# Patient Record
Sex: FEMALE | Race: WHITE | Employment: OTHER | URBAN - METROPOLITAN AREA
[De-identification: names, ages, dates, MRNs, and addresses within clinical notes are randomized per-mention and may not be internally consistent; named-entity substitution may affect disease eponyms.]

---

## 2017-03-21 DIAGNOSIS — K58.9 IRRITABLE BOWEL SYNDROME WITHOUT DIARRHEA: ICD-10-CM

## 2017-03-21 NOTE — TELEPHONE ENCOUNTER
Senna Lax 8.6 mg tab       Last Written Prescription Date: 03/10/16  Last Fill Quantity: 120,  # refills: 6   [place] Office Visit with [facility] [place] or [facility] prescribing provider: 08/03/16 Dr. Craig

## 2017-03-22 RX ORDER — SENNOSIDES A AND B 8.6 MG/1
2 TABLET, FILM COATED ORAL 2 TIMES DAILY
Qty: 120 TABLET | Refills: 6 | Status: SHIPPED | OUTPATIENT
Start: 2017-03-22 | End: 2017-06-26

## 2017-03-22 NOTE — TELEPHONE ENCOUNTER
Prescription approved per [facility] Refill Protocol  Hailey Ruiz RN BS

## 2017-03-28 ENCOUNTER — TELEPHONE (OUTPATIENT)
Dept: FAMILY MEDICINE | Facility: CLINIC | Age: 72
End: 2017-03-28

## 2017-03-28 DIAGNOSIS — I10 BENIGN ESSENTIAL HYPERTENSION: Primary | ICD-10-CM

## 2017-03-28 RX ORDER — LISINOPRIL 40 MG/1
40 TABLET ORAL DAILY
Qty: 90 TABLET | Refills: 0 | Status: SHIPPED | OUTPATIENT
Start: 2017-03-28 | End: 2017-06-26

## 2017-03-28 NOTE — TELEPHONE ENCOUNTER
captopril (CAPOTEN) 50 MG tablet 180 tablet 3 8/3/2016  No   Sig: Take 1 tablet (50 mg) by mouth 2 times daily     Pt calling in, states her Capoten is not covered any longer (was >$200)  Wondering if she could get rx for Lisinopril instead    Route to     CB# [phone] OK to JERRI Ruiz RN, BS  Clinical Nurse Triage.

## 2017-04-07 DIAGNOSIS — G89.29 CHRONIC LOW BACK PAIN WITH SCIATICA, SCIATICA LATERALITY UNSPECIFIED, UNSPECIFIED BACK PAIN LATERALITY: ICD-10-CM

## 2017-04-07 DIAGNOSIS — M54.40 CHRONIC LOW BACK PAIN WITH SCIATICA, SCIATICA LATERALITY UNSPECIFIED, UNSPECIFIED BACK PAIN LATERALITY: ICD-10-CM

## 2017-04-07 RX ORDER — GABAPENTIN 300 MG/1
CAPSULE ORAL
Qty: 90 CAPSULE | Refills: 0 | Status: SHIPPED | OUTPATIENT
Start: 2017-04-07 | End: 2017-05-31

## 2017-04-07 NOTE — TELEPHONE ENCOUNTER
gabapentin (NEURONTIN) 300 MG capsule      Sig: Take 1 capsule (300 mg) by mouth 3 times daily Due for fasting       Last Written Prescription Date: 12/7/16  Last Quantity: 90, # refills: 3  Last Office Visit with [facility], [facility] or [facility] prescribing provider: 8/3/2016       Creatinine   Date Value Ref Range Status   08/03/2016 0.72 0.52 - 1.04 mg/dL Final     Lab Results   Component Value Date    AST 15 08/03/2016     Lab Results   Component Value Date    ALT 23 08/03/2016     BP Readings from Last 3 Encounters:   08/03/16 132/74   06/23/15 110/64   06/12/15 118/64         Routing refill request to provider for review/approval because:  Drug not on the [facility], [facility] or  [facility] refill protocol or controlled substance    RAJWINDER Moyer  April 7, 2017  8:22 AM

## 2017-04-07 NOTE — LETTER
[facility]  [id number] [place] [id number] [phone]          April 7, 2017    Claudia Simon                                                                                                                     [address] [facility] [address] [place] [id number]            Dear Claudia,    After reviewing your chart, it has come to our attention that you are due for an office visit for a Diabetes check and medication review.    Please call our clinic at: [phone] to schedule an appointment.              Sincerely,         Sekou Craig MD/jesusita

## 2017-04-07 NOTE — TELEPHONE ENCOUNTER
Routing refill request to provider for review/approval because:  Drug not on the [facility] refill protocol     Letter sent, patient due for office visit.    Meghan Gunter RN

## 2017-05-16 ENCOUNTER — TELEPHONE (OUTPATIENT)
Dept: FAMILY MEDICINE | Facility: CLINIC | Age: 72
End: 2017-05-16

## 2017-05-16 NOTE — TELEPHONE ENCOUNTER
Panel Management Review      Patient has the following on her problem list:     Diabetes    ASA: Passed    Last A1C  Lab Results   Component Value Date    A1C 7.4 06/23/2015     A1C tested: Passed    Last LDL:    Lab Results   Component Value Date    CHOL 247 08/03/2016     Lab Results   Component Value Date    HDL 31 08/03/2016     Lab Results   Component Value Date     08/03/2016     Lab Results   Component Value Date    TRIG 360 08/03/2016     Lab Results   Component Value Date    CHOLHDLRATIO 6.7 06/12/2015     Lab Results   Component Value Date    NHDL 216 08/03/2016       Is the patient on a Statin? YES             Is the patient on Aspirin? YES    Medications     HMG CoA Reductase Inhibitors    atorvastatin (LIPITOR) 10 MG tablet    Salicylates    ASPIRIN 81 MG OR TABS          Last three blood pressure readings:  BP Readings from Last 3 Encounters:   08/03/16 132/74   06/23/15 110/64   06/12/15 118/64       Date of last diabetes office visit:      Tobacco History:     History   Smoking Status     Former Smoker     Years: 34.00     Quit date: 2/12/2009   Smokeless Tobacco     Never Used           Composite cancer screening  Chart review shows that this patient is due/due soon for the following Mammogram  Summary:    Patient is due/failing the following:   MAMMOGRAM    Action needed:   Patient needs office visit for Mammo.    Type of outreach:    Phone, spoke to patient.  sent to scheduling to make a Mammo Appt    Questions for provider review:    Jarrod Watson      Chart routed to none  .

## 2017-05-31 DIAGNOSIS — M54.40 CHRONIC LOW BACK PAIN WITH SCIATICA, SCIATICA LATERALITY UNSPECIFIED, UNSPECIFIED BACK PAIN LATERALITY: ICD-10-CM

## 2017-05-31 DIAGNOSIS — G89.29 CHRONIC LOW BACK PAIN WITH SCIATICA, SCIATICA LATERALITY UNSPECIFIED, UNSPECIFIED BACK PAIN LATERALITY: ICD-10-CM

## 2017-06-01 RX ORDER — GABAPENTIN 300 MG/1
CAPSULE ORAL
Qty: 90 CAPSULE | Refills: 0 | Status: SHIPPED | OUTPATIENT
Start: 2017-06-01 | End: 2017-06-26

## 2017-06-01 NOTE — TELEPHONE ENCOUNTER
Gabapentin 300mg       Last Written Prescription Date: 04/07/17  Last Fill Quantity: 90,  # refills: 0   Last Office Visit with [facility], P or [facility] prescribing provider: 08/03/16 dr. Craig                                         Next 5 appointments (look out 90 days)     Jun 26, 2017  9:30 AM CDT   SHORT with Sekou Craig MD   [facility]    [address] [place] [id number] [phone]

## 2017-06-01 NOTE — TELEPHONE ENCOUNTER
Routing refill request to provider for review/approval because:  Drug not on the [facility] refill protocol     Hailey Ruiz RN, BS  Clinical Nurse Triage.

## 2017-06-26 ENCOUNTER — OFFICE VISIT (OUTPATIENT)
Dept: FAMILY MEDICINE | Facility: CLINIC | Age: 72
End: 2017-06-26
Payer: MEDICARE

## 2017-06-26 ENCOUNTER — RADIANT APPOINTMENT (OUTPATIENT)
Dept: MAMMOGRAPHY | Facility: CLINIC | Age: 72
End: 2017-06-26
Payer: MEDICARE

## 2017-06-26 VITALS
DIASTOLIC BLOOD PRESSURE: 78 MMHG | OXYGEN SATURATION: 96 % | HEART RATE: 77 BPM | TEMPERATURE: 97.7 F | WEIGHT: 165.1 LBS | BODY MASS INDEX: 28.19 KG/M2 | HEIGHT: 64 IN | SYSTOLIC BLOOD PRESSURE: 129 MMHG | RESPIRATION RATE: 14 BRPM

## 2017-06-26 DIAGNOSIS — G89.29 CHRONIC LOW BACK PAIN WITH SCIATICA, SCIATICA LATERALITY UNSPECIFIED, UNSPECIFIED BACK PAIN LATERALITY: ICD-10-CM

## 2017-06-26 DIAGNOSIS — K58.1 IRRITABLE BOWEL SYNDROME WITH CONSTIPATION: ICD-10-CM

## 2017-06-26 DIAGNOSIS — I10 ESSENTIAL HYPERTENSION, BENIGN: Primary | ICD-10-CM

## 2017-06-26 DIAGNOSIS — E11.40 TYPE 2 DIABETES MELLITUS WITH DIABETIC NEUROPATHY, WITHOUT LONG-TERM CURRENT USE OF INSULIN (H): ICD-10-CM

## 2017-06-26 DIAGNOSIS — E78.5 HYPERLIPIDEMIA LDL GOAL <130: ICD-10-CM

## 2017-06-26 DIAGNOSIS — Z12.31 VISIT FOR SCREENING MAMMOGRAM: ICD-10-CM

## 2017-06-26 DIAGNOSIS — I10 BENIGN ESSENTIAL HYPERTENSION: ICD-10-CM

## 2017-06-26 DIAGNOSIS — Z23 NEED FOR TDAP VACCINATION: ICD-10-CM

## 2017-06-26 DIAGNOSIS — E66.01 MORBID OBESITY DUE TO EXCESS CALORIES (H): ICD-10-CM

## 2017-06-26 DIAGNOSIS — M54.40 CHRONIC LOW BACK PAIN WITH SCIATICA, SCIATICA LATERALITY UNSPECIFIED, UNSPECIFIED BACK PAIN LATERALITY: ICD-10-CM

## 2017-06-26 DIAGNOSIS — K58.9 IRRITABLE BOWEL SYNDROME WITHOUT DIARRHEA: ICD-10-CM

## 2017-06-26 LAB
ALBUMIN SERPL-MCNC: 3.8 G/DL (ref 3.4–5)
ALP SERPL-CCNC: 60 U/L (ref 40–150)
ALT SERPL W P-5'-P-CCNC: 26 U/L (ref 0–50)
ANION GAP SERPL CALCULATED.3IONS-SCNC: 7 MMOL/L (ref 3–14)
AST SERPL W P-5'-P-CCNC: 14 U/L (ref 0–45)
BILIRUB SERPL-MCNC: 1 MG/DL (ref 0.2–1.3)
BUN SERPL-MCNC: 14 MG/DL (ref 7–30)
CALCIUM SERPL-MCNC: 9.7 MG/DL (ref 8.5–10.1)
CHLORIDE SERPL-SCNC: 107 MMOL/L (ref 94–109)
CHOLEST SERPL-MCNC: 171 MG/DL
CO2 SERPL-SCNC: 25 MMOL/L (ref 20–32)
CREAT SERPL-MCNC: 0.78 MG/DL (ref 0.52–1.04)
CREAT UR-MCNC: 139 MG/DL
GFR SERPL CREATININE-BSD FRML MDRD: 72 ML/MIN/1.7M2
GLUCOSE SERPL-MCNC: 132 MG/DL (ref 70–99)
HBA1C MFR BLD: 6.5 % (ref 4.3–6)
HDLC SERPL-MCNC: 40 MG/DL
LDLC SERPL CALC-MCNC: 84 MG/DL
MICROALBUMIN UR-MCNC: 7 MG/L
MICROALBUMIN/CREAT UR: 4.98 MG/G CR (ref 0–25)
NONHDLC SERPL-MCNC: 131 MG/DL
POTASSIUM SERPL-SCNC: 4.6 MMOL/L (ref 3.4–5.3)
PROT SERPL-MCNC: 7.3 G/DL (ref 6.8–8.8)
SODIUM SERPL-SCNC: 139 MMOL/L (ref 133–144)
TRIGL SERPL-MCNC: 234 MG/DL
TSH SERPL DL<=0.05 MIU/L-ACNC: 4.64 MU/L (ref 0.4–4)

## 2017-06-26 PROCEDURE — 99214 OFFICE O/P EST MOD 30 MIN: CPT | Mod: 25 | Performed by: FAMILY MEDICINE

## 2017-06-26 PROCEDURE — 84443 ASSAY THYROID STIM HORMONE: CPT | Performed by: FAMILY MEDICINE

## 2017-06-26 PROCEDURE — 90715 TDAP VACCINE 7 YRS/> IM: CPT | Performed by: FAMILY MEDICINE

## 2017-06-26 PROCEDURE — G0202 SCR MAMMO BI INCL CAD: HCPCS | Mod: TC

## 2017-06-26 PROCEDURE — 80053 COMPREHEN METABOLIC PANEL: CPT | Performed by: FAMILY MEDICINE

## 2017-06-26 PROCEDURE — 90471 IMMUNIZATION ADMIN: CPT | Performed by: FAMILY MEDICINE

## 2017-06-26 PROCEDURE — 36415 COLL VENOUS BLD VENIPUNCTURE: CPT | Performed by: FAMILY MEDICINE

## 2017-06-26 PROCEDURE — 80061 LIPID PANEL: CPT | Performed by: FAMILY MEDICINE

## 2017-06-26 PROCEDURE — 82043 UR ALBUMIN QUANTITATIVE: CPT | Performed by: FAMILY MEDICINE

## 2017-06-26 PROCEDURE — 83036 HEMOGLOBIN GLYCOSYLATED A1C: CPT | Performed by: FAMILY MEDICINE

## 2017-06-26 RX ORDER — GABAPENTIN 300 MG/1
CAPSULE ORAL
Qty: 90 CAPSULE | Refills: 0 | Status: SHIPPED | OUTPATIENT
Start: 2017-06-26 | End: 2017-07-31

## 2017-06-26 RX ORDER — LISINOPRIL 40 MG/1
40 TABLET ORAL DAILY
Qty: 90 TABLET | Refills: 3 | Status: SHIPPED | OUTPATIENT
Start: 2017-06-26 | End: 2018-07-27

## 2017-06-26 RX ORDER — SENNOSIDES A AND B 8.6 MG/1
2 TABLET, FILM COATED ORAL 2 TIMES DAILY
Qty: 120 TABLET | Refills: 6 | Status: SHIPPED | OUTPATIENT
Start: 2017-06-26 | End: 2018-07-19

## 2017-06-26 RX ORDER — METOPROLOL SUCCINATE 25 MG/1
25 TABLET, EXTENDED RELEASE ORAL DAILY
Qty: 90 TABLET | Refills: 1 | Status: SHIPPED | OUTPATIENT
Start: 2017-06-26 | End: 2017-12-31

## 2017-06-26 RX ORDER — ATORVASTATIN CALCIUM 10 MG/1
10 TABLET, FILM COATED ORAL DAILY
Qty: 90 TABLET | Refills: 3 | Status: SHIPPED | OUTPATIENT
Start: 2017-06-26 | End: 2018-07-27

## 2017-06-26 NOTE — NURSING NOTE
"No chief complaint on file.      Initial /78 (BP Location: Left arm, Patient Position: Chair, Cuff Size: Adult Regular)  Pulse 77  Temp 97.7  F (36.5  C) (Oral)  Resp 14  Ht 5' 4\" (1.626 m)  Wt 165 lb 1.6 oz (74.9 kg)  SpO2 96%  BMI 28.34 kg/m2 Estimated body mass index is 28.34 kg/(m^2) as calculated from the following:    Height as of this encounter: 5' 4\" (1.626 m).    Weight as of this encounter: 165 lb 1.6 oz (74.9 kg).  Medication Reconciliation: complete   Krystian Burnett CMA       "

## 2017-06-26 NOTE — MR AVS SNAPSHOT
"              After Visit Summary   6/26/2017    Claudia Simon    MRN: [id number]           Patient Information     Date Of Birth          1945        Visit Information        Provider Department      6/26/2017 9:30 AM Sekou Craig MD [facility]        Today's Diagnoses     Essential hypertension, benign    -  1    Type 2 diabetes mellitus with diabetic neuropathy, without long-term current use of insulin (H)        Irritable bowel syndrome with constipation        Morbid obesity due to excess calories (H)        Hyperlipidemia LDL goal <130        Chronic low back pain with sciatica, sciatica laterality unspecified, unspecified back pain laterality        Benign essential hypertension        Irritable bowel syndrome without diarrhea           Follow-ups after your visit        Who to contact     If you have questions or need follow up information about today's clinic visit or your schedule please contact [facility] directly at [phone].  Normal or non-critical lab and imaging results will be communicated to you by MyChart, letter or phone within 4 business days after the clinic has received the results. If you do not hear from us within 7 days, please contact the clinic through MyChart or phone. If you have a critical or abnormal lab result, we will notify you by phone as soon as possible.  Submit refill requests through [facility] or call your pharmacy and they will forward the refill request to us. Please allow 3 business days for your refill to be completed.          Additional Information About Your Visit        MyChart Information     [facility] lets you send messages to your doctor, view your test results, renew your prescriptions, schedule appointments and more. To sign up, go to [email or web address] . Click on \"Log in\" on the left side of the screen, which will take you to the Welcome page. Then click on \"Sign up Now\" on the right side of the page. " "    You will be asked to enter the access code listed below, as well as some personal information. Please follow the directions to create your username and password.     Your access code is: [email or web address]  Expires: 2017 10:17 AM     Your access code will  in 90 days. If you need help or a new code, please call your [facility] clinic or [phone].        Care EveryWhere ID     This is your Care EveryWhere ID. This could be used by other organizations to access your [facility] medical records  [id number]        Your Vitals Were     Pulse Temperature Respirations Height Pulse Oximetry BMI (Body Mass Index)    77 97.7  F (36.5  C) (Oral) 14 5' 4\" (1.626 m) 96% 28.34 kg/m2       Blood Pressure from Last 3 Encounters:   17 129/78   16 132/74   06/23/15 110/64    Weight from Last 3 Encounters:   17 165 lb 1.6 oz (74.9 kg)   16 168 lb (76.2 kg)   06/23/15 173 lb (78.5 kg)              We Performed the Following     Albumin Random Urine Quantitative     Comprehensive metabolic panel     Hemoglobin A1c     Lipid panel reflex to direct LDL     TSH          Today's Medication Changes          These changes are accurate as of: 17 10:22 AM.  If you have any questions, ask your nurse or doctor.               These medicines have changed or have updated prescriptions.        Dose/Directions    gabapentin 300 MG capsule   Commonly known as:  NEURONTIN   This may have changed:  See the new instructions.   Used for:  Chronic low back pain with sciatica, sciatica laterality unspecified, unspecified back pain laterality   Changed by:  Sekou Craig MD        TAKE ONE CAPSULE BY MOUTH THREE TIMES DAILY   Quantity:  90 capsule   Refills:  0            Where to get your medicines      These medications were sent to [id number] - [place], [place] - [address]  [address] [facility] [address]    Hours:  9Am-9Pm (M-F) 9Am-6Pm (S&S) Phone:  [phone]     atorvastatin 10 MG " tablet    gabapentin 300 MG capsule    lisinopril 40 MG tablet    metoprolol 25 MG 24 hr tablet    senna 8.6 MG tablet                Primary Care Provider Office Phone # Fax #    Sekou Craig [phone]       [facility] [address] [facility] [address]        Equal Access to Services     ELODIA LANDEROS : Hadii aad ku hadasho Soomaali, waaxda luqadaha, qaybta kaalmada adeegyada, waxay idiin hayaan adeeg kharash la'aan ah. So [facility] [phone].    ATENCIÓN: Si habla español, tiene a catherine disposición servicios gratuitos de asistencia lingüística. Kristie al [phone].    We comply with applicable federal civil rights laws and [place] laws. We do not discriminate on the basis of race, color, national origin, age, disability sex, sexual orientation or gender identity.            Thank you!     Thank you for choosing [facility]  for your care. Our goal is always to provide you with excellent care. Hearing back from our patients is one way we can continue to improve our services. Please take a few minutes to complete the written survey that you may receive in the mail after your visit with us. Thank you!             Your Updated Medication List - Protect others around you: Learn how to safely use, store and throw away your medicines at www.disposemymeds.org.          This list is accurate as of: 6/26/17 10:22 AM.  Always use your most recent med list.                   Brand Name Dispense Instructions for use Diagnosis    aspirin 81 MG tablet     100    ONE DAILY    Disorder of bone and cartilage, unspecified       atorvastatin 10 MG tablet    LIPITOR    90 tablet    Take 1 tablet (10 mg) by mouth daily    Hyperlipidemia LDL goal <130       gabapentin 300 MG capsule    NEURONTIN    90 capsule    TAKE ONE CAPSULE BY MOUTH THREE TIMES DAILY    Chronic low back pain with sciatica, sciatica laterality unspecified, unspecified back pain laterality        lisinopril 40 MG tablet    PRINIVIL/ZESTRIL    90 tablet    Take 1 tablet (40 mg) by mouth daily    Benign essential hypertension       metoprolol 25 MG 24 hr tablet    TOPROL XL    90 tablet    Take 1 tablet (25 mg) by mouth daily Due for office visit    Essential hypertension, benign       senna 8.6 MG tablet    SENNA LAX    120 tablet    Take 2 tablets by mouth 2 times daily    Irritable bowel syndrome without diarrhea

## 2017-06-26 NOTE — PROGRESS NOTES
"  SUBJECTIVE:                                                    Claudia Simon is a 72 year old female who presents to clinic today for the following health issues:      Medication Followup of all medications    Taking Medication as prescribed: yes    Side Effects:  Not sure    Medication Helping Symptoms:  Not sure   Has a prickly sensation acress her back like wearing a TENS unit     PROBLEMS TO ADD ON...    Problem list and histories reviewed & adjusted, as indicated.  Additional history: as documented    Current Outpatient Prescriptions   Medication Sig Dispense Refill     gabapentin (NEURONTIN) 300 MG capsule TAKE ONE CAPSULE BY MOUTH THREE TIMES DAILY  90 capsule 0     lisinopril (PRINIVIL/ZESTRIL) 40 MG tablet Take 1 tablet (40 mg) by mouth daily 90 tablet 0     senna (SENNA LAX) 8.6 MG tablet Take 2 tablets by mouth 2 times daily 120 tablet 6     atorvastatin (LIPITOR) 10 MG tablet Take 1 tablet (10 mg) by mouth daily 90 tablet 3     metoprolol (TOPROL XL) 25 MG 24 hr tablet Take 1 tablet (25 mg) by mouth daily Due for office visit 90 tablet 3     ASPIRIN 81 MG OR TABS ONE DAILY 100 3       Reviewed and updated as needed this visit by clinical staff       Reviewed and updated as needed this visit by Provider         ROS:  Constitutional, HEENT, cardiovascular, pulmonary, GI, , musculoskeletal, neuro, skin, endocrine and psych systems are negative, except as otherwise noted.    OBJECTIVE:     /78 (BP Location: Left arm, Patient Position: Chair, Cuff Size: Adult Regular)  Pulse 77  Temp 97.7  F (36.5  C) (Oral)  Resp 14  Ht 5' 4\" (1.626 m)  Wt 165 lb 1.6 oz (74.9 kg)  SpO2 96%  BMI 28.34 kg/m2  Body mass index is 28.34 kg/(m^2).   GENERAL: healthy, alert and no distress  EYES: Eyes grossly normal to inspection, PERRL and conjunctivae and sclerae normal  HENT: ear canals and TM's normal, nose and mouth without ulcers or lesions  NECK: no adenopathy, no asymmetry, masses, or scars and thyroid " normal to palpation  RESP: lungs clear to auscultation - no rales, rhonchi or wheezes  BREAST: normal without masses, tenderness or nipple discharge and no palpable axillary masses or adenopathy  CV: regular rate and rhythm, normal S1 S2, no S3 or S4, no murmur, click or rub, no peripheral edema and peripheral pulses strong  ABDOMEN: soft, nontender, no hepatosplenomegaly, no masses and bowel sounds normal  MS: no gross musculoskeletal defects noted, no edema  SKIN: no suspicious lesions or rashes  NEURO: Normal strength and tone, mentation intact and speech normal  PSYCH: mentation appears normal, affect normal/bright        ASSESSMENT:   DIABETES TYPE 2, non-insulin dependent, controlled. Associated with the following: Renal Complications: CKD  HYPERLIPIDEMIA, controlled  HYPERTENSION, controlled. Associated with the following complications: None  HYPOTHYROIDISM, borderlin  e euthyroid                                 VASCULAR DISEASE, Coronary Artery Disease (CAD); controlled. Associated with the following complications: None      PLAN:   (I10) Essential hypertension, benign  (primary encounter diagnosis)  Comment:   Plan: at goal, work on exercise     (E11.40) Type 2 diabetes mellitus with diabetic neuropathy, without long-term current use of insulin (H)  Comment:   Plan: good control    (K58.1) Irritable bowel syndrome with constipation  Comment:   Plan: occasional now    (E66.01) Morbid obesity due to excess calories (H)  Comment:   Plan: diabetes, osteoarthritis ,     (E78.5) Hyperlipidemia LDL goal <130  Comment:   Plan: Body mass index is 28.34 kg/(m^2).            Sekou Craig MD  [facility]

## 2017-06-26 NOTE — LETTER
[facility]  [address] [place], [place], [address]  [phone]      June 27, 2017    Claudia Simon  [address] [facility] [address]  [place] [address]          Dear Claudia,    The results of your recent tests are back. Labs look at your baseline, See me in November. Have a great summer.  Enclosed is a copy of the results.  It was a pleasure to see you at your last appointment.  Results for orders placed or performed in visit on 06/26/17   TSH   Result Value Ref Range    TSH 4.64 (H) 0.40 - 4.00 mU/L   Lipid panel reflex to direct LDL   Result Value Ref Range    Cholesterol 171 <200 mg/dL    Triglycerides 234 (H) <150 mg/dL    HDL Cholesterol 40 (L) >49 mg/dL    LDL Cholesterol Calculated 84 <100 mg/dL    Non HDL Cholesterol 131 (H) <130 mg/dL   Comprehensive metabolic panel   Result Value Ref Range    Sodium 139 133 - 144 mmol/L    Potassium 4.6 3.4 - 5.3 mmol/L    Chloride 107 94 - 109 mmol/L    Carbon Dioxide 25 20 - 32 mmol/L    Anion Gap 7 3 - 14 mmol/L    Glucose 132 (H) 70 - 99 mg/dL    Urea Nitrogen 14 7 - 30 mg/dL    Creatinine 0.78 0.52 - 1.04 mg/dL    GFR Estimate 72 >60 mL/min/1.7m2    GFR Estimate If Black 87 >60 mL/min/1.7m2    Calcium 9.7 8.5 - 10.1 mg/dL    Bilirubin Total 1.0 0.2 - 1.3 mg/dL    Albumin 3.8 3.4 - 5.0 g/dL    Protein Total 7.3 6.8 - 8.8 g/dL    Alkaline Phosphatase 60 40 - 150 U/L    ALT 26 0 - 50 U/L    AST 14 0 - 45 U/L   Hemoglobin A1c   Result Value Ref Range    Hemoglobin A1C 6.5 (H) 4.3 - 6.0 %   Albumin Random Urine Quantitative   Result Value Ref Range    Creatinine Urine 139 mg/dL    Albumin Urine mg/L 7 mg/L    Albumin Urine mg/g Cr 4.98 0 - 25 mg/g Cr     If you have any questions or concerns, please call myself or my nurse at [phone].          Sincerely,    Sekou Craig MD  [id number]

## 2017-07-31 DIAGNOSIS — M54.40 CHRONIC LOW BACK PAIN WITH SCIATICA, SCIATICA LATERALITY UNSPECIFIED, UNSPECIFIED BACK PAIN LATERALITY: ICD-10-CM

## 2017-07-31 DIAGNOSIS — G89.29 CHRONIC LOW BACK PAIN WITH SCIATICA, SCIATICA LATERALITY UNSPECIFIED, UNSPECIFIED BACK PAIN LATERALITY: ICD-10-CM

## 2017-07-31 NOTE — TELEPHONE ENCOUNTER
Gabapentin Oral Capsule 300 MG-pt would like 90 days-not pills please        Last Written Prescription Date: 06/26/17  Last Fill Quantity: 90,  # refills: 0   Last Office Visit with [facility], [facility] or [facility] prescribing provider: 06/26/17 Dr. Craig

## 2017-08-04 RX ORDER — GABAPENTIN 300 MG/1
CAPSULE ORAL
Qty: 270 CAPSULE | Refills: 1 | Status: SHIPPED | OUTPATIENT
Start: 2017-08-04 | End: 2018-01-23

## 2017-08-17 ENCOUNTER — TELEPHONE (OUTPATIENT)
Dept: FAMILY MEDICINE | Facility: CLINIC | Age: 72
End: 2017-08-17

## 2017-08-17 NOTE — TELEPHONE ENCOUNTER
Panel Management Review      Patient has the following on her problem list:     Diabetes    ASA: Passed    Last A1C  Lab Results   Component Value Date    A1C 6.5 06/26/2017    A1C 7.4 06/23/2015     A1C tested: Passed    Last LDL:    Lab Results   Component Value Date    CHOL 171 06/26/2017     Lab Results   Component Value Date    HDL 40 06/26/2017     Lab Results   Component Value Date    LDL 84 06/26/2017     Lab Results   Component Value Date    TRIG 234 06/26/2017     Lab Results   Component Value Date    CHOLHDLRATIO 6.7 06/12/2015     Lab Results   Component Value Date    NHDL 131 06/26/2017       Is the patient on a Statin? YES             Is the patient on Aspirin? YES    Medications     HMG CoA Reductase Inhibitors    atorvastatin (LIPITOR) 10 MG tablet    Salicylates    ASPIRIN 81 MG OR TABS          Last three blood pressure readings:  BP Readings from Last 3 Encounters:   06/26/17 129/78   08/03/16 132/74   06/23/15 110/64       Date of last diabetes office visit: 6/26/17     Tobacco History:     History   Smoking Status     Former Smoker     Years: 34.00     Quit date: 2/12/2009   Smokeless Tobacco     Never Used         Hypertension   Last three blood pressure readings:  BP Readings from Last 3 Encounters:   06/26/17 129/78   08/03/16 132/74   06/23/15 110/64     Blood pressure: Passed    HTN Guidelines:  Age 18-59 BP range:  Less than 140/90  Age 60-85 with Diabetes:  Less than 140/90  Age 60-85 without Diabetes:  less than 150/90          Composite cancer screening  Chart review shows that this patient is due/due soon for the following Colonoscopy  Summary:    Patient is due/failing the following:   COLONOSCOPY    Action needed:   Patient needs referral/order: colonoscopy    Type of outreach:    Phone, left message for patient to call back.     Questions for provider review:    None                                                                                                                                     Krystian Burnett [facility]        Chart routed to Care Team .

## 2017-09-16 ENCOUNTER — HEALTH MAINTENANCE LETTER (OUTPATIENT)
Age: 72
End: 2017-09-16

## 2017-12-31 DIAGNOSIS — I10 ESSENTIAL HYPERTENSION, BENIGN: ICD-10-CM

## 2018-01-03 RX ORDER — METOPROLOL SUCCINATE 25 MG/1
TABLET, EXTENDED RELEASE ORAL
Qty: 90 TABLET | Refills: 1 | Status: SHIPPED | OUTPATIENT
Start: 2018-01-03 | End: 2018-06-30

## 2018-01-03 NOTE — TELEPHONE ENCOUNTER
Requested Prescriptions   Pending Prescriptions Disp Refills     metoprolol (TOPROL-XL) 25 MG 24 hr tablet [Pharmacy Med Name: Metoprolol Succinate ER Oral Tablet Extended Release 24 Hour 25 MG] 90 tablet 0    Last Written Prescription Date:  6/26/17  Last Fill Quantity: 90,  # refills: 1   Last Office Visit with [facility], [facility] or [facility] prescribing provider:  6/26/2017   Future Office Visit:      Sig: TAKE ONE TABLET BY MOUTH DAILY.NEED TO SEE DOCTOR.    Beta-Blockers Protocol Passed    12/31/2017  7:00 AM       Passed - Blood pressure under 140/90    BP Readings from Last 3 Encounters:   06/26/17 129/78   08/03/16 132/74   06/23/15 110/64          Passed - Patient is age 6 or older       Passed - Recent or future visit with authorizing provider's specialty    Patient had office visit in the last year or has a visit in the next 30 days with authorizing provider.  See chart review.

## 2018-01-03 NOTE — TELEPHONE ENCOUNTER
Prescription approved per [facility] Refill Protocol.    Abi Pierre RN -- [facility]

## 2018-01-23 DIAGNOSIS — M54.40 CHRONIC LOW BACK PAIN WITH SCIATICA, SCIATICA LATERALITY UNSPECIFIED, UNSPECIFIED BACK PAIN LATERALITY: ICD-10-CM

## 2018-01-23 DIAGNOSIS — G89.29 CHRONIC LOW BACK PAIN WITH SCIATICA, SCIATICA LATERALITY UNSPECIFIED, UNSPECIFIED BACK PAIN LATERALITY: ICD-10-CM

## 2018-01-23 RX ORDER — GABAPENTIN 300 MG/1
CAPSULE ORAL
Qty: 270 CAPSULE | Refills: 0 | Status: SHIPPED | OUTPATIENT
Start: 2018-01-23 | End: 2018-04-23

## 2018-01-23 NOTE — TELEPHONE ENCOUNTER
Controlled Substance Refill Request for gabapentin (NEURONTIN) 300 MG capsule  Problem List Complete:  No     PROVIDER TO CONSIDER COMPLETION OF PROBLEM LIST AND OVERVIEW/CONTROLLED SUBSTANCE AGREEMENT    Last Written Prescription Date:  08/04/2017  Last Fill Quantity: 270 CAPSULE,   # refills: 1    Last Office Visit with G primary care provider: 06/26/2017    Future Office visit:     Controlled substance agreement on file: No.     Processing:  Fax Rx to [facility] pharmacy     checked in past 6 months?  No, route to GUERO PURDY

## 2018-01-25 ENCOUNTER — TELEPHONE (OUTPATIENT)
Dept: FAMILY MEDICINE | Facility: CLINIC | Age: 73
End: 2018-01-25

## 2018-01-25 NOTE — TELEPHONE ENCOUNTER
Panel Management Review      Patient has the following on her problem list:     Diabetes    ASA: Passed    Last A1C  Lab Results   Component Value Date    A1C 6.5 06/26/2017    A1C 7.4 06/23/2015     A1C tested: Passed    Last LDL:    Lab Results   Component Value Date    CHOL 171 06/26/2017     Lab Results   Component Value Date    HDL 40 06/26/2017     Lab Results   Component Value Date    LDL 84 06/26/2017     Lab Results   Component Value Date    TRIG 234 06/26/2017     Lab Results   Component Value Date    CHOLHDLRATIO 6.7 06/12/2015     Lab Results   Component Value Date    NHDL 131 06/26/2017       Is the patient on a Statin? YES             Is the patient on Aspirin? YES    Medications     HMG CoA Reductase Inhibitors    atorvastatin (LIPITOR) 10 MG tablet    Salicylates    ASPIRIN 81 MG OR TABS          Last three blood pressure readings:  BP Readings from Last 3 Encounters:   06/26/17 129/78   08/03/16 132/74   06/23/15 110/64       Date of last diabetes office visit: 6/23/15     Tobacco History:     History   Smoking Status     Former Smoker     Years: 34.00     Quit date: 2/12/2009   Smokeless Tobacco     Never Used         Hypertension   Last three blood pressure readings:  BP Readings from Last 3 Encounters:   06/26/17 129/78   08/03/16 132/74   06/23/15 110/64     Blood pressure: Passed    HTN Guidelines:  Age 18-59 BP range:  Less than 140/90  Age 60-85 with Diabetes:  Less than 140/90  Age 60-85 without Diabetes:  less than 150/90      Composite cancer screening  Chart review shows that this patient is due/due soon for the following Colonoscopy  Summary:    Patient is due/failing the following:   COLONOSCOPY    Action needed:   Patient needs referral/order: colonoscopy    Type of outreach:    Phone, spoke to patient.  patient declined    Questions for provider review:    None                                                                                                                                     Krystian Burnett CMA        Chart routed to none .

## 2018-01-29 ENCOUNTER — TRANSFERRED RECORDS (OUTPATIENT)
Dept: HEALTH INFORMATION MANAGEMENT | Facility: CLINIC | Age: 73
End: 2018-01-29

## 2018-04-23 DIAGNOSIS — M54.40 CHRONIC LOW BACK PAIN WITH SCIATICA, SCIATICA LATERALITY UNSPECIFIED, UNSPECIFIED BACK PAIN LATERALITY: ICD-10-CM

## 2018-04-23 DIAGNOSIS — G89.29 CHRONIC LOW BACK PAIN WITH SCIATICA, SCIATICA LATERALITY UNSPECIFIED, UNSPECIFIED BACK PAIN LATERALITY: ICD-10-CM

## 2018-04-24 NOTE — TELEPHONE ENCOUNTER
Requested Prescriptions   Pending Prescriptions Disp Refills     gabapentin (NEURONTIN) 300 MG capsule [Pharmacy Med Name: Gabapentin Oral Capsule 300 MG] 270 capsule 0     Sig: TAKE ONE CAPSULE BY MOUTH THREE TIMES DAILY    There is no refill protocol information for this order            Last Written Prescription Date:  1/23/18  Last Fill Quantity: 270 capsule,   # refills: 0  Last Office Visit: 6/26/17 (Kiara)  Future Office visit:       Routing refill request to provider for review/approval because:  Drug not on the [facility], P or [facility] refill protocol or controlled substance

## 2018-04-26 ENCOUNTER — TELEPHONE (OUTPATIENT)
Dept: FAMILY MEDICINE | Facility: CLINIC | Age: 73
End: 2018-04-26

## 2018-04-26 NOTE — TELEPHONE ENCOUNTER
Panel Management Review      Patient has the following on her problem list: None      Composite cancer screening  Chart review shows that this patient is due/due soon for the following Colonoscopy  Summary:    Patient is due/failing the following:   COLONOSCOPY    Action needed:   Patient needs referral/order: colonoscopy    Type of outreach:    Phone, left message for patient to call back.     Questions for provider review:    None                                                                                                                                    [facility]        Chart routed to Care Team .

## 2018-04-27 RX ORDER — GABAPENTIN 300 MG/1
CAPSULE ORAL
Qty: 270 CAPSULE | Refills: 0 | Status: SHIPPED | OUTPATIENT
Start: 2018-04-27 | End: 2018-07-27

## 2018-06-30 DIAGNOSIS — I10 ESSENTIAL HYPERTENSION, BENIGN: ICD-10-CM

## 2018-07-02 RX ORDER — METOPROLOL SUCCINATE 25 MG/1
TABLET, EXTENDED RELEASE ORAL
Qty: 30 TABLET | Refills: 0 | Status: SHIPPED | OUTPATIENT
Start: 2018-07-02 | End: 2018-07-27

## 2018-07-02 NOTE — TELEPHONE ENCOUNTER
Routing refill request to provider for review/approval because:  Bree given x1 and patient did not follow up, please advise  Patient needs to be seen because it has been more than 1 year since last office visit.    Hailey Ruiz RN, BS  Clinical Nurse Triage.

## 2018-07-19 DIAGNOSIS — K58.9 IRRITABLE BOWEL SYNDROME WITHOUT DIARRHEA: ICD-10-CM

## 2018-07-19 NOTE — TELEPHONE ENCOUNTER
Requested Prescriptions   Pending Prescriptions Disp Refills     EQL SENNA LAXATIVE 8.6 MG tablet [Pharmacy Med Name: EQL Senna Laxative Oral Tablet 8.6 MG] 120 tablet 5     Sig: TAKE TWO TABLETS BY MOUTH TWICE DAILY.    There is no refill protocol information for this order        Last Written Prescription Date:  6/26/17  Last Fill Quantity: 120 tablet,  # refills: 6   Last office visit: 6/26/2017 with prescribing provider:  Kiara Frazier Office Visit:

## 2018-07-20 RX ORDER — IBUPROFEN 50 MG/1.25
SUSPENSION, DROPS(FINAL DOSAGE FORM)(ML) ORAL
Qty: 120 TABLET | Refills: 5 | Status: SHIPPED | OUTPATIENT
Start: 2018-07-20 | End: 2019-01-01

## 2018-07-20 NOTE — TELEPHONE ENCOUNTER
Prescription approved per [facility] Refill Protocol.    Elda Chahal, RN  Patient Care Supervisor  [facility]  [phone]

## 2018-07-27 ENCOUNTER — OFFICE VISIT (OUTPATIENT)
Dept: FAMILY MEDICINE | Facility: CLINIC | Age: 73
End: 2018-07-27
Payer: MEDICARE

## 2018-07-27 ENCOUNTER — RADIANT APPOINTMENT (OUTPATIENT)
Dept: MAMMOGRAPHY | Facility: CLINIC | Age: 73
End: 2018-07-27
Payer: MEDICARE

## 2018-07-27 VITALS
SYSTOLIC BLOOD PRESSURE: 100 MMHG | HEART RATE: 66 BPM | TEMPERATURE: 97.7 F | OXYGEN SATURATION: 97 % | RESPIRATION RATE: 14 BRPM | BODY MASS INDEX: 27.49 KG/M2 | HEIGHT: 64 IN | WEIGHT: 161 LBS | DIASTOLIC BLOOD PRESSURE: 60 MMHG

## 2018-07-27 DIAGNOSIS — I10 BENIGN ESSENTIAL HYPERTENSION: ICD-10-CM

## 2018-07-27 DIAGNOSIS — E11.40 TYPE 2 DIABETES MELLITUS WITH DIABETIC NEUROPATHY, WITHOUT LONG-TERM CURRENT USE OF INSULIN (H): ICD-10-CM

## 2018-07-27 DIAGNOSIS — M54.40 CHRONIC LOW BACK PAIN WITH SCIATICA, SCIATICA LATERALITY UNSPECIFIED, UNSPECIFIED BACK PAIN LATERALITY: ICD-10-CM

## 2018-07-27 DIAGNOSIS — Z12.11 COLON CANCER SCREENING: Primary | ICD-10-CM

## 2018-07-27 DIAGNOSIS — E78.5 HYPERLIPIDEMIA LDL GOAL <130: ICD-10-CM

## 2018-07-27 DIAGNOSIS — I10 ESSENTIAL HYPERTENSION, BENIGN: ICD-10-CM

## 2018-07-27 DIAGNOSIS — G89.29 CHRONIC LOW BACK PAIN WITH SCIATICA, SCIATICA LATERALITY UNSPECIFIED, UNSPECIFIED BACK PAIN LATERALITY: ICD-10-CM

## 2018-07-27 DIAGNOSIS — Z12.31 VISIT FOR SCREENING MAMMOGRAM: ICD-10-CM

## 2018-07-27 LAB
ALBUMIN SERPL-MCNC: 4.1 G/DL (ref 3.4–5)
ALP SERPL-CCNC: 67 U/L (ref 40–150)
ALT SERPL W P-5'-P-CCNC: 19 U/L (ref 0–50)
ANION GAP SERPL CALCULATED.3IONS-SCNC: 4 MMOL/L (ref 3–14)
AST SERPL W P-5'-P-CCNC: 14 U/L (ref 0–45)
BILIRUB SERPL-MCNC: 0.9 MG/DL (ref 0.2–1.3)
BUN SERPL-MCNC: 11 MG/DL (ref 7–30)
CALCIUM SERPL-MCNC: 9.4 MG/DL (ref 8.5–10.1)
CHLORIDE SERPL-SCNC: 102 MMOL/L (ref 94–109)
CHOLEST SERPL-MCNC: 203 MG/DL
CO2 SERPL-SCNC: 29 MMOL/L (ref 20–32)
CREAT SERPL-MCNC: 0.77 MG/DL (ref 0.52–1.04)
GFR SERPL CREATININE-BSD FRML MDRD: 73 ML/MIN/1.7M2
GLUCOSE SERPL-MCNC: 111 MG/DL (ref 70–99)
HBA1C MFR BLD: 6.4 % (ref 0–5.6)
HDLC SERPL-MCNC: 40 MG/DL
LDLC SERPL CALC-MCNC: 131 MG/DL
NONHDLC SERPL-MCNC: 163 MG/DL
POTASSIUM SERPL-SCNC: 4.9 MMOL/L (ref 3.4–5.3)
PROT SERPL-MCNC: 7.7 G/DL (ref 6.8–8.8)
SODIUM SERPL-SCNC: 135 MMOL/L (ref 133–144)
TRIGL SERPL-MCNC: 162 MG/DL

## 2018-07-27 PROCEDURE — 80061 LIPID PANEL: CPT | Performed by: FAMILY MEDICINE

## 2018-07-27 PROCEDURE — 36415 COLL VENOUS BLD VENIPUNCTURE: CPT | Performed by: FAMILY MEDICINE

## 2018-07-27 PROCEDURE — 80053 COMPREHEN METABOLIC PANEL: CPT | Performed by: FAMILY MEDICINE

## 2018-07-27 PROCEDURE — 83036 HEMOGLOBIN GLYCOSYLATED A1C: CPT | Performed by: FAMILY MEDICINE

## 2018-07-27 PROCEDURE — 99214 OFFICE O/P EST MOD 30 MIN: CPT | Performed by: FAMILY MEDICINE

## 2018-07-27 PROCEDURE — 77067 SCR MAMMO BI INCL CAD: CPT | Mod: TC

## 2018-07-27 PROCEDURE — 82043 UR ALBUMIN QUANTITATIVE: CPT | Performed by: FAMILY MEDICINE

## 2018-07-27 RX ORDER — METOPROLOL SUCCINATE 25 MG/1
TABLET, EXTENDED RELEASE ORAL
Qty: 90 TABLET | Refills: 1 | Status: SHIPPED | OUTPATIENT
Start: 2018-07-27 | End: 2019-01-01

## 2018-07-27 RX ORDER — ATORVASTATIN CALCIUM 10 MG/1
10 TABLET, FILM COATED ORAL DAILY
Qty: 90 TABLET | Refills: 3 | COMMUNITY
Start: 2018-07-27 | End: 2019-01-01

## 2018-07-27 RX ORDER — GABAPENTIN 300 MG/1
CAPSULE ORAL
Qty: 270 CAPSULE | Refills: 1 | Status: SHIPPED | OUTPATIENT
Start: 2018-07-27 | End: 2019-01-01

## 2018-07-27 RX ORDER — LISINOPRIL 40 MG/1
40 TABLET ORAL DAILY
Qty: 90 TABLET | Refills: 3 | Status: SHIPPED | OUTPATIENT
Start: 2018-07-27 | End: 2019-01-01

## 2018-07-27 NOTE — PROGRESS NOTES
SUBJECTIVE:   Claudia Simon is a 73 year old female who presents to clinic today for the following health issues:    Diabetes Follow-up      Patient is checking blood sugars: not at all    Diabetic concerns: None     Symptoms of hypoglycemia (low blood sugar): none     Paresthesias (numbness or burning in feet) or sores: No     Date of last diabetic eye exam: none    Diabetes Management Resources    Hyperlipidemia Follow-Up      Rate your low fat/cholesterol diet?: fair    Taking statin?  Yes, no muscle aches from statin    Other lipid medications/supplements?:  none    Hypertension Follow-up      Outpatient blood pressures are not being checked.    Low Salt Diet: no added salt    BP Readings from Last 2 Encounters:   06/26/17 129/78   08/03/16 132/74     Hemoglobin A1C (%)   Date Value   06/26/2017 6.5 (H)   06/23/2015 7.4 (H)     LDL Cholesterol Calculated (mg/dL)   Date Value   06/26/2017 84   08/03/2016 144 (H)       Amount of exercise or physical activity: 2-3 days/week for an average of 45-60 minutes    Problems taking medications regularly: No    Medication side effects: none    Diet: low fat/cholesterol     REVIEW OF SYSTEMS    Generally has been otherwise  feeling well until this episode. No problems with vision, hearing, dental or neck pain.Has hph airborne or ingestion allergy  No chest pain, palpitations, dyspnea, change in bowel habits, blood  in stool or dyspepsia.  No rashes, changing moles, weakness, lassitude or back problems.  No chronic issues . No dysuria  Patient not  a smoker. No problems with significant headaches.  On exam the vital signs are stable  Weight is Body mass index is 27.64 kg/(m^2).   Eyes show beny   No neck masses or thyromegaly.Ear nose and throat shows normal   No bruits, murmers, rubs or extrasounds. No cardiomegaly or chest wall tenderness. Lungs clear, no abdominal masses or organomegaly. No CVA tenderness.  Skin eval no rash   No hernias, good range of motion neck,  "back and extremities. No abnormal skin lesions. Normal genitalia. Good peripheral pulses. No adenopathy.  Normal gait and stance. Neck is supple.  Back exam shows pain in right hip aggravated by torsional stress: she doesn't want to see Ortho about it at this time present without trauma for 2-4 weeks.     (Z12.11) Colon cancer screening  (primary encounter diagnosis)  Comment:   Plan: Fecal colorectal cancer screen (FIT)            (E11.40) Type 2 diabetes mellitus with diabetic neuropathy, without long-term current use of insulin (H)  Comment:   Plan: Hemoglobin A1c, Comprehensive metabolic panel,         Lipid panel reflex to direct LDL Fasting,         Albumin Random Urine Quantitative with Creat         Ratio        Body mass index is 27.64 kg/(m^2).  Is improved, diet and pool exercise     (E78.5) Hyperlipidemia LDL goal <130  Comment:   Plan: atorvastatin (LIPITOR) 10 MG tablet            (M54.40,  G89.29) Chronic low back pain with sciatica, sciatica laterality unspecified, unspecified back pain laterality  Comment:   Plan: gabapentin (NEURONTIN) 300 MG capsule        With right hip pain: she needs a anusha to get up on an xray table,with her back not ready to schedule    (I10) Benign essential hypertension  Comment:   Plan: lisinopril (PRINIVIL/ZESTRIL) 40 MG tablet        /60 (BP Location: Left arm, Patient Position: Chair, Cuff Size: Adult Regular)  Pulse 66  Temp 97.7  F (36.5  C) (Oral)  Resp 14  Ht 5' 4\" (1.626 m)  Wt 161 lb (73 kg)  SpO2 97%  BMI 27.64 kg/m2      (I10) Essential hypertension, benign  Comment:   Plan: metoprolol succinate (TOPROL-XL) 25 MG 24 hr         tablet        Med recheck       .    "

## 2018-07-27 NOTE — LETTER
July 30, 2018      Claudia Simon  [address] [place] [id number]        Dear ,    We are writing to inform you of your test results.    Your blood tests look pretty good, with good a1c, very decent cholesterol and better than average kidney function. Good work and nice to see you.     Resulted Orders   Hemoglobin A1c   Result Value Ref Range    Hemoglobin A1C 6.4 (H) 0 - 5.6 %      Comment:      Normal <5.7% Prediabetes 5.7-6.4%  Diabetes 6.5% or higher - adopted from ADA   consensus guidelines.     Comprehensive metabolic panel   Result Value Ref Range    Sodium 135 133 - 144 mmol/L    Potassium 4.9 3.4 - 5.3 mmol/L    Chloride 102 94 - 109 mmol/L    Carbon Dioxide 29 20 - 32 mmol/L    Anion Gap 4 3 - 14 mmol/L    Glucose 111 (H) 70 - 99 mg/dL      Comment:      Fasting specimen    Urea Nitrogen 11 7 - 30 mg/dL    Creatinine 0.77 0.52 - 1.04 mg/dL    GFR Estimate 73 >60 mL/min/1.7m2      Comment:      Non  GFR Calc    GFR Estimate If Black 89 >60 mL/min/1.7m2      Comment:       GFR Calc    Calcium 9.4 8.5 - 10.1 mg/dL    Bilirubin Total 0.9 0.2 - 1.3 mg/dL    Albumin 4.1 3.4 - 5.0 g/dL    Protein Total 7.7 6.8 - 8.8 g/dL    Alkaline Phosphatase 67 40 - 150 U/L    ALT 19 0 - 50 U/L    AST 14 0 - 45 U/L   Lipid panel reflex to direct LDL Fasting   Result Value Ref Range    Cholesterol 203 (H) <200 mg/dL      Comment:      Desirable:       <200 mg/dl    Triglycerides 162 (H) <150 mg/dL      Comment:      Borderline high:  150-199 mg/dl  High:             200-499 mg/dl  Very high:       >499 mg/dl  Fasting specimen      HDL Cholesterol 40 (L) >49 mg/dL    LDL Cholesterol Calculated 131 (H) <100 mg/dL      Comment:      Above desirable:  100-129 mg/dl  Borderline High:  130-159 mg/dL  High:             160-189 mg/dL  Very high:       >189 mg/dl      Non HDL Cholesterol 163 (H) <130 mg/dL      Comment:      Above Desirable:  130-159 mg/dl  Borderline  high:  160-189 mg/dl  High:             190-219 mg/dl  Very high:       >219 mg/dl     Albumin Random Urine Quantitative with Creat Ratio   Result Value Ref Range    Creatinine Urine 80 mg/dL    Albumin Urine mg/L <5 mg/L    Albumin Urine mg/g Cr Unable to calculate due to low value 0 - 25 mg/g Cr       If you have any questions or concerns, please call the clinic at the number listed above.       Sincerely,        Sekou Craig MD

## 2018-07-27 NOTE — MR AVS SNAPSHOT
"              After Visit Summary   7/27/2018    Claudia Simon    MRN: [id number]           Patient Information     Date Of Birth          1945        Visit Information        Provider Department      7/27/2018 10:45 AM Sekou Craig MD [facility]        Today's Diagnoses     Colon cancer screening    -  1    Type 2 diabetes mellitus with diabetic neuropathy, without long-term current use of insulin (H)        Hyperlipidemia LDL goal <130        Chronic low back pain with sciatica, sciatica laterality unspecified, unspecified back pain laterality        Benign essential hypertension        Essential hypertension, benign           Follow-ups after your visit        Follow-up notes from your care team     Return in about 3 months (around 10/27/2018) for Routine Visit.      Your next 10 appointments already scheduled     Jul 27, 2018  2:00 PM CDT   MA SCREENING DIGITAL BILATERAL with CRMA1   [facility] ([facility]    [address] [place] [id number] [phone]           Do not use any powder, lotion or deodorant under your arms or on your breast. If you do, we will ask you to remove it before your exam.  Wear comfortable, two-piece clothing.  If you have any allergies, tell your care team.  Bring any previous mammograms from other facilities or have them mailed to the breast center. Three-dimensional (3D) mammograms are available at [facility] locations in [facility], [facility], [place], [place], and [place]. [facility] locations include [place] and [facility] in [place]. Benefits of 3D mammograms include: - Improved rate of cancer detection - Decreases your chance of having to go back for more tests, which means fewer: - \"False-positive\" results (This means that there is an abnormal area but it isn't cancer.) - Invasive testing procedures, such as a biopsy or surgery - " "Can provide clearer images of the breast if you have dense breast tissue. 3D mammography is an optional exam that anyone can have with a 2D mammogram. It doesn't replace or take the place of a 2D mammogram. 2D mammograms remain an effective screening test for all women.  Not all insurance companies cover the cost of a 3D mammogram. Check with your insurance.              Future tests that were ordered for you today     Open Future Orders        Priority Expected Expires Ordered    Fecal colorectal cancer screen (FIT) Routine 8/17/2018 10/19/2018 7/27/2018    MA Screening Digital Bilateral Routine  7/27/2019 7/27/2018            Who to contact     If you have questions or need follow up information about today's clinic visit or your schedule please contact [facility] directly at [phone].  Normal or non-critical lab and imaging results will be communicated to you by MyChart, letter or phone within 4 business days after the clinic has received the results. If you do not hear from us within 7 days, please contact the clinic through MyChart or phone. If you have a critical or abnormal lab result, we will notify you by phone as soon as possible.  Submit refill requests through [employer] or call your pharmacy and they will forward the refill request to us. Please allow 3 business days for your refill to be completed.          Additional Information About Your Visit        Care EveryWhere ID     This is your Care EveryWhere ID. This could be used by other organizations to access your [facility] medical records  [id number]        Your Vitals Were     Pulse Temperature Respirations Height Pulse Oximetry BMI (Body Mass Index)    66 97.7  F (36.5  C) (Oral) 14 5' 4\" (1.626 m) 97% 27.64 kg/m2       Blood Pressure from Last 3 Encounters:   07/27/18 100/60   06/26/17 129/78   08/03/16 132/74    Weight from Last 3 Encounters:   07/27/18 161 lb (73 kg)   06/26/17 165 lb 1.6 oz (74.9 kg)   08/03/16 168 lb (76.2 " kg)              We Performed the Following     Albumin Random Urine Quantitative with Creat Ratio     Comprehensive metabolic panel     Hemoglobin A1c     Lipid panel reflex to direct LDL Fasting          Today's Medication Changes          These changes are accurate as of 7/27/18 11:18 AM.  If you have any questions, ask your nurse or doctor.               These medicines have changed or have updated prescriptions.        Dose/Directions    gabapentin 300 MG capsule   Commonly known as:  NEURONTIN   This may have changed:  See the new instructions.   Used for:  Chronic low back pain with sciatica, sciatica laterality unspecified, unspecified back pain laterality   Changed by:  Sekou Craig MD        TAKE ONE CAPSULE BY MOUTH THREE TIMES DAILY   Quantity:  270 capsule   Refills:  1       metoprolol succinate 25 MG 24 hr tablet   Commonly known as:  TOPROL-XL   This may have changed:  See the new instructions.   Used for:  Essential hypertension, benign   Changed by:  Sekou Craig MD        TAKE ONE TABLET BY MOUTH ONE TIME DAILY.   Quantity:  90 tablet   Refills:  1            Where to get your medicines      These medications were sent to [id number] - [place], [place] - [address]  [address] [facility] [address]    Hours:  9Am-9Pm (M-F) 9Am-6Pm (S&S) Phone:  [phone]     gabapentin 300 MG capsule    lisinopril 40 MG tablet    metoprolol succinate 25 MG 24 hr tablet                Primary Care Provider Office Phone # Fax #    Sekou Craig [phone] [address] [facility] [address]        Equal Access to Services     [facility] AH: Hadii tammy castroo Soemiliano, waaxda luqadaha, qaybta kaalmada adeegyada, shiloh jara. So [facility] [phone].    ATENCIÓN: Si habla español, tiene a catherine disposición servicios gratuitos de asistencia lingüística. Llame al [phone].    We comply with applicable federal  civil rights laws and [place] laws. We do not discriminate on the basis of race, color, national origin, age, disability, sex, sexual orientation, or gender identity.            Thank you!     Thank you for choosing [facility]  for your care. Our goal is always to provide you with excellent care. Hearing back from our patients is one way we can continue to improve our services. Please take a few minutes to complete the written survey that you may receive in the mail after your visit with us. Thank you!             Your Updated Medication List - Protect others around you: Learn how to safely use, store and throw away your medicines at www.disposemymeds.org.          This list is accurate as of 7/27/18 11:18 AM.  Always use your most recent med list.                   Brand Name Dispense Instructions for use Diagnosis    aspirin 81 MG tablet     100    ONE DAILY    Disorder of bone and cartilage, unspecified       atorvastatin 10 MG tablet    LIPITOR    90 tablet    Take 1 tablet (10 mg) by mouth daily    Hyperlipidemia LDL goal <130       EQL SENNA LAXATIVE 8.6 MG tablet   Generic drug:  senna     120 tablet    TAKE TWO TABLETS BY MOUTH TWICE DAILY.    Irritable bowel syndrome without diarrhea       gabapentin 300 MG capsule    NEURONTIN    270 capsule    TAKE ONE CAPSULE BY MOUTH THREE TIMES DAILY    Chronic low back pain with sciatica, sciatica laterality unspecified, unspecified back pain laterality       lisinopril 40 MG tablet    PRINIVIL/ZESTRIL    90 tablet    Take 1 tablet (40 mg) by mouth daily    Benign essential hypertension       metoprolol succinate 25 MG 24 hr tablet    TOPROL-XL    90 tablet    TAKE ONE TABLET BY MOUTH ONE TIME DAILY.    Essential hypertension, benign

## 2018-07-28 LAB
CREAT UR-MCNC: 80 MG/DL
MICROALBUMIN UR-MCNC: <5 MG/L
MICROALBUMIN/CREAT UR: NORMAL MG/G CR (ref 0–25)

## 2018-10-05 ENCOUNTER — ALLIED HEALTH/NURSE VISIT (OUTPATIENT)
Dept: NURSING | Facility: CLINIC | Age: 73
End: 2018-10-05
Payer: MEDICARE

## 2018-10-05 DIAGNOSIS — Z23 NEED FOR PROPHYLACTIC VACCINATION AND INOCULATION AGAINST INFLUENZA: Primary | ICD-10-CM

## 2018-10-05 PROCEDURE — 90662 IIV NO PRSV INCREASED AG IM: CPT

## 2018-10-05 PROCEDURE — G0008 ADMIN INFLUENZA VIRUS VAC: HCPCS

## 2018-10-05 NOTE — MR AVS SNAPSHOT
After Visit Summary   10/5/2018    Claudia Simon    MRN: [id number]           Patient Information     Date Of Birth          1945        Visit Information        Provider Department      10/5/2018 11:30 AM PHAN ORELLANA/LPN [facility]        Today's Diagnoses     Need for prophylactic vaccination and inoculation against influenza    -  1       Follow-ups after your visit        Who to contact     If you have questions or need follow up information about today's clinic visit or your schedule please contact [facility] directly at [phone].  Normal or non-critical lab and imaging results will be communicated to you by MyChart, letter or phone within 4 business days after the clinic has received the results. If you do not hear from us within 7 days, please contact the clinic through MyChart or phone. If you have a critical or abnormal lab result, we will notify you by phone as soon as possible.  Submit refill requests through [employer] or call your pharmacy and they will forward the refill request to us. Please allow 3 business days for your refill to be completed.          Additional Information About Your Visit        Care EveryWhere ID     This is your Care EveryWhere ID. This could be used by other organizations to access your [facility] medical records  [id number]         Blood Pressure from Last 3 Encounters:   07/27/18 100/60   06/26/17 129/78   08/03/16 132/74    Weight from Last 3 Encounters:   07/27/18 161 lb (73 kg)   06/26/17 165 lb 1.6 oz (74.9 kg)   08/03/16 168 lb (76.2 kg)              We Performed the Following     FLU VACCINE, INCREASED ANTIGEN, PRESV FREE, AGE 65+ [36279]     Vaccine Administration, Initial [[id number]]        Primary Care Provider Office Phone # Fax #    Sekou Craig [phone] [address] [facility] [address]        Equal Access to Services     ELODIA LANDEROS AH: Eric Forrest,  waaxda luqadaha, qaybta kaalmada isai, shiloh doylekashmir ah. So [facility] [phone].    ATENCIÓN: Si yue yoo, tiene a catherine disposición servicios gratuitos de asistencia lingüística. Kristie al [phone].    We comply with applicable federal civil rights laws and [place] laws. We do not discriminate on the basis of race, color, national origin, age, disability, sex, sexual orientation, or gender identity.            Thank you!     Thank you for choosing [facility]  for your care. Our goal is always to provide you with excellent care. Hearing back from our patients is one way we can continue to improve our services. Please take a few minutes to complete the written survey that you may receive in the mail after your visit with us. Thank you!             Your Updated Medication List - Protect others around you: Learn how to safely use, store and throw away your medicines at www.disposemymeds.org.          This list is accurate as of 10/5/18 12:19 PM.  Always use your most recent med list.                   Brand Name Dispense Instructions for use Diagnosis    aspirin 81 MG tablet     100    ONE DAILY    Disorder of bone and cartilage, unspecified       atorvastatin 10 MG tablet    LIPITOR    90 tablet    Take 1 tablet (10 mg) by mouth daily    Hyperlipidemia LDL goal <130       EQL SENNA LAXATIVE 8.6 MG tablet   Generic drug:  senna     120 tablet    TAKE TWO TABLETS BY MOUTH TWICE DAILY.    Irritable bowel syndrome without diarrhea       gabapentin 300 MG capsule    NEURONTIN    270 capsule    TAKE ONE CAPSULE BY MOUTH THREE TIMES DAILY    Chronic low back pain with sciatica, sciatica laterality unspecified, unspecified back pain laterality       lisinopril 40 MG tablet    PRINIVIL/ZESTRIL    90 tablet    Take 1 tablet (40 mg) by mouth daily    Benign essential hypertension       metoprolol succinate 25 MG 24 hr tablet    TOPROL-XL    90 tablet    TAKE ONE TABLET BY  MOUTH ONE TIME DAILY.    Essential hypertension, benign

## 2018-10-23 ENCOUNTER — TELEPHONE (OUTPATIENT)
Dept: FAMILY MEDICINE | Facility: CLINIC | Age: 73
End: 2018-10-23

## 2018-10-29 NOTE — TELEPHONE ENCOUNTER
Type of outreach:  Phone, left message for patient to call back.    Joann Mace MA on 10/29/2018 at 12:30 PM

## 2018-11-02 NOTE — TELEPHONE ENCOUNTER
Pt returned call  Pt last colonoscopy done 2007, had FIT testing completed July 2018    Hailey Ruiz RN, BS  Clinical Nurse Triage.

## 2019-01-01 ENCOUNTER — APPOINTMENT (OUTPATIENT)
Dept: RADIATION ONCOLOGY | Facility: CLINIC | Age: 74
End: 2019-01-01
Attending: RADIOLOGY
Payer: MEDICARE

## 2019-01-01 ENCOUNTER — APPOINTMENT (OUTPATIENT)
Dept: OCCUPATIONAL THERAPY | Facility: CLINIC | Age: 74
DRG: 180 | End: 2019-01-01
Attending: NEUROLOGICAL SURGERY
Payer: MEDICARE

## 2019-01-01 ENCOUNTER — ANCILLARY PROCEDURE (OUTPATIENT)
Dept: PET IMAGING | Facility: CLINIC | Age: 74
End: 2019-01-01
Attending: INTERNAL MEDICINE
Payer: MEDICARE

## 2019-01-01 ENCOUNTER — APPOINTMENT (OUTPATIENT)
Dept: MRI IMAGING | Facility: CLINIC | Age: 74
DRG: 180 | End: 2019-01-01
Attending: NURSE PRACTITIONER
Payer: MEDICARE

## 2019-01-01 ENCOUNTER — TELEPHONE (OUTPATIENT)
Dept: EMERGENCY MEDICINE | Facility: CLINIC | Age: 74
End: 2019-01-01

## 2019-01-01 ENCOUNTER — DOCUMENTATION ONLY (OUTPATIENT)
Dept: CARE COORDINATION | Facility: CLINIC | Age: 74
End: 2019-01-01

## 2019-01-01 ENCOUNTER — APPOINTMENT (OUTPATIENT)
Dept: LAB | Facility: CLINIC | Age: 74
End: 2019-01-01
Payer: MEDICARE

## 2019-01-01 ENCOUNTER — TELEPHONE (OUTPATIENT)
Dept: ONCOLOGY | Facility: CLINIC | Age: 74
End: 2019-01-01

## 2019-01-01 ENCOUNTER — PATIENT OUTREACH (OUTPATIENT)
Dept: CARE COORDINATION | Facility: CLINIC | Age: 74
End: 2019-01-01

## 2019-01-01 ENCOUNTER — ONCOLOGY VISIT (OUTPATIENT)
Dept: ONCOLOGY | Facility: CLINIC | Age: 74
End: 2019-01-01
Attending: INTERNAL MEDICINE
Payer: MEDICARE

## 2019-01-01 ENCOUNTER — APPOINTMENT (OUTPATIENT)
Dept: ULTRASOUND IMAGING | Facility: CLINIC | Age: 74
DRG: 809 | End: 2019-01-01
Attending: INTERNAL MEDICINE
Payer: MEDICARE

## 2019-01-01 ENCOUNTER — APPOINTMENT (OUTPATIENT)
Dept: CT IMAGING | Facility: CLINIC | Age: 74
DRG: 180 | End: 2019-01-01
Attending: NEUROLOGICAL SURGERY
Payer: MEDICARE

## 2019-01-01 ENCOUNTER — APPOINTMENT (OUTPATIENT)
Dept: GENERAL RADIOLOGY | Facility: CLINIC | Age: 74
DRG: 809 | End: 2019-01-01
Attending: INTERNAL MEDICINE
Payer: MEDICARE

## 2019-01-01 ENCOUNTER — APPOINTMENT (OUTPATIENT)
Dept: CT IMAGING | Facility: CLINIC | Age: 74
DRG: 809 | End: 2019-01-01
Attending: HOSPITALIST
Payer: MEDICARE

## 2019-01-01 ENCOUNTER — APPOINTMENT (OUTPATIENT)
Dept: MRI IMAGING | Facility: CLINIC | Age: 74
End: 2019-01-01
Attending: EMERGENCY MEDICINE
Payer: MEDICARE

## 2019-01-01 ENCOUNTER — TELEPHONE (OUTPATIENT)
Dept: PHARMACY | Facility: CLINIC | Age: 74
End: 2019-01-01

## 2019-01-01 ENCOUNTER — APPOINTMENT (OUTPATIENT)
Dept: INTERVENTIONAL RADIOLOGY/VASCULAR | Facility: CLINIC | Age: 74
DRG: 180 | End: 2019-01-01
Attending: RADIOLOGY
Payer: MEDICARE

## 2019-01-01 ENCOUNTER — ONCOLOGY VISIT (OUTPATIENT)
Dept: ONCOLOGY | Facility: CLINIC | Age: 74
End: 2019-01-01
Attending: PHYSICIAN ASSISTANT
Payer: MEDICARE

## 2019-01-01 ENCOUNTER — HOSPITAL ENCOUNTER (INPATIENT)
Facility: CLINIC | Age: 74
LOS: 11 days | Discharge: HOSPICE | DRG: 809 | End: 2019-11-07
Attending: EMERGENCY MEDICINE | Admitting: INTERNAL MEDICINE
Payer: MEDICARE

## 2019-01-01 ENCOUNTER — APPOINTMENT (OUTPATIENT)
Dept: CT IMAGING | Facility: CLINIC | Age: 74
DRG: 809 | End: 2019-01-01
Attending: EMERGENCY MEDICINE
Payer: MEDICARE

## 2019-01-01 ENCOUNTER — OFFICE VISIT (OUTPATIENT)
Dept: FAMILY MEDICINE | Facility: CLINIC | Age: 74
End: 2019-01-01
Payer: MEDICARE

## 2019-01-01 ENCOUNTER — APPOINTMENT (OUTPATIENT)
Dept: GENERAL RADIOLOGY | Facility: CLINIC | Age: 74
DRG: 180 | End: 2019-01-01
Attending: INTERNAL MEDICINE
Payer: MEDICARE

## 2019-01-01 ENCOUNTER — APPOINTMENT (OUTPATIENT)
Dept: SPEECH THERAPY | Facility: CLINIC | Age: 74
DRG: 809 | End: 2019-01-01
Attending: HOSPITALIST
Payer: MEDICARE

## 2019-01-01 ENCOUNTER — APPOINTMENT (OUTPATIENT)
Dept: NUCLEAR MEDICINE | Facility: CLINIC | Age: 74
DRG: 809 | End: 2019-01-01
Attending: INTERNAL MEDICINE
Payer: MEDICARE

## 2019-01-01 ENCOUNTER — HOSPITAL ENCOUNTER (OUTPATIENT)
Facility: CLINIC | Age: 74
Setting detail: SPECIMEN
Discharge: HOME | DRG: 809 | End: 2019-10-25
Attending: INTERNAL MEDICINE | Admitting: INTERNAL MEDICINE
Payer: MEDICARE

## 2019-01-01 ENCOUNTER — APPOINTMENT (OUTPATIENT)
Dept: INTERVENTIONAL RADIOLOGY/VASCULAR | Facility: CLINIC | Age: 74
DRG: 180 | End: 2019-01-01
Attending: NURSE PRACTITIONER
Payer: MEDICARE

## 2019-01-01 ENCOUNTER — OFFICE VISIT (OUTPATIENT)
Dept: OPHTHALMOLOGY | Facility: CLINIC | Age: 74
End: 2019-01-01
Attending: OPHTHALMOLOGY
Payer: MEDICARE

## 2019-01-01 ENCOUNTER — HOSPITAL ENCOUNTER (EMERGENCY)
Facility: CLINIC | Age: 74
Discharge: ANOTHER HOSPITAL | End: 2019-09-17
Attending: EMERGENCY MEDICINE | Admitting: EMERGENCY MEDICINE
Payer: MEDICARE

## 2019-01-01 ENCOUNTER — APPOINTMENT (OUTPATIENT)
Dept: GENERAL RADIOLOGY | Facility: CLINIC | Age: 74
DRG: 180 | End: 2019-01-01
Attending: STUDENT IN AN ORGANIZED HEALTH CARE EDUCATION/TRAINING PROGRAM
Payer: MEDICARE

## 2019-01-01 ENCOUNTER — APPOINTMENT (OUTPATIENT)
Dept: SPEECH THERAPY | Facility: CLINIC | Age: 74
DRG: 809 | End: 2019-01-01
Payer: MEDICARE

## 2019-01-01 ENCOUNTER — PATIENT OUTREACH (OUTPATIENT)
Dept: ONCOLOGY | Facility: CLINIC | Age: 74
End: 2019-01-01

## 2019-01-01 ENCOUNTER — APPOINTMENT (OUTPATIENT)
Dept: GENERAL RADIOLOGY | Facility: CLINIC | Age: 74
DRG: 180 | End: 2019-01-01
Attending: RADIOLOGY
Payer: MEDICARE

## 2019-01-01 ENCOUNTER — TELEPHONE (OUTPATIENT)
Dept: FAMILY MEDICINE | Facility: CLINIC | Age: 74
End: 2019-01-01

## 2019-01-01 ENCOUNTER — ONCOLOGY VISIT (OUTPATIENT)
Dept: RADIATION ONCOLOGY | Facility: CLINIC | Age: 74
End: 2019-01-01

## 2019-01-01 ENCOUNTER — NURSE TRIAGE (OUTPATIENT)
Dept: FAMILY MEDICINE | Facility: CLINIC | Age: 74
End: 2019-01-01

## 2019-01-01 ENCOUNTER — PRE VISIT (OUTPATIENT)
Dept: ONCOLOGY | Facility: CLINIC | Age: 74
End: 2019-01-01

## 2019-01-01 ENCOUNTER — HOSPITAL ENCOUNTER (OUTPATIENT)
Dept: CARDIOLOGY | Facility: CLINIC | Age: 74
Discharge: HOME | End: 2019-10-11
Attending: INTERNAL MEDICINE | Admitting: INTERNAL MEDICINE
Payer: MEDICARE

## 2019-01-01 ENCOUNTER — DOCUMENTATION ONLY (OUTPATIENT)
Dept: OTHER | Facility: CLINIC | Age: 74
End: 2019-01-01

## 2019-01-01 ENCOUNTER — RESEARCH ENCOUNTER (OUTPATIENT)
Dept: ONCOLOGY | Facility: CLINIC | Age: 74
End: 2019-01-01

## 2019-01-01 ENCOUNTER — APPOINTMENT (OUTPATIENT)
Dept: MRI IMAGING | Facility: CLINIC | Age: 74
DRG: 180 | End: 2019-01-01
Attending: NEUROLOGICAL SURGERY
Payer: MEDICARE

## 2019-01-01 ENCOUNTER — APPOINTMENT (OUTPATIENT)
Dept: LAB | Facility: CLINIC | Age: 74
End: 2019-01-01
Attending: INTERNAL MEDICINE
Payer: MEDICARE

## 2019-01-01 ENCOUNTER — APPOINTMENT (OUTPATIENT)
Dept: GENERAL RADIOLOGY | Facility: CLINIC | Age: 74
DRG: 180 | End: 2019-01-01
Attending: NEUROLOGICAL SURGERY
Payer: MEDICARE

## 2019-01-01 ENCOUNTER — INFUSION THERAPY VISIT (OUTPATIENT)
Dept: INFUSION THERAPY | Facility: CLINIC | Age: 74
End: 2019-01-01
Attending: PHYSICIAN ASSISTANT
Payer: MEDICARE

## 2019-01-01 ENCOUNTER — HOSPITAL ENCOUNTER (INPATIENT)
Facility: CLINIC | Age: 74
LOS: 6 days | Discharge: HOME HEALTH | DRG: 180 | End: 2019-09-24
Attending: NEUROLOGICAL SURGERY | Admitting: NEUROLOGICAL SURGERY
Payer: MEDICARE

## 2019-01-01 VITALS
WEIGHT: 143 LBS | HEART RATE: 105 BPM | OXYGEN SATURATION: 98 % | BODY MASS INDEX: 24.55 KG/M2 | SYSTOLIC BLOOD PRESSURE: 121 MMHG | TEMPERATURE: 97.7 F | DIASTOLIC BLOOD PRESSURE: 70 MMHG | RESPIRATION RATE: 18 BRPM

## 2019-01-01 VITALS
OXYGEN SATURATION: 97 % | SYSTOLIC BLOOD PRESSURE: 131 MMHG | WEIGHT: 143.9 LBS | HEART RATE: 64 BPM | DIASTOLIC BLOOD PRESSURE: 72 MMHG | TEMPERATURE: 97.7 F | RESPIRATION RATE: 16 BRPM | BODY MASS INDEX: 24.7 KG/M2

## 2019-01-01 VITALS
DIASTOLIC BLOOD PRESSURE: 70 MMHG | OXYGEN SATURATION: 96 % | HEART RATE: 54 BPM | TEMPERATURE: 98.4 F | SYSTOLIC BLOOD PRESSURE: 130 MMHG | RESPIRATION RATE: 16 BRPM

## 2019-01-01 VITALS
HEART RATE: 77 BPM | SYSTOLIC BLOOD PRESSURE: 112 MMHG | RESPIRATION RATE: 18 BRPM | OXYGEN SATURATION: 99 % | TEMPERATURE: 97.3 F | DIASTOLIC BLOOD PRESSURE: 69 MMHG

## 2019-01-01 VITALS
TEMPERATURE: 97.4 F | BODY MASS INDEX: 24.77 KG/M2 | OXYGEN SATURATION: 94 % | HEART RATE: 75 BPM | DIASTOLIC BLOOD PRESSURE: 60 MMHG | WEIGHT: 144.3 LBS | SYSTOLIC BLOOD PRESSURE: 122 MMHG

## 2019-01-01 VITALS
WEIGHT: 140 LBS | HEIGHT: 64 IN | BODY MASS INDEX: 23.9 KG/M2 | HEART RATE: 106 BPM | OXYGEN SATURATION: 95 % | TEMPERATURE: 98.9 F | DIASTOLIC BLOOD PRESSURE: 60 MMHG | RESPIRATION RATE: 16 BRPM | SYSTOLIC BLOOD PRESSURE: 91 MMHG

## 2019-01-01 VITALS
HEART RATE: 63 BPM | BODY MASS INDEX: 25.8 KG/M2 | OXYGEN SATURATION: 93 % | DIASTOLIC BLOOD PRESSURE: 59 MMHG | RESPIRATION RATE: 16 BRPM | SYSTOLIC BLOOD PRESSURE: 116 MMHG | WEIGHT: 151.1 LBS | HEIGHT: 64 IN | TEMPERATURE: 97.6 F

## 2019-01-01 VITALS
DIASTOLIC BLOOD PRESSURE: 62 MMHG | RESPIRATION RATE: 16 BRPM | BODY MASS INDEX: 25.93 KG/M2 | HEART RATE: 90 BPM | WEIGHT: 151.9 LBS | OXYGEN SATURATION: 98 % | TEMPERATURE: 97.1 F | SYSTOLIC BLOOD PRESSURE: 102 MMHG | HEIGHT: 64 IN

## 2019-01-01 VITALS — BODY MASS INDEX: 25.08 KG/M2 | WEIGHT: 146.1 LBS

## 2019-01-01 VITALS
DIASTOLIC BLOOD PRESSURE: 72 MMHG | WEIGHT: 140.9 LBS | TEMPERATURE: 97.6 F | BODY MASS INDEX: 24.19 KG/M2 | HEART RATE: 36 BPM | SYSTOLIC BLOOD PRESSURE: 105 MMHG | OXYGEN SATURATION: 100 %

## 2019-01-01 VITALS
OXYGEN SATURATION: 98 % | SYSTOLIC BLOOD PRESSURE: 111 MMHG | HEART RATE: 87 BPM | DIASTOLIC BLOOD PRESSURE: 63 MMHG | WEIGHT: 142 LBS | BODY MASS INDEX: 24.37 KG/M2

## 2019-01-01 DIAGNOSIS — K58.9 IRRITABLE BOWEL SYNDROME WITHOUT DIARRHEA: ICD-10-CM

## 2019-01-01 DIAGNOSIS — C79.49 SECONDARY MALIGNANT NEOPLASM OF BRAIN AND SPINAL CORD (H): Primary | ICD-10-CM

## 2019-01-01 DIAGNOSIS — C71.9 GLIOMA (EXCEPT NASAL GLIOMA, NOT NEOPLASTIC) (H): ICD-10-CM

## 2019-01-01 DIAGNOSIS — K21.00 GASTROESOPHAGEAL REFLUX DISEASE WITH ESOPHAGITIS: ICD-10-CM

## 2019-01-01 DIAGNOSIS — E87.1 HYPONATREMIA: ICD-10-CM

## 2019-01-01 DIAGNOSIS — I10 ESSENTIAL HYPERTENSION, BENIGN: ICD-10-CM

## 2019-01-01 DIAGNOSIS — C79.31 BRAIN METASTASIS: ICD-10-CM

## 2019-01-01 DIAGNOSIS — C34.92 PRIMARY LUNG ADENOCARCINOMA, LEFT (H): Primary | ICD-10-CM

## 2019-01-01 DIAGNOSIS — B37.0 THRUSH: ICD-10-CM

## 2019-01-01 DIAGNOSIS — Z00.00 ENCOUNTER FOR MEDICARE ANNUAL WELLNESS EXAM: ICD-10-CM

## 2019-01-01 DIAGNOSIS — C34.92 PRIMARY LUNG ADENOCARCINOMA, LEFT (H): ICD-10-CM

## 2019-01-01 DIAGNOSIS — R50.81 NEUTROPENIC FEVER (H): ICD-10-CM

## 2019-01-01 DIAGNOSIS — E09.9 STEROID-INDUCED DIABETES MELLITUS (H): Primary | ICD-10-CM

## 2019-01-01 DIAGNOSIS — H47.099 COMPRESSIVE OPTIC NEUROPATHY: ICD-10-CM

## 2019-01-01 DIAGNOSIS — K21.9 GASTROESOPHAGEAL REFLUX DISEASE WITHOUT ESOPHAGITIS: Primary | ICD-10-CM

## 2019-01-01 DIAGNOSIS — G89.3 CANCER RELATED PAIN: ICD-10-CM

## 2019-01-01 DIAGNOSIS — C34.90 NON-SMALL CELL CARCINOMA OF LUNG (H): ICD-10-CM

## 2019-01-01 DIAGNOSIS — H49.23 SIXTH NERVE PALSY OF BOTH EYES: Primary | ICD-10-CM

## 2019-01-01 DIAGNOSIS — Z23 ENCOUNTER FOR IMMUNIZATION: ICD-10-CM

## 2019-01-01 DIAGNOSIS — G89.3 CANCER RELATED PAIN: Primary | ICD-10-CM

## 2019-01-01 DIAGNOSIS — G93.89 BRAIN MASS: Primary | ICD-10-CM

## 2019-01-01 DIAGNOSIS — Z13.29 SCREENING FOR HYPOTHYROIDISM: ICD-10-CM

## 2019-01-01 DIAGNOSIS — G93.89 BRAIN MASS: ICD-10-CM

## 2019-01-01 DIAGNOSIS — R06.02 SHORTNESS OF BREATH: ICD-10-CM

## 2019-01-01 DIAGNOSIS — E09.9 STEROID-INDUCED DIABETES MELLITUS (H): ICD-10-CM

## 2019-01-01 DIAGNOSIS — Z23 NEED FOR PROPHYLACTIC VACCINATION AND INOCULATION AGAINST INFLUENZA: ICD-10-CM

## 2019-01-01 DIAGNOSIS — C79.31 BRAIN METASTASIS: Primary | ICD-10-CM

## 2019-01-01 DIAGNOSIS — D70.9 NEUTROPENIC FEVER (H): ICD-10-CM

## 2019-01-01 DIAGNOSIS — D69.6 THROMBOCYTOPENIA (H): ICD-10-CM

## 2019-01-01 DIAGNOSIS — M54.40 CHRONIC LOW BACK PAIN WITH SCIATICA, SCIATICA LATERALITY UNSPECIFIED, UNSPECIFIED BACK PAIN LATERALITY: ICD-10-CM

## 2019-01-01 DIAGNOSIS — T38.0X5A STEROID-INDUCED DIABETES MELLITUS (H): ICD-10-CM

## 2019-01-01 DIAGNOSIS — Z51.5 HOSPICE CARE PATIENT: Primary | ICD-10-CM

## 2019-01-01 DIAGNOSIS — H05.89 ORBITAL MASS: ICD-10-CM

## 2019-01-01 DIAGNOSIS — K58.1 IRRITABLE BOWEL SYNDROME WITH CONSTIPATION: ICD-10-CM

## 2019-01-01 DIAGNOSIS — E11.40 TYPE 2 DIABETES MELLITUS WITH DIABETIC NEUROPATHY, WITHOUT LONG-TERM CURRENT USE OF INSULIN (H): Primary | ICD-10-CM

## 2019-01-01 DIAGNOSIS — I10 BENIGN ESSENTIAL HYPERTENSION: ICD-10-CM

## 2019-01-01 DIAGNOSIS — R04.0 EPISTAXIS: ICD-10-CM

## 2019-01-01 DIAGNOSIS — T38.0X5A STEROID-INDUCED DIABETES MELLITUS (H): Primary | ICD-10-CM

## 2019-01-01 DIAGNOSIS — K59.03 THERAPEUTIC OPIOID INDUCED CONSTIPATION: ICD-10-CM

## 2019-01-01 DIAGNOSIS — E78.5 HYPERLIPIDEMIA LDL GOAL <130: ICD-10-CM

## 2019-01-01 DIAGNOSIS — C34.90 ADENOCARCINOMA OF LUNG, UNSPECIFIED LATERALITY (H): ICD-10-CM

## 2019-01-01 DIAGNOSIS — C79.31 SECONDARY MALIGNANT NEOPLASM OF BRAIN AND SPINAL CORD (H): Primary | ICD-10-CM

## 2019-01-01 DIAGNOSIS — M94.9 DISORDER OF BONE AND CARTILAGE: ICD-10-CM

## 2019-01-01 DIAGNOSIS — T40.2X5A THERAPEUTIC OPIOID INDUCED CONSTIPATION: ICD-10-CM

## 2019-01-01 DIAGNOSIS — H49.02 PARALYTIC STRABISMUS ASSOCIATED WITH LEFT PARTIAL OCULOMOTOR NERVE PALSY: ICD-10-CM

## 2019-01-01 DIAGNOSIS — G47.00 INSOMNIA, UNSPECIFIED TYPE: ICD-10-CM

## 2019-01-01 DIAGNOSIS — Z08 ENCOUNTER FOR FOLLOW-UP EXAMINATION AFTER COMPLETED TREATMENT FOR MALIGNANT NEOPLASM: ICD-10-CM

## 2019-01-01 DIAGNOSIS — R11.0 NAUSEA: ICD-10-CM

## 2019-01-01 DIAGNOSIS — M89.9 DISORDER OF BONE AND CARTILAGE: ICD-10-CM

## 2019-01-01 DIAGNOSIS — G89.29 CHRONIC LOW BACK PAIN WITH SCIATICA, SCIATICA LATERALITY UNSPECIFIED, UNSPECIFIED BACK PAIN LATERALITY: ICD-10-CM

## 2019-01-01 DIAGNOSIS — C79.51 BONE METASTASIS: ICD-10-CM

## 2019-01-01 LAB
ABO + RH BLD: NORMAL
ALBUMIN SERPL-MCNC: 1.6 G/DL (ref 3.4–5)
ALBUMIN SERPL-MCNC: 1.6 G/DL (ref 3.4–5)
ALBUMIN SERPL-MCNC: 1.7 G/DL (ref 3.4–5)
ALBUMIN SERPL-MCNC: 1.8 G/DL (ref 3.4–5)
ALBUMIN SERPL-MCNC: 1.9 G/DL (ref 3.4–5)
ALBUMIN SERPL-MCNC: 2.5 G/DL (ref 3.4–5)
ALBUMIN SERPL-MCNC: 2.8 G/DL (ref 3.4–5)
ALBUMIN SERPL-MCNC: 3.2 G/DL (ref 3.4–5)
ALBUMIN SERPL-MCNC: 3.4 G/DL (ref 3.4–5)
ALBUMIN SERPL-MCNC: 3.5 G/DL (ref 3.4–5)
ALBUMIN SERPL-MCNC: 3.6 G/DL (ref 3.4–5)
ALBUMIN UR-MCNC: 20 MG/DL
ALP SERPL-CCNC: 108 U/L (ref 40–150)
ALP SERPL-CCNC: 110 U/L (ref 40–150)
ALP SERPL-CCNC: 112 U/L (ref 40–150)
ALP SERPL-CCNC: 116 U/L (ref 40–150)
ALP SERPL-CCNC: 120 U/L (ref 40–150)
ALP SERPL-CCNC: 74 U/L (ref 40–150)
ALP SERPL-CCNC: 75 U/L (ref 40–150)
ALP SERPL-CCNC: 76 U/L (ref 40–150)
ALP SERPL-CCNC: 80 U/L (ref 40–150)
ALP SERPL-CCNC: 91 U/L (ref 40–150)
ALP SERPL-CCNC: 94 U/L (ref 40–150)
ALT SERPL W P-5'-P-CCNC: 133 U/L (ref 0–50)
ALT SERPL W P-5'-P-CCNC: 16 U/L (ref 0–50)
ALT SERPL W P-5'-P-CCNC: 234 U/L (ref 0–50)
ALT SERPL W P-5'-P-CCNC: 24 U/L (ref 0–50)
ALT SERPL W P-5'-P-CCNC: 26 U/L (ref 0–50)
ALT SERPL W P-5'-P-CCNC: 50 U/L (ref 0–50)
ALT SERPL W P-5'-P-CCNC: 58 U/L (ref 0–50)
ALT SERPL W P-5'-P-CCNC: 62 U/L (ref 0–50)
ALT SERPL W P-5'-P-CCNC: 70 U/L (ref 0–50)
ALT SERPL W P-5'-P-CCNC: 74 U/L (ref 0–50)
ALT SERPL W P-5'-P-CCNC: 77 U/L (ref 0–50)
ANION GAP SERPL CALCULATED.3IONS-SCNC: 10 MMOL/L (ref 3–14)
ANION GAP SERPL CALCULATED.3IONS-SCNC: 10 MMOL/L (ref 3–14)
ANION GAP SERPL CALCULATED.3IONS-SCNC: 2 MMOL/L (ref 3–14)
ANION GAP SERPL CALCULATED.3IONS-SCNC: 3 MMOL/L (ref 3–14)
ANION GAP SERPL CALCULATED.3IONS-SCNC: 3 MMOL/L (ref 3–14)
ANION GAP SERPL CALCULATED.3IONS-SCNC: 5 MMOL/L (ref 3–14)
ANION GAP SERPL CALCULATED.3IONS-SCNC: 5 MMOL/L (ref 3–14)
ANION GAP SERPL CALCULATED.3IONS-SCNC: 6 MMOL/L (ref 3–14)
ANION GAP SERPL CALCULATED.3IONS-SCNC: 7 MMOL/L (ref 3–14)
ANION GAP SERPL CALCULATED.3IONS-SCNC: 7 MMOL/L (ref 3–14)
ANION GAP SERPL CALCULATED.3IONS-SCNC: 8 MMOL/L (ref 3–14)
ANION GAP SERPL CALCULATED.3IONS-SCNC: 9 MMOL/L (ref 3–14)
APPEARANCE UR: CLEAR
AST SERPL W P-5'-P-CCNC: 17 U/L (ref 0–45)
AST SERPL W P-5'-P-CCNC: 17 U/L (ref 0–45)
AST SERPL W P-5'-P-CCNC: 20 U/L (ref 0–45)
AST SERPL W P-5'-P-CCNC: 23 U/L (ref 0–45)
AST SERPL W P-5'-P-CCNC: 26 U/L (ref 0–45)
AST SERPL W P-5'-P-CCNC: 27 U/L (ref 0–45)
AST SERPL W P-5'-P-CCNC: 28 U/L (ref 0–45)
AST SERPL W P-5'-P-CCNC: 30 U/L (ref 0–45)
AST SERPL W P-5'-P-CCNC: 30 U/L (ref 0–45)
AST SERPL W P-5'-P-CCNC: 31 U/L (ref 0–45)
AST SERPL W P-5'-P-CCNC: 34 U/L (ref 0–45)
BACTERIA SPEC CULT: NO GROWTH
BACTERIA SPEC CULT: NO GROWTH
BASOPHILS # BLD AUTO: 0 10E9/L (ref 0–0.2)
BASOPHILS # BLD AUTO: 0.1 10E9/L (ref 0–0.2)
BASOPHILS # BLD AUTO: 0.1 10E9/L (ref 0–0.2)
BASOPHILS NFR BLD AUTO: 0 %
BASOPHILS NFR BLD AUTO: 0.2 %
BASOPHILS NFR BLD AUTO: 0.3 %
BASOPHILS NFR BLD AUTO: 0.6 %
BASOPHILS NFR BLD AUTO: 0.6 %
BILIRUB DIRECT SERPL-MCNC: 0.2 MG/DL (ref 0–0.2)
BILIRUB SERPL-MCNC: 0.5 MG/DL (ref 0.2–1.3)
BILIRUB SERPL-MCNC: 0.5 MG/DL (ref 0.2–1.3)
BILIRUB SERPL-MCNC: 0.7 MG/DL (ref 0.2–1.3)
BILIRUB SERPL-MCNC: 0.7 MG/DL (ref 0.2–1.3)
BILIRUB SERPL-MCNC: 0.8 MG/DL (ref 0.2–1.3)
BILIRUB SERPL-MCNC: 0.8 MG/DL (ref 0.2–1.3)
BILIRUB SERPL-MCNC: 0.9 MG/DL (ref 0.2–1.3)
BILIRUB SERPL-MCNC: 1.1 MG/DL (ref 0.2–1.3)
BILIRUB SERPL-MCNC: 1.1 MG/DL (ref 0.2–1.3)
BILIRUB SERPL-MCNC: 1.3 MG/DL (ref 0.2–1.3)
BILIRUB SERPL-MCNC: 1.4 MG/DL (ref 0.2–1.3)
BILIRUB UR QL STRIP: NEGATIVE
BLASTS # BLD: 0 10E9/L
BLASTS # BLD: 0.1 10E9/L
BLASTS BLD QL SMEAR: 1 %
BLASTS BLD QL SMEAR: 4 %
BLD GP AB SCN SERPL QL: NORMAL
BLD PROD TYP BPU: NORMAL
BLD UNIT ID BPU: 0
BLOOD BANK CMNT PATIENT-IMP: NORMAL
BLOOD PRODUCT CODE: NORMAL
BPU ID: NORMAL
BUN SERPL-MCNC: 10 MG/DL (ref 7–30)
BUN SERPL-MCNC: 10 MG/DL (ref 7–30)
BUN SERPL-MCNC: 11 MG/DL (ref 7–30)
BUN SERPL-MCNC: 12 MG/DL (ref 7–30)
BUN SERPL-MCNC: 13 MG/DL (ref 7–30)
BUN SERPL-MCNC: 13 MG/DL (ref 7–30)
BUN SERPL-MCNC: 15 MG/DL (ref 7–30)
BUN SERPL-MCNC: 16 MG/DL (ref 7–30)
BUN SERPL-MCNC: 16 MG/DL (ref 7–30)
BUN SERPL-MCNC: 18 MG/DL (ref 7–30)
BUN SERPL-MCNC: 19 MG/DL (ref 7–30)
BUN SERPL-MCNC: 20 MG/DL (ref 7–30)
BUN SERPL-MCNC: 22 MG/DL (ref 7–30)
BUN SERPL-MCNC: 23 MG/DL (ref 7–30)
BUN SERPL-MCNC: 23 MG/DL (ref 7–30)
BUN SERPL-MCNC: 24 MG/DL (ref 7–30)
BUN SERPL-MCNC: 24 MG/DL (ref 7–30)
BUN SERPL-MCNC: 25 MG/DL (ref 7–30)
BUN SERPL-MCNC: 25 MG/DL (ref 7–30)
BUN SERPL-MCNC: 26 MG/DL (ref 7–30)
BUN SERPL-MCNC: 27 MG/DL (ref 7–30)
BUN SERPL-MCNC: 30 MG/DL (ref 7–30)
C COLI+JEJUNI+LARI FUSA STL QL NAA+PROBE: NOT DETECTED
C DIFF TOX B STL QL: NEGATIVE
C DIFF TOX B STL QL: NEGATIVE
CALCIUM SERPL-MCNC: 8.2 MG/DL (ref 8.5–10.1)
CALCIUM SERPL-MCNC: 8.3 MG/DL (ref 8.5–10.1)
CALCIUM SERPL-MCNC: 8.5 MG/DL (ref 8.5–10.1)
CALCIUM SERPL-MCNC: 8.6 MG/DL (ref 8.5–10.1)
CALCIUM SERPL-MCNC: 8.7 MG/DL (ref 8.5–10.1)
CALCIUM SERPL-MCNC: 8.8 MG/DL (ref 8.5–10.1)
CALCIUM SERPL-MCNC: 8.8 MG/DL (ref 8.5–10.1)
CALCIUM SERPL-MCNC: 9.1 MG/DL (ref 8.5–10.1)
CALCIUM SERPL-MCNC: 9.1 MG/DL (ref 8.5–10.1)
CALCIUM SERPL-MCNC: 9.2 MG/DL (ref 8.5–10.1)
CALCIUM SERPL-MCNC: 9.3 MG/DL (ref 8.5–10.1)
CALCIUM SERPL-MCNC: 9.4 MG/DL (ref 8.5–10.1)
CALCIUM SERPL-MCNC: 9.5 MG/DL (ref 8.5–10.1)
CALCIUM SERPL-MCNC: 9.6 MG/DL (ref 8.5–10.1)
CALCIUM SERPL-MCNC: 9.8 MG/DL (ref 8.5–10.1)
CALCIUM SERPL-MCNC: 9.8 MG/DL (ref 8.5–10.1)
CHLORIDE SERPL-SCNC: 100 MMOL/L (ref 94–109)
CHLORIDE SERPL-SCNC: 101 MMOL/L (ref 94–109)
CHLORIDE SERPL-SCNC: 102 MMOL/L (ref 94–109)
CHLORIDE SERPL-SCNC: 104 MMOL/L (ref 94–109)
CHLORIDE SERPL-SCNC: 108 MMOL/L (ref 94–109)
CHLORIDE SERPL-SCNC: 109 MMOL/L (ref 94–109)
CHLORIDE SERPL-SCNC: 109 MMOL/L (ref 94–109)
CHLORIDE SERPL-SCNC: 111 MMOL/L (ref 94–109)
CHLORIDE SERPL-SCNC: 114 MMOL/L (ref 94–109)
CHLORIDE SERPL-SCNC: 114 MMOL/L (ref 94–109)
CHLORIDE SERPL-SCNC: 115 MMOL/L (ref 94–109)
CHLORIDE SERPL-SCNC: 115 MMOL/L (ref 94–109)
CHLORIDE SERPL-SCNC: 93 MMOL/L (ref 94–109)
CHLORIDE SERPL-SCNC: 94 MMOL/L (ref 94–109)
CHLORIDE SERPL-SCNC: 94 MMOL/L (ref 94–109)
CHLORIDE SERPL-SCNC: 95 MMOL/L (ref 94–109)
CHLORIDE SERPL-SCNC: 97 MMOL/L (ref 94–109)
CHLORIDE SERPL-SCNC: 99 MMOL/L (ref 94–109)
CO2 BLDCOV-SCNC: 25 MMOL/L (ref 21–28)
CO2 SERPL-SCNC: 21 MMOL/L (ref 20–32)
CO2 SERPL-SCNC: 22 MMOL/L (ref 20–32)
CO2 SERPL-SCNC: 23 MMOL/L (ref 20–32)
CO2 SERPL-SCNC: 24 MMOL/L (ref 20–32)
CO2 SERPL-SCNC: 25 MMOL/L (ref 20–32)
CO2 SERPL-SCNC: 26 MMOL/L (ref 20–32)
CO2 SERPL-SCNC: 26 MMOL/L (ref 20–32)
CO2 SERPL-SCNC: 29 MMOL/L (ref 20–32)
CO2 SERPL-SCNC: 29 MMOL/L (ref 20–32)
CO2 SERPL-SCNC: 31 MMOL/L (ref 20–32)
COLOR UR AUTO: YELLOW
COPATH REPORT: NORMAL
CREAT BLD-MCNC: 0.8 MG/DL (ref 0.52–1.04)
CREAT SERPL-MCNC: 0.46 MG/DL (ref 0.52–1.04)
CREAT SERPL-MCNC: 0.6 MG/DL (ref 0.52–1.04)
CREAT SERPL-MCNC: 0.6 MG/DL (ref 0.52–1.04)
CREAT SERPL-MCNC: 0.61 MG/DL (ref 0.52–1.04)
CREAT SERPL-MCNC: 0.61 MG/DL (ref 0.52–1.04)
CREAT SERPL-MCNC: 0.62 MG/DL (ref 0.52–1.04)
CREAT SERPL-MCNC: 0.63 MG/DL (ref 0.52–1.04)
CREAT SERPL-MCNC: 0.63 MG/DL (ref 0.52–1.04)
CREAT SERPL-MCNC: 0.64 MG/DL (ref 0.52–1.04)
CREAT SERPL-MCNC: 0.65 MG/DL (ref 0.52–1.04)
CREAT SERPL-MCNC: 0.66 MG/DL (ref 0.52–1.04)
CREAT SERPL-MCNC: 0.67 MG/DL (ref 0.52–1.04)
CREAT SERPL-MCNC: 0.68 MG/DL (ref 0.52–1.04)
CREAT SERPL-MCNC: 0.69 MG/DL (ref 0.52–1.04)
CREAT SERPL-MCNC: 0.73 MG/DL (ref 0.52–1.04)
CREAT SERPL-MCNC: 0.73 MG/DL (ref 0.52–1.04)
CREAT SERPL-MCNC: 0.74 MG/DL (ref 0.52–1.04)
CREAT SERPL-MCNC: 0.76 MG/DL (ref 0.52–1.04)
CREAT SERPL-MCNC: 0.76 MG/DL (ref 0.52–1.04)
CREAT SERPL-MCNC: 0.77 MG/DL (ref 0.52–1.04)
CREAT SERPL-MCNC: 0.79 MG/DL (ref 0.52–1.04)
CREAT SERPL-MCNC: 0.88 MG/DL (ref 0.52–1.04)
CREAT SERPL-MCNC: 0.96 MG/DL (ref 0.52–1.04)
CREAT SERPL-MCNC: 1.03 MG/DL (ref 0.52–1.04)
DIFFERENTIAL METHOD BLD: ABNORMAL
DIFFERENTIAL METHOD BLD: NORMAL
DIFFERENTIAL METHOD BLD: NORMAL
DOHLE BOD BLD QL SMEAR: PRESENT
DOHLE BOD BLD QL SMEAR: PRESENT
EC STX1 GENE STL QL NAA+PROBE: NOT DETECTED
EC STX2 GENE STL QL NAA+PROBE: NOT DETECTED
ENTERIC PATHOGEN COMMENT: NORMAL
EOSINOPHIL # BLD AUTO: 0 10E9/L (ref 0–0.7)
EOSINOPHIL # BLD AUTO: 0.1 10E9/L (ref 0–0.7)
EOSINOPHIL NFR BLD AUTO: 0 %
EOSINOPHIL NFR BLD AUTO: 0.1 %
EOSINOPHIL NFR BLD AUTO: 0.6 %
EOSINOPHIL NFR BLD AUTO: 1 %
ERYTHROCYTE [DISTWIDTH] IN BLOOD BY AUTOMATED COUNT: 13.2 % (ref 10–15)
ERYTHROCYTE [DISTWIDTH] IN BLOOD BY AUTOMATED COUNT: 13.3 % (ref 10–15)
ERYTHROCYTE [DISTWIDTH] IN BLOOD BY AUTOMATED COUNT: 13.4 % (ref 10–15)
ERYTHROCYTE [DISTWIDTH] IN BLOOD BY AUTOMATED COUNT: 13.4 % (ref 10–15)
ERYTHROCYTE [DISTWIDTH] IN BLOOD BY AUTOMATED COUNT: 13.5 % (ref 10–15)
ERYTHROCYTE [DISTWIDTH] IN BLOOD BY AUTOMATED COUNT: 14.5 % (ref 10–15)
ERYTHROCYTE [DISTWIDTH] IN BLOOD BY AUTOMATED COUNT: 14.5 % (ref 10–15)
ERYTHROCYTE [DISTWIDTH] IN BLOOD BY AUTOMATED COUNT: 14.6 % (ref 10–15)
ERYTHROCYTE [DISTWIDTH] IN BLOOD BY AUTOMATED COUNT: 14.7 % (ref 10–15)
ERYTHROCYTE [DISTWIDTH] IN BLOOD BY AUTOMATED COUNT: 15.1 % (ref 10–15)
ERYTHROCYTE [DISTWIDTH] IN BLOOD BY AUTOMATED COUNT: 15.3 % (ref 10–15)
ERYTHROCYTE [DISTWIDTH] IN BLOOD BY AUTOMATED COUNT: 15.6 % (ref 10–15)
ERYTHROCYTE [DISTWIDTH] IN BLOOD BY AUTOMATED COUNT: 15.7 % (ref 10–15)
ERYTHROCYTE [DISTWIDTH] IN BLOOD BY AUTOMATED COUNT: 16.2 % (ref 10–15)
ERYTHROCYTE [DISTWIDTH] IN BLOOD BY AUTOMATED COUNT: 16.3 % (ref 10–15)
GFR SERPL CREATININE-BSD FRML MDRD: 53 ML/MIN/{1.73_M2}
GFR SERPL CREATININE-BSD FRML MDRD: 58 ML/MIN/{1.73_M2}
GFR SERPL CREATININE-BSD FRML MDRD: 64 ML/MIN/{1.73_M2}
GFR SERPL CREATININE-BSD FRML MDRD: 70 ML/MIN/{1.73_M2}
GFR SERPL CREATININE-BSD FRML MDRD: 73 ML/MIN/{1.73_M2}
GFR SERPL CREATININE-BSD FRML MDRD: 75 ML/MIN/{1.73_M2}
GFR SERPL CREATININE-BSD FRML MDRD: 76 ML/MIN/{1.73_M2}
GFR SERPL CREATININE-BSD FRML MDRD: 76 ML/MIN/{1.73_M2}
GFR SERPL CREATININE-BSD FRML MDRD: 78 ML/MIN/{1.73_M2}
GFR SERPL CREATININE-BSD FRML MDRD: 80 ML/MIN/{1.73_M2}
GFR SERPL CREATININE-BSD FRML MDRD: 81 ML/MIN/{1.73_M2}
GFR SERPL CREATININE-BSD FRML MDRD: 85 ML/MIN/{1.73_M2}
GFR SERPL CREATININE-BSD FRML MDRD: 85 ML/MIN/{1.73_M2}
GFR SERPL CREATININE-BSD FRML MDRD: 86 ML/MIN/{1.73_M2}
GFR SERPL CREATININE-BSD FRML MDRD: 86 ML/MIN/{1.73_M2}
GFR SERPL CREATININE-BSD FRML MDRD: 87 ML/MIN/{1.73_M2}
GFR SERPL CREATININE-BSD FRML MDRD: 88 ML/MIN/{1.73_M2}
GFR SERPL CREATININE-BSD FRML MDRD: 88 ML/MIN/{1.73_M2}
GFR SERPL CREATININE-BSD FRML MDRD: 89 ML/MIN/{1.73_M2}
GFR SERPL CREATININE-BSD FRML MDRD: >90 ML/MIN/{1.73_M2}
GLUCOSE BLDC GLUCOMTR-MCNC: 100 MG/DL (ref 70–99)
GLUCOSE BLDC GLUCOMTR-MCNC: 102 MG/DL (ref 70–99)
GLUCOSE BLDC GLUCOMTR-MCNC: 104 MG/DL (ref 70–99)
GLUCOSE BLDC GLUCOMTR-MCNC: 104 MG/DL (ref 70–99)
GLUCOSE BLDC GLUCOMTR-MCNC: 105 MG/DL (ref 70–99)
GLUCOSE BLDC GLUCOMTR-MCNC: 105 MG/DL (ref 70–99)
GLUCOSE BLDC GLUCOMTR-MCNC: 106 MG/DL (ref 70–99)
GLUCOSE BLDC GLUCOMTR-MCNC: 109 MG/DL (ref 70–99)
GLUCOSE BLDC GLUCOMTR-MCNC: 109 MG/DL (ref 70–99)
GLUCOSE BLDC GLUCOMTR-MCNC: 110 MG/DL (ref 70–99)
GLUCOSE BLDC GLUCOMTR-MCNC: 111 MG/DL (ref 70–99)
GLUCOSE BLDC GLUCOMTR-MCNC: 113 MG/DL (ref 70–99)
GLUCOSE BLDC GLUCOMTR-MCNC: 115 MG/DL (ref 70–99)
GLUCOSE BLDC GLUCOMTR-MCNC: 116 MG/DL (ref 70–99)
GLUCOSE BLDC GLUCOMTR-MCNC: 117 MG/DL (ref 70–99)
GLUCOSE BLDC GLUCOMTR-MCNC: 117 MG/DL (ref 70–99)
GLUCOSE BLDC GLUCOMTR-MCNC: 118 MG/DL (ref 70–99)
GLUCOSE BLDC GLUCOMTR-MCNC: 118 MG/DL (ref 70–99)
GLUCOSE BLDC GLUCOMTR-MCNC: 119 MG/DL (ref 70–99)
GLUCOSE BLDC GLUCOMTR-MCNC: 123 MG/DL (ref 70–99)
GLUCOSE BLDC GLUCOMTR-MCNC: 125 MG/DL (ref 70–99)
GLUCOSE BLDC GLUCOMTR-MCNC: 126 MG/DL (ref 70–99)
GLUCOSE BLDC GLUCOMTR-MCNC: 127 MG/DL (ref 70–99)
GLUCOSE BLDC GLUCOMTR-MCNC: 127 MG/DL (ref 70–99)
GLUCOSE BLDC GLUCOMTR-MCNC: 132 MG/DL (ref 70–99)
GLUCOSE BLDC GLUCOMTR-MCNC: 134 MG/DL (ref 70–99)
GLUCOSE BLDC GLUCOMTR-MCNC: 135 MG/DL (ref 70–99)
GLUCOSE BLDC GLUCOMTR-MCNC: 140 MG/DL (ref 70–99)
GLUCOSE BLDC GLUCOMTR-MCNC: 141 MG/DL (ref 70–99)
GLUCOSE BLDC GLUCOMTR-MCNC: 143 MG/DL (ref 70–99)
GLUCOSE BLDC GLUCOMTR-MCNC: 147 MG/DL (ref 70–99)
GLUCOSE BLDC GLUCOMTR-MCNC: 153 MG/DL (ref 70–99)
GLUCOSE BLDC GLUCOMTR-MCNC: 154 MG/DL (ref 70–99)
GLUCOSE BLDC GLUCOMTR-MCNC: 154 MG/DL (ref 70–99)
GLUCOSE BLDC GLUCOMTR-MCNC: 156 MG/DL (ref 70–99)
GLUCOSE BLDC GLUCOMTR-MCNC: 156 MG/DL (ref 70–99)
GLUCOSE BLDC GLUCOMTR-MCNC: 158 MG/DL (ref 70–99)
GLUCOSE BLDC GLUCOMTR-MCNC: 159 MG/DL (ref 70–99)
GLUCOSE BLDC GLUCOMTR-MCNC: 164 MG/DL (ref 70–99)
GLUCOSE BLDC GLUCOMTR-MCNC: 167 MG/DL (ref 70–99)
GLUCOSE BLDC GLUCOMTR-MCNC: 168 MG/DL (ref 70–99)
GLUCOSE BLDC GLUCOMTR-MCNC: 168 MG/DL (ref 70–99)
GLUCOSE BLDC GLUCOMTR-MCNC: 170 MG/DL (ref 70–99)
GLUCOSE BLDC GLUCOMTR-MCNC: 173 MG/DL (ref 70–99)
GLUCOSE BLDC GLUCOMTR-MCNC: 174 MG/DL (ref 70–99)
GLUCOSE BLDC GLUCOMTR-MCNC: 177 MG/DL (ref 70–99)
GLUCOSE BLDC GLUCOMTR-MCNC: 177 MG/DL (ref 70–99)
GLUCOSE BLDC GLUCOMTR-MCNC: 178 MG/DL (ref 70–99)
GLUCOSE BLDC GLUCOMTR-MCNC: 178 MG/DL (ref 70–99)
GLUCOSE BLDC GLUCOMTR-MCNC: 179 MG/DL (ref 70–99)
GLUCOSE BLDC GLUCOMTR-MCNC: 181 MG/DL (ref 70–99)
GLUCOSE BLDC GLUCOMTR-MCNC: 181 MG/DL (ref 70–99)
GLUCOSE BLDC GLUCOMTR-MCNC: 182 MG/DL (ref 70–99)
GLUCOSE BLDC GLUCOMTR-MCNC: 183 MG/DL (ref 70–99)
GLUCOSE BLDC GLUCOMTR-MCNC: 183 MG/DL (ref 70–99)
GLUCOSE BLDC GLUCOMTR-MCNC: 188 MG/DL (ref 70–99)
GLUCOSE BLDC GLUCOMTR-MCNC: 189 MG/DL (ref 70–99)
GLUCOSE BLDC GLUCOMTR-MCNC: 201 MG/DL (ref 70–99)
GLUCOSE BLDC GLUCOMTR-MCNC: 214 MG/DL (ref 70–99)
GLUCOSE BLDC GLUCOMTR-MCNC: 214 MG/DL (ref 70–99)
GLUCOSE BLDC GLUCOMTR-MCNC: 222 MG/DL (ref 70–99)
GLUCOSE BLDC GLUCOMTR-MCNC: 229 MG/DL (ref 70–99)
GLUCOSE BLDC GLUCOMTR-MCNC: 236 MG/DL (ref 70–99)
GLUCOSE BLDC GLUCOMTR-MCNC: 77 MG/DL (ref 70–99)
GLUCOSE BLDC GLUCOMTR-MCNC: 79 MG/DL (ref 70–99)
GLUCOSE BLDC GLUCOMTR-MCNC: 86 MG/DL (ref 70–99)
GLUCOSE BLDC GLUCOMTR-MCNC: 90 MG/DL (ref 70–99)
GLUCOSE BLDC GLUCOMTR-MCNC: 90 MG/DL (ref 70–99)
GLUCOSE BLDC GLUCOMTR-MCNC: 91 MG/DL (ref 70–99)
GLUCOSE BLDC GLUCOMTR-MCNC: 92 MG/DL (ref 70–99)
GLUCOSE BLDC GLUCOMTR-MCNC: 94 MG/DL (ref 70–99)
GLUCOSE BLDC GLUCOMTR-MCNC: 99 MG/DL (ref 70–99)
GLUCOSE SERPL-MCNC: 100 MG/DL (ref 70–99)
GLUCOSE SERPL-MCNC: 107 MG/DL (ref 70–99)
GLUCOSE SERPL-MCNC: 112 MG/DL (ref 70–99)
GLUCOSE SERPL-MCNC: 113 MG/DL (ref 70–99)
GLUCOSE SERPL-MCNC: 114 MG/DL (ref 70–99)
GLUCOSE SERPL-MCNC: 119 MG/DL (ref 70–99)
GLUCOSE SERPL-MCNC: 120 MG/DL (ref 70–99)
GLUCOSE SERPL-MCNC: 122 MG/DL (ref 70–99)
GLUCOSE SERPL-MCNC: 122 MG/DL (ref 70–99)
GLUCOSE SERPL-MCNC: 125 MG/DL (ref 70–99)
GLUCOSE SERPL-MCNC: 141 MG/DL (ref 70–99)
GLUCOSE SERPL-MCNC: 143 MG/DL (ref 70–99)
GLUCOSE SERPL-MCNC: 147 MG/DL (ref 70–99)
GLUCOSE SERPL-MCNC: 147 MG/DL (ref 70–99)
GLUCOSE SERPL-MCNC: 149 MG/DL (ref 70–99)
GLUCOSE SERPL-MCNC: 150 MG/DL (ref 70–99)
GLUCOSE SERPL-MCNC: 159 MG/DL (ref 70–99)
GLUCOSE SERPL-MCNC: 163 MG/DL (ref 70–99)
GLUCOSE SERPL-MCNC: 173 MG/DL (ref 70–99)
GLUCOSE SERPL-MCNC: 178 MG/DL (ref 70–99)
GLUCOSE SERPL-MCNC: 181 MG/DL (ref 70–99)
GLUCOSE SERPL-MCNC: 192 MG/DL (ref 70–99)
GLUCOSE SERPL-MCNC: 199 MG/DL (ref 70–99)
GLUCOSE SERPL-MCNC: 352 MG/DL (ref 70–99)
GLUCOSE SERPL-MCNC: 94 MG/DL (ref 70–99)
GLUCOSE SERPL-MCNC: 95 MG/DL (ref 70–99)
GLUCOSE UR STRIP-MCNC: NEGATIVE MG/DL
HBA1C MFR BLD: 6.1 % (ref 0–5.6)
HCT VFR BLD AUTO: 24 % (ref 35–47)
HCT VFR BLD AUTO: 25.5 % (ref 35–47)
HCT VFR BLD AUTO: 27.8 % (ref 35–47)
HCT VFR BLD AUTO: 29 % (ref 35–47)
HCT VFR BLD AUTO: 29.2 % (ref 35–47)
HCT VFR BLD AUTO: 29.7 % (ref 35–47)
HCT VFR BLD AUTO: 29.7 % (ref 35–47)
HCT VFR BLD AUTO: 30.9 % (ref 35–47)
HCT VFR BLD AUTO: 31.2 % (ref 35–47)
HCT VFR BLD AUTO: 32.6 % (ref 35–47)
HCT VFR BLD AUTO: 34.2 % (ref 35–47)
HCT VFR BLD AUTO: 38.1 % (ref 35–47)
HCT VFR BLD AUTO: 41 % (ref 35–47)
HCT VFR BLD AUTO: 42.2 % (ref 35–47)
HCT VFR BLD AUTO: 43.3 % (ref 35–47)
HCT VFR BLD AUTO: 43.9 % (ref 35–47)
HCT VFR BLD AUTO: 44.6 % (ref 35–47)
HCT VFR BLD AUTO: 45.7 % (ref 35–47)
HCT VFR BLD AUTO: 46.4 % (ref 35–47)
HCT VFR BLD AUTO: 46.8 % (ref 35–47)
HCT VFR BLD AUTO: 48.8 % (ref 35–47)
HGB BLD-MCNC: 10 G/DL (ref 11.7–15.7)
HGB BLD-MCNC: 10.8 G/DL (ref 11.7–15.7)
HGB BLD-MCNC: 11.4 G/DL (ref 11.7–15.7)
HGB BLD-MCNC: 12.6 G/DL (ref 11.7–15.7)
HGB BLD-MCNC: 13.7 G/DL (ref 11.7–15.7)
HGB BLD-MCNC: 14.1 G/DL (ref 11.7–15.7)
HGB BLD-MCNC: 14.2 G/DL (ref 11.7–15.7)
HGB BLD-MCNC: 14.6 G/DL (ref 11.7–15.7)
HGB BLD-MCNC: 14.7 G/DL (ref 11.7–15.7)
HGB BLD-MCNC: 15 G/DL (ref 11.7–15.7)
HGB BLD-MCNC: 15.4 G/DL (ref 11.7–15.7)
HGB BLD-MCNC: 15.4 G/DL (ref 11.7–15.7)
HGB BLD-MCNC: 16.3 G/DL (ref 11.7–15.7)
HGB BLD-MCNC: 7.7 G/DL (ref 11.7–15.7)
HGB BLD-MCNC: 8.5 G/DL (ref 11.7–15.7)
HGB BLD-MCNC: 8.9 G/DL (ref 11.7–15.7)
HGB BLD-MCNC: 9.1 G/DL (ref 11.7–15.7)
HGB BLD-MCNC: 9.3 G/DL (ref 11.7–15.7)
HGB BLD-MCNC: 9.4 G/DL (ref 11.7–15.7)
HGB BLD-MCNC: 9.7 G/DL (ref 11.7–15.7)
HGB BLD-MCNC: 9.9 G/DL (ref 11.7–15.7)
HGB UR QL STRIP: NEGATIVE
HYALINE CASTS #/AREA URNS LPF: 6 /LPF (ref 0–2)
IMM GRANULOCYTES # BLD: 0.1 10E9/L (ref 0–0.4)
IMM GRANULOCYTES # BLD: 0.2 10E9/L (ref 0–0.4)
IMM GRANULOCYTES # BLD: 0.3 10E9/L (ref 0–0.4)
IMM GRANULOCYTES # BLD: 0.3 10E9/L (ref 0–0.4)
IMM GRANULOCYTES NFR BLD: 0.6 %
IMM GRANULOCYTES NFR BLD: 0.8 %
IMM GRANULOCYTES NFR BLD: 1.1 %
IMM GRANULOCYTES NFR BLD: 1.2 %
IMM GRANULOCYTES NFR BLD: 1.5 %
IMM GRANULOCYTES NFR BLD: 1.8 %
IMM GRANULOCYTES NFR BLD: 1.9 %
IMM GRANULOCYTES NFR BLD: 1.9 %
IMM GRANULOCYTES NFR BLD: 2 %
INR PPP: 0.99 (ref 0.86–1.14)
KETONES UR STRIP-MCNC: NEGATIVE MG/DL
LACTATE BLD-SCNC: 0.6 MMOL/L (ref 0.7–2)
LACTATE BLD-SCNC: 0.7 MMOL/L (ref 0.7–2)
LACTATE BLD-SCNC: 1 MMOL/L (ref 0.7–2)
LACTATE BLD-SCNC: 1.6 MMOL/L (ref 0.7–2.1)
LACTATE BLD-SCNC: 1.7 MMOL/L (ref 0.7–2)
LACTATE BLD-SCNC: 1.9 MMOL/L (ref 0.7–2)
LACTATE BLD-SCNC: 1.9 MMOL/L (ref 0.7–2)
LACTATE BLD-SCNC: 2 MMOL/L (ref 0.7–2)
LDH SERPL L TO P-CCNC: 433 U/L (ref 81–234)
LDH SERPL L TO P-CCNC: NORMAL U/L (ref 81–234)
LEUKOCYTE ESTERASE UR QL STRIP: NEGATIVE
LIPASE SERPL-CCNC: 119 U/L (ref 73–393)
LYMPHOCYTES # BLD AUTO: 0.1 10E9/L (ref 0.8–5.3)
LYMPHOCYTES # BLD AUTO: 0.2 10E9/L (ref 0.8–5.3)
LYMPHOCYTES # BLD AUTO: 0.2 10E9/L (ref 0.8–5.3)
LYMPHOCYTES # BLD AUTO: 0.3 10E9/L (ref 0.8–5.3)
LYMPHOCYTES # BLD AUTO: 0.3 10E9/L (ref 0.8–5.3)
LYMPHOCYTES # BLD AUTO: 0.4 10E9/L (ref 0.8–5.3)
LYMPHOCYTES # BLD AUTO: 0.5 10E9/L (ref 0.8–5.3)
LYMPHOCYTES # BLD AUTO: 1.1 10E9/L (ref 0.8–5.3)
LYMPHOCYTES # BLD AUTO: 1.3 10E9/L (ref 0.8–5.3)
LYMPHOCYTES # BLD AUTO: 1.6 10E9/L (ref 0.8–5.3)
LYMPHOCYTES # BLD AUTO: 1.7 10E9/L (ref 0.8–5.3)
LYMPHOCYTES # BLD AUTO: 2 10E9/L (ref 0.8–5.3)
LYMPHOCYTES # BLD AUTO: 2 10E9/L (ref 0.8–5.3)
LYMPHOCYTES NFR BLD AUTO: 1.9 %
LYMPHOCYTES NFR BLD AUTO: 10 %
LYMPHOCYTES NFR BLD AUTO: 10.7 %
LYMPHOCYTES NFR BLD AUTO: 10.7 %
LYMPHOCYTES NFR BLD AUTO: 12 %
LYMPHOCYTES NFR BLD AUTO: 13.6 %
LYMPHOCYTES NFR BLD AUTO: 15.2 %
LYMPHOCYTES NFR BLD AUTO: 16 %
LYMPHOCYTES NFR BLD AUTO: 17 %
LYMPHOCYTES NFR BLD AUTO: 18.9 %
LYMPHOCYTES NFR BLD AUTO: 2.5 %
LYMPHOCYTES NFR BLD AUTO: 3.4 %
LYMPHOCYTES NFR BLD AUTO: 9.6 %
Lab: NORMAL
Lab: NORMAL
MAGNESIUM SERPL-MCNC: 2 MG/DL (ref 1.6–2.3)
MAGNESIUM SERPL-MCNC: 2 MG/DL (ref 1.6–2.3)
MAGNESIUM SERPL-MCNC: 2.2 MG/DL (ref 1.6–2.3)
MAGNESIUM SERPL-MCNC: 2.3 MG/DL (ref 1.6–2.3)
MAGNESIUM SERPL-MCNC: 2.4 MG/DL (ref 1.6–2.3)
MCH RBC QN AUTO: 30.3 PG (ref 26.5–33)
MCH RBC QN AUTO: 30.5 PG (ref 26.5–33)
MCH RBC QN AUTO: 30.6 PG (ref 26.5–33)
MCH RBC QN AUTO: 30.6 PG (ref 26.5–33)
MCH RBC QN AUTO: 30.8 PG (ref 26.5–33)
MCH RBC QN AUTO: 30.9 PG (ref 26.5–33)
MCH RBC QN AUTO: 31 PG (ref 26.5–33)
MCH RBC QN AUTO: 31.1 PG (ref 26.5–33)
MCH RBC QN AUTO: 31.2 PG (ref 26.5–33)
MCH RBC QN AUTO: 31.4 PG (ref 26.5–33)
MCH RBC QN AUTO: 31.4 PG (ref 26.5–33)
MCH RBC QN AUTO: 31.5 PG (ref 26.5–33)
MCH RBC QN AUTO: 31.5 PG (ref 26.5–33)
MCH RBC QN AUTO: 31.6 PG (ref 26.5–33)
MCH RBC QN AUTO: 31.7 PG (ref 26.5–33)
MCH RBC QN AUTO: 31.7 PG (ref 26.5–33)
MCH RBC QN AUTO: 31.8 PG (ref 26.5–33)
MCH RBC QN AUTO: 31.9 PG (ref 26.5–33)
MCH RBC QN AUTO: 32 PG (ref 26.5–33)
MCHC RBC AUTO-ENTMCNC: 31.2 G/DL (ref 31.5–36.5)
MCHC RBC AUTO-ENTMCNC: 31.3 G/DL (ref 31.5–36.5)
MCHC RBC AUTO-ENTMCNC: 31.6 G/DL (ref 31.5–36.5)
MCHC RBC AUTO-ENTMCNC: 32 G/DL (ref 31.5–36.5)
MCHC RBC AUTO-ENTMCNC: 32 G/DL (ref 31.5–36.5)
MCHC RBC AUTO-ENTMCNC: 32.1 G/DL (ref 31.5–36.5)
MCHC RBC AUTO-ENTMCNC: 32.1 G/DL (ref 31.5–36.5)
MCHC RBC AUTO-ENTMCNC: 32.5 G/DL (ref 31.5–36.5)
MCHC RBC AUTO-ENTMCNC: 32.8 G/DL (ref 31.5–36.5)
MCHC RBC AUTO-ENTMCNC: 32.8 G/DL (ref 31.5–36.5)
MCHC RBC AUTO-ENTMCNC: 32.9 G/DL (ref 31.5–36.5)
MCHC RBC AUTO-ENTMCNC: 33 G/DL (ref 31.5–36.5)
MCHC RBC AUTO-ENTMCNC: 33.1 G/DL (ref 31.5–36.5)
MCHC RBC AUTO-ENTMCNC: 33.1 G/DL (ref 31.5–36.5)
MCHC RBC AUTO-ENTMCNC: 33.2 G/DL (ref 31.5–36.5)
MCHC RBC AUTO-ENTMCNC: 33.3 G/DL (ref 31.5–36.5)
MCHC RBC AUTO-ENTMCNC: 33.4 G/DL (ref 31.5–36.5)
MCHC RBC AUTO-ENTMCNC: 33.4 G/DL (ref 31.5–36.5)
MCHC RBC AUTO-ENTMCNC: 34.4 G/DL (ref 31.5–36.5)
MCV RBC AUTO: 100 FL (ref 78–100)
MCV RBC AUTO: 100 FL (ref 78–100)
MCV RBC AUTO: 101 FL (ref 78–100)
MCV RBC AUTO: 90 FL (ref 78–100)
MCV RBC AUTO: 92 FL (ref 78–100)
MCV RBC AUTO: 92 FL (ref 78–100)
MCV RBC AUTO: 93 FL (ref 78–100)
MCV RBC AUTO: 94 FL (ref 78–100)
MCV RBC AUTO: 95 FL (ref 78–100)
MCV RBC AUTO: 96 FL (ref 78–100)
MCV RBC AUTO: 96 FL (ref 78–100)
MCV RBC AUTO: 97 FL (ref 78–100)
MCV RBC AUTO: 97 FL (ref 78–100)
MCV RBC AUTO: 98 FL (ref 78–100)
MCV RBC AUTO: 98 FL (ref 78–100)
METAMYELOCYTES # BLD: 0 10E9/L
METAMYELOCYTES # BLD: 0.1 10E9/L
METAMYELOCYTES # BLD: 0.1 10E9/L
METAMYELOCYTES NFR BLD MANUAL: 1 %
METAMYELOCYTES NFR BLD MANUAL: 3 %
METAMYELOCYTES NFR BLD MANUAL: 7 %
MONOCYTES # BLD AUTO: 0.1 10E9/L (ref 0–1.3)
MONOCYTES # BLD AUTO: 0.3 10E9/L (ref 0–1.3)
MONOCYTES # BLD AUTO: 0.4 10E9/L (ref 0–1.3)
MONOCYTES # BLD AUTO: 0.6 10E9/L (ref 0–1.3)
MONOCYTES # BLD AUTO: 0.7 10E9/L (ref 0–1.3)
MONOCYTES # BLD AUTO: 1.1 10E9/L (ref 0–1.3)
MONOCYTES # BLD AUTO: 1.1 10E9/L (ref 0–1.3)
MONOCYTES # BLD AUTO: 1.2 10E9/L (ref 0–1.3)
MONOCYTES # BLD AUTO: 1.4 10E9/L (ref 0–1.3)
MONOCYTES NFR BLD AUTO: 10.6 %
MONOCYTES NFR BLD AUTO: 12 %
MONOCYTES NFR BLD AUTO: 3 %
MONOCYTES NFR BLD AUTO: 3.8 %
MONOCYTES NFR BLD AUTO: 3.9 %
MONOCYTES NFR BLD AUTO: 5 %
MONOCYTES NFR BLD AUTO: 5.5 %
MONOCYTES NFR BLD AUTO: 5.6 %
MONOCYTES NFR BLD AUTO: 7 %
MONOCYTES NFR BLD AUTO: 8.9 %
MONOCYTES NFR BLD AUTO: 8.9 %
MONOCYTES NFR BLD AUTO: 9 %
MONOCYTES NFR BLD AUTO: 9.5 %
MUCOUS THREADS #/AREA URNS LPF: PRESENT /LPF
MYELOCYTES # BLD: 0 10E9/L
MYELOCYTES # BLD: 0 10E9/L
MYELOCYTES # BLD: 0.1 10E9/L
MYELOCYTES NFR BLD MANUAL: 1 %
MYELOCYTES NFR BLD MANUAL: 2 %
MYELOCYTES NFR BLD MANUAL: 6 %
NEUTROPHILS # BLD AUTO: 0.4 10E9/L (ref 1.6–8.3)
NEUTROPHILS # BLD AUTO: 0.7 10E9/L (ref 1.6–8.3)
NEUTROPHILS # BLD AUTO: 1.2 10E9/L (ref 1.6–8.3)
NEUTROPHILS # BLD AUTO: 11.5 10E9/L (ref 1.6–8.3)
NEUTROPHILS # BLD AUTO: 11.6 10E9/L (ref 1.6–8.3)
NEUTROPHILS # BLD AUTO: 3.6 10E9/L (ref 1.6–8.3)
NEUTROPHILS # BLD AUTO: 7.4 10E9/L (ref 1.6–8.3)
NEUTROPHILS # BLD AUTO: 7.6 10E9/L (ref 1.6–8.3)
NEUTROPHILS # BLD AUTO: 9.2 10E9/L (ref 1.6–8.3)
NEUTROPHILS # BLD AUTO: 9.6 10E9/L (ref 1.6–8.3)
NEUTROPHILS # BLD AUTO: 9.7 10E9/L (ref 1.6–8.3)
NEUTROPHILS # BLD AUTO: 9.8 10E9/L (ref 1.6–8.3)
NEUTROPHILS # BLD AUTO: 9.8 10E9/L (ref 1.6–8.3)
NEUTROPHILS NFR BLD AUTO: 64 %
NEUTROPHILS NFR BLD AUTO: 68 %
NEUTROPHILS NFR BLD AUTO: 68.7 %
NEUTROPHILS NFR BLD AUTO: 73.6 %
NEUTROPHILS NFR BLD AUTO: 75 %
NEUTROPHILS NFR BLD AUTO: 75 %
NEUTROPHILS NFR BLD AUTO: 75.8 %
NEUTROPHILS NFR BLD AUTO: 77.7 %
NEUTROPHILS NFR BLD AUTO: 82.2 %
NEUTROPHILS NFR BLD AUTO: 85.5 %
NEUTROPHILS NFR BLD AUTO: 88.7 %
NEUTROPHILS NFR BLD AUTO: 91.8 %
NEUTROPHILS NFR BLD AUTO: 93.2 %
NITRATE UR QL: NEGATIVE
NOROV GI+II ORF1-ORF2 JNC STL QL NAA+PR: NOT DETECTED
NRBC # BLD AUTO: 0 10*3/UL
NRBC BLD AUTO-RTO: 0 /100
NRBC BLD AUTO-RTO: 1 /100
NRBC BLD AUTO-RTO: 1 /100
NUM BPU REQUESTED: 1
NUM BPU REQUESTED: 1
OSMOLALITY SERPL: 284 MMOL/KG (ref 280–301)
OSMOLALITY UR: 550 MMOL/KG (ref 100–1200)
PCO2 BLDV: 42 MM HG (ref 40–50)
PH BLDV: 7.39 PH (ref 7.32–7.43)
PH UR STRIP: 6 PH (ref 5–7)
PHOSPHATE SERPL-MCNC: 3.4 MG/DL (ref 2.5–4.5)
PHOSPHATE SERPL-MCNC: 3.4 MG/DL (ref 2.5–4.5)
PHOSPHATE SERPL-MCNC: 3.7 MG/DL (ref 2.5–4.5)
PHOSPHATE SERPL-MCNC: 3.9 MG/DL (ref 2.5–4.5)
PHOSPHATE SERPL-MCNC: 4.4 MG/DL (ref 2.5–4.5)
PLATELET # BLD AUTO: 16 10E9/L (ref 150–450)
PLATELET # BLD AUTO: 160 10E9/L (ref 150–450)
PLATELET # BLD AUTO: 178 10E9/L (ref 150–450)
PLATELET # BLD AUTO: 209 10E9/L (ref 150–450)
PLATELET # BLD AUTO: 21 10E9/L (ref 150–450)
PLATELET # BLD AUTO: 220 10E9/L (ref 150–450)
PLATELET # BLD AUTO: 238 10E9/L (ref 150–450)
PLATELET # BLD AUTO: 239 10E9/L (ref 150–450)
PLATELET # BLD AUTO: 244 10E9/L (ref 150–450)
PLATELET # BLD AUTO: 250 10E9/L (ref 150–450)
PLATELET # BLD AUTO: 26 10E9/L (ref 150–450)
PLATELET # BLD AUTO: 263 10E9/L (ref 150–450)
PLATELET # BLD AUTO: 27 10E9/L (ref 150–450)
PLATELET # BLD AUTO: 28 10E9/L (ref 150–450)
PLATELET # BLD AUTO: 30 10E9/L (ref 150–450)
PLATELET # BLD AUTO: 34 10E9/L (ref 150–450)
PLATELET # BLD AUTO: 38 10E9/L (ref 150–450)
PLATELET # BLD AUTO: 42 10E9/L (ref 150–450)
PLATELET # BLD AUTO: 44 10E9/L (ref 150–450)
PLATELET # BLD EST: ABNORMAL 10*3/UL
PO2 BLDV: 23 MM HG (ref 25–47)
POTASSIUM SERPL-SCNC: 3 MMOL/L (ref 3.4–5.3)
POTASSIUM SERPL-SCNC: 3.1 MMOL/L (ref 3.4–5.3)
POTASSIUM SERPL-SCNC: 3.3 MMOL/L (ref 3.4–5.3)
POTASSIUM SERPL-SCNC: 3.4 MMOL/L (ref 3.4–5.3)
POTASSIUM SERPL-SCNC: 3.5 MMOL/L (ref 3.4–5.3)
POTASSIUM SERPL-SCNC: 3.6 MMOL/L (ref 3.4–5.3)
POTASSIUM SERPL-SCNC: 3.7 MMOL/L (ref 3.4–5.3)
POTASSIUM SERPL-SCNC: 3.8 MMOL/L (ref 3.4–5.3)
POTASSIUM SERPL-SCNC: 3.8 MMOL/L (ref 3.4–5.3)
POTASSIUM SERPL-SCNC: 3.9 MMOL/L (ref 3.4–5.3)
POTASSIUM SERPL-SCNC: 4.4 MMOL/L (ref 3.4–5.3)
POTASSIUM SERPL-SCNC: 4.4 MMOL/L (ref 3.4–5.3)
POTASSIUM SERPL-SCNC: 4.5 MMOL/L (ref 3.4–5.3)
POTASSIUM SERPL-SCNC: 4.5 MMOL/L (ref 3.4–5.3)
POTASSIUM SERPL-SCNC: 4.6 MMOL/L (ref 3.4–5.3)
POTASSIUM SERPL-SCNC: 4.7 MMOL/L (ref 3.4–5.3)
POTASSIUM SERPL-SCNC: 4.8 MMOL/L (ref 3.4–5.3)
POTASSIUM SERPL-SCNC: 4.8 MMOL/L (ref 3.4–5.3)
POTASSIUM SERPL-SCNC: 4.9 MMOL/L (ref 3.4–5.3)
PROMYELOCYTES # BLD MANUAL: 0 10E9/L
PROMYELOCYTES NFR BLD MANUAL: 1 %
PROMYELOCYTES NFR BLD MANUAL: 1 %
PROMYELOCYTES NFR BLD MANUAL: 2 %
PROT SERPL-MCNC: 5 G/DL (ref 6.8–8.8)
PROT SERPL-MCNC: 5.3 G/DL (ref 6.8–8.8)
PROT SERPL-MCNC: 5.5 G/DL (ref 6.8–8.8)
PROT SERPL-MCNC: 6.6 G/DL (ref 6.8–8.8)
PROT SERPL-MCNC: 6.6 G/DL (ref 6.8–8.8)
PROT SERPL-MCNC: 6.7 G/DL (ref 6.8–8.8)
PROT SERPL-MCNC: 6.8 G/DL (ref 6.8–8.8)
PROT SERPL-MCNC: 6.9 G/DL (ref 6.8–8.8)
PROT SERPL-MCNC: 7.3 G/DL (ref 6.8–8.8)
RADIOLOGIST FLAGS: ABNORMAL
RBC # BLD AUTO: 2.48 10E12/L (ref 3.8–5.2)
RBC # BLD AUTO: 2.68 10E12/L (ref 3.8–5.2)
RBC # BLD AUTO: 2.86 10E12/L (ref 3.8–5.2)
RBC # BLD AUTO: 2.93 10E12/L (ref 3.8–5.2)
RBC # BLD AUTO: 2.94 10E12/L (ref 3.8–5.2)
RBC # BLD AUTO: 2.96 10E12/L (ref 3.8–5.2)
RBC # BLD AUTO: 3.05 10E12/L (ref 3.8–5.2)
RBC # BLD AUTO: 3.14 10E12/L (ref 3.8–5.2)
RBC # BLD AUTO: 3.18 10E12/L (ref 3.8–5.2)
RBC # BLD AUTO: 3.39 10E12/L (ref 3.8–5.2)
RBC # BLD AUTO: 3.6 10E12/L (ref 3.8–5.2)
RBC # BLD AUTO: 3.99 10E12/L (ref 3.8–5.2)
RBC # BLD AUTO: 4.52 10E12/L (ref 3.8–5.2)
RBC # BLD AUTO: 4.54 10E12/L (ref 3.8–5.2)
RBC # BLD AUTO: 4.6 10E12/L (ref 3.8–5.2)
RBC # BLD AUTO: 4.64 10E12/L (ref 3.8–5.2)
RBC # BLD AUTO: 4.78 10E12/L (ref 3.8–5.2)
RBC # BLD AUTO: 4.81 10E12/L (ref 3.8–5.2)
RBC # BLD AUTO: 5.04 10E12/L (ref 3.8–5.2)
RBC # BLD AUTO: 5.05 10E12/L (ref 3.8–5.2)
RBC # BLD AUTO: 5.32 10E12/L (ref 3.8–5.2)
RBC #/AREA URNS AUTO: <1 /HPF (ref 0–2)
RBC MORPH BLD: ABNORMAL
RVA NSP5 STL QL NAA+PROBE: NOT DETECTED
SALMONELLA SP RPOD STL QL NAA+PROBE: NOT DETECTED
SAO2 % BLDV FROM PO2: 39 %
SHIGELLA SP+EIEC IPAH STL QL NAA+PROBE: NOT DETECTED
SODIUM SERPL-SCNC: 126 MMOL/L (ref 133–144)
SODIUM SERPL-SCNC: 126 MMOL/L (ref 133–144)
SODIUM SERPL-SCNC: 128 MMOL/L (ref 133–144)
SODIUM SERPL-SCNC: 129 MMOL/L (ref 133–144)
SODIUM SERPL-SCNC: 130 MMOL/L (ref 133–144)
SODIUM SERPL-SCNC: 130 MMOL/L (ref 133–144)
SODIUM SERPL-SCNC: 131 MMOL/L (ref 133–144)
SODIUM SERPL-SCNC: 133 MMOL/L (ref 133–144)
SODIUM SERPL-SCNC: 133 MMOL/L (ref 133–144)
SODIUM SERPL-SCNC: 134 MMOL/L (ref 133–144)
SODIUM SERPL-SCNC: 136 MMOL/L (ref 133–144)
SODIUM SERPL-SCNC: 137 MMOL/L (ref 133–144)
SODIUM SERPL-SCNC: 140 MMOL/L (ref 133–144)
SODIUM SERPL-SCNC: 141 MMOL/L (ref 133–144)
SODIUM SERPL-SCNC: 143 MMOL/L (ref 133–144)
SODIUM SERPL-SCNC: 144 MMOL/L (ref 133–144)
SODIUM SERPL-SCNC: 145 MMOL/L (ref 133–144)
SODIUM SERPL-SCNC: 146 MMOL/L (ref 133–144)
SODIUM SERPL-SCNC: 147 MMOL/L (ref 133–144)
SODIUM UR-SCNC: 33 MMOL/L
SOURCE: ABNORMAL
SP GR UR STRIP: 1.02 (ref 1–1.03)
SPECIMEN EXP DATE BLD: NORMAL
SPECIMEN EXP DATE BLD: NORMAL
SPECIMEN SOURCE: NORMAL
SQUAMOUS #/AREA URNS AUTO: <1 /HPF (ref 0–1)
T4 FREE SERPL-MCNC: 0.84 NG/DL (ref 0.76–1.46)
TOXIC GRANULES BLD QL SMEAR: PRESENT
TRANSFUSION STATUS PATIENT QL: NORMAL
TSH SERPL DL<=0.005 MIU/L-ACNC: 3.3 MU/L (ref 0.4–4)
UROBILINOGEN UR STRIP-MCNC: 3 MG/DL (ref 0–2)
V CHOL+PARA RFBL+TRKH+TNAA STL QL NAA+PR: NOT DETECTED
VANCOMYCIN SERPL-MCNC: 21.8 MG/L
VARIANT LYMPHS BLD QL SMEAR: PRESENT
WBC # BLD AUTO: 0.3 10E9/L (ref 4–11)
WBC # BLD AUTO: 0.3 10E9/L (ref 4–11)
WBC # BLD AUTO: 0.4 10E9/L (ref 4–11)
WBC # BLD AUTO: 0.5 10E9/L (ref 4–11)
WBC # BLD AUTO: 1.1 10E9/L (ref 4–11)
WBC # BLD AUTO: 1.8 10E9/L (ref 4–11)
WBC # BLD AUTO: 10.4 10E9/L (ref 4–11)
WBC # BLD AUTO: 10.8 10E9/L (ref 4–11)
WBC # BLD AUTO: 11.4 10E9/L (ref 4–11)
WBC # BLD AUTO: 11.8 10E9/L (ref 4–11)
WBC # BLD AUTO: 12.5 10E9/L (ref 4–11)
WBC # BLD AUTO: 12.6 10E9/L (ref 4–11)
WBC # BLD AUTO: 13.3 10E9/L (ref 4–11)
WBC # BLD AUTO: 14.8 10E9/L (ref 4–11)
WBC # BLD AUTO: 4.8 10E9/L (ref 4–11)
WBC # BLD AUTO: 6.5 10E9/L (ref 4–11)
WBC # BLD AUTO: 6.7 10E9/L (ref 4–11)
WBC # BLD AUTO: 6.8 10E9/L (ref 4–11)
WBC # BLD AUTO: 8.3 10E9/L (ref 4–11)
WBC # BLD AUTO: 8.5 10E9/L (ref 4–11)
WBC # BLD AUTO: 8.8 10E9/L (ref 4–11)
WBC #/AREA URNS AUTO: 1 /HPF (ref 0–5)
Y ENTERO RECN STL QL NAA+PROBE: NOT DETECTED

## 2019-01-01 PROCEDURE — 00000146 ZZHCL STATISTIC GLUCOSE BY METER IP

## 2019-01-01 PROCEDURE — 77336 RADIATION PHYSICS CONSULT: CPT | Performed by: RADIOLOGY

## 2019-01-01 PROCEDURE — 99232 SBSQ HOSP IP/OBS MODERATE 35: CPT | Performed by: INTERNAL MEDICINE

## 2019-01-01 PROCEDURE — 99232 SBSQ HOSP IP/OBS MODERATE 35: CPT | Performed by: PHYSICIAN ASSISTANT

## 2019-01-01 PROCEDURE — 97530 THERAPEUTIC ACTIVITIES: CPT | Mod: GO

## 2019-01-01 PROCEDURE — 80053 COMPREHEN METABOLIC PANEL: CPT | Performed by: INTERNAL MEDICINE

## 2019-01-01 PROCEDURE — 36415 COLL VENOUS BLD VENIPUNCTURE: CPT | Performed by: INTERNAL MEDICINE

## 2019-01-01 PROCEDURE — 71045 X-RAY EXAM CHEST 1 VIEW: CPT | Mod: 76

## 2019-01-01 PROCEDURE — 25000132 ZZH RX MED GY IP 250 OP 250 PS 637: Mod: GY | Performed by: NURSE PRACTITIONER

## 2019-01-01 PROCEDURE — 77331 SPECIAL RADIATION DOSIMETRY: CPT | Performed by: RADIOLOGY

## 2019-01-01 PROCEDURE — 99222 1ST HOSP IP/OBS MODERATE 55: CPT | Performed by: SURGERY

## 2019-01-01 PROCEDURE — 25000128 H RX IP 250 OP 636: Performed by: HOSPITALIST

## 2019-01-01 PROCEDURE — 90662 IIV NO PRSV INCREASED AG IM: CPT | Mod: ZF | Performed by: INTERNAL MEDICINE

## 2019-01-01 PROCEDURE — 86901 BLOOD TYPING SEROLOGIC RH(D): CPT | Performed by: INTERNAL MEDICINE

## 2019-01-01 PROCEDURE — 25000132 ZZH RX MED GY IP 250 OP 250 PS 637: Mod: GY | Performed by: STUDENT IN AN ORGANIZED HEALTH CARE EDUCATION/TRAINING PROGRAM

## 2019-01-01 PROCEDURE — 00000159 ZZHCL STATISTIC H-SEND OUTS PREP: Performed by: RADIOLOGY

## 2019-01-01 PROCEDURE — 25000132 ZZH RX MED GY IP 250 OP 250 PS 637: Mod: GY | Performed by: HOSPITALIST

## 2019-01-01 PROCEDURE — 25000128 H RX IP 250 OP 636: Performed by: RADIOLOGY

## 2019-01-01 PROCEDURE — 25800030 ZZH RX IP 258 OP 636: Mod: ZF | Performed by: PHYSICIAN ASSISTANT

## 2019-01-01 PROCEDURE — 25000128 H RX IP 250 OP 636: Performed by: INTERNAL MEDICINE

## 2019-01-01 PROCEDURE — 85027 COMPLETE CBC AUTOMATED: CPT

## 2019-01-01 PROCEDURE — 88305 TISSUE EXAM BY PATHOLOGIST: CPT | Performed by: RADIOLOGY

## 2019-01-01 PROCEDURE — 77387 GUIDANCE FOR RADJ TX DLVR: CPT | Performed by: RADIOLOGY

## 2019-01-01 PROCEDURE — 25000132 ZZH RX MED GY IP 250 OP 250 PS 637: Mod: GY | Performed by: INTERNAL MEDICINE

## 2019-01-01 PROCEDURE — 86901 BLOOD TYPING SEROLOGIC RH(D): CPT | Performed by: EMERGENCY MEDICINE

## 2019-01-01 PROCEDURE — 99207 ZZC CONSULT E&M CHANGED TO INITIAL LEVEL: CPT | Performed by: NURSE PRACTITIONER

## 2019-01-01 PROCEDURE — 36415 COLL VENOUS BLD VENIPUNCTURE: CPT | Performed by: STUDENT IN AN ORGANIZED HEALTH CARE EDUCATION/TRAINING PROGRAM

## 2019-01-01 PROCEDURE — 97535 SELF CARE MNGMENT TRAINING: CPT | Mod: GO | Performed by: OCCUPATIONAL THERAPIST

## 2019-01-01 PROCEDURE — 25000125 ZZHC RX 250: Performed by: EMERGENCY MEDICINE

## 2019-01-01 PROCEDURE — 99239 HOSP IP/OBS DSCHRG MGMT >30: CPT | Mod: GC | Performed by: INTERNAL MEDICINE

## 2019-01-01 PROCEDURE — 77412 RADIATION TX DELIVERY LVL 3: CPT | Performed by: RADIOLOGY

## 2019-01-01 PROCEDURE — 25000128 H RX IP 250 OP 636: Performed by: NURSE PRACTITIONER

## 2019-01-01 PROCEDURE — 25000128 H RX IP 250 OP 636: Performed by: EMERGENCY MEDICINE

## 2019-01-01 PROCEDURE — 36415 COLL VENOUS BLD VENIPUNCTURE: CPT | Performed by: NEUROLOGICAL SURGERY

## 2019-01-01 PROCEDURE — 71260 CT THORAX DX C+: CPT

## 2019-01-01 PROCEDURE — 80048 BASIC METABOLIC PNL TOTAL CA: CPT | Performed by: SURGERY

## 2019-01-01 PROCEDURE — 36592 COLLECT BLOOD FROM PICC: CPT

## 2019-01-01 PROCEDURE — 99215 OFFICE O/P EST HI 40 MIN: CPT | Mod: ZP | Performed by: PHYSICIAN ASSISTANT

## 2019-01-01 PROCEDURE — 99233 SBSQ HOSP IP/OBS HIGH 50: CPT | Performed by: HOSPITALIST

## 2019-01-01 PROCEDURE — G0463 HOSPITAL OUTPT CLINIC VISIT: HCPCS | Mod: ZF

## 2019-01-01 PROCEDURE — G0008 ADMIN INFLUENZA VIRUS VAC: HCPCS

## 2019-01-01 PROCEDURE — 27210886 ZZH ACCESSORY CR5

## 2019-01-01 PROCEDURE — 84132 ASSAY OF SERUM POTASSIUM: CPT | Performed by: HOSPITALIST

## 2019-01-01 PROCEDURE — 86900 BLOOD TYPING SEROLOGIC ABO: CPT | Performed by: EMERGENCY MEDICINE

## 2019-01-01 PROCEDURE — 77334 RADIATION TREATMENT AID(S): CPT | Performed by: RADIOLOGY

## 2019-01-01 PROCEDURE — 93306 TTE W/DOPPLER COMPLETE: CPT | Mod: 26 | Performed by: INTERNAL MEDICINE

## 2019-01-01 PROCEDURE — 99232 SBSQ HOSP IP/OBS MODERATE 35: CPT | Mod: GW | Performed by: NURSE PRACTITIONER

## 2019-01-01 PROCEDURE — 25000132 ZZH RX MED GY IP 250 OP 250 PS 637: Mod: GY | Performed by: NEUROLOGICAL SURGERY

## 2019-01-01 PROCEDURE — 25800025 ZZH RX 258: Performed by: HOSPITALIST

## 2019-01-01 PROCEDURE — 81445 SO NEO GSAP 5-50DNA/DNA&RNA: CPT | Performed by: RADIOLOGY

## 2019-01-01 PROCEDURE — 99232 SBSQ HOSP IP/OBS MODERATE 35: CPT | Performed by: SURGERY

## 2019-01-01 PROCEDURE — 12000001 ZZH R&B MED SURG/OB UMMC

## 2019-01-01 PROCEDURE — 25800030 ZZH RX IP 258 OP 636: Mod: ZF | Performed by: INTERNAL MEDICINE

## 2019-01-01 PROCEDURE — 83690 ASSAY OF LIPASE: CPT | Performed by: EMERGENCY MEDICINE

## 2019-01-01 PROCEDURE — 86850 RBC ANTIBODY SCREEN: CPT | Performed by: EMERGENCY MEDICINE

## 2019-01-01 PROCEDURE — C9113 INJ PANTOPRAZOLE SODIUM, VIA: HCPCS | Performed by: INTERNAL MEDICINE

## 2019-01-01 PROCEDURE — 25500064 ZZH RX 255 OP 636: Performed by: EMERGENCY MEDICINE

## 2019-01-01 PROCEDURE — 96360 HYDRATION IV INFUSION INIT: CPT

## 2019-01-01 PROCEDURE — 99214 OFFICE O/P EST MOD 30 MIN: CPT | Performed by: FAMILY MEDICINE

## 2019-01-01 PROCEDURE — 70496 CT ANGIOGRAPHY HEAD: CPT

## 2019-01-01 PROCEDURE — 25800030 ZZH RX IP 258 OP 636: Performed by: INTERNAL MEDICINE

## 2019-01-01 PROCEDURE — 84100 ASSAY OF PHOSPHORUS: CPT | Performed by: NEUROLOGICAL SURGERY

## 2019-01-01 PROCEDURE — 12000000 ZZH R&B MED SURG/OB

## 2019-01-01 PROCEDURE — 85025 COMPLETE CBC W/AUTO DIFF WBC: CPT | Performed by: NEUROLOGICAL SURGERY

## 2019-01-01 PROCEDURE — A9585 GADOBUTROL INJECTION: HCPCS | Performed by: EMERGENCY MEDICINE

## 2019-01-01 PROCEDURE — 85025 COMPLETE CBC W/AUTO DIFF WBC: CPT | Performed by: HOSPITALIST

## 2019-01-01 PROCEDURE — 71045 X-RAY EXAM CHEST 1 VIEW: CPT

## 2019-01-01 PROCEDURE — 99231 SBSQ HOSP IP/OBS SF/LOW 25: CPT | Performed by: PHYSICIAN ASSISTANT

## 2019-01-01 PROCEDURE — 70543 MRI ORBT/FAC/NCK W/O &W/DYE: CPT

## 2019-01-01 PROCEDURE — 80048 BASIC METABOLIC PNL TOTAL CA: CPT | Performed by: NEUROLOGICAL SURGERY

## 2019-01-01 PROCEDURE — 88360 TUMOR IMMUNOHISTOCHEM/MANUAL: CPT | Performed by: RADIOLOGY

## 2019-01-01 PROCEDURE — 80048 BASIC METABOLIC PNL TOTAL CA: CPT | Performed by: HOSPITALIST

## 2019-01-01 PROCEDURE — 25000131 ZZH RX MED GY IP 250 OP 636 PS 637: Mod: GY | Performed by: STUDENT IN AN ORGANIZED HEALTH CARE EDUCATION/TRAINING PROGRAM

## 2019-01-01 PROCEDURE — 36415 COLL VENOUS BLD VENIPUNCTURE: CPT | Performed by: HOSPITALIST

## 2019-01-01 PROCEDURE — 99232 SBSQ HOSP IP/OBS MODERATE 35: CPT | Performed by: NURSE PRACTITIONER

## 2019-01-01 PROCEDURE — 36591 DRAW BLOOD OFF VENOUS DEVICE: CPT

## 2019-01-01 PROCEDURE — 96374 THER/PROPH/DIAG INJ IV PUSH: CPT | Mod: 59

## 2019-01-01 PROCEDURE — 80053 COMPREHEN METABOLIC PANEL: CPT | Performed by: EMERGENCY MEDICINE

## 2019-01-01 PROCEDURE — 25000131 ZZH RX MED GY IP 250 OP 636 PS 637: Mod: GY | Performed by: EMERGENCY MEDICINE

## 2019-01-01 PROCEDURE — 77333 RADIATION TREATMENT AID(S): CPT | Performed by: RADIOLOGY

## 2019-01-01 PROCEDURE — 25000128 H RX IP 250 OP 636: Mod: ZF | Performed by: PHYSICIAN ASSISTANT

## 2019-01-01 PROCEDURE — 00000468 ZZHCL STATISTIC CYTO QA-OR TOUCH APT TC 88333: Performed by: RADIOLOGY

## 2019-01-01 PROCEDURE — 77300 RADIATION THERAPY DOSE PLAN: CPT | Performed by: RADIOLOGY

## 2019-01-01 PROCEDURE — 82803 BLOOD GASES ANY COMBINATION: CPT

## 2019-01-01 PROCEDURE — 84132 ASSAY OF SERUM POTASSIUM: CPT | Performed by: INTERNAL MEDICINE

## 2019-01-01 PROCEDURE — 99215 OFFICE O/P EST HI 40 MIN: CPT | Performed by: PHYSICIAN ASSISTANT

## 2019-01-01 PROCEDURE — A9585 GADOBUTROL INJECTION: HCPCS | Performed by: NEUROLOGICAL SURGERY

## 2019-01-01 PROCEDURE — 0BBG3ZX EXCISION OF LEFT UPPER LUNG LOBE, PERCUTANEOUS APPROACH, DIAGNOSTIC: ICD-10-PCS | Performed by: RADIOLOGY

## 2019-01-01 PROCEDURE — 99207 ZZC CDG-MDM COMPONENT: MEETS MODERATE - UP CODED: CPT | Performed by: HOSPITALIST

## 2019-01-01 PROCEDURE — 83605 ASSAY OF LACTIC ACID: CPT | Performed by: HOSPITALIST

## 2019-01-01 PROCEDURE — 32557 INSERT CATH PLEURA W/ IMAGE: CPT | Mod: LT

## 2019-01-01 PROCEDURE — 70450 CT HEAD/BRAIN W/O DYE: CPT

## 2019-01-01 PROCEDURE — 80202 ASSAY OF VANCOMYCIN: CPT | Performed by: INTERNAL MEDICINE

## 2019-01-01 PROCEDURE — 70553 MRI BRAIN STEM W/O & W/DYE: CPT

## 2019-01-01 PROCEDURE — 86900 BLOOD TYPING SEROLOGIC ABO: CPT | Performed by: INTERNAL MEDICINE

## 2019-01-01 PROCEDURE — 80048 BASIC METABOLIC PNL TOTAL CA: CPT | Performed by: INTERNAL MEDICINE

## 2019-01-01 PROCEDURE — 74177 CT ABD & PELVIS W/CONTRAST: CPT

## 2019-01-01 PROCEDURE — 25000131 ZZH RX MED GY IP 250 OP 636 PS 637: Mod: GY | Performed by: NURSE PRACTITIONER

## 2019-01-01 PROCEDURE — 27210910 ZZH NEEDLE CR6

## 2019-01-01 PROCEDURE — 92610 EVALUATE SWALLOWING FUNCTION: CPT | Mod: GN | Performed by: SPEECH-LANGUAGE PATHOLOGIST

## 2019-01-01 PROCEDURE — 96361 HYDRATE IV INFUSION ADD-ON: CPT

## 2019-01-01 PROCEDURE — 34300033 ZZH RX 343: Performed by: INTERNAL MEDICINE

## 2019-01-01 PROCEDURE — 83605 ASSAY OF LACTIC ACID: CPT

## 2019-01-01 PROCEDURE — 36415 COLL VENOUS BLD VENIPUNCTURE: CPT

## 2019-01-01 PROCEDURE — 99207 ZZC CDG-MDM COMPONENT: MEETS MODERATE - UP CODED: CPT | Performed by: INTERNAL MEDICINE

## 2019-01-01 PROCEDURE — 27211039 ZZH NEEDLE CR2

## 2019-01-01 PROCEDURE — 25000128 H RX IP 250 OP 636: Mod: ZF | Performed by: INTERNAL MEDICINE

## 2019-01-01 PROCEDURE — 80048 BASIC METABOLIC PNL TOTAL CA: CPT | Performed by: STUDENT IN AN ORGANIZED HEALTH CARE EDUCATION/TRAINING PROGRAM

## 2019-01-01 PROCEDURE — 36592 COLLECT BLOOD FROM PICC: CPT | Performed by: INTERNAL MEDICINE

## 2019-01-01 PROCEDURE — 82565 ASSAY OF CREATININE: CPT

## 2019-01-01 PROCEDURE — 40000333 IR FOLLOW UP VISIT INPATIENT

## 2019-01-01 PROCEDURE — 85025 COMPLETE CBC W/AUTO DIFF WBC: CPT | Performed by: INTERNAL MEDICINE

## 2019-01-01 PROCEDURE — 96365 THER/PROPH/DIAG IV INF INIT: CPT | Mod: 59

## 2019-01-01 PROCEDURE — 99223 1ST HOSP IP/OBS HIGH 75: CPT | Mod: AI | Performed by: INTERNAL MEDICINE

## 2019-01-01 PROCEDURE — G0463 HOSPITAL OUTPT CLINIC VISIT: HCPCS | Mod: 25 | Performed by: RADIOLOGY

## 2019-01-01 PROCEDURE — 25000128 H RX IP 250 OP 636

## 2019-01-01 PROCEDURE — 25000125 ZZHC RX 250: Performed by: NURSE PRACTITIONER

## 2019-01-01 PROCEDURE — 32405 CT LUNG MEDIASTINUM BIOPSY: CPT

## 2019-01-01 PROCEDURE — 84439 ASSAY OF FREE THYROXINE: CPT | Performed by: INTERNAL MEDICINE

## 2019-01-01 PROCEDURE — 87493 C DIFF AMPLIFIED PROBE: CPT | Performed by: HOSPITALIST

## 2019-01-01 PROCEDURE — 99231 SBSQ HOSP IP/OBS SF/LOW 25: CPT | Performed by: SURGERY

## 2019-01-01 PROCEDURE — 78226 HEPATOBILIARY SYSTEM IMAGING: CPT

## 2019-01-01 PROCEDURE — 77307 TELETHX ISODOSE PLAN CPLX: CPT | Performed by: RADIOLOGY

## 2019-01-01 PROCEDURE — 99285 EMERGENCY DEPT VISIT HI MDM: CPT | Mod: 25

## 2019-01-01 PROCEDURE — 93306 TTE W/DOPPLER COMPLETE: CPT

## 2019-01-01 PROCEDURE — 25800030 ZZH RX IP 258 OP 636: Performed by: EMERGENCY MEDICINE

## 2019-01-01 PROCEDURE — G0463 HOSPITAL OUTPT CLINIC VISIT: HCPCS | Mod: 25

## 2019-01-01 PROCEDURE — 83605 ASSAY OF LACTIC ACID: CPT | Performed by: INTERNAL MEDICINE

## 2019-01-01 PROCEDURE — 70498 CT ANGIOGRAPHY NECK: CPT

## 2019-01-01 PROCEDURE — 40000986 XR CHEST PORT 1 VW

## 2019-01-01 PROCEDURE — 25000125 ZZHC RX 250: Performed by: INTERNAL MEDICINE

## 2019-01-01 PROCEDURE — 83615 LACTATE (LD) (LDH) ENZYME: CPT | Performed by: INTERNAL MEDICINE

## 2019-01-01 PROCEDURE — 96417 CHEMO IV INFUS EACH ADDL SEQ: CPT

## 2019-01-01 PROCEDURE — 96413 CHEMO IV INFUSION 1 HR: CPT

## 2019-01-01 PROCEDURE — 83735 ASSAY OF MAGNESIUM: CPT | Performed by: STUDENT IN AN ORGANIZED HEALTH CARE EDUCATION/TRAINING PROGRAM

## 2019-01-01 PROCEDURE — 84443 ASSAY THYROID STIM HORMONE: CPT | Performed by: INTERNAL MEDICINE

## 2019-01-01 PROCEDURE — 96375 TX/PRO/DX INJ NEW DRUG ADDON: CPT

## 2019-01-01 PROCEDURE — 83735 ASSAY OF MAGNESIUM: CPT | Performed by: NEUROLOGICAL SURGERY

## 2019-01-01 PROCEDURE — C1769 GUIDE WIRE: HCPCS

## 2019-01-01 PROCEDURE — 72156 MRI NECK SPINE W/O & W/DYE: CPT

## 2019-01-01 PROCEDURE — 25000125 ZZHC RX 250: Performed by: NEUROLOGICAL SURGERY

## 2019-01-01 PROCEDURE — 40000986 XR CHEST 1 VW

## 2019-01-01 PROCEDURE — A9537 TC99M MEBROFENIN: HCPCS | Performed by: INTERNAL MEDICINE

## 2019-01-01 PROCEDURE — 36415 COLL VENOUS BLD VENIPUNCTURE: CPT | Performed by: EMERGENCY MEDICINE

## 2019-01-01 PROCEDURE — 85004 AUTOMATED DIFF WBC COUNT: CPT | Performed by: HOSPITALIST

## 2019-01-01 PROCEDURE — 25800030 ZZH RX IP 258 OP 636: Performed by: STUDENT IN AN ORGANIZED HEALTH CARE EDUCATION/TRAINING PROGRAM

## 2019-01-01 PROCEDURE — 80053 COMPREHEN METABOLIC PANEL: CPT | Performed by: HOSPITALIST

## 2019-01-01 PROCEDURE — 85027 COMPLETE CBC AUTOMATED: CPT | Performed by: INTERNAL MEDICINE

## 2019-01-01 PROCEDURE — 85610 PROTHROMBIN TIME: CPT | Performed by: EMERGENCY MEDICINE

## 2019-01-01 PROCEDURE — 85027 COMPLETE CBC AUTOMATED: CPT | Performed by: HOSPITALIST

## 2019-01-01 PROCEDURE — 27210195 ZZH KIT POWER PICC DOUBLE LUMEN

## 2019-01-01 PROCEDURE — 85025 COMPLETE CBC W/AUTO DIFF WBC: CPT | Performed by: EMERGENCY MEDICINE

## 2019-01-01 PROCEDURE — 25000132 ZZH RX MED GY IP 250 OP 250 PS 637: Mod: GY | Performed by: EMERGENCY MEDICINE

## 2019-01-01 PROCEDURE — 87493 C DIFF AMPLIFIED PROBE: CPT | Performed by: EMERGENCY MEDICINE

## 2019-01-01 PROCEDURE — 96366 THER/PROPH/DIAG IV INF ADDON: CPT

## 2019-01-01 PROCEDURE — 77295 3-D RADIOTHERAPY PLAN: CPT | Performed by: RADIOLOGY

## 2019-01-01 PROCEDURE — 87040 BLOOD CULTURE FOR BACTERIA: CPT | Performed by: EMERGENCY MEDICINE

## 2019-01-01 PROCEDURE — 25000128 H RX IP 250 OP 636: Performed by: PHYSICIAN ASSISTANT

## 2019-01-01 PROCEDURE — 83935 ASSAY OF URINE OSMOLALITY: CPT | Performed by: INTERNAL MEDICINE

## 2019-01-01 PROCEDURE — 40000902 ZZH STATISTIC WOC PT EDUCATION, 16-30 MIN

## 2019-01-01 PROCEDURE — 40000556 ZZH STATISTIC PERIPHERAL IV START W US GUIDANCE: Mod: ZF

## 2019-01-01 PROCEDURE — 82947 ASSAY GLUCOSE BLOOD QUANT: CPT | Performed by: INTERNAL MEDICINE

## 2019-01-01 PROCEDURE — 96372 THER/PROPH/DIAG INJ SC/IM: CPT

## 2019-01-01 PROCEDURE — 82962 GLUCOSE BLOOD TEST: CPT

## 2019-01-01 PROCEDURE — 25000131 ZZH RX MED GY IP 250 OP 636 PS 637: Mod: GY | Performed by: INTERNAL MEDICINE

## 2019-01-01 PROCEDURE — 36569 INSJ PICC 5 YR+ W/O IMAGING: CPT

## 2019-01-01 PROCEDURE — 99222 1ST HOSP IP/OBS MODERATE 55: CPT | Performed by: NURSE PRACTITIONER

## 2019-01-01 PROCEDURE — 25000128 H RX IP 250 OP 636: Performed by: NEUROLOGICAL SURGERY

## 2019-01-01 PROCEDURE — 85027 COMPLETE CBC AUTOMATED: CPT | Performed by: SURGERY

## 2019-01-01 PROCEDURE — 27210732 ZZH ACCESSORY CR1

## 2019-01-01 PROCEDURE — 72158 MRI LUMBAR SPINE W/O & W/DYE: CPT

## 2019-01-01 PROCEDURE — 99232 SBSQ HOSP IP/OBS MODERATE 35: CPT | Performed by: HOSPITALIST

## 2019-01-01 PROCEDURE — 99207 ZZC CDG-CUT & PASTE-POTENTIAL IMPACT ON LEVEL: CPT | Performed by: NURSE PRACTITIONER

## 2019-01-01 PROCEDURE — 77290 THER RAD SIMULAJ FIELD CPLX: CPT | Performed by: RADIOLOGY

## 2019-01-01 PROCEDURE — 77280 THER RAD SIMULAJ FIELD SMPL: CPT | Performed by: RADIOLOGY

## 2019-01-01 PROCEDURE — 80076 HEPATIC FUNCTION PANEL: CPT | Performed by: HOSPITALIST

## 2019-01-01 PROCEDURE — 25500064 ZZH RX 255 OP 636: Performed by: NEUROLOGICAL SURGERY

## 2019-01-01 PROCEDURE — 99222 1ST HOSP IP/OBS MODERATE 55: CPT | Performed by: INTERNAL MEDICINE

## 2019-01-01 PROCEDURE — 70544 MR ANGIOGRAPHY HEAD W/O DYE: CPT | Mod: XU

## 2019-01-01 PROCEDURE — 76705 ECHO EXAM OF ABDOMEN: CPT

## 2019-01-01 PROCEDURE — 71046 X-RAY EXAM CHEST 2 VIEWS: CPT

## 2019-01-01 PROCEDURE — 25000125 ZZHC RX 250: Performed by: RADIOLOGY

## 2019-01-01 PROCEDURE — 99223 1ST HOSP IP/OBS HIGH 75: CPT | Performed by: NURSE PRACTITIONER

## 2019-01-01 PROCEDURE — P9037 PLATE PHERES LEUKOREDU IRRAD: HCPCS | Performed by: HOSPITALIST

## 2019-01-01 PROCEDURE — 97535 SELF CARE MNGMENT TRAINING: CPT | Mod: GO

## 2019-01-01 PROCEDURE — 99207 ZZC CDG-MDM COMPONENT: MEETS LOW - DOWN CODED: CPT | Performed by: HOSPITALIST

## 2019-01-01 PROCEDURE — 92526 ORAL FUNCTION THERAPY: CPT | Mod: GN

## 2019-01-01 PROCEDURE — 97165 OT EVAL LOW COMPLEX 30 MIN: CPT | Mod: GO | Performed by: OCCUPATIONAL THERAPIST

## 2019-01-01 PROCEDURE — 0BPQX0Z REMOVAL OF DRAINAGE DEVICE FROM PLEURA, EXTERNAL APPROACH: ICD-10-PCS | Performed by: PHYSICIAN ASSISTANT

## 2019-01-01 PROCEDURE — 88342 IMHCHEM/IMCYTCHM 1ST ANTB: CPT | Performed by: RADIOLOGY

## 2019-01-01 PROCEDURE — 92526 ORAL FUNCTION THERAPY: CPT | Mod: GN | Performed by: SPEECH-LANGUAGE PATHOLOGIST

## 2019-01-01 PROCEDURE — 86923 COMPATIBILITY TEST ELECTRIC: CPT | Performed by: EMERGENCY MEDICINE

## 2019-01-01 PROCEDURE — 99207 ZZC CDG-MDM COMPONENT: MEETS LOW - DOWN CODED: CPT | Performed by: INTERNAL MEDICINE

## 2019-01-01 PROCEDURE — 72157 MRI CHEST SPINE W/O & W/DYE: CPT

## 2019-01-01 PROCEDURE — 40000257 ZZH STATISTIC CONSULT NO CHARGE VASC ACCESS

## 2019-01-01 PROCEDURE — 99239 HOSP IP/OBS DSCHRG MGMT >30: CPT | Performed by: INTERNAL MEDICINE

## 2019-01-01 PROCEDURE — 0W9B30Z DRAINAGE OF LEFT PLEURAL CAVITY WITH DRAINAGE DEVICE, PERCUTANEOUS APPROACH: ICD-10-PCS | Performed by: PHYSICIAN ASSISTANT

## 2019-01-01 PROCEDURE — 87506 IADNA-DNA/RNA PROBE TQ 6-11: CPT | Performed by: INTERNAL MEDICINE

## 2019-01-01 PROCEDURE — 83036 HEMOGLOBIN GLYCOSYLATED A1C: CPT | Performed by: STUDENT IN AN ORGANIZED HEALTH CARE EDUCATION/TRAINING PROGRAM

## 2019-01-01 PROCEDURE — G0463 HOSPITAL OUTPT CLINIC VISIT: HCPCS

## 2019-01-01 PROCEDURE — 92060 SENSORIMOTOR EXAMINATION: CPT | Mod: ZF | Performed by: OPHTHALMOLOGY

## 2019-01-01 PROCEDURE — 81001 URINALYSIS AUTO W/SCOPE: CPT | Performed by: EMERGENCY MEDICINE

## 2019-01-01 PROCEDURE — P9016 RBC LEUKOCYTES REDUCED: HCPCS | Performed by: EMERGENCY MEDICINE

## 2019-01-01 PROCEDURE — 83930 ASSAY OF BLOOD OSMOLALITY: CPT | Performed by: INTERNAL MEDICINE

## 2019-01-01 PROCEDURE — 84300 ASSAY OF URINE SODIUM: CPT | Performed by: INTERNAL MEDICINE

## 2019-01-01 PROCEDURE — C1729 CATH, DRAINAGE: HCPCS

## 2019-01-01 PROCEDURE — 99291 CRITICAL CARE FIRST HOUR: CPT | Mod: 25

## 2019-01-01 PROCEDURE — 99205 OFFICE O/P NEW HI 60 MIN: CPT | Mod: ZP | Performed by: INTERNAL MEDICINE

## 2019-01-01 PROCEDURE — P9037 PLATE PHERES LEUKOREDU IRRAD: HCPCS | Performed by: EMERGENCY MEDICINE

## 2019-01-01 RX ORDER — HYDROMORPHONE HYDROCHLORIDE 1 MG/ML
.3-.5 INJECTION, SOLUTION INTRAMUSCULAR; INTRAVENOUS; SUBCUTANEOUS
Status: DISCONTINUED | OUTPATIENT
Start: 2019-01-01 | End: 2019-01-01 | Stop reason: HOSPADM

## 2019-01-01 RX ORDER — METOPROLOL SUCCINATE 25 MG/1
TABLET, EXTENDED RELEASE ORAL
Qty: 90 TABLET | Refills: 0 | Status: ON HOLD | OUTPATIENT
Start: 2019-01-01 | End: 2019-01-01

## 2019-01-01 RX ORDER — HYDRALAZINE HYDROCHLORIDE 20 MG/ML
10-20 INJECTION INTRAMUSCULAR; INTRAVENOUS EVERY 30 MIN PRN
Status: DISCONTINUED | OUTPATIENT
Start: 2019-01-01 | End: 2019-01-01 | Stop reason: HOSPADM

## 2019-01-01 RX ORDER — OXYCODONE HCL 5 MG/5 ML
10 SOLUTION, ORAL ORAL
Status: DISCONTINUED | OUTPATIENT
Start: 2019-01-01 | End: 2019-01-01

## 2019-01-01 RX ORDER — SODIUM CHLORIDE 9 MG/ML
INJECTION, SOLUTION INTRAVENOUS CONTINUOUS
Status: DISCONTINUED | OUTPATIENT
Start: 2019-01-01 | End: 2019-01-01

## 2019-01-01 RX ORDER — OXYCODONE HCL 5 MG/5 ML
10 SOLUTION, ORAL ORAL EVERY 4 HOURS PRN
Status: DISCONTINUED | OUTPATIENT
Start: 2019-01-01 | End: 2019-01-01

## 2019-01-01 RX ORDER — LISINOPRIL 20 MG/1
40 TABLET ORAL DAILY
Status: DISCONTINUED | OUTPATIENT
Start: 2019-01-01 | End: 2019-01-01

## 2019-01-01 RX ORDER — SUCRALFATE ORAL 1 G/10ML
1 SUSPENSION ORAL 4 TIMES DAILY
Qty: 420 ML | Refills: 1 | Status: ON HOLD | OUTPATIENT
Start: 2019-01-01 | End: 2019-01-01

## 2019-01-01 RX ORDER — POLYETHYLENE GLYCOL 3350 17 G/17G
1 POWDER, FOR SOLUTION ORAL DAILY
Qty: 30 PACKET | Refills: 11 | Status: ON HOLD | OUTPATIENT
Start: 2019-01-01 | End: 2019-01-01

## 2019-01-01 RX ORDER — LISINOPRIL 40 MG/1
40 TABLET ORAL DAILY
Qty: 90 TABLET | Refills: 2 | Status: ON HOLD | OUTPATIENT
Start: 2019-01-01 | End: 2019-01-01

## 2019-01-01 RX ORDER — METFORMIN HCL 500 MG
500 TABLET, EXTENDED RELEASE 24 HR ORAL
Status: ON HOLD | COMMUNITY
End: 2019-01-01

## 2019-01-01 RX ORDER — IOPAMIDOL 755 MG/ML
500 INJECTION, SOLUTION INTRAVASCULAR ONCE
Status: COMPLETED | OUTPATIENT
Start: 2019-01-01 | End: 2019-01-01

## 2019-01-01 RX ORDER — METFORMIN HCL 500 MG
500 TABLET, EXTENDED RELEASE 24 HR ORAL
Status: DISCONTINUED | OUTPATIENT
Start: 2019-01-01 | End: 2019-01-01

## 2019-01-01 RX ORDER — DEXAMETHASONE 1 MG
2 TABLET ORAL DAILY
Status: DISCONTINUED | OUTPATIENT
Start: 2019-01-01 | End: 2019-01-01 | Stop reason: HOSPADM

## 2019-01-01 RX ORDER — NICOTINE POLACRILEX 4 MG
15-30 LOZENGE BUCCAL
Status: DISCONTINUED | OUTPATIENT
Start: 2019-01-01 | End: 2019-01-01 | Stop reason: HOSPADM

## 2019-01-01 RX ORDER — GABAPENTIN 300 MG/1
300 CAPSULE ORAL DAILY
Status: DISCONTINUED | OUTPATIENT
Start: 2019-01-01 | End: 2019-01-01 | Stop reason: HOSPADM

## 2019-01-01 RX ORDER — NYSTATIN 100000/ML
500000 SUSPENSION, ORAL (FINAL DOSE FORM) ORAL 4 TIMES DAILY
Status: DISCONTINUED | OUTPATIENT
Start: 2019-01-01 | End: 2019-01-01 | Stop reason: HOSPADM

## 2019-01-01 RX ORDER — METOPROLOL SUCCINATE 25 MG/1
25 TABLET, EXTENDED RELEASE ORAL DAILY
Status: DISCONTINUED | OUTPATIENT
Start: 2019-01-01 | End: 2019-01-01 | Stop reason: HOSPADM

## 2019-01-01 RX ORDER — GABAPENTIN 300 MG/1
600 CAPSULE ORAL AT BEDTIME
COMMUNITY
End: 2019-01-01

## 2019-01-01 RX ORDER — METHYLPREDNISOLONE SODIUM SUCCINATE 125 MG/2ML
125 INJECTION, POWDER, LYOPHILIZED, FOR SOLUTION INTRAMUSCULAR; INTRAVENOUS
Status: CANCELLED
Start: 2019-01-01

## 2019-01-01 RX ORDER — PROCHLORPERAZINE MALEATE 10 MG
5 TABLET ORAL EVERY 6 HOURS PRN
Qty: 30 TABLET | Refills: 11 | Status: ON HOLD | OUTPATIENT
Start: 2019-01-01 | End: 2019-01-01

## 2019-01-01 RX ORDER — OXYCODONE HYDROCHLORIDE 5 MG/1
5 TABLET ORAL EVERY 6 HOURS PRN
Qty: 60 TABLET | Refills: 0 | Status: SHIPPED | OUTPATIENT
Start: 2019-01-01 | End: 2019-01-01

## 2019-01-01 RX ORDER — LOPERAMIDE HYDROCHLORIDE 1 MG/5ML
2 SOLUTION ORAL 4 TIMES DAILY PRN
Qty: 118 ML | Refills: 0 | Status: SHIPPED | OUTPATIENT
Start: 2019-01-01

## 2019-01-01 RX ORDER — IOPAMIDOL 755 MG/ML
126 INJECTION, SOLUTION INTRAVASCULAR ONCE
Status: COMPLETED | OUTPATIENT
Start: 2019-01-01 | End: 2019-01-01

## 2019-01-01 RX ORDER — ONDANSETRON 2 MG/ML
4 INJECTION INTRAMUSCULAR; INTRAVENOUS ONCE
Status: COMPLETED | OUTPATIENT
Start: 2019-01-01 | End: 2019-01-01

## 2019-01-01 RX ORDER — ONDANSETRON 4 MG/1
4 TABLET, ORALLY DISINTEGRATING ORAL EVERY 6 HOURS PRN
Status: CANCELLED | OUTPATIENT
Start: 2019-01-01

## 2019-01-01 RX ORDER — DEXAMETHASONE 2 MG/1
2 TABLET ORAL DAILY
Qty: 4 TABLET | Refills: 0 | Status: SHIPPED | OUTPATIENT
Start: 2019-01-01 | End: 2019-01-01

## 2019-01-01 RX ORDER — LACTOBACILLUS RHAMNOSUS GG 10B CELL
1 CAPSULE ORAL 2 TIMES DAILY
Status: DISCONTINUED | OUTPATIENT
Start: 2019-01-01 | End: 2019-01-01 | Stop reason: HOSPADM

## 2019-01-01 RX ORDER — POTASSIUM CHLORIDE 1.5 G/1.58G
20-40 POWDER, FOR SOLUTION ORAL
Status: DISCONTINUED | OUTPATIENT
Start: 2019-01-01 | End: 2019-01-01

## 2019-01-01 RX ORDER — OXYCODONE HCL 5 MG/5 ML
5-10 SOLUTION, ORAL ORAL
Status: DISCONTINUED | OUTPATIENT
Start: 2019-01-01 | End: 2019-01-01

## 2019-01-01 RX ORDER — ONDANSETRON 4 MG/1
4 TABLET, ORALLY DISINTEGRATING ORAL EVERY 6 HOURS PRN
Status: DISCONTINUED | OUTPATIENT
Start: 2019-01-01 | End: 2019-01-01 | Stop reason: HOSPADM

## 2019-01-01 RX ORDER — HYDROMORPHONE HYDROCHLORIDE 1 MG/ML
4 SOLUTION ORAL EVERY 4 HOURS
Qty: 473 ML | Refills: 0 | Status: SHIPPED | OUTPATIENT
Start: 2019-01-01

## 2019-01-01 RX ORDER — POTASSIUM CL/LIDO/0.9 % NACL 10MEQ/0.1L
10 INTRAVENOUS SOLUTION, PIGGYBACK (ML) INTRAVENOUS
Status: DISCONTINUED | OUTPATIENT
Start: 2019-01-01 | End: 2019-01-01

## 2019-01-01 RX ORDER — POTASSIUM CHLORIDE 29.8 MG/ML
20 INJECTION INTRAVENOUS
Status: CANCELLED | OUTPATIENT
Start: 2019-01-01

## 2019-01-01 RX ORDER — NYSTATIN 100000/ML
SUSPENSION, ORAL (FINAL DOSE FORM) ORAL
Qty: 400 ML | Refills: 1 | Status: ON HOLD | OUTPATIENT
Start: 2019-01-01 | End: 2019-01-01

## 2019-01-01 RX ORDER — PROCHLORPERAZINE 25 MG
12.5 SUPPOSITORY, RECTAL RECTAL EVERY 12 HOURS PRN
Status: DISCONTINUED | OUTPATIENT
Start: 2019-01-01 | End: 2019-01-01 | Stop reason: HOSPADM

## 2019-01-01 RX ORDER — EPINEPHRINE 0.3 MG/.3ML
0.3 INJECTION SUBCUTANEOUS EVERY 5 MIN PRN
Status: CANCELLED | OUTPATIENT
Start: 2019-01-01

## 2019-01-01 RX ORDER — GABAPENTIN 300 MG/1
600 CAPSULE ORAL AT BEDTIME
Status: DISCONTINUED | OUTPATIENT
Start: 2019-01-01 | End: 2019-01-01 | Stop reason: HOSPADM

## 2019-01-01 RX ORDER — HYDROMORPHONE HYDROCHLORIDE 1 MG/ML
2-4 SOLUTION ORAL
Status: DISCONTINUED | OUTPATIENT
Start: 2019-01-01 | End: 2019-01-01 | Stop reason: HOSPADM

## 2019-01-01 RX ORDER — GABAPENTIN 300 MG/1
300 CAPSULE ORAL DAILY
Qty: 90 CAPSULE | Refills: 3 | Status: ON HOLD | OUTPATIENT
Start: 2019-01-01 | End: 2019-01-01

## 2019-01-01 RX ORDER — GABAPENTIN 250 MG/5ML
600 SOLUTION ORAL AT BEDTIME
Qty: 470 ML | Refills: 0 | Status: SHIPPED | OUTPATIENT
Start: 2019-01-01

## 2019-01-01 RX ORDER — METFORMIN HCL 500 MG
500 TABLET, EXTENDED RELEASE 24 HR ORAL
Qty: 30 TABLET | Refills: 3 | Status: ON HOLD | OUTPATIENT
Start: 2019-01-01 | End: 2019-01-01

## 2019-01-01 RX ORDER — OXYCODONE HYDROCHLORIDE 5 MG/1
10 TABLET ORAL
Status: DISCONTINUED | OUTPATIENT
Start: 2019-01-01 | End: 2019-01-01

## 2019-01-01 RX ORDER — POTASSIUM CHLORIDE 1500 MG/1
20-40 TABLET, EXTENDED RELEASE ORAL
Status: DISCONTINUED | OUTPATIENT
Start: 2019-01-01 | End: 2019-01-01

## 2019-01-01 RX ORDER — LORAZEPAM 2 MG/ML
.25-.5 CONCENTRATE ORAL EVERY 4 HOURS PRN
Qty: 30 ML | Refills: 0 | Status: SHIPPED | OUTPATIENT
Start: 2019-01-01

## 2019-01-01 RX ORDER — LORAZEPAM 0.5 MG/1
0.5 TABLET ORAL EVERY 6 HOURS PRN
Qty: 30 TABLET | Refills: 3 | Status: CANCELLED | OUTPATIENT
Start: 2020-03-29

## 2019-01-01 RX ORDER — NALOXONE HYDROCHLORIDE 0.4 MG/ML
.1-.4 INJECTION, SOLUTION INTRAMUSCULAR; INTRAVENOUS; SUBCUTANEOUS
Status: DISCONTINUED | OUTPATIENT
Start: 2019-01-01 | End: 2019-01-01 | Stop reason: HOSPADM

## 2019-01-01 RX ORDER — PROCHLORPERAZINE MALEATE 5 MG
5 TABLET ORAL EVERY 6 HOURS PRN
Status: DISCONTINUED | OUTPATIENT
Start: 2019-01-01 | End: 2019-01-01 | Stop reason: HOSPADM

## 2019-01-01 RX ORDER — GABAPENTIN 300 MG/1
600 CAPSULE ORAL AT BEDTIME
Status: CANCELLED | OUTPATIENT
Start: 2019-01-01

## 2019-01-01 RX ORDER — FOLIC ACID 1 MG/1
1000 TABLET ORAL DAILY
Qty: 30 TABLET | Refills: 11 | Status: CANCELLED | OUTPATIENT
Start: 2020-03-29

## 2019-01-01 RX ORDER — ONDANSETRON 2 MG/ML
4 INJECTION INTRAMUSCULAR; INTRAVENOUS EVERY 6 HOURS PRN
Status: CANCELLED | OUTPATIENT
Start: 2019-01-01

## 2019-01-01 RX ORDER — DEXAMETHASONE 4 MG/1
TABLET ORAL
Qty: 30 TABLET | Refills: 0 | Status: SHIPPED | OUTPATIENT
Start: 2019-01-01 | End: 2019-01-01

## 2019-01-01 RX ORDER — GABAPENTIN 300 MG/1
300 CAPSULE ORAL EVERY 24 HOURS
Status: DISCONTINUED | OUTPATIENT
Start: 2019-01-01 | End: 2019-01-01

## 2019-01-01 RX ORDER — OXYCODONE HYDROCHLORIDE 5 MG/1
10 TABLET ORAL EVERY 4 HOURS PRN
Qty: 100 TABLET | Refills: 0 | Status: SHIPPED | OUTPATIENT
Start: 2019-01-01 | End: 2019-01-01

## 2019-01-01 RX ORDER — LIDOCAINE 50 MG/G
2 PATCH TOPICAL EVERY 24 HOURS
Qty: 60 PATCH | Refills: 1 | Status: SHIPPED | OUTPATIENT
Start: 2019-01-01

## 2019-01-01 RX ORDER — NYSTATIN 100000/ML
500000 SUSPENSION, ORAL (FINAL DOSE FORM) ORAL 4 TIMES DAILY
Qty: 60 ML | Refills: 0 | Status: SHIPPED | OUTPATIENT
Start: 2019-01-01

## 2019-01-01 RX ORDER — NALOXONE HYDROCHLORIDE 0.4 MG/ML
.1-.4 INJECTION, SOLUTION INTRAMUSCULAR; INTRAVENOUS; SUBCUTANEOUS
Status: CANCELLED | OUTPATIENT
Start: 2019-01-01

## 2019-01-01 RX ORDER — GADOBUTROL 604.72 MG/ML
7.5 INJECTION INTRAVENOUS ONCE
Status: COMPLETED | OUTPATIENT
Start: 2019-01-01 | End: 2019-01-01

## 2019-01-01 RX ORDER — METRONIDAZOLE 500 MG/1
500 TABLET ORAL 3 TIMES DAILY
Status: DISCONTINUED | OUTPATIENT
Start: 2019-01-01 | End: 2019-01-01 | Stop reason: ALTCHOICE

## 2019-01-01 RX ORDER — ONDANSETRON 8 MG/1
8 TABLET, FILM COATED ORAL EVERY 8 HOURS PRN
Qty: 30 TABLET | Refills: 11 | Status: CANCELLED | OUTPATIENT
Start: 2020-03-29

## 2019-01-01 RX ORDER — LIDOCAINE 40 MG/G
CREAM TOPICAL
Status: CANCELLED | OUTPATIENT
Start: 2019-01-01

## 2019-01-01 RX ORDER — POTASSIUM CHLORIDE 7.45 MG/ML
10 INJECTION INTRAVENOUS
Status: DISCONTINUED | OUTPATIENT
Start: 2019-01-01 | End: 2019-01-01

## 2019-01-01 RX ORDER — NEOMYCIN/BACITRACIN/POLYMYXINB 3.5-400-5K
OINTMENT (GRAM) TOPICAL 2 TIMES DAILY
Status: DISCONTINUED | OUTPATIENT
Start: 2019-01-01 | End: 2019-01-01 | Stop reason: HOSPADM

## 2019-01-01 RX ORDER — SODIUM CHLORIDE 9 MG/ML
INJECTION, SOLUTION INTRAVENOUS CONTINUOUS
Status: DISCONTINUED | OUTPATIENT
Start: 2019-01-01 | End: 2019-01-01 | Stop reason: HOSPADM

## 2019-01-01 RX ORDER — LABETALOL 20 MG/4 ML (5 MG/ML) INTRAVENOUS SYRINGE
10-40 EVERY 10 MIN PRN
Status: DISCONTINUED | OUTPATIENT
Start: 2019-01-01 | End: 2019-01-01 | Stop reason: HOSPADM

## 2019-01-01 RX ORDER — METOPROLOL SUCCINATE 25 MG/1
TABLET, EXTENDED RELEASE ORAL
Qty: 90 TABLET | Refills: 0 | Status: SHIPPED | OUTPATIENT
Start: 2019-01-01 | End: 2019-01-01

## 2019-01-01 RX ORDER — CEFEPIME HYDROCHLORIDE 1 G/1
1 INJECTION, POWDER, FOR SOLUTION INTRAMUSCULAR; INTRAVENOUS EVERY 12 HOURS
Status: DISCONTINUED | OUTPATIENT
Start: 2019-01-01 | End: 2019-01-01

## 2019-01-01 RX ORDER — LISINOPRIL 20 MG/1
40 TABLET ORAL EVERY EVENING
Status: DISCONTINUED | OUTPATIENT
Start: 2019-01-01 | End: 2019-01-01 | Stop reason: HOSPADM

## 2019-01-01 RX ORDER — GABAPENTIN 300 MG/1
CAPSULE ORAL
Qty: 270 CAPSULE | Refills: 1 | Status: SHIPPED | OUTPATIENT
Start: 2019-01-01 | End: 2019-01-01

## 2019-01-01 RX ORDER — ONDANSETRON 8 MG/1
8 TABLET, ORALLY DISINTEGRATING ORAL EVERY 8 HOURS PRN
Qty: 30 TABLET | Refills: 11 | Status: ON HOLD | OUTPATIENT
Start: 2019-01-01 | End: 2019-01-01

## 2019-01-01 RX ORDER — DEXAMETHASONE 4 MG/1
4 TABLET ORAL EVERY 8 HOURS SCHEDULED
Status: COMPLETED | OUTPATIENT
Start: 2019-01-01 | End: 2019-01-01

## 2019-01-01 RX ORDER — NICOTINE POLACRILEX 4 MG/1
20 GUM, CHEWING ORAL DAILY
Qty: 30 TABLET | Refills: 0 | Status: ON HOLD | OUTPATIENT
Start: 2019-01-01 | End: 2019-01-01

## 2019-01-01 RX ORDER — HEPARIN SODIUM,PORCINE 10 UNIT/ML
2-5 VIAL (ML) INTRAVENOUS
Status: DISCONTINUED | OUTPATIENT
Start: 2019-01-01 | End: 2019-01-01 | Stop reason: HOSPADM

## 2019-01-01 RX ORDER — ALBUTEROL SULFATE 90 UG/1
1-2 AEROSOL, METERED RESPIRATORY (INHALATION)
Status: CANCELLED
Start: 2019-01-01

## 2019-01-01 RX ORDER — LIDOCAINE 40 MG/G
CREAM TOPICAL
Status: DISCONTINUED | OUTPATIENT
Start: 2019-01-01 | End: 2019-01-01 | Stop reason: HOSPADM

## 2019-01-01 RX ORDER — LORAZEPAM 0.5 MG/1
0.5 TABLET ORAL EVERY 6 HOURS PRN
Qty: 30 TABLET | Refills: 3 | Status: ON HOLD | OUTPATIENT
Start: 2019-01-01 | End: 2019-01-01

## 2019-01-01 RX ORDER — HYDROMORPHONE HYDROCHLORIDE 1 MG/ML
0.5 INJECTION, SOLUTION INTRAMUSCULAR; INTRAVENOUS; SUBCUTANEOUS EVERY 4 HOURS PRN
Status: DISCONTINUED | OUTPATIENT
Start: 2019-01-01 | End: 2019-01-01

## 2019-01-01 RX ORDER — GABAPENTIN 250 MG/5ML
300 SOLUTION ORAL DAILY
Status: DISCONTINUED | OUTPATIENT
Start: 2019-01-01 | End: 2019-01-01 | Stop reason: HOSPADM

## 2019-01-01 RX ORDER — HYDROMORPHONE HYDROCHLORIDE 1 MG/ML
0.5 INJECTION, SOLUTION INTRAMUSCULAR; INTRAVENOUS; SUBCUTANEOUS
Status: COMPLETED | OUTPATIENT
Start: 2019-01-01 | End: 2019-01-01

## 2019-01-01 RX ORDER — PANTOPRAZOLE SODIUM 40 MG/1
40 TABLET, DELAYED RELEASE ORAL
Status: DISCONTINUED | OUTPATIENT
Start: 2019-01-01 | End: 2019-01-01 | Stop reason: HOSPADM

## 2019-01-01 RX ORDER — KIT FOR THE PREPARATION OF TECHNETIUM TC 99M MEBROFENIN 45 MG/10ML
6 INJECTION, POWDER, LYOPHILIZED, FOR SOLUTION INTRAVENOUS ONCE
Status: COMPLETED | OUTPATIENT
Start: 2019-01-01 | End: 2019-01-01

## 2019-01-01 RX ORDER — DEXAMETHASONE 1 MG
2 TABLET ORAL EVERY 12 HOURS SCHEDULED
Status: COMPLETED | OUTPATIENT
Start: 2019-01-01 | End: 2019-01-01

## 2019-01-01 RX ORDER — POTASSIUM CHLORIDE 29.8 MG/ML
20 INJECTION INTRAVENOUS
Status: DISCONTINUED | OUTPATIENT
Start: 2019-01-01 | End: 2019-01-01

## 2019-01-01 RX ORDER — SENNOSIDES A AND B 8.6 MG/1
2 TABLET, FILM COATED ORAL 2 TIMES DAILY
Qty: 120 TABLET | Refills: 0 | Status: SHIPPED | OUTPATIENT
Start: 2019-01-01 | End: 2019-01-01

## 2019-01-01 RX ORDER — FOLIC ACID 1 MG/1
1000 TABLET ORAL DAILY
Qty: 30 TABLET | Refills: 11 | Status: ON HOLD | OUTPATIENT
Start: 2019-01-01 | End: 2019-01-01

## 2019-01-01 RX ORDER — DEXAMETHASONE 4 MG/1
8 TABLET ORAL ONCE
Status: COMPLETED | OUTPATIENT
Start: 2019-01-01 | End: 2019-01-01

## 2019-01-01 RX ORDER — HYDROMORPHONE HYDROCHLORIDE 1 MG/ML
INJECTION, SOLUTION INTRAMUSCULAR; INTRAVENOUS; SUBCUTANEOUS
Status: COMPLETED
Start: 2019-01-01 | End: 2019-01-01

## 2019-01-01 RX ORDER — GABAPENTIN 250 MG/5ML
300 SOLUTION ORAL DAILY
Qty: 470 ML | Refills: 0 | Status: SHIPPED | OUTPATIENT
Start: 2019-01-01

## 2019-01-01 RX ORDER — IBUPROFEN 50 MG/1.25
SUSPENSION, DROPS(FINAL DOSAGE FORM)(ML) ORAL
Qty: 120 TABLET | Refills: 0 | Status: SHIPPED | OUTPATIENT
Start: 2019-01-01 | End: 2019-01-01

## 2019-01-01 RX ORDER — GABAPENTIN 300 MG/1
300 CAPSULE ORAL EVERY 24 HOURS
Status: CANCELLED | OUTPATIENT
Start: 2019-01-01

## 2019-01-01 RX ORDER — PROCHLORPERAZINE 25 MG
12.5 SUPPOSITORY, RECTAL RECTAL EVERY 12 HOURS PRN
Qty: 3 SUPPOSITORY | Refills: 0 | Status: SHIPPED | OUTPATIENT
Start: 2019-01-01

## 2019-01-01 RX ORDER — FOLIC ACID 1 MG/1
1 TABLET ORAL DAILY
Status: DISCONTINUED | OUTPATIENT
Start: 2019-01-01 | End: 2019-01-01 | Stop reason: HOSPADM

## 2019-01-01 RX ORDER — DIPHENHYDRAMINE HYDROCHLORIDE AND LIDOCAINE HYDROCHLORIDE AND ALUMINUM HYDROXIDE AND MAGNESIUM HYDRO
10 KIT
Status: DISCONTINUED | OUTPATIENT
Start: 2019-01-01 | End: 2019-01-01 | Stop reason: HOSPADM

## 2019-01-01 RX ORDER — DEXAMETHASONE 4 MG/1
4 TABLET ORAL 2 TIMES DAILY WITH MEALS
Qty: 6 TABLET | Refills: 11 | Status: CANCELLED | OUTPATIENT
Start: 2020-04-04

## 2019-01-01 RX ORDER — ONDANSETRON 8 MG/1
8 TABLET, FILM COATED ORAL EVERY 8 HOURS PRN
Qty: 30 TABLET | Refills: 11 | Status: ON HOLD | OUTPATIENT
Start: 2019-01-01 | End: 2019-01-01

## 2019-01-01 RX ORDER — DEXAMETHASONE 2 MG/1
TABLET ORAL
Qty: 15 TABLET | Refills: 0 | Status: ON HOLD | OUTPATIENT
Start: 2019-01-01 | End: 2019-01-01

## 2019-01-01 RX ORDER — DIPHENHYDRAMINE HYDROCHLORIDE AND LIDOCAINE HYDROCHLORIDE AND ALUMINUM HYDROXIDE AND MAGNESIUM HYDRO
10 KIT
Qty: 119 ML | Refills: 0 | Status: SHIPPED | OUTPATIENT
Start: 2019-01-01

## 2019-01-01 RX ORDER — FLUMAZENIL 0.1 MG/ML
0.2 INJECTION, SOLUTION INTRAVENOUS
Status: DISCONTINUED | OUTPATIENT
Start: 2019-01-01 | End: 2019-01-01 | Stop reason: HOSPADM

## 2019-01-01 RX ORDER — DEXAMETHASONE 2 MG/1
2 TABLET ORAL EVERY 12 HOURS
Qty: 3 TABLET | Refills: 0 | Status: SHIPPED | OUTPATIENT
Start: 2019-01-01 | End: 2019-01-01

## 2019-01-01 RX ORDER — EPINEPHRINE 1 MG/ML
0.3 INJECTION, SOLUTION INTRAMUSCULAR; SUBCUTANEOUS EVERY 5 MIN PRN
Status: CANCELLED | OUTPATIENT
Start: 2019-01-01

## 2019-01-01 RX ORDER — ACETAMINOPHEN 325 MG/1
650 TABLET ORAL EVERY 4 HOURS PRN
Status: CANCELLED | OUTPATIENT
Start: 2019-01-01

## 2019-01-01 RX ORDER — LIDOCAINE 50 MG/G
2 PATCH TOPICAL EVERY 24 HOURS
Status: DISCONTINUED | OUTPATIENT
Start: 2019-01-01 | End: 2019-01-01

## 2019-01-01 RX ORDER — OXYMETAZOLINE HYDROCHLORIDE 0.05 G/100ML
2 SPRAY NASAL ONCE
Status: DISCONTINUED | OUTPATIENT
Start: 2019-01-01 | End: 2019-01-01

## 2019-01-01 RX ORDER — LORAZEPAM 2 MG/ML
3 INJECTION INTRAMUSCULAR ONCE
Status: COMPLETED | OUTPATIENT
Start: 2019-01-01 | End: 2019-01-01

## 2019-01-01 RX ORDER — GABAPENTIN 300 MG/1
600 CAPSULE ORAL AT BEDTIME
Status: ON HOLD | COMMUNITY
End: 2019-01-01

## 2019-01-01 RX ORDER — SENNOSIDES A AND B 8.6 MG/1
1-2 TABLET, FILM COATED ORAL 2 TIMES DAILY
Status: ON HOLD | COMMUNITY
End: 2019-01-01

## 2019-01-01 RX ORDER — DEXTROSE MONOHYDRATE 25 G/50ML
25-50 INJECTION, SOLUTION INTRAVENOUS
Status: DISCONTINUED | OUTPATIENT
Start: 2019-01-01 | End: 2019-01-01 | Stop reason: HOSPADM

## 2019-01-01 RX ORDER — SODIUM CHLORIDE 9 MG/ML
INJECTION, SOLUTION INTRAVENOUS CONTINUOUS
Status: CANCELLED | OUTPATIENT
Start: 2019-01-01

## 2019-01-01 RX ORDER — DIPHENHYDRAMINE HYDROCHLORIDE 50 MG/ML
50 INJECTION INTRAMUSCULAR; INTRAVENOUS
Status: CANCELLED
Start: 2019-01-01

## 2019-01-01 RX ORDER — FENTANYL CITRATE 50 UG/ML
25-50 INJECTION, SOLUTION INTRAMUSCULAR; INTRAVENOUS EVERY 5 MIN PRN
Status: DISCONTINUED | OUTPATIENT
Start: 2019-01-01 | End: 2019-01-01 | Stop reason: HOSPADM

## 2019-01-01 RX ORDER — POTASSIUM CL/LIDO/0.9 % NACL 10MEQ/0.1L
10 INTRAVENOUS SOLUTION, PIGGYBACK (ML) INTRAVENOUS
Status: CANCELLED | OUTPATIENT
Start: 2019-01-01

## 2019-01-01 RX ORDER — LOPERAMIDE HYDROCHLORIDE 1 MG/5ML
2 SOLUTION ORAL 4 TIMES DAILY PRN
Status: DISCONTINUED | OUTPATIENT
Start: 2019-01-01 | End: 2019-01-01 | Stop reason: HOSPADM

## 2019-01-01 RX ORDER — ALBUTEROL SULFATE 0.83 MG/ML
2.5 SOLUTION RESPIRATORY (INHALATION)
Status: CANCELLED | OUTPATIENT
Start: 2019-01-01

## 2019-01-01 RX ORDER — HYDROMORPHONE HYDROCHLORIDE 1 MG/ML
.3-.5 INJECTION, SOLUTION INTRAMUSCULAR; INTRAVENOUS; SUBCUTANEOUS
Status: DISCONTINUED | OUTPATIENT
Start: 2019-01-01 | End: 2019-01-01

## 2019-01-01 RX ORDER — ONDANSETRON 4 MG/1
8 TABLET, FILM COATED ORAL EVERY 8 HOURS PRN
Status: DISCONTINUED | OUTPATIENT
Start: 2019-01-01 | End: 2019-01-01 | Stop reason: DRUGHIGH

## 2019-01-01 RX ORDER — HYDROMORPHONE HYDROCHLORIDE 1 MG/ML
2-4 SOLUTION ORAL
Qty: 473 ML | Refills: 0 | Status: SHIPPED | OUTPATIENT
Start: 2019-01-01

## 2019-01-01 RX ORDER — DEXAMETHASONE 4 MG/1
4 TABLET ORAL
Qty: 30 TABLET | Refills: 3 | Status: SHIPPED | OUTPATIENT
Start: 2019-01-01 | End: 2019-01-01 | Stop reason: ALTCHOICE

## 2019-01-01 RX ORDER — CYANOCOBALAMIN 1000 UG/ML
1000 INJECTION, SOLUTION INTRAMUSCULAR; SUBCUTANEOUS ONCE
Status: CANCELLED
Start: 2019-01-01

## 2019-01-01 RX ORDER — ONDANSETRON 2 MG/ML
4 INJECTION INTRAMUSCULAR; INTRAVENOUS EVERY 6 HOURS PRN
Status: DISCONTINUED | OUTPATIENT
Start: 2019-01-01 | End: 2019-01-01 | Stop reason: HOSPADM

## 2019-01-01 RX ORDER — SUCRALFATE ORAL 1 G/10ML
1 SUSPENSION ORAL 4 TIMES DAILY
Qty: 420 ML | Refills: 0 | Status: SHIPPED | OUTPATIENT
Start: 2019-01-01 | End: 2019-01-01

## 2019-01-01 RX ORDER — ONDANSETRON 4 MG/1
4 TABLET, ORALLY DISINTEGRATING ORAL EVERY 6 HOURS PRN
Qty: 8 TABLET | Refills: 0 | Status: SHIPPED | OUTPATIENT
Start: 2019-01-01

## 2019-01-01 RX ORDER — LIDOCAINE 4 G/G
2 PATCH TOPICAL
Status: DISCONTINUED | OUTPATIENT
Start: 2019-01-01 | End: 2019-01-01 | Stop reason: HOSPADM

## 2019-01-01 RX ORDER — SENNOSIDES 8.6 MG/1
TABLET ORAL
Qty: 120 TABLET | Refills: 0 | Status: ON HOLD | OUTPATIENT
Start: 2019-01-01 | End: 2019-01-01

## 2019-01-01 RX ORDER — SODIUM CHLORIDE 9 MG/ML
1000 INJECTION, SOLUTION INTRAVENOUS CONTINUOUS PRN
Status: CANCELLED
Start: 2019-01-01

## 2019-01-01 RX ORDER — OXYCODONE HYDROCHLORIDE 5 MG/1
10 TABLET ORAL EVERY 4 HOURS PRN
Status: DISCONTINUED | OUTPATIENT
Start: 2019-01-01 | End: 2019-01-01

## 2019-01-01 RX ORDER — OXYCODONE HYDROCHLORIDE 5 MG/1
5 TABLET ORAL EVERY 6 HOURS PRN
Qty: 10 TABLET | Refills: 0 | Status: SHIPPED | OUTPATIENT
Start: 2019-01-01 | End: 2019-01-01

## 2019-01-01 RX ORDER — LORAZEPAM 0.5 MG/1
0.5 TABLET ORAL EVERY 6 HOURS PRN
Status: DISCONTINUED | OUTPATIENT
Start: 2019-01-01 | End: 2019-01-01 | Stop reason: HOSPADM

## 2019-01-01 RX ORDER — MAGNESIUM SULFATE HEPTAHYDRATE 40 MG/ML
4 INJECTION, SOLUTION INTRAVENOUS EVERY 4 HOURS PRN
Status: CANCELLED | OUTPATIENT
Start: 2019-01-01

## 2019-01-01 RX ORDER — DEXAMETHASONE 4 MG/1
4 TABLET ORAL EVERY 8 HOURS SCHEDULED
Status: DISCONTINUED | OUTPATIENT
Start: 2019-01-01 | End: 2019-01-01

## 2019-01-01 RX ORDER — GABAPENTIN 250 MG/5ML
600 SOLUTION ORAL AT BEDTIME
Status: DISCONTINUED | OUTPATIENT
Start: 2019-01-01 | End: 2019-01-01 | Stop reason: HOSPADM

## 2019-01-01 RX ORDER — HYDROMORPHONE HYDROCHLORIDE 1 MG/ML
2-4 SOLUTION ORAL EVERY 4 HOURS
Status: DISCONTINUED | OUTPATIENT
Start: 2019-01-01 | End: 2019-01-01

## 2019-01-01 RX ORDER — MEPERIDINE HYDROCHLORIDE 25 MG/ML
25 INJECTION INTRAMUSCULAR; INTRAVENOUS; SUBCUTANEOUS EVERY 30 MIN PRN
Status: CANCELLED | OUTPATIENT
Start: 2019-01-01

## 2019-01-01 RX ORDER — OXYCODONE HCL 5 MG/5 ML
10-15 SOLUTION, ORAL ORAL
Status: DISCONTINUED | OUTPATIENT
Start: 2019-01-01 | End: 2019-01-01

## 2019-01-01 RX ORDER — SENNOSIDES A AND B 8.6 MG/1
1-2 TABLET, FILM COATED ORAL 2 TIMES DAILY PRN
Status: CANCELLED | OUTPATIENT
Start: 2019-01-01

## 2019-01-01 RX ORDER — SENNOSIDES 8.6 MG
1-2 TABLET ORAL 2 TIMES DAILY
Status: DISCONTINUED | OUTPATIENT
Start: 2019-01-01 | End: 2019-01-01 | Stop reason: HOSPADM

## 2019-01-01 RX ORDER — FOLIC ACID 1 MG/1
1 TABLET ORAL DAILY
Qty: 30 TABLET | Refills: 0 | Status: ON HOLD | OUTPATIENT
Start: 2019-01-01 | End: 2019-01-01

## 2019-01-01 RX ORDER — OXYCODONE HYDROCHLORIDE 5 MG/1
10 TABLET ORAL EVERY 4 HOURS PRN
Qty: 100 TABLET | Refills: 0 | Status: ON HOLD | OUTPATIENT
Start: 2019-01-01 | End: 2019-01-01

## 2019-01-01 RX ORDER — ACETAMINOPHEN 325 MG/1
975 TABLET ORAL EVERY 8 HOURS PRN
Status: DISCONTINUED | OUTPATIENT
Start: 2019-01-01 | End: 2019-01-01 | Stop reason: HOSPADM

## 2019-01-01 RX ORDER — GABAPENTIN 300 MG/1
600 CAPSULE ORAL AT BEDTIME
Status: DISCONTINUED | OUTPATIENT
Start: 2019-01-01 | End: 2019-01-01 | Stop reason: ALTCHOICE

## 2019-01-01 RX ORDER — POTASSIUM CHLORIDE 7.45 MG/ML
10 INJECTION INTRAVENOUS
Status: CANCELLED | OUTPATIENT
Start: 2019-01-01

## 2019-01-01 RX ORDER — TRAZODONE HYDROCHLORIDE 50 MG/1
50 TABLET, FILM COATED ORAL
Status: DISCONTINUED | OUTPATIENT
Start: 2019-01-01 | End: 2019-01-01 | Stop reason: HOSPADM

## 2019-01-01 RX ORDER — POTASSIUM CHLORIDE 1500 MG/1
20-40 TABLET, EXTENDED RELEASE ORAL
Status: CANCELLED | OUTPATIENT
Start: 2019-01-01

## 2019-01-01 RX ORDER — OXYCODONE HYDROCHLORIDE 5 MG/1
10 TABLET ORAL EVERY 4 HOURS PRN
Status: DISCONTINUED | OUTPATIENT
Start: 2019-01-01 | End: 2019-01-01 | Stop reason: CLARIF

## 2019-01-01 RX ORDER — HYDROMORPHONE HYDROCHLORIDE 1 MG/ML
4 SOLUTION ORAL EVERY 4 HOURS
Status: DISCONTINUED | OUTPATIENT
Start: 2019-01-01 | End: 2019-01-01 | Stop reason: HOSPADM

## 2019-01-01 RX ORDER — DEXTROSE MONOHYDRATE 25 G/50ML
25-50 INJECTION, SOLUTION INTRAVENOUS
Status: DISCONTINUED | OUTPATIENT
Start: 2019-01-01 | End: 2019-01-01

## 2019-01-01 RX ORDER — ACETAMINOPHEN 325 MG/1
650 TABLET ORAL ONCE
Status: COMPLETED | OUTPATIENT
Start: 2019-01-01 | End: 2019-01-01

## 2019-01-01 RX ORDER — CYANOCOBALAMIN 1000 UG/ML
1000 INJECTION, SOLUTION INTRAMUSCULAR; SUBCUTANEOUS ONCE
Status: COMPLETED | OUTPATIENT
Start: 2019-01-01 | End: 2019-01-01

## 2019-01-01 RX ORDER — DEXAMETHASONE 4 MG/1
4 TABLET ORAL 2 TIMES DAILY WITH MEALS
Qty: 6 TABLET | Refills: 11 | Status: ON HOLD | OUTPATIENT
Start: 2019-01-01 | End: 2019-01-01

## 2019-01-01 RX ORDER — LOPERAMIDE HYDROCHLORIDE 1 MG/5ML
2 SOLUTION ORAL 4 TIMES DAILY PRN
Status: DISCONTINUED | OUTPATIENT
Start: 2019-01-01 | End: 2019-01-01

## 2019-01-01 RX ORDER — ACETAMINOPHEN 325 MG/1
650 TABLET ORAL EVERY 4 HOURS PRN
Status: DISCONTINUED | OUTPATIENT
Start: 2019-01-01 | End: 2019-01-01 | Stop reason: CLARIF

## 2019-01-01 RX ORDER — LOPERAMIDE HCL 2 MG
2 CAPSULE ORAL 4 TIMES DAILY PRN
Status: DISCONTINUED | OUTPATIENT
Start: 2019-01-01 | End: 2019-01-01

## 2019-01-01 RX ORDER — SALIVA STIMULANT COMB. NO.3
2 SPRAY, NON-AEROSOL (ML) MUCOUS MEMBRANE
Qty: 1 BOTTLE | Refills: 0 | Status: SHIPPED | OUTPATIENT
Start: 2019-01-01

## 2019-01-01 RX ORDER — GABAPENTIN 300 MG/1
300 CAPSULE ORAL DAILY
COMMUNITY
End: 2019-01-01

## 2019-01-01 RX ORDER — NICOTINE POLACRILEX 4 MG
15-30 LOZENGE BUCCAL
Status: DISCONTINUED | OUTPATIENT
Start: 2019-01-01 | End: 2019-01-01

## 2019-01-01 RX ORDER — LORAZEPAM 2 MG/ML
0.5 INJECTION INTRAMUSCULAR EVERY 4 HOURS PRN
Status: CANCELLED
Start: 2019-01-01

## 2019-01-01 RX ORDER — SALIVA STIMULANT COMB. NO.3
2 SPRAY, NON-AEROSOL (ML) MUCOUS MEMBRANE
Status: DISCONTINUED | OUTPATIENT
Start: 2019-01-01 | End: 2019-01-01 | Stop reason: HOSPADM

## 2019-01-01 RX ORDER — POTASSIUM CHLORIDE 1.5 G/1.58G
20-40 POWDER, FOR SOLUTION ORAL
Status: CANCELLED | OUTPATIENT
Start: 2019-01-01

## 2019-01-01 RX ORDER — LORAZEPAM 2 MG/ML
1 CONCENTRATE ORAL EVERY 8 HOURS PRN
Qty: 30 ML | Refills: 0 | Status: SHIPPED | OUTPATIENT
Start: 2019-01-01 | End: 2019-01-01

## 2019-01-01 RX ORDER — OXYCODONE HYDROCHLORIDE 5 MG/1
10-15 TABLET ORAL
Status: DISCONTINUED | OUTPATIENT
Start: 2019-01-01 | End: 2019-01-01

## 2019-01-01 RX ORDER — LACTOBACILLUS RHAMNOSUS GG 10B CELL
1 CAPSULE ORAL 2 TIMES DAILY
Qty: 6 CAPSULE | Refills: 0 | Status: SHIPPED | OUTPATIENT
Start: 2019-01-01

## 2019-01-01 RX ORDER — PROCHLORPERAZINE MALEATE 10 MG
5 TABLET ORAL EVERY 6 HOURS PRN
Qty: 30 TABLET | Refills: 11 | Status: CANCELLED | OUTPATIENT
Start: 2020-03-29

## 2019-01-01 RX ORDER — HYDROMORPHONE HYDROCHLORIDE 2 MG/1
2-4 TABLET ORAL
Status: DISCONTINUED | OUTPATIENT
Start: 2019-01-01 | End: 2019-01-01

## 2019-01-01 RX ORDER — DEXTROSE MONOHYDRATE, SODIUM CHLORIDE, AND POTASSIUM CHLORIDE 50; 1.49; 4.5 G/1000ML; G/1000ML; G/1000ML
INJECTION, SOLUTION INTRAVENOUS CONTINUOUS
Status: DISCONTINUED | OUTPATIENT
Start: 2019-01-01 | End: 2019-01-01

## 2019-01-01 RX ORDER — LIDOCAINE 40 MG/G
CREAM TOPICAL 2 TIMES DAILY
Status: COMPLETED | OUTPATIENT
Start: 2019-01-01 | End: 2019-01-01

## 2019-01-01 RX ADMIN — NYSTATIN 500000 UNITS: 100000 SUSPENSION ORAL at 09:17

## 2019-01-01 RX ADMIN — OXYCODONE HYDROCHLORIDE 10 MG: 5 TABLET ORAL at 18:02

## 2019-01-01 RX ADMIN — CARBOPLATIN 450 MG: 10 INJECTION, SOLUTION INTRAVENOUS at 11:32

## 2019-01-01 RX ADMIN — VANCOMYCIN HYDROCHLORIDE 1500 MG: 10 INJECTION, POWDER, LYOPHILIZED, FOR SOLUTION INTRAVENOUS at 03:55

## 2019-01-01 RX ADMIN — INSULIN ASPART 2 UNITS: 100 INJECTION, SOLUTION INTRAVENOUS; SUBCUTANEOUS at 16:37

## 2019-01-01 RX ADMIN — Medication 1 CAPSULE: at 09:04

## 2019-01-01 RX ADMIN — SENNOSIDES 1 TABLET: 8.6 TABLET, FILM COATED ORAL at 08:17

## 2019-01-01 RX ADMIN — LIDOCAINE 2 PATCH: 560 PATCH PERCUTANEOUS; TOPICAL; TRANSDERMAL at 21:37

## 2019-01-01 RX ADMIN — LIDOCAINE 2 PATCH: 560 PATCH PERCUTANEOUS; TOPICAL; TRANSDERMAL at 07:46

## 2019-01-01 RX ADMIN — CHOLECALCIFEROL CAP 125 MCG (5000 UNIT) 5000 UNITS: 125 CAP at 08:36

## 2019-01-01 RX ADMIN — SENNOSIDES 1 TABLET: 8.6 TABLET, FILM COATED ORAL at 07:48

## 2019-01-01 RX ADMIN — SODIUM CHLORIDE, PRESERVATIVE FREE 90 ML: 5 INJECTION INTRAVENOUS at 05:40

## 2019-01-01 RX ADMIN — DICLOFENAC SODIUM 4 G: 10 GEL TOPICAL at 18:28

## 2019-01-01 RX ADMIN — GABAPENTIN 600 MG: 300 CAPSULE ORAL at 21:12

## 2019-01-01 RX ADMIN — LIDOCAINE 2 PATCH: 560 PATCH PERCUTANEOUS; TOPICAL; TRANSDERMAL at 20:40

## 2019-01-01 RX ADMIN — MIDAZOLAM 0.5 MG: 1 INJECTION INTRAMUSCULAR; INTRAVENOUS at 08:40

## 2019-01-01 RX ADMIN — DEXAMETHASONE 4 MG: 4 TABLET ORAL at 23:41

## 2019-01-01 RX ADMIN — MIDAZOLAM 0.5 MG: 1 INJECTION INTRAMUSCULAR; INTRAVENOUS at 08:47

## 2019-01-01 RX ADMIN — SODIUM CHLORIDE 200 MG: 9 INJECTION, SOLUTION INTRAVENOUS at 10:08

## 2019-01-01 RX ADMIN — DEXAMETHASONE 4 MG: 4 TABLET ORAL at 08:19

## 2019-01-01 RX ADMIN — PANTOPRAZOLE SODIUM 40 MG: 40 INJECTION, POWDER, FOR SOLUTION INTRAVENOUS at 08:55

## 2019-01-01 RX ADMIN — SODIUM CHLORIDE 1000 ML: 9 INJECTION, SOLUTION INTRAVENOUS at 14:47

## 2019-01-01 RX ADMIN — INSULIN ASPART 1 UNITS: 100 INJECTION, SOLUTION INTRAVENOUS; SUBCUTANEOUS at 14:26

## 2019-01-01 RX ADMIN — LISINOPRIL 40 MG: 20 TABLET ORAL at 10:03

## 2019-01-01 RX ADMIN — Medication 500 MG: at 16:41

## 2019-01-01 RX ADMIN — CEFEPIME HYDROCHLORIDE 1 G: 1 INJECTION, POWDER, FOR SOLUTION INTRAMUSCULAR; INTRAVENOUS at 12:15

## 2019-01-01 RX ADMIN — HYDROMORPHONE HYDROCHLORIDE 0.5 MG: 1 INJECTION, SOLUTION INTRAMUSCULAR; INTRAVENOUS; SUBCUTANEOUS at 13:22

## 2019-01-01 RX ADMIN — DEXAMETHASONE 2 MG: 1 TABLET ORAL at 10:04

## 2019-01-01 RX ADMIN — GADOBUTROL 7.5 ML: 604.72 INJECTION INTRAVENOUS at 20:33

## 2019-01-01 RX ADMIN — NYSTATIN 500000 UNITS: 100000 SUSPENSION ORAL at 09:20

## 2019-01-01 RX ADMIN — ACETAMINOPHEN 650 MG: 325 TABLET, FILM COATED ORAL at 17:14

## 2019-01-01 RX ADMIN — INSULIN ASPART 1 UNITS: 100 INJECTION, SOLUTION INTRAVENOUS; SUBCUTANEOUS at 12:24

## 2019-01-01 RX ADMIN — MIDAZOLAM 0.5 MG: 1 INJECTION INTRAMUSCULAR; INTRAVENOUS at 08:38

## 2019-01-01 RX ADMIN — GABAPENTIN 300 MG: 250 SUSPENSION ORAL at 12:57

## 2019-01-01 RX ADMIN — GABAPENTIN 300 MG: 300 CAPSULE ORAL at 08:17

## 2019-01-01 RX ADMIN — DEXAMETHASONE 2 MG: 1 TABLET ORAL at 19:55

## 2019-01-01 RX ADMIN — Medication 500 MG: at 08:45

## 2019-01-01 RX ADMIN — OXYCODONE HYDROCHLORIDE 10 MG: 5 TABLET ORAL at 06:39

## 2019-01-01 RX ADMIN — INSULIN ASPART 1 UNITS: 100 INJECTION, SOLUTION INTRAVENOUS; SUBCUTANEOUS at 12:00

## 2019-01-01 RX ADMIN — HYDROMORPHONE HYDROCHLORIDE 4 MG: 5 SOLUTION ORAL at 12:45

## 2019-01-01 RX ADMIN — PANTOPRAZOLE SODIUM 40 MG: 40 TABLET, DELAYED RELEASE ORAL at 07:42

## 2019-01-01 RX ADMIN — OXYCODONE HYDROCHLORIDE 10 MG: 5 SOLUTION ORAL at 07:40

## 2019-01-01 RX ADMIN — DEXAMETHASONE 4 MG: 4 TABLET ORAL at 07:42

## 2019-01-01 RX ADMIN — ACETAMINOPHEN 975 MG: 325 TABLET, FILM COATED ORAL at 22:18

## 2019-01-01 RX ADMIN — Medication 5 MG: at 05:59

## 2019-01-01 RX ADMIN — NYSTATIN 500000 UNITS: 100000 SUSPENSION ORAL at 12:15

## 2019-01-01 RX ADMIN — ONDANSETRON 4 MG: 2 INJECTION INTRAMUSCULAR; INTRAVENOUS at 13:19

## 2019-01-01 RX ADMIN — OXYCODONE HYDROCHLORIDE 10 MG: 5 TABLET ORAL at 08:32

## 2019-01-01 RX ADMIN — DICLOFENAC SODIUM 4 G: 10 GEL TOPICAL at 22:30

## 2019-01-01 RX ADMIN — INSULIN ASPART 1 UNITS: 100 INJECTION, SOLUTION INTRAVENOUS; SUBCUTANEOUS at 12:34

## 2019-01-01 RX ADMIN — LIDOCAINE 2 PATCH: 560 PATCH PERCUTANEOUS; TOPICAL; TRANSDERMAL at 20:31

## 2019-01-01 RX ADMIN — NYSTATIN 500000 UNITS: 100000 SUSPENSION ORAL at 09:19

## 2019-01-01 RX ADMIN — DICLOFENAC SODIUM 4 G: 10 GEL TOPICAL at 22:00

## 2019-01-01 RX ADMIN — DEXTROSE AND SODIUM CHLORIDE: 5; 450 INJECTION, SOLUTION INTRAVENOUS at 05:22

## 2019-01-01 RX ADMIN — CHOLECALCIFEROL CAP 125 MCG (5000 UNIT) 5000 UNITS: 125 CAP at 10:04

## 2019-01-01 RX ADMIN — DEXAMETHASONE 2 MG: 1 TABLET ORAL at 20:36

## 2019-01-01 RX ADMIN — CEFEPIME HYDROCHLORIDE 1 G: 1 INJECTION, POWDER, FOR SOLUTION INTRAMUSCULAR; INTRAVENOUS at 23:50

## 2019-01-01 RX ADMIN — CEFEPIME HYDROCHLORIDE 1 G: 1 INJECTION, POWDER, FOR SOLUTION INTRAMUSCULAR; INTRAVENOUS at 00:17

## 2019-01-01 RX ADMIN — NYSTATIN 500000 UNITS: 100000 SUSPENSION ORAL at 13:20

## 2019-01-01 RX ADMIN — LISINOPRIL 40 MG: 20 TABLET ORAL at 00:40

## 2019-01-01 RX ADMIN — FOLIC ACID 1 MG: 1 TABLET ORAL at 08:44

## 2019-01-01 RX ADMIN — OXYCODONE HYDROCHLORIDE 10 MG: 5 SOLUTION ORAL at 13:58

## 2019-01-01 RX ADMIN — Medication 500 MG: at 22:17

## 2019-01-01 RX ADMIN — Medication 1 CAPSULE: at 20:31

## 2019-01-01 RX ADMIN — INSULIN ASPART 1 UNITS: 100 INJECTION, SOLUTION INTRAVENOUS; SUBCUTANEOUS at 16:08

## 2019-01-01 RX ADMIN — ACETAMINOPHEN 975 MG: 325 TABLET, FILM COATED ORAL at 05:59

## 2019-01-01 RX ADMIN — NYSTATIN 500000 UNITS: 100000 SUSPENSION ORAL at 22:10

## 2019-01-01 RX ADMIN — OXYCODONE HYDROCHLORIDE 10 MG: 5 SOLUTION ORAL at 15:57

## 2019-01-01 RX ADMIN — LIDOCAINE 2 PATCH: 560 PATCH PERCUTANEOUS; TOPICAL; TRANSDERMAL at 20:03

## 2019-01-01 RX ADMIN — METOPROLOL SUCCINATE 25 MG: 25 TABLET, EXTENDED RELEASE ORAL at 19:49

## 2019-01-01 RX ADMIN — LISINOPRIL 40 MG: 20 TABLET ORAL at 07:49

## 2019-01-01 RX ADMIN — Medication 500 MG: at 23:01

## 2019-01-01 RX ADMIN — GABAPENTIN 300 MG: 250 SUSPENSION ORAL at 14:11

## 2019-01-01 RX ADMIN — NYSTATIN 500000 UNITS: 100000 SUSPENSION ORAL at 22:17

## 2019-01-01 RX ADMIN — HYDROMORPHONE HYDROCHLORIDE 4 MG: 5 SOLUTION ORAL at 15:07

## 2019-01-01 RX ADMIN — OXYCODONE HYDROCHLORIDE 10 MG: 5 SOLUTION ORAL at 06:02

## 2019-01-01 RX ADMIN — DICLOFENAC SODIUM 4 G: 10 GEL TOPICAL at 09:05

## 2019-01-01 RX ADMIN — OXYCODONE HYDROCHLORIDE 10 MG: 5 SOLUTION ORAL at 01:20

## 2019-01-01 RX ADMIN — SENNOSIDES 1 TABLET: 8.6 TABLET, FILM COATED ORAL at 20:55

## 2019-01-01 RX ADMIN — HYDROMORPHONE HYDROCHLORIDE 4 MG: 5 SOLUTION ORAL at 18:45

## 2019-01-01 RX ADMIN — GABAPENTIN 600 MG: 250 SUSPENSION ORAL at 22:17

## 2019-01-01 RX ADMIN — OXYCODONE HYDROCHLORIDE 15 MG: 5 SOLUTION ORAL at 21:58

## 2019-01-01 RX ADMIN — HYDROMORPHONE HYDROCHLORIDE 4 MG: 5 SOLUTION ORAL at 07:14

## 2019-01-01 RX ADMIN — Medication 1 CAPSULE: at 21:20

## 2019-01-01 RX ADMIN — OXYCODONE HYDROCHLORIDE 10 MG: 5 SOLUTION ORAL at 11:15

## 2019-01-01 RX ADMIN — METRONIDAZOLE 500 MG: 500 TABLET ORAL at 21:13

## 2019-01-01 RX ADMIN — ACETAMINOPHEN 650 MG: 160 SOLUTION ORAL at 15:15

## 2019-01-01 RX ADMIN — Medication 1 CAPSULE: at 09:20

## 2019-01-01 RX ADMIN — INSULIN ASPART 1 UNITS: 100 INJECTION, SOLUTION INTRAVENOUS; SUBCUTANEOUS at 16:40

## 2019-01-01 RX ADMIN — INSULIN ASPART 1 UNITS: 100 INJECTION, SOLUTION INTRAVENOUS; SUBCUTANEOUS at 12:19

## 2019-01-01 RX ADMIN — Medication 500 MG: at 21:47

## 2019-01-01 RX ADMIN — LIDOCAINE HYDROCHLORIDE 5 ML: 10 INJECTION, SOLUTION EPIDURAL; INFILTRATION; INTRACAUDAL; PERINEURAL at 13:31

## 2019-01-01 RX ADMIN — DICLOFENAC SODIUM 4 G: 10 GEL TOPICAL at 19:04

## 2019-01-01 RX ADMIN — ACETAMINOPHEN 975 MG: 325 TABLET, FILM COATED ORAL at 23:50

## 2019-01-01 RX ADMIN — DICLOFENAC SODIUM 4 G: 10 GEL TOPICAL at 21:47

## 2019-01-01 RX ADMIN — ACETAMINOPHEN 650 MG: 160 SOLUTION ORAL at 23:58

## 2019-01-01 RX ADMIN — ACETAMINOPHEN 975 MG: 325 TABLET, FILM COATED ORAL at 19:49

## 2019-01-01 RX ADMIN — FILGRASTIM 480 MCG: 480 INJECTION, SOLUTION INTRAVENOUS; SUBCUTANEOUS at 21:35

## 2019-01-01 RX ADMIN — CEFEPIME HYDROCHLORIDE 1 G: 1 INJECTION, POWDER, FOR SOLUTION INTRAMUSCULAR; INTRAVENOUS at 11:53

## 2019-01-01 RX ADMIN — HYDROMORPHONE HYDROCHLORIDE 0.5 MG: 1 INJECTION, SOLUTION INTRAMUSCULAR; INTRAVENOUS; SUBCUTANEOUS at 09:29

## 2019-01-01 RX ADMIN — DIPHENHYDRAMINE HYDROCHLORIDE AND LIDOCAINE HYDROCHLORIDE AND ALUMINUM HYDROXIDE AND MAGNESIUM HYDRO 10 ML: KIT at 22:00

## 2019-01-01 RX ADMIN — DIPHENHYDRAMINE HYDROCHLORIDE AND LIDOCAINE HYDROCHLORIDE AND ALUMINUM HYDROXIDE AND MAGNESIUM HYDRO 10 ML: KIT at 19:14

## 2019-01-01 RX ADMIN — DIPHENHYDRAMINE HYDROCHLORIDE AND LIDOCAINE HYDROCHLORIDE AND ALUMINUM HYDROXIDE AND MAGNESIUM HYDRO 10 ML: KIT at 22:11

## 2019-01-01 RX ADMIN — Medication 1 CAPSULE: at 20:41

## 2019-01-01 RX ADMIN — DEXAMETHASONE 4 MG: 4 TABLET ORAL at 18:52

## 2019-01-01 RX ADMIN — FOLIC ACID 1 MG: 1 TABLET ORAL at 08:55

## 2019-01-01 RX ADMIN — SENNOSIDES 2 TABLET: 8.6 TABLET, FILM COATED ORAL at 19:49

## 2019-01-01 RX ADMIN — FILGRASTIM 480 MCG: 480 INJECTION, SOLUTION INTRAVENOUS; SUBCUTANEOUS at 20:15

## 2019-01-01 RX ADMIN — LIDOCAINE 2 PATCH: 560 PATCH PERCUTANEOUS; TOPICAL; TRANSDERMAL at 21:19

## 2019-01-01 RX ADMIN — Medication 5 MG: at 14:18

## 2019-01-01 RX ADMIN — FOLIC ACID 1 MG: 1 TABLET ORAL at 09:20

## 2019-01-01 RX ADMIN — OXYCODONE HYDROCHLORIDE 10 MG: 5 SOLUTION ORAL at 23:57

## 2019-01-01 RX ADMIN — DIPHENHYDRAMINE HYDROCHLORIDE AND LIDOCAINE HYDROCHLORIDE AND ALUMINUM HYDROXIDE AND MAGNESIUM HYDRO 10 ML: KIT at 23:47

## 2019-01-01 RX ADMIN — NYSTATIN 500000 UNITS: 100000 SUSPENSION ORAL at 08:23

## 2019-01-01 RX ADMIN — GABAPENTIN 300 MG: 300 CAPSULE ORAL at 08:03

## 2019-01-01 RX ADMIN — DIPHENHYDRAMINE HYDROCHLORIDE AND LIDOCAINE HYDROCHLORIDE AND ALUMINUM HYDROXIDE AND MAGNESIUM HYDRO 10 ML: KIT at 09:20

## 2019-01-01 RX ADMIN — ACETAMINOPHEN 975 MG: 325 TABLET, FILM COATED ORAL at 10:03

## 2019-01-01 RX ADMIN — BACITRACIN ZINC, NEOMYCIN, POLYMYXIN B: 400; 3.5; 5 OINTMENT TOPICAL at 21:48

## 2019-01-01 RX ADMIN — IOPAMIDOL 71 ML: 755 INJECTION, SOLUTION INTRAVENOUS at 14:44

## 2019-01-01 RX ADMIN — GABAPENTIN 600 MG: 300 CAPSULE ORAL at 21:24

## 2019-01-01 RX ADMIN — LIDOCAINE 2 PATCH: 560 PATCH PERCUTANEOUS; TOPICAL; TRANSDERMAL at 19:57

## 2019-01-01 RX ADMIN — Medication 1 CAPSULE: at 21:13

## 2019-01-01 RX ADMIN — OXYCODONE HYDROCHLORIDE 10 MG: 5 SOLUTION ORAL at 01:59

## 2019-01-01 RX ADMIN — BACITRACIN ZINC, NEOMYCIN, POLYMYXIN B: 400; 3.5; 5 OINTMENT TOPICAL at 09:05

## 2019-01-01 RX ADMIN — CHOLECALCIFEROL CAP 125 MCG (5000 UNIT) 5000 UNITS: 125 CAP at 08:17

## 2019-01-01 RX ADMIN — HYDROMORPHONE HYDROCHLORIDE 0.5 MG: 1 INJECTION, SOLUTION INTRAMUSCULAR; INTRAVENOUS; SUBCUTANEOUS at 14:40

## 2019-01-01 RX ADMIN — OXYCODONE HYDROCHLORIDE 10 MG: 5 SOLUTION ORAL at 14:39

## 2019-01-01 RX ADMIN — NYSTATIN 500000 UNITS: 100000 SUSPENSION ORAL at 18:00

## 2019-01-01 RX ADMIN — NYSTATIN 500000 UNITS: 100000 SUSPENSION ORAL at 14:41

## 2019-01-01 RX ADMIN — METOPROLOL SUCCINATE 25 MG: 25 TABLET, EXTENDED RELEASE ORAL at 20:36

## 2019-01-01 RX ADMIN — METRONIDAZOLE 500 MG: 500 TABLET ORAL at 08:55

## 2019-01-01 RX ADMIN — SODIUM CHLORIDE: 9 INJECTION, SOLUTION INTRAVENOUS at 17:43

## 2019-01-01 RX ADMIN — NYSTATIN 500000 UNITS: 100000 SUSPENSION ORAL at 12:57

## 2019-01-01 RX ADMIN — NYSTATIN 500000 UNITS: 100000 SUSPENSION ORAL at 08:55

## 2019-01-01 RX ADMIN — OXYCODONE HYDROCHLORIDE 10 MG: 5 SOLUTION ORAL at 01:46

## 2019-01-01 RX ADMIN — FOLIC ACID 1 MG: 1 TABLET ORAL at 10:17

## 2019-01-01 RX ADMIN — DIPHENHYDRAMINE HYDROCHLORIDE AND LIDOCAINE HYDROCHLORIDE AND ALUMINUM HYDROXIDE AND MAGNESIUM HYDRO 10 ML: KIT at 21:47

## 2019-01-01 RX ADMIN — NYSTATIN 500000 UNITS: 100000 SUSPENSION ORAL at 23:44

## 2019-01-01 RX ADMIN — NYSTATIN 500000 UNITS: 100000 SUSPENSION ORAL at 09:16

## 2019-01-01 RX ADMIN — POTASSIUM CHLORIDE 20 MEQ: 1.5 POWDER, FOR SOLUTION ORAL at 10:58

## 2019-01-01 RX ADMIN — DIPHENHYDRAMINE HYDROCHLORIDE AND LIDOCAINE HYDROCHLORIDE AND ALUMINUM HYDROXIDE AND MAGNESIUM HYDRO 10 ML: KIT at 22:31

## 2019-01-01 RX ADMIN — GADOBUTROL 7.5 ML: 604.72 INJECTION INTRAVENOUS at 14:16

## 2019-01-01 RX ADMIN — DIPHENHYDRAMINE HYDROCHLORIDE AND LIDOCAINE HYDROCHLORIDE AND ALUMINUM HYDROXIDE AND MAGNESIUM HYDRO 10 ML: KIT at 12:44

## 2019-01-01 RX ADMIN — Medication 1 CAPSULE: at 09:19

## 2019-01-01 RX ADMIN — NYSTATIN 500000 UNITS: 100000 SUSPENSION ORAL at 12:45

## 2019-01-01 RX ADMIN — DEXAMETHASONE 2 MG: 1 TABLET ORAL at 08:37

## 2019-01-01 RX ADMIN — Medication 5 MG: at 21:23

## 2019-01-01 RX ADMIN — ACETAMINOPHEN 975 MG: 325 TABLET, FILM COATED ORAL at 15:30

## 2019-01-01 RX ADMIN — SENNOSIDES 2 TABLET: 8.6 TABLET, FILM COATED ORAL at 20:07

## 2019-01-01 RX ADMIN — SENNOSIDES 2 TABLET: 8.6 TABLET, FILM COATED ORAL at 20:28

## 2019-01-01 RX ADMIN — SENNOSIDES 1 TABLET: 8.6 TABLET, FILM COATED ORAL at 08:35

## 2019-01-01 RX ADMIN — BACITRACIN ZINC, NEOMYCIN, POLYMYXIN B: 400; 3.5; 5 OINTMENT TOPICAL at 08:03

## 2019-01-01 RX ADMIN — NYSTATIN 500000 UNITS: 100000 SUSPENSION ORAL at 08:02

## 2019-01-01 RX ADMIN — SENNOSIDES 2 TABLET: 8.6 TABLET, FILM COATED ORAL at 08:35

## 2019-01-01 RX ADMIN — GABAPENTIN 600 MG: 300 CAPSULE ORAL at 22:04

## 2019-01-01 RX ADMIN — Medication 1 CAPSULE: at 22:15

## 2019-01-01 RX ADMIN — PANTOPRAZOLE SODIUM 40 MG: 40 TABLET, DELAYED RELEASE ORAL at 10:04

## 2019-01-01 RX ADMIN — INSULIN ASPART 2 UNITS: 100 INJECTION, SOLUTION INTRAVENOUS; SUBCUTANEOUS at 22:17

## 2019-01-01 RX ADMIN — NYSTATIN 500000 UNITS: 100000 SUSPENSION ORAL at 14:40

## 2019-01-01 RX ADMIN — Medication 500 MG: at 09:04

## 2019-01-01 RX ADMIN — DEXAMETHASONE 4 MG: 4 TABLET ORAL at 17:58

## 2019-01-01 RX ADMIN — DICLOFENAC SODIUM 4 G: 10 GEL TOPICAL at 17:08

## 2019-01-01 RX ADMIN — LIDOCAINE 2 PATCH: 560 PATCH PERCUTANEOUS; TOPICAL; TRANSDERMAL at 20:37

## 2019-01-01 RX ADMIN — Medication 5 MG: at 21:54

## 2019-01-01 RX ADMIN — Medication 1 CAPSULE: at 10:53

## 2019-01-01 RX ADMIN — INSULIN ASPART 1 UNITS: 100 INJECTION, SOLUTION INTRAVENOUS; SUBCUTANEOUS at 15:56

## 2019-01-01 RX ADMIN — SENNOSIDES 1 TABLET: 8.6 TABLET, FILM COATED ORAL at 10:05

## 2019-01-01 RX ADMIN — FOLIC ACID 1 MG: 1 TABLET ORAL at 19:53

## 2019-01-01 RX ADMIN — FENTANYL CITRATE 25 MCG: 50 INJECTION, SOLUTION INTRAMUSCULAR; INTRAVENOUS at 08:33

## 2019-01-01 RX ADMIN — POTASSIUM CHLORIDE 20 MEQ: 1500 TABLET, EXTENDED RELEASE ORAL at 13:13

## 2019-01-01 RX ADMIN — GABAPENTIN 600 MG: 300 CAPSULE ORAL at 21:20

## 2019-01-01 RX ADMIN — NYSTATIN 500000 UNITS: 100000 SUSPENSION ORAL at 17:41

## 2019-01-01 RX ADMIN — INSULIN ASPART 2 UNITS: 100 INJECTION, SOLUTION INTRAVENOUS; SUBCUTANEOUS at 22:53

## 2019-01-01 RX ADMIN — DIPHENHYDRAMINE HYDROCHLORIDE AND LIDOCAINE HYDROCHLORIDE AND ALUMINUM HYDROXIDE AND MAGNESIUM HYDRO 10 ML: KIT at 08:25

## 2019-01-01 RX ADMIN — HYDROMORPHONE HYDROCHLORIDE 4 MG: 5 SOLUTION ORAL at 22:34

## 2019-01-01 RX ADMIN — LIDOCAINE: 40 CREAM TOPICAL at 20:38

## 2019-01-01 RX ADMIN — POTASSIUM CHLORIDE, DEXTROSE MONOHYDRATE AND SODIUM CHLORIDE: 150; 5; 450 INJECTION, SOLUTION INTRAVENOUS at 11:39

## 2019-01-01 RX ADMIN — OXYCODONE HYDROCHLORIDE 10 MG: 5 SOLUTION ORAL at 08:23

## 2019-01-01 RX ADMIN — HYDROMORPHONE HYDROCHLORIDE 4 MG: 5 SOLUTION ORAL at 08:44

## 2019-01-01 RX ADMIN — GABAPENTIN 600 MG: 300 CAPSULE ORAL at 22:14

## 2019-01-01 RX ADMIN — NYSTATIN 500000 UNITS: 100000 SUSPENSION ORAL at 12:35

## 2019-01-01 RX ADMIN — INSULIN ASPART 1 UNITS: 100 INJECTION, SOLUTION INTRAVENOUS; SUBCUTANEOUS at 18:54

## 2019-01-01 RX ADMIN — NYSTATIN 500000 UNITS: 100000 SUSPENSION ORAL at 22:14

## 2019-01-01 RX ADMIN — NYSTATIN 500000 UNITS: 100000 SUSPENSION ORAL at 10:54

## 2019-01-01 RX ADMIN — HYDROMORPHONE HYDROCHLORIDE 0.5 MG: 1 INJECTION, SOLUTION INTRAMUSCULAR; INTRAVENOUS; SUBCUTANEOUS at 09:00

## 2019-01-01 RX ADMIN — OXYCODONE HYDROCHLORIDE 10 MG: 5 SOLUTION ORAL at 11:55

## 2019-01-01 RX ADMIN — HYDROMORPHONE HYDROCHLORIDE 0.5 MG: 1 INJECTION, SOLUTION INTRAMUSCULAR; INTRAVENOUS; SUBCUTANEOUS at 07:56

## 2019-01-01 RX ADMIN — SENNOSIDES 1 TABLET: 8.6 TABLET, FILM COATED ORAL at 20:36

## 2019-01-01 RX ADMIN — Medication 1 CAPSULE: at 22:08

## 2019-01-01 RX ADMIN — Medication 1 CAPSULE: at 08:32

## 2019-01-01 RX ADMIN — CHOLECALCIFEROL CAP 125 MCG (5000 UNIT) 5000 UNITS: 125 CAP at 08:03

## 2019-01-01 RX ADMIN — DIPHENHYDRAMINE HYDROCHLORIDE AND LIDOCAINE HYDROCHLORIDE AND ALUMINUM HYDROXIDE AND MAGNESIUM HYDRO 10 ML: KIT at 18:29

## 2019-01-01 RX ADMIN — OXYCODONE HYDROCHLORIDE 10 MG: 5 SOLUTION ORAL at 16:23

## 2019-01-01 RX ADMIN — GABAPENTIN 600 MG: 250 SUSPENSION ORAL at 21:59

## 2019-01-01 RX ADMIN — NYSTATIN 500000 UNITS: 100000 SUSPENSION ORAL at 09:04

## 2019-01-01 RX ADMIN — DIPHENHYDRAMINE HYDROCHLORIDE AND LIDOCAINE HYDROCHLORIDE AND ALUMINUM HYDROXIDE AND MAGNESIUM HYDRO 10 ML: KIT at 18:41

## 2019-01-01 RX ADMIN — METOPROLOL SUCCINATE 25 MG: 25 TABLET, EXTENDED RELEASE ORAL at 19:56

## 2019-01-01 RX ADMIN — HYDROMORPHONE HYDROCHLORIDE 0.3 MG: 1 INJECTION, SOLUTION INTRAMUSCULAR; INTRAVENOUS; SUBCUTANEOUS at 14:34

## 2019-01-01 RX ADMIN — HYDROMORPHONE HYDROCHLORIDE 0.5 MG: 1 INJECTION, SOLUTION INTRAMUSCULAR; INTRAVENOUS; SUBCUTANEOUS at 18:22

## 2019-01-01 RX ADMIN — DIPHENHYDRAMINE HYDROCHLORIDE AND LIDOCAINE HYDROCHLORIDE AND ALUMINUM HYDROXIDE AND MAGNESIUM HYDRO 10 ML: KIT at 08:44

## 2019-01-01 RX ADMIN — CEFEPIME HYDROCHLORIDE 1 G: 1 INJECTION, POWDER, FOR SOLUTION INTRAMUSCULAR; INTRAVENOUS at 19:08

## 2019-01-01 RX ADMIN — CEFEPIME HYDROCHLORIDE 1 G: 1 INJECTION, POWDER, FOR SOLUTION INTRAMUSCULAR; INTRAVENOUS at 17:33

## 2019-01-01 RX ADMIN — Medication 1 CAPSULE: at 09:17

## 2019-01-01 RX ADMIN — HYDROMORPHONE HYDROCHLORIDE 4 MG: 5 SOLUTION ORAL at 11:33

## 2019-01-01 RX ADMIN — PANTOPRAZOLE SODIUM 40 MG: 40 TABLET, DELAYED RELEASE ORAL at 09:32

## 2019-01-01 RX ADMIN — FILGRASTIM 480 MCG: 480 INJECTION, SOLUTION INTRAVENOUS; SUBCUTANEOUS at 21:12

## 2019-01-01 RX ADMIN — GABAPENTIN 300 MG: 300 CAPSULE ORAL at 08:37

## 2019-01-01 RX ADMIN — DICLOFENAC SODIUM 4 G: 10 GEL TOPICAL at 12:43

## 2019-01-01 RX ADMIN — OXYCODONE HYDROCHLORIDE 10 MG: 5 SOLUTION ORAL at 18:34

## 2019-01-01 RX ADMIN — OXYCODONE HYDROCHLORIDE 10 MG: 5 TABLET ORAL at 20:13

## 2019-01-01 RX ADMIN — Medication 500 MG: at 09:21

## 2019-01-01 RX ADMIN — METOPROLOL SUCCINATE 25 MG: 25 TABLET, EXTENDED RELEASE ORAL at 20:28

## 2019-01-01 RX ADMIN — CHOLECALCIFEROL CAP 125 MCG (5000 UNIT) 5000 UNITS: 125 CAP at 07:48

## 2019-01-01 RX ADMIN — CEFEPIME HYDROCHLORIDE 1 G: 1 INJECTION, POWDER, FOR SOLUTION INTRAMUSCULAR; INTRAVENOUS at 05:41

## 2019-01-01 RX ADMIN — DICLOFENAC SODIUM 4 G: 10 GEL TOPICAL at 14:11

## 2019-01-01 RX ADMIN — DEXAMETHASONE 4 MG: 4 TABLET ORAL at 00:08

## 2019-01-01 RX ADMIN — OXYCODONE HYDROCHLORIDE 10 MG: 5 SOLUTION ORAL at 10:58

## 2019-01-01 RX ADMIN — Medication 5 MG: at 15:25

## 2019-01-01 RX ADMIN — HYDROMORPHONE HYDROCHLORIDE 0.5 MG: 1 INJECTION, SOLUTION INTRAMUSCULAR; INTRAVENOUS; SUBCUTANEOUS at 19:50

## 2019-01-01 RX ADMIN — FOLIC ACID 1 MG: 1 TABLET ORAL at 08:02

## 2019-01-01 RX ADMIN — PANTOPRAZOLE SODIUM 40 MG: 40 TABLET, DELAYED RELEASE ORAL at 07:48

## 2019-01-01 RX ADMIN — METRONIDAZOLE 500 MG: 500 TABLET ORAL at 22:15

## 2019-01-01 RX ADMIN — OXYCODONE HYDROCHLORIDE 10 MG: 5 TABLET ORAL at 09:24

## 2019-01-01 RX ADMIN — OXYCODONE HYDROCHLORIDE 10 MG: 5 SOLUTION ORAL at 22:23

## 2019-01-01 RX ADMIN — VANCOMYCIN HYDROCHLORIDE 1500 MG: 5 INJECTION, POWDER, LYOPHILIZED, FOR SOLUTION INTRAVENOUS at 13:40

## 2019-01-01 RX ADMIN — NYSTATIN 500000 UNITS: 100000 SUSPENSION ORAL at 19:54

## 2019-01-01 RX ADMIN — LISINOPRIL 40 MG: 20 TABLET ORAL at 20:55

## 2019-01-01 RX ADMIN — DICLOFENAC SODIUM 4 G: 10 GEL TOPICAL at 19:08

## 2019-01-01 RX ADMIN — DIPHENHYDRAMINE HYDROCHLORIDE AND LIDOCAINE HYDROCHLORIDE AND ALUMINUM HYDROXIDE AND MAGNESIUM HYDRO 10 ML: KIT at 14:39

## 2019-01-01 RX ADMIN — ACETAMINOPHEN 650 MG: 160 SOLUTION ORAL at 09:04

## 2019-01-01 RX ADMIN — DIPHENHYDRAMINE HYDROCHLORIDE AND LIDOCAINE HYDROCHLORIDE AND ALUMINUM HYDROXIDE AND MAGNESIUM HYDRO 10 ML: KIT at 14:19

## 2019-01-01 RX ADMIN — PANTOPRAZOLE SODIUM 40 MG: 40 TABLET, DELAYED RELEASE ORAL at 08:19

## 2019-01-01 RX ADMIN — ACETAMINOPHEN 975 MG: 325 TABLET, FILM COATED ORAL at 09:00

## 2019-01-01 RX ADMIN — PANTOPRAZOLE SODIUM 40 MG: 40 INJECTION, POWDER, FOR SOLUTION INTRAVENOUS at 09:17

## 2019-01-01 RX ADMIN — OXYCODONE HYDROCHLORIDE 10 MG: 5 SOLUTION ORAL at 16:45

## 2019-01-01 RX ADMIN — GABAPENTIN 300 MG: 250 SUSPENSION ORAL at 12:55

## 2019-01-01 RX ADMIN — Medication 1 CAPSULE: at 10:18

## 2019-01-01 RX ADMIN — GABAPENTIN 600 MG: 300 CAPSULE ORAL at 23:03

## 2019-01-01 RX ADMIN — PANTOPRAZOLE SODIUM 40 MG: 40 INJECTION, POWDER, FOR SOLUTION INTRAVENOUS at 10:53

## 2019-01-01 RX ADMIN — HYDROMORPHONE HYDROCHLORIDE 4 MG: 5 SOLUTION ORAL at 03:20

## 2019-01-01 RX ADMIN — METRONIDAZOLE 500 MG: 500 TABLET ORAL at 15:46

## 2019-01-01 RX ADMIN — ONDANSETRON 4 MG: 4 TABLET, ORALLY DISINTEGRATING ORAL at 23:45

## 2019-01-01 RX ADMIN — ACETAMINOPHEN 975 MG: 325 TABLET, FILM COATED ORAL at 07:40

## 2019-01-01 RX ADMIN — DIPHENHYDRAMINE HYDROCHLORIDE AND LIDOCAINE HYDROCHLORIDE AND ALUMINUM HYDROXIDE AND MAGNESIUM HYDRO 10 ML: KIT at 18:59

## 2019-01-01 RX ADMIN — OXYCODONE HYDROCHLORIDE 10 MG: 5 SOLUTION ORAL at 17:09

## 2019-01-01 RX ADMIN — CEFEPIME HYDROCHLORIDE 1 G: 1 INJECTION, POWDER, FOR SOLUTION INTRAMUSCULAR; INTRAVENOUS at 11:38

## 2019-01-01 RX ADMIN — SODIUM CHLORIDE 1000 ML: 9 INJECTION, SOLUTION INTRAVENOUS at 13:20

## 2019-01-01 RX ADMIN — GABAPENTIN 300 MG: 300 CAPSULE ORAL at 07:48

## 2019-01-01 RX ADMIN — ACETAMINOPHEN 650 MG: 160 SOLUTION ORAL at 01:56

## 2019-01-01 RX ADMIN — LIDOCAINE 2 PATCH: 560 PATCH PERCUTANEOUS; TOPICAL; TRANSDERMAL at 08:39

## 2019-01-01 RX ADMIN — SODIUM CHLORIDE 1000 ML: 9 INJECTION, SOLUTION INTRAVENOUS at 11:08

## 2019-01-01 RX ADMIN — DICLOFENAC SODIUM 4 G: 10 GEL TOPICAL at 09:21

## 2019-01-01 RX ADMIN — FENTANYL CITRATE 25 MCG: 50 INJECTION, SOLUTION INTRAMUSCULAR; INTRAVENOUS at 08:40

## 2019-01-01 RX ADMIN — PANTOPRAZOLE SODIUM 40 MG: 40 TABLET, DELAYED RELEASE ORAL at 08:45

## 2019-01-01 RX ADMIN — OXYCODONE HYDROCHLORIDE 10 MG: 5 SOLUTION ORAL at 20:40

## 2019-01-01 RX ADMIN — GABAPENTIN 300 MG: 300 CAPSULE ORAL at 10:04

## 2019-01-01 RX ADMIN — DICLOFENAC SODIUM 4 G: 10 GEL TOPICAL at 09:19

## 2019-01-01 RX ADMIN — FILGRASTIM 480 MCG: 480 INJECTION, SOLUTION INTRAVENOUS; SUBCUTANEOUS at 22:05

## 2019-01-01 RX ADMIN — DEXAMETHASONE 2 MG: 1 TABLET ORAL at 07:48

## 2019-01-01 RX ADMIN — NYSTATIN 500000 UNITS: 100000 SUSPENSION ORAL at 18:29

## 2019-01-01 RX ADMIN — FENTANYL CITRATE 25 MCG: 50 INJECTION, SOLUTION INTRAMUSCULAR; INTRAVENOUS at 08:47

## 2019-01-01 RX ADMIN — Medication 2 SPRAY: at 09:19

## 2019-01-01 RX ADMIN — GABAPENTIN 300 MG: 250 SUSPENSION ORAL at 11:57

## 2019-01-01 RX ADMIN — VANCOMYCIN HYDROCHLORIDE 1500 MG: 10 INJECTION, POWDER, LYOPHILIZED, FOR SOLUTION INTRAVENOUS at 22:26

## 2019-01-01 RX ADMIN — BACITRACIN ZINC, NEOMYCIN, POLYMYXIN B: 400; 3.5; 5 OINTMENT TOPICAL at 08:51

## 2019-01-01 RX ADMIN — OXYCODONE HYDROCHLORIDE 10 MG: 5 TABLET ORAL at 17:40

## 2019-01-01 RX ADMIN — Medication 2.5 MG: at 15:18

## 2019-01-01 RX ADMIN — HYDROMORPHONE HYDROCHLORIDE 0.5 MG: 1 INJECTION, SOLUTION INTRAMUSCULAR; INTRAVENOUS; SUBCUTANEOUS at 17:32

## 2019-01-01 RX ADMIN — OXYCODONE HYDROCHLORIDE 10 MG: 5 SOLUTION ORAL at 06:00

## 2019-01-01 RX ADMIN — DICLOFENAC SODIUM 4 G: 10 GEL TOPICAL at 22:10

## 2019-01-01 RX ADMIN — BACITRACIN ZINC, NEOMYCIN, POLYMYXIN B: 400; 3.5; 5 OINTMENT TOPICAL at 23:49

## 2019-01-01 RX ADMIN — GABAPENTIN 600 MG: 250 SUSPENSION ORAL at 22:10

## 2019-01-01 RX ADMIN — BACITRACIN ZINC, NEOMYCIN, POLYMYXIN B: 400; 3.5; 5 OINTMENT TOPICAL at 09:19

## 2019-01-01 RX ADMIN — CHOLECALCIFEROL CAP 125 MCG (5000 UNIT) 5000 UNITS: 125 CAP at 07:42

## 2019-01-01 RX ADMIN — CEFEPIME HYDROCHLORIDE 1 G: 1 INJECTION, POWDER, FOR SOLUTION INTRAMUSCULAR; INTRAVENOUS at 01:35

## 2019-01-01 RX ADMIN — PANTOPRAZOLE SODIUM 40 MG: 40 INJECTION, POWDER, FOR SOLUTION INTRAVENOUS at 08:34

## 2019-01-01 RX ADMIN — LISINOPRIL 40 MG: 20 TABLET ORAL at 08:36

## 2019-01-01 RX ADMIN — OXYCODONE HYDROCHLORIDE 10 MG: 5 TABLET ORAL at 22:14

## 2019-01-01 RX ADMIN — Medication 500 MG: at 08:02

## 2019-01-01 RX ADMIN — POTASSIUM CHLORIDE 40 MEQ: 1.5 POWDER, FOR SOLUTION ORAL at 10:19

## 2019-01-01 RX ADMIN — LIDOCAINE HYDROCHLORIDE 6 ML: 10 INJECTION, SOLUTION EPIDURAL; INFILTRATION; INTRACAUDAL; PERINEURAL at 08:45

## 2019-01-01 RX ADMIN — GABAPENTIN 600 MG: 300 CAPSULE ORAL at 21:50

## 2019-01-01 RX ADMIN — SENNOSIDES 1 TABLET: 8.6 TABLET, FILM COATED ORAL at 19:55

## 2019-01-01 RX ADMIN — DICLOFENAC SODIUM 4 G: 10 GEL TOPICAL at 19:20

## 2019-01-01 RX ADMIN — INSULIN ASPART 1 UNITS: 100 INJECTION, SOLUTION INTRAVENOUS; SUBCUTANEOUS at 08:18

## 2019-01-01 RX ADMIN — OXYCODONE HYDROCHLORIDE 10 MG: 5 SOLUTION ORAL at 05:11

## 2019-01-01 RX ADMIN — LIDOCAINE 2 PATCH: 560 PATCH PERCUTANEOUS; TOPICAL; TRANSDERMAL at 21:12

## 2019-01-01 RX ADMIN — LISINOPRIL 40 MG: 20 TABLET ORAL at 08:19

## 2019-01-01 RX ADMIN — OXYCODONE HYDROCHLORIDE 10 MG: 5 TABLET ORAL at 23:15

## 2019-01-01 RX ADMIN — GABAPENTIN 300 MG: 250 SUSPENSION ORAL at 18:09

## 2019-01-01 RX ADMIN — Medication 5 MG: at 10:03

## 2019-01-01 RX ADMIN — OXYCODONE HYDROCHLORIDE 10 MG: 5 SOLUTION ORAL at 10:50

## 2019-01-01 RX ADMIN — METRONIDAZOLE 500 MG: 500 TABLET ORAL at 18:53

## 2019-01-01 RX ADMIN — HYDROMORPHONE HYDROCHLORIDE 4 MG: 5 SOLUTION ORAL at 02:44

## 2019-01-01 RX ADMIN — Medication 1 CAPSULE: at 08:55

## 2019-01-01 RX ADMIN — OXYCODONE HYDROCHLORIDE 10 MG: 5 TABLET ORAL at 12:35

## 2019-01-01 RX ADMIN — INSULIN ASPART 1 UNITS: 100 INJECTION, SOLUTION INTRAVENOUS; SUBCUTANEOUS at 12:30

## 2019-01-01 RX ADMIN — SODIUM CHLORIDE 500 ML: 9 INJECTION, SOLUTION INTRAVENOUS at 14:42

## 2019-01-01 RX ADMIN — PANTOPRAZOLE SODIUM 40 MG: 40 INJECTION, POWDER, FOR SOLUTION INTRAVENOUS at 08:44

## 2019-01-01 RX ADMIN — DEXAMETHASONE 4 MG: 4 TABLET ORAL at 23:33

## 2019-01-01 RX ADMIN — DICLOFENAC SODIUM 4 G: 10 GEL TOPICAL at 18:42

## 2019-01-01 RX ADMIN — OXYCODONE HYDROCHLORIDE 10 MG: 5 SOLUTION ORAL at 03:02

## 2019-01-01 RX ADMIN — DEXAMETHASONE 4 MG: 4 TABLET ORAL at 16:35

## 2019-01-01 RX ADMIN — SODIUM CHLORIDE: 9 INJECTION, SOLUTION INTRAVENOUS at 02:58

## 2019-01-01 RX ADMIN — CEFEPIME HYDROCHLORIDE 1 G: 1 INJECTION, POWDER, FOR SOLUTION INTRAMUSCULAR; INTRAVENOUS at 05:26

## 2019-01-01 RX ADMIN — METRONIDAZOLE 500 MG: 500 TABLET ORAL at 10:53

## 2019-01-01 RX ADMIN — NYSTATIN 500000 UNITS: 100000 SUSPENSION ORAL at 08:34

## 2019-01-01 RX ADMIN — DEXAMETHASONE 8 MG: 4 TABLET ORAL at 16:43

## 2019-01-01 RX ADMIN — OXYCODONE HYDROCHLORIDE 10 MG: 5 TABLET ORAL at 02:22

## 2019-01-01 RX ADMIN — CEFEPIME HYDROCHLORIDE 1 G: 1 INJECTION, POWDER, FOR SOLUTION INTRAMUSCULAR; INTRAVENOUS at 12:44

## 2019-01-01 RX ADMIN — BACITRACIN ZINC, NEOMYCIN, POLYMYXIN B: 400; 3.5; 5 OINTMENT TOPICAL at 10:06

## 2019-01-01 RX ADMIN — Medication 5 MG: at 19:48

## 2019-01-01 RX ADMIN — VANCOMYCIN HYDROCHLORIDE 1500 MG: 10 INJECTION, POWDER, LYOPHILIZED, FOR SOLUTION INTRAVENOUS at 18:05

## 2019-01-01 RX ADMIN — PANTOPRAZOLE SODIUM 40 MG: 40 TABLET, DELAYED RELEASE ORAL at 08:36

## 2019-01-01 RX ADMIN — POTASSIUM CHLORIDE 20 MEQ: 29.8 INJECTION, SOLUTION INTRAVENOUS at 09:04

## 2019-01-01 RX ADMIN — GABAPENTIN 600 MG: 300 CAPSULE ORAL at 21:45

## 2019-01-01 RX ADMIN — LORAZEPAM 3 MG: 2 INJECTION INTRAMUSCULAR; INTRAVENOUS at 15:26

## 2019-01-01 RX ADMIN — Medication 1 CAPSULE: at 23:01

## 2019-01-01 RX ADMIN — DICLOFENAC SODIUM 4 G: 10 GEL TOPICAL at 10:06

## 2019-01-01 RX ADMIN — DICLOFENAC SODIUM 4 G: 10 GEL TOPICAL at 08:25

## 2019-01-01 RX ADMIN — OXYCODONE HYDROCHLORIDE 10 MG: 5 SOLUTION ORAL at 21:46

## 2019-01-01 RX ADMIN — LIDOCAINE 2 PATCH: 560 PATCH PERCUTANEOUS; TOPICAL; TRANSDERMAL at 12:23

## 2019-01-01 RX ADMIN — LIDOCAINE 2 PATCH: 560 PATCH PERCUTANEOUS; TOPICAL; TRANSDERMAL at 10:37

## 2019-01-01 RX ADMIN — DIPHENHYDRAMINE HYDROCHLORIDE AND LIDOCAINE HYDROCHLORIDE AND ALUMINUM HYDROXIDE AND MAGNESIUM HYDRO 10 ML: KIT at 22:12

## 2019-01-01 RX ADMIN — METRONIDAZOLE 500 MG: 500 TABLET ORAL at 17:41

## 2019-01-01 RX ADMIN — DIPHENHYDRAMINE HYDROCHLORIDE AND LIDOCAINE HYDROCHLORIDE AND ALUMINUM HYDROXIDE AND MAGNESIUM HYDRO 10 ML: KIT at 12:57

## 2019-01-01 RX ADMIN — IOPAMIDOL 126 ML: 755 INJECTION, SOLUTION INTRAVENOUS at 05:40

## 2019-01-01 RX ADMIN — LIDOCAINE 2 PATCH: 560 PATCH PERCUTANEOUS; TOPICAL; TRANSDERMAL at 19:12

## 2019-01-01 RX ADMIN — LIDOCAINE: 40 CREAM TOPICAL at 16:48

## 2019-01-01 RX ADMIN — DIPHENHYDRAMINE HYDROCHLORIDE AND LIDOCAINE HYDROCHLORIDE AND ALUMINUM HYDROXIDE AND MAGNESIUM HYDRO 10 ML: KIT at 09:05

## 2019-01-01 RX ADMIN — Medication 2 SPRAY: at 08:02

## 2019-01-01 RX ADMIN — POTASSIUM CHLORIDE 40 MEQ: 1.5 POWDER, FOR SOLUTION ORAL at 08:55

## 2019-01-01 RX ADMIN — FILGRASTIM 480 MCG: 480 INJECTION, SOLUTION INTRAVENOUS; SUBCUTANEOUS at 21:11

## 2019-01-01 RX ADMIN — LIDOCAINE 2 PATCH: 560 PATCH PERCUTANEOUS; TOPICAL; TRANSDERMAL at 07:48

## 2019-01-01 RX ADMIN — Medication 1 CAPSULE: at 08:24

## 2019-01-01 RX ADMIN — INSULIN ASPART 1 UNITS: 100 INJECTION, SOLUTION INTRAVENOUS; SUBCUTANEOUS at 18:37

## 2019-01-01 RX ADMIN — METRONIDAZOLE 500 MG: 500 TABLET ORAL at 21:43

## 2019-01-01 RX ADMIN — NYSTATIN 500000 UNITS: 100000 SUSPENSION ORAL at 18:05

## 2019-01-01 RX ADMIN — GABAPENTIN 600 MG: 250 SUSPENSION ORAL at 23:02

## 2019-01-01 RX ADMIN — DICLOFENAC SODIUM 4 G: 10 GEL TOPICAL at 12:58

## 2019-01-01 RX ADMIN — Medication 500 MG: at 15:57

## 2019-01-01 RX ADMIN — LIDOCAINE 2 PATCH: 560 PATCH PERCUTANEOUS; TOPICAL; TRANSDERMAL at 10:02

## 2019-01-01 RX ADMIN — NYSTATIN 500000 UNITS: 100000 SUSPENSION ORAL at 23:03

## 2019-01-01 RX ADMIN — FOLIC ACID 1 MG: 1 TABLET ORAL at 08:24

## 2019-01-01 RX ADMIN — GABAPENTIN 600 MG: 300 CAPSULE ORAL at 22:40

## 2019-01-01 RX ADMIN — OXYCODONE HYDROCHLORIDE 10 MG: 5 SOLUTION ORAL at 09:19

## 2019-01-01 RX ADMIN — BACITRACIN ZINC, NEOMYCIN, POLYMYXIN B: 400; 3.5; 5 OINTMENT TOPICAL at 09:22

## 2019-01-01 RX ADMIN — VANCOMYCIN HYDROCHLORIDE 1500 MG: 10 INJECTION, POWDER, LYOPHILIZED, FOR SOLUTION INTRAVENOUS at 05:26

## 2019-01-01 RX ADMIN — DICLOFENAC SODIUM 4 G: 10 GEL TOPICAL at 12:47

## 2019-01-01 RX ADMIN — NYSTATIN 500000 UNITS: 100000 SUSPENSION ORAL at 21:59

## 2019-01-01 RX ADMIN — METOPROLOL SUCCINATE 25 MG: 25 TABLET, EXTENDED RELEASE ORAL at 08:17

## 2019-01-01 RX ADMIN — OXYCODONE HYDROCHLORIDE 10 MG: 5 SOLUTION ORAL at 13:09

## 2019-01-01 RX ADMIN — NYSTATIN 500000 UNITS: 100000 SUSPENSION ORAL at 18:02

## 2019-01-01 RX ADMIN — CYANOCOBALAMIN 1000 MCG: 1000 INJECTION, SOLUTION INTRAMUSCULAR at 13:40

## 2019-01-01 RX ADMIN — NYSTATIN 500000 UNITS: 100000 SUSPENSION ORAL at 21:12

## 2019-01-01 RX ADMIN — DEXAMETHASONE 4 MG: 4 TABLET ORAL at 08:03

## 2019-01-01 RX ADMIN — CEFEPIME HYDROCHLORIDE 1 G: 1 INJECTION, POWDER, FOR SOLUTION INTRAMUSCULAR; INTRAVENOUS at 12:56

## 2019-01-01 RX ADMIN — SODIUM CHLORIDE 500 ML: 9 INJECTION, SOLUTION INTRAVENOUS at 09:50

## 2019-01-01 RX ADMIN — LIDOCAINE 2 PATCH: 560 PATCH PERCUTANEOUS; TOPICAL; TRANSDERMAL at 20:13

## 2019-01-01 RX ADMIN — LIDOCAINE HYDROCHLORIDE 1 ML: 10 INJECTION, SOLUTION INFILTRATION; PERINEURAL at 03:00

## 2019-01-01 RX ADMIN — NYSTATIN 500000 UNITS: 100000 SUSPENSION ORAL at 23:09

## 2019-01-01 RX ADMIN — INFLUENZA A VIRUS A/MICHIGAN/45/2015 X-275 (H1N1) ANTIGEN (FORMALDEHYDE INACTIVATED), INFLUENZA A VIRUS A/SINGAPORE/INFIMH-16-0019/2016 IVR-186 (H3N2) ANTIGEN (FORMALDEHYDE INACTIVATED), AND INFLUENZA B VIRUS B/MARYLAND/15/2016 BX-69A (A B/COLORADO/6/2017-LIKE VIRUS) ANTIGEN (FORMALDEHYDE INACTIVATED) 0.5 ML: 60; 60; 60 INJECTION, SUSPENSION INTRAMUSCULAR at 15:25

## 2019-01-01 RX ADMIN — SENNOSIDES 2 TABLET: 8.6 TABLET, FILM COATED ORAL at 08:03

## 2019-01-01 RX ADMIN — ACETAMINOPHEN 650 MG: 160 SOLUTION ORAL at 14:39

## 2019-01-01 RX ADMIN — NYSTATIN 500000 UNITS: 100000 SUSPENSION ORAL at 21:47

## 2019-01-01 RX ADMIN — CEFEPIME HYDROCHLORIDE 1 G: 1 INJECTION, POWDER, FOR SOLUTION INTRAMUSCULAR; INTRAVENOUS at 11:16

## 2019-01-01 RX ADMIN — FOLIC ACID 1 MG: 1 TABLET ORAL at 08:32

## 2019-01-01 RX ADMIN — DIPHENHYDRAMINE HYDROCHLORIDE AND LIDOCAINE HYDROCHLORIDE AND ALUMINUM HYDROXIDE AND MAGNESIUM HYDRO 10 ML: KIT at 18:11

## 2019-01-01 RX ADMIN — LISINOPRIL 40 MG: 20 TABLET ORAL at 10:43

## 2019-01-01 RX ADMIN — Medication 2 SPRAY: at 08:25

## 2019-01-01 RX ADMIN — SODIUM CHLORIDE 800 MG: 9 INJECTION, SOLUTION INTRAVENOUS at 10:55

## 2019-01-01 RX ADMIN — OXYCODONE HYDROCHLORIDE 10 MG: 5 SOLUTION ORAL at 19:25

## 2019-01-01 RX ADMIN — HYDROMORPHONE HYDROCHLORIDE 4 MG: 5 SOLUTION ORAL at 11:57

## 2019-01-01 RX ADMIN — DIPHENHYDRAMINE HYDROCHLORIDE AND LIDOCAINE HYDROCHLORIDE AND ALUMINUM HYDROXIDE AND MAGNESIUM HYDRO 10 ML: KIT at 08:03

## 2019-01-01 RX ADMIN — ACETAMINOPHEN 650 MG: 160 SOLUTION ORAL at 06:03

## 2019-01-01 RX ADMIN — MEBROFENIN 5.5 MCI.: 45 INJECTION, POWDER, LYOPHILIZED, FOR SOLUTION INTRAVENOUS at 14:36

## 2019-01-01 RX ADMIN — PANTOPRAZOLE SODIUM 40 MG: 40 TABLET, DELAYED RELEASE ORAL at 09:04

## 2019-01-01 RX ADMIN — Medication 5 MG: at 12:03

## 2019-01-01 RX ADMIN — ACETAMINOPHEN 975 MG: 325 TABLET, FILM COATED ORAL at 16:39

## 2019-01-01 RX ADMIN — BACITRACIN ZINC, NEOMYCIN, POLYMYXIN B: 400; 3.5; 5 OINTMENT TOPICAL at 21:59

## 2019-01-01 RX ADMIN — PANTOPRAZOLE SODIUM 40 MG: 40 TABLET, DELAYED RELEASE ORAL at 09:21

## 2019-01-01 RX ADMIN — Medication 1 CAPSULE: at 08:44

## 2019-01-01 RX ADMIN — DIPHENHYDRAMINE HYDROCHLORIDE AND LIDOCAINE HYDROCHLORIDE AND ALUMINUM HYDROXIDE AND MAGNESIUM HYDRO 10 ML: KIT at 10:54

## 2019-01-01 RX ADMIN — ACETAMINOPHEN 975 MG: 325 TABLET, FILM COATED ORAL at 16:58

## 2019-01-01 RX ADMIN — INSULIN ASPART 1 UNITS: 100 INJECTION, SOLUTION INTRAVENOUS; SUBCUTANEOUS at 14:25

## 2019-01-01 RX ADMIN — DIPHENHYDRAMINE HYDROCHLORIDE AND LIDOCAINE HYDROCHLORIDE AND ALUMINUM HYDROXIDE AND MAGNESIUM HYDRO 10 ML: KIT at 12:39

## 2019-01-01 RX ADMIN — GABAPENTIN 300 MG: 250 SUSPENSION ORAL at 11:56

## 2019-01-01 RX ADMIN — NYSTATIN 500000 UNITS: 100000 SUSPENSION ORAL at 19:09

## 2019-01-01 RX ADMIN — Medication 1 CAPSULE: at 20:38

## 2019-01-01 RX ADMIN — GABAPENTIN 600 MG: 300 CAPSULE ORAL at 21:43

## 2019-01-01 RX ADMIN — BACITRACIN ZINC, NEOMYCIN, POLYMYXIN B: 400; 3.5; 5 OINTMENT TOPICAL at 22:10

## 2019-01-01 RX ADMIN — CEFEPIME HYDROCHLORIDE 2 G: 2 INJECTION, POWDER, FOR SOLUTION INTRAVENOUS at 12:13

## 2019-01-01 RX ADMIN — DEXAMETHASONE 2 MG: 1 TABLET ORAL at 19:48

## 2019-01-01 RX ADMIN — DEXAMETHASONE SODIUM PHOSPHATE: 10 INJECTION, SOLUTION INTRAMUSCULAR; INTRAVENOUS at 09:52

## 2019-01-01 RX ADMIN — Medication 1 CAPSULE: at 23:03

## 2019-01-01 RX ADMIN — OXYCODONE HYDROCHLORIDE 10 MG: 5 SOLUTION ORAL at 19:04

## 2019-01-01 RX ADMIN — METRONIDAZOLE 500 MG: 500 TABLET ORAL at 23:09

## 2019-01-01 RX ADMIN — METRONIDAZOLE 500 MG: 500 TABLET ORAL at 17:18

## 2019-01-01 RX ADMIN — DIPHENHYDRAMINE HYDROCHLORIDE AND LIDOCAINE HYDROCHLORIDE AND ALUMINUM HYDROXIDE AND MAGNESIUM HYDRO 10 ML: KIT at 12:42

## 2019-01-01 RX ADMIN — PANTOPRAZOLE SODIUM 40 MG: 40 TABLET, DELAYED RELEASE ORAL at 08:03

## 2019-01-01 RX ADMIN — CEFEPIME HYDROCHLORIDE 1 G: 1 INJECTION, POWDER, FOR SOLUTION INTRAMUSCULAR; INTRAVENOUS at 01:56

## 2019-01-01 RX ADMIN — NYSTATIN 500000 UNITS: 100000 SUSPENSION ORAL at 18:59

## 2019-01-01 RX ADMIN — FOLIC ACID 1 MG: 1 TABLET ORAL at 09:17

## 2019-01-01 RX ADMIN — INSULIN ASPART 1 UNITS: 100 INJECTION, SOLUTION INTRAVENOUS; SUBCUTANEOUS at 07:51

## 2019-01-01 RX ADMIN — Medication 500 MG: at 16:27

## 2019-01-01 RX ADMIN — NYSTATIN 500000 UNITS: 100000 SUSPENSION ORAL at 21:48

## 2019-01-01 RX ADMIN — NYSTATIN 500000 UNITS: 100000 SUSPENSION ORAL at 17:28

## 2019-01-01 RX ADMIN — SENNOSIDES 1 TABLET: 8.6 TABLET, FILM COATED ORAL at 07:43

## 2019-01-01 RX ADMIN — METOPROLOL SUCCINATE 25 MG: 25 TABLET, EXTENDED RELEASE ORAL at 20:07

## 2019-01-01 RX ADMIN — METRONIDAZOLE 500 MG: 500 TABLET ORAL at 09:09

## 2019-01-01 RX ADMIN — SODIUM CHLORIDE 62 ML: 9 INJECTION, SOLUTION INTRAVENOUS at 14:44

## 2019-01-01 RX ADMIN — NYSTATIN 500000 UNITS: 100000 SUSPENSION ORAL at 17:18

## 2019-01-01 RX ADMIN — HYDROMORPHONE HYDROCHLORIDE 4 MG: 5 SOLUTION ORAL at 22:52

## 2019-01-01 RX ADMIN — Medication 500 MG: at 19:09

## 2019-01-01 RX ADMIN — OXYCODONE HYDROCHLORIDE 10 MG: 5 SOLUTION ORAL at 14:59

## 2019-01-01 RX ADMIN — Medication 1 CAPSULE: at 21:47

## 2019-01-01 RX ADMIN — GABAPENTIN 300 MG: 300 CAPSULE ORAL at 08:20

## 2019-01-01 RX ADMIN — FOLIC ACID 1 MG: 1 TABLET ORAL at 09:19

## 2019-01-01 RX ADMIN — ACETAMINOPHEN 975 MG: 325 TABLET, FILM COATED ORAL at 14:09

## 2019-01-01 RX ADMIN — OXYCODONE HYDROCHLORIDE 10 MG: 5 SOLUTION ORAL at 23:46

## 2019-01-01 RX ADMIN — DEXTROSE AND SODIUM CHLORIDE: 5; 450 INJECTION, SOLUTION INTRAVENOUS at 20:08

## 2019-01-01 RX ADMIN — NYSTATIN 500000 UNITS: 100000 SUSPENSION ORAL at 13:13

## 2019-01-01 RX ADMIN — ACETAMINOPHEN 650 MG: 160 SOLUTION ORAL at 13:58

## 2019-01-01 RX ADMIN — HYDROMORPHONE HYDROCHLORIDE 0.3 MG: 1 INJECTION, SOLUTION INTRAMUSCULAR; INTRAVENOUS; SUBCUTANEOUS at 18:33

## 2019-01-01 RX ADMIN — POTASSIUM CHLORIDE, DEXTROSE MONOHYDRATE AND SODIUM CHLORIDE: 150; 5; 450 INJECTION, SOLUTION INTRAVENOUS at 19:43

## 2019-01-01 RX ADMIN — PANTOPRAZOLE SODIUM 40 MG: 40 TABLET, DELAYED RELEASE ORAL at 08:02

## 2019-01-01 RX ADMIN — FOLIC ACID 1 MG: 1 TABLET ORAL at 09:04

## 2019-01-01 RX ADMIN — DICLOFENAC SODIUM 4 G: 10 GEL TOPICAL at 08:02

## 2019-01-01 RX ADMIN — METRONIDAZOLE 500 MG: 500 TABLET ORAL at 08:32

## 2019-01-01 RX ADMIN — Medication 1 CAPSULE: at 21:42

## 2019-01-01 RX ADMIN — SODIUM CHLORIDE 1000 ML: 9 INJECTION, SOLUTION INTRAVENOUS at 11:52

## 2019-01-01 RX ADMIN — GABAPENTIN 600 MG: 250 SUSPENSION ORAL at 22:02

## 2019-01-01 RX ADMIN — HYDROMORPHONE HYDROCHLORIDE 0.5 MG: 1 INJECTION, SOLUTION INTRAMUSCULAR; INTRAVENOUS; SUBCUTANEOUS at 18:16

## 2019-01-01 RX ADMIN — Medication 1 CAPSULE: at 08:02

## 2019-01-01 RX ADMIN — GABAPENTIN 300 MG: 250 SUSPENSION ORAL at 11:33

## 2019-01-01 RX ADMIN — Medication 500 MG: at 21:59

## 2019-01-01 RX ADMIN — Medication 500 MG: at 16:22

## 2019-01-01 RX ADMIN — CEFEPIME HYDROCHLORIDE 1 G: 1 INJECTION, POWDER, FOR SOLUTION INTRAMUSCULAR; INTRAVENOUS at 05:11

## 2019-01-01 RX ADMIN — POTASSIUM CHLORIDE 20 MEQ: 29.8 INJECTION, SOLUTION INTRAVENOUS at 07:07

## 2019-01-01 RX ADMIN — DIPHENHYDRAMINE HYDROCHLORIDE AND LIDOCAINE HYDROCHLORIDE AND ALUMINUM HYDROXIDE AND MAGNESIUM HYDRO 10 ML: KIT at 20:02

## 2019-01-01 RX ADMIN — CHOLECALCIFEROL CAP 125 MCG (5000 UNIT) 5000 UNITS: 125 CAP at 08:20

## 2019-01-01 RX ADMIN — FOLIC ACID 1 MG: 1 TABLET ORAL at 10:53

## 2019-01-01 RX ADMIN — ACETAMINOPHEN 975 MG: 325 TABLET, FILM COATED ORAL at 00:35

## 2019-01-01 RX ADMIN — LISINOPRIL 40 MG: 20 TABLET ORAL at 08:02

## 2019-01-01 RX ADMIN — GABAPENTIN 600 MG: 250 SUSPENSION ORAL at 21:47

## 2019-01-01 RX ADMIN — LIDOCAINE 2 PATCH: 560 PATCH PERCUTANEOUS; TOPICAL; TRANSDERMAL at 21:10

## 2019-01-01 RX ADMIN — OXYCODONE HYDROCHLORIDE 10 MG: 5 SOLUTION ORAL at 06:21

## 2019-01-01 RX ADMIN — GABAPENTIN 300 MG: 300 CAPSULE ORAL at 07:41

## 2019-01-01 RX ADMIN — GABAPENTIN 600 MG: 300 CAPSULE ORAL at 00:39

## 2019-01-01 RX ADMIN — Medication 500 MG: at 22:01

## 2019-01-01 RX ADMIN — ACETAMINOPHEN 975 MG: 325 TABLET, FILM COATED ORAL at 08:17

## 2019-01-01 RX ADMIN — LIDOCAINE 2 PATCH: 560 PATCH PERCUTANEOUS; TOPICAL; TRANSDERMAL at 08:21

## 2019-01-01 RX ADMIN — TRAZODONE HYDROCHLORIDE 50 MG: 50 TABLET ORAL at 00:34

## 2019-01-01 RX ADMIN — CEFEPIME HYDROCHLORIDE 1 G: 1 INJECTION, POWDER, FOR SOLUTION INTRAMUSCULAR; INTRAVENOUS at 12:03

## 2019-01-01 RX ADMIN — NYSTATIN 500000 UNITS: 100000 SUSPENSION ORAL at 22:02

## 2019-01-01 RX ADMIN — NYSTATIN 500000 UNITS: 100000 SUSPENSION ORAL at 17:09

## 2019-01-01 RX ADMIN — DIPHENHYDRAMINE HYDROCHLORIDE AND LIDOCAINE HYDROCHLORIDE AND ALUMINUM HYDROXIDE AND MAGNESIUM HYDRO 10 ML: KIT at 09:21

## 2019-01-01 RX ADMIN — HYDROMORPHONE HYDROCHLORIDE 0.5 MG: 1 INJECTION, SOLUTION INTRAMUSCULAR; INTRAVENOUS; SUBCUTANEOUS at 10:55

## 2019-01-01 RX ADMIN — DICLOFENAC SODIUM 4 G: 10 GEL TOPICAL at 23:49

## 2019-01-01 RX ADMIN — DICLOFENAC SODIUM 4 G: 10 GEL TOPICAL at 14:40

## 2019-01-01 RX ADMIN — NYSTATIN 500000 UNITS: 100000 SUSPENSION ORAL at 08:44

## 2019-01-01 RX ADMIN — CEFEPIME HYDROCHLORIDE 1 G: 1 INJECTION, POWDER, FOR SOLUTION INTRAMUSCULAR; INTRAVENOUS at 00:05

## 2019-01-01 RX ADMIN — OXYCODONE HYDROCHLORIDE 10 MG: 5 SOLUTION ORAL at 09:04

## 2019-01-01 RX ADMIN — CEFEPIME HYDROCHLORIDE 1 G: 1 INJECTION, POWDER, FOR SOLUTION INTRAMUSCULAR; INTRAVENOUS at 00:38

## 2019-01-01 RX ADMIN — CEFEPIME HYDROCHLORIDE 1 G: 1 INJECTION, POWDER, FOR SOLUTION INTRAMUSCULAR; INTRAVENOUS at 23:58

## 2019-01-01 RX ADMIN — Medication 5 MG: at 15:30

## 2019-01-01 RX ADMIN — INSULIN ASPART 1 UNITS: 100 INJECTION, SOLUTION INTRAVENOUS; SUBCUTANEOUS at 18:12

## 2019-01-01 RX ADMIN — MEBROFENIN 6.5 MILLICURIE: 45 INJECTION, POWDER, LYOPHILIZED, FOR SOLUTION INTRAVENOUS at 17:15

## 2019-01-01 ASSESSMENT — ACTIVITIES OF DAILY LIVING (ADL)
ADLS_ACUITY_SCORE: 32
ADLS_ACUITY_SCORE: 33
ADLS_ACUITY_SCORE: 32
ADLS_ACUITY_SCORE: 26
ADLS_ACUITY_SCORE: 26
ADLS_ACUITY_SCORE: 15
ADLS_ACUITY_SCORE: 32
NUMBER_OF_TIMES_PATIENT_HAS_FALLEN_WITHIN_LAST_SIX_MONTHS: 2
ADLS_ACUITY_SCORE: 26
ADLS_ACUITY_SCORE: 32
ADLS_ACUITY_SCORE: 24
ADLS_ACUITY_SCORE: 30
ADLS_ACUITY_SCORE: 21
ADLS_ACUITY_SCORE: 21
ADLS_ACUITY_SCORE: 26
ADLS_ACUITY_SCORE: 23
TOILETING: 0-->INDEPENDENT
ADLS_ACUITY_SCORE: 29
SWALLOWING: 0-->SWALLOWS FOODS/LIQUIDS WITHOUT DIFFICULTY
AMBULATION: 0-->INDEPENDENT
ADLS_ACUITY_SCORE: 29
ADLS_ACUITY_SCORE: 26
ADLS_ACUITY_SCORE: 31
ADLS_ACUITY_SCORE: 31
ADLS_ACUITY_SCORE: 26
ADLS_ACUITY_SCORE: 21
ADLS_ACUITY_SCORE: 15
ADLS_ACUITY_SCORE: 33
ADLS_ACUITY_SCORE: 19
ADLS_ACUITY_SCORE: 19
ADLS_ACUITY_SCORE: 27
ADLS_ACUITY_SCORE: 19
ADLS_ACUITY_SCORE: 15
ADLS_ACUITY_SCORE: 27
ADLS_ACUITY_SCORE: 19
ADLS_ACUITY_SCORE: 21
ADLS_ACUITY_SCORE: 19
ADLS_ACUITY_SCORE: 19
ADLS_ACUITY_SCORE: 32
ADLS_ACUITY_SCORE: 30
FALL_HISTORY_WITHIN_LAST_SIX_MONTHS: YES
TRANSFERRING: 0-->INDEPENDENT
ADLS_ACUITY_SCORE: 21
ADLS_ACUITY_SCORE: 21
ADLS_ACUITY_SCORE: 15
ADLS_ACUITY_SCORE: 26
BATHING: 0-->INDEPENDENT
ADLS_ACUITY_SCORE: 26
ADLS_ACUITY_SCORE: 21
ADLS_ACUITY_SCORE: 15
ADLS_ACUITY_SCORE: 26
ADLS_ACUITY_SCORE: 19
ADLS_ACUITY_SCORE: 21
ADLS_ACUITY_SCORE: 26
ADLS_ACUITY_SCORE: 33
ADLS_ACUITY_SCORE: 29
ADLS_ACUITY_SCORE: 19
ADLS_ACUITY_SCORE: 15
ADLS_ACUITY_SCORE: 19
ADLS_ACUITY_SCORE: 26
ADLS_ACUITY_SCORE: 19
ADLS_ACUITY_SCORE: 29
ADLS_ACUITY_SCORE: 31
ADLS_ACUITY_SCORE: 26
ADLS_ACUITY_SCORE: 19
ADLS_ACUITY_SCORE: 21
ADLS_ACUITY_SCORE: 19
ADLS_ACUITY_SCORE: 21
ADLS_ACUITY_SCORE: 30
ADLS_ACUITY_SCORE: 26
DRESS: 0-->INDEPENDENT
ADLS_ACUITY_SCORE: 30
RETIRED_EATING: 0-->INDEPENDENT
ADLS_ACUITY_SCORE: 33
DEPENDENT_IADLS:: CLEANING;COOKING;LAUNDRY;SHOPPING;TRANSPORTATION;MEAL PREPARATION;MEDICATION MANAGEMENT
ADLS_ACUITY_SCORE: 27
ADLS_ACUITY_SCORE: 30
ADLS_ACUITY_SCORE: 19
ADLS_ACUITY_SCORE: 30
ADLS_ACUITY_SCORE: 26
ADLS_ACUITY_SCORE: 32
ADLS_ACUITY_SCORE: 19
ADLS_ACUITY_SCORE: 26
ADLS_ACUITY_SCORE: 29
ADLS_ACUITY_SCORE: 32
ADLS_ACUITY_SCORE: 33
ADLS_ACUITY_SCORE: 26
ADLS_ACUITY_SCORE: 29
ADLS_ACUITY_SCORE: 33
ADLS_ACUITY_SCORE: 26
ADLS_ACUITY_SCORE: 21
ADLS_ACUITY_SCORE: 21
ADLS_ACUITY_SCORE: 19
ADLS_ACUITY_SCORE: 15
ADLS_ACUITY_SCORE: 19
ADLS_ACUITY_SCORE: 25
RETIRED_COMMUNICATION: 0-->UNDERSTANDS/COMMUNICATES WITHOUT DIFFICULTY
ADLS_ACUITY_SCORE: 26
ADLS_ACUITY_SCORE: 21
ADLS_ACUITY_SCORE: 27
ADLS_ACUITY_SCORE: 19
ADLS_ACUITY_SCORE: 33
ADLS_ACUITY_SCORE: 26
ADLS_ACUITY_SCORE: 15
ADLS_ACUITY_SCORE: 27
COGNITION: 0 - NO COGNITION ISSUES REPORTED
ADLS_ACUITY_SCORE: 32
ADLS_ACUITY_SCORE: 26
ADLS_ACUITY_SCORE: 26
ADLS_ACUITY_SCORE: 32
ADLS_ACUITY_SCORE: 32
ADLS_ACUITY_SCORE: 29
ADLS_ACUITY_SCORE: 26
ADLS_ACUITY_SCORE: 21

## 2019-01-01 ASSESSMENT — ENCOUNTER SYMPTOMS
ABDOMINAL PAIN: 0
PHOTOPHOBIA: 1
BACK PAIN: 1
SORE THROAT: 0
COUGH: 0
ABDOMINAL PAIN: 1
NUMBNESS: 1
DIZZINESS: 1
CHEST TIGHTNESS: 1
FEVER: 1
NAUSEA: 0
DIARRHEA: 1
HEADACHES: 1
NECK PAIN: 1
EYE PAIN: 1
WEAKNESS: 1

## 2019-01-01 ASSESSMENT — TONOMETRY
OD_IOP_MMHG: 12
OS_IOP_MMHG: 15
IOP_METHOD: ICARE

## 2019-01-01 ASSESSMENT — MIFFLIN-ST. JEOR
SCORE: 1169.01
SCORE: 1146.79
SCORE: 1136.35
SCORE: 1115.04
SCORE: 1165.84

## 2019-01-01 ASSESSMENT — CONF VISUAL FIELD
OS_SUPERIOR_NASAL_RESTRICTION: 1
OS_INFERIOR_NASAL_RESTRICTION: 3
METHOD: COUNTING FINGERS
OD_NORMAL: 1
OS_SUPERIOR_TEMPORAL_RESTRICTION: 1
OS_INFERIOR_TEMPORAL_RESTRICTION: 3

## 2019-01-01 ASSESSMENT — SLIT LAMP EXAM - LIDS
COMMENTS: NORMAL
COMMENTS: PTOSIS

## 2019-01-01 ASSESSMENT — VISUAL ACUITY
OU: DOUBLE VISION/DIPLOPIA
OU: DOUBLE VISION/DIPLOPIA
CORRECTION_TYPE: GLASSES
METHOD: SNELLEN - LINEAR
OD_CC+: -2
OU: DOUBLE VISION/DIPLOPIA
OU: DOUBLE VISION/DIPLOPIA;BLURRED VISION
OU: DOUBLE VISION/DIPLOPIA
OU: DOUBLE VISION/DIPLOPIA
OS_CC: LP&P
OU: DOUBLE VISION/DIPLOPIA
OU: DOUBLE VISION/DIPLOPIA
OD_CC: 20/25

## 2019-01-01 ASSESSMENT — REFRACTION_WEARINGRX
OD_AXIS: 070
OD_ADD: +2.75
OS_ADD: +2.75
OS_CYLINDER: SPHERE
OS_SPHERE: +2.00
OD_SPHERE: +1.50
OD_CYLINDER: +0.25

## 2019-01-01 ASSESSMENT — PAIN SCALES - GENERAL
PAINLEVEL: EXTREME PAIN (8)
PAINLEVEL: NO PAIN (0)
PAINLEVEL: SEVERE PAIN (7)
PAINLEVEL: EXTREME PAIN (8)

## 2019-01-01 ASSESSMENT — EXTERNAL EXAM - RIGHT EYE: OD_EXAM: NORMAL

## 2019-01-01 ASSESSMENT — PAIN DESCRIPTION - DESCRIPTORS
DESCRIPTORS: ACHING
DESCRIPTORS: ACHING

## 2019-01-14 NOTE — TELEPHONE ENCOUNTER
gabapentin (NEURONTIN) 300 MG capsule      Last Written Prescription Date:  7/27/2018  Last Fill Quantity: 270 capsule,   # refills: 1  Last Office Visit: 7/27/2018Kiara  Future Office visit:       Routing refill request to provider for review/approval because:  Drug not on the [facility], [facility] or [facility] refill protocol or controlled substance

## 2019-01-16 NOTE — TELEPHONE ENCOUNTER
Routing refill request to provider to review approval because:  Drug not on the [facility], [facility] or [facility] refill protocol or controlled substance  Fyi, 6 month visit due for DM    Alecia Howe RN, BSN  Message handled by Nurse Triage.

## 2019-01-29 NOTE — TELEPHONE ENCOUNTER
Pharmacy appointment message sent  Prescription approved per [facility] Refill Protocol.  Alecia Howe, RN, BSN

## 2019-01-29 NOTE — TELEPHONE ENCOUNTER
"Requested Prescriptions   Pending Prescriptions Disp Refills     metoprolol succinate ER (TOPROL-XL) 25 MG 24 hr tablet [Pharmacy Med Name: Metoprolol Succinate ER Oral Tablet Extended Release 24 Hour 25 MG] 90 tablet 0     Sig: TAKE ONE TABLET BY MOUTH ONE TIME DAILY    Beta-Blockers Protocol Passed - 1/28/2019  7:00 AM       Passed - Blood pressure under 140/90 in past 12 months    BP Readings from Last 3 Encounters:   07/27/18 100/60   06/26/17 129/78   08/03/16 132/74                Passed - Patient is age 6 or older       Passed - Recent (12 mo) or future (30 days) visit within the authorizing provider's specialty    Patient had office visit in the last 12 months or has a visit in the next 30 days with authorizing provider or within the authorizing provider's specialty.  See \"Patient Info\" tab in inbasket, or \"Choose Columns\" in Meds & Orders section of the refill encounter.             Passed - Medication is active on med list        Last Written Prescription Date: 07/28/2018  Last Fill Quantity: 90,  # refills: 1  Last office visit: 7/27/2018 with prescribing provider:  Kiara   Future Office Visit:      "

## 2019-07-25 NOTE — TELEPHONE ENCOUNTER
"Prescription approved per [facility] Refill Protocol for 90 day supply. Pt due for follow up visit. Cari Moreno RN on 7/25/2019 at 8:13 AM  Requested Prescriptions   Pending Prescriptions Disp Refills     metoprolol succinate ER (TOPROL-XL) 25 MG 24 hr tablet [Pharmacy Med Name: Metoprolol Succinate ER Oral Tablet Extended Release 24 Hour 25 MG] 90 tablet 0     Sig: TAKE ONE TABLET BY MOUTH ONE TIME DAILY       Beta-Blockers Protocol Passed - 7/25/2019  7:00 AM        Passed - Blood pressure under 140/90 in past 12 months     BP Readings from Last 3 Encounters:   07/27/18 100/60   06/26/17 129/78   08/03/16 132/74                 Passed - Patient is age 6 or older        Passed - Recent (12 mo) or future (30 days) visit within the authorizing provider's specialty     Patient had office visit in the last 12 months or has a visit in the next 30 days with authorizing provider or within the authorizing provider's specialty.  See \"Patient Info\" tab in inbasket, or \"Choose Columns\" in Meds & Orders section of the refill encounter.              Passed - Medication is active on med list          "

## 2019-08-13 NOTE — LETTER
[facility]  [address] [facility] [address]  Phone: [phone]    08/14/19    Claudia Simon  [address] [place] [id number]      Dear Claudia:       We recently received a request to refill your medication.    A review of your chart indicates that an appointment is required with your provider for yearly physical.     Please schedule an appointment for an office visit. You can schedule on line or call us at [phone].     We have authorized one refill of your medication to allow time for you to schedule your appointment.    Taking care of your health is important to us, and ongoing visits with your provider are vital to your care.  We look forward to seeing you in the near future.            Sincerely,      Sekou Craig MD.Hailey DAUGHERTY RN

## 2019-08-13 NOTE — TELEPHONE ENCOUNTER
EQL SENNA LAXATIVE 8.6 MG tablet      Last Written Prescription Date:  7/20/2018  Last Fill Quantity: 120 tablet,   # refills: 5  Last Office Visit: 7/27/2018Kiara  Future Office visit:       Routing refill request to provider for review/approval because:  Drug not on the [facility], [facility] or [facility] refill protocol or controlled substance

## 2019-08-14 NOTE — TELEPHONE ENCOUNTER
Medication is being filled for 1 time refill only due to:  Patient needs to be seen because it has been more than one year since last visit.   Please schedule appt.  Meghan Serna RN

## 2019-09-11 NOTE — TELEPHONE ENCOUNTER
Requested Prescriptions   Pending Prescriptions Disp Refills     SM SENNA LAXATIVE 8.6 MG tablet [Pharmacy Med Name: SM Senna Laxative Oral Tablet 8.6 MG] 120 tablet 0     Sig: TAKE TWO TABLETS BY MOUTH TWICE DAILY. NEEDS TO BE SEEN FOR MORE REFILLS.         Last Written Prescription Date:  8/14/19  Last Fill Quantity: 120 tab,   # refills: 0  Last Office Visit: 07/27/2018   Kiara  Future Office visit:    Next 5 appointments (look out 90 days)    Sep 30, 2019  1:30 PM CDT  Adult physical with Sekou Craig MD, CR EXAM ROOM 27  [facility] [address] [place] [id number] [phone]           Routing refill request to provider for review/approval because:  Drug not on the [facility], P or  Health refill protocol or controlled substance      There is no refill protocol information for this order

## 2019-09-17 NOTE — TELEPHONE ENCOUNTER
Discussed with Sekou Craig MD, recommend ER, called , informed and agrees    Alecia Howe RN, BSN  Message handled by Nurse Triage with Huddle - provider name: ANA.

## 2019-09-17 NOTE — ED PROVIDER NOTES
History     Chief Complaint:    Numbness and Wound Check    The history is provided by the patient and the spouse.      Claudia Simon is a 74 year old female with a history of type 2 diabetes, hypertension, hyperlipidemia, and numerous back surgeries who presents with left-sided facial numbness and pain. The patient reports having a lump on the left side of her forehead that looked like a pimple; it appeared 4 days ago. She began to feel some numbness around the lump and her left eye, and the numbness and pain radiated up towards her scalp. She also reports back pain along her bra line. The patient reports diplopia, photophobia, and it is uncomfortable to open her left eyelid. In addition, she reports chest tightness, dizziness, leg weakness, neck pain, and a headache, which she has been treating with acetaminophen. The patient denies any chest pain or abdominal pain. She hasn't been feeling well this past week and has been laying on her left side a significant amount. The patient has a history of chickenpox, but denies any past eye issues. Of note: the patient reports having a tick attached to her left earlobe 3 weeks ago. She is also concerned as her twin sister passed away from a brain aneurysm.    Allergies:  Sulfa drugs    Medications:    Aspirin 81 mg  Lipitor  Neurontin  Prinivil  Toprol-XL  Senna laxative    Past Medical History:    Type 2 diabetes mellitus  Hypertension  Hyperlipidemia  IBS  Obesity  Disorder of bone and cartilage    Past Surgical History:    Back surgery x 3  Ruptured discs x 2-1 yr apart  Titanium cage nonspecific procedure    Family History:    Diabetes - mother  Alzheimer's disease - father  Cerebrovascular disease - sister    Social History:  Negative for tobacco use.  Negative for alcohol use.  Negative for drug use.  Patient accompanied to ER by her .  Marital Status:   [2]     Review of Systems   Eyes: Positive for photophobia, pain and visual disturbance.    Respiratory: Positive for chest tightness.    Cardiovascular: Negative for chest pain.   Gastrointestinal: Negative for abdominal pain.   Musculoskeletal: Positive for back pain and neck pain.   Neurological: Positive for dizziness, weakness (legs), numbness and headaches.   All other systems reviewed and are negative.      Physical Exam     Patient Vitals for the past 24 hrs:   BP Temp Pulse Resp SpO2   19 1430 131/77 -- -- -- 98 %   19 1425 -- -- 62 -- 97 %   19 1330 122/69 -- 55 -- 95 %   19 1315 -- -- -- -- 96 %   19 1300 -- -- -- -- 97 %   19 1245 135/79 -- -- -- --   19 1149 (!) 116/98 98.4  F (36.9  C) 67 16 100 %     Physical Exam    General: Patient is awake, alert and interactive when I enter the room  Head: The scalp, face, and head appear normal  Eyes: The pupils are equal, round, and reactive to light. Conjunctivae and sclerae are normal. Left eye with cn 6th palsy with some associated ptosis   ENT: External acoustic canals are normal. The oropharynx is normal without erythema. Uvula is in the midline  Neck: Normal range of motion. No anterior cervical lymphadenopathy noted  CV: Regular rate. S1/S2. No murmurs.   Resp: Lungs are clear without wheezes or rales. No respiratory distress.   GI: Abdomen is soft, no rigidity, guarding, or rebound. No distension. No tenderness to palpation in any quadrant.     MS: Normal tone. Joints grossly normal without effusions. No asymmetric leg swelling, calf or thigh tenderness.    Skin: No rash or lesions noted. Normal capillary refill noted  Neuro:   Speech is normal and fluent.   Face is symmetric without droop. CN's II-XII intact. Negative pronator drift. Finger to nose intact. Heel to shin intact.   Right Arm: Good  strength. 5/5 elbow flexion. 5/5 elbow extension. Sensation intact to light touch.   Left Arm: Good  strength. 5/5 elbow flexion. 5/5 elbow extension. Sensation intact to light touch.   Right Le/5  straight leg raise, 5/5 knee flexion, 5/5 knee extension, 5/5 dorsiflexion, 5/5 plantar flexion. Sensation intact to light touch.   Left Le/5 straight leg raise, 5/5 knee flexion, 5/5 knee extension, 5/5 dorsiflexion, 5/5 plantar flexion. Sensation intact to light touch.    Psych:  Normal affect.  Appropriate interactions.    Emergency Department Course     Imaging:  Radiology findings were communicated with the patient and family who voiced understanding of the findings.    MR Brain w/o contrast:  1. Mass centered within the left sphenotemporal buttress with  extraosseous extension into the orbit, consistent with metastatic  disease.  2. Extensive bilateral nodular dural thickening consistent with dural  metastatic disease (left worse than right). This results in occlusion  of the middle third of the superior sagittal sinus. Posterior third  flow-void is preserved.  3. A 1 cm enhancing mass within a sulcus along the right parietal  cortex, most consistent with metastatic disease. This may be  leptomeningeal or pedunculated from the dura.  4. Multiple (approximately 5-6) acute infarcts within the bilateral  posterior frontal and parietal lobes. Late subacute infarct within the  left precentral gyrus with evolving laminar necrosis.  5. Multiple (3-4) nonspecific chronic microhemorrhages.  6. Other osseous metastatic disease involving the calvarium and  clivus.  7. The disease pattern nonspecific but statistically most commonly  seen in the setting of breast or lung malignancies, less commonly seen  in gastrointestinal or gynecologic malignancies.    MR Brain ([facility]) w/o contrast:  Normal     Laboratory:  Laboratory findings were communicated with the patient and family who voiced understanding of the findings.    CBC: AWNL. (WBC 10.8, HGB 14.2, )   CMP: Glucose 107 H o/w WNL (Creatinine 0.79)  Lipase: 119  INR: 0.99    Interventions:  1319: Zofran 4 mg IV  1416: Gadavist 7.5 mL IV    Dexamethasone   Tylenol     Emergency Department Course:  Past medical records, nursing notes, and vitals reviewed.    1206: I performed an exam of the patient as documented above.     IV was inserted and blood was drawn for laboratory testing, results above.    The patient was sent for a Brain MR w/o and w/ contrast and a MRA Brain ([place] [facility]) w/o contrast while in the emergency department, results above.     1436: I spoke with radiology regarding the imaging results.    1453: Patient rechecked and family updated.      I personally reviewed the laboratory and imaging results with the Patient and spouse and answered all related questions prior to admission.     Findings and plan explained to the Patient and spouse who consents to transfer.     I discussed the patient with Dr. Mercado of neurosurgery at the [facility] who accepted patient for transfer.    Impression & Plan     Medical Decision Making:  Patient is a 74-year-old female with past medical history of diabetes and hypertension who presents to the emergency department with blurred vision in her left eye as well as dizziness.  On physical exam the patient was  found to have a left cranial nerve VI palsy therefore MRI was obtained which unfortunately showed significant burden of metastatic disease as well as mass-effect on the left orbit likely causing her symptomatology.  I called and discussed the case with hospitalist team, oncology and neurosurgery who agreed that the patient's care would be best undertaken at the [facility].  I called and discussed the case with neurosurgery at the [facility] will accept the patient for floor bed.  The patient otherwise has a reassuring examination without any significant neurological deficits and is cardiovascularly intact.  There is not unknown source at this time and will defer further imaging following transfer.  Blood work is reassuring at this time.  Patient's  symptoms and headache were treated in the emergency department with Tylenol and dexamethasone with some improvement.  Patient remained stable while under my care.    Discharge Diagnosis:    ICD-10-CM    1. Brain metastasis (H) C79.31 CBC with platelets differential     Comprehensive metabolic panel     Lipase     INR     Disposition:  transfer to the [facility] for neurosurgical evaluation and treatment.    Scribe Disclosure:  Nona THAKUR, am serving as a scribe at 12:09 PM on 9/17/2019 to document services personally performed by Kyle Dow MD based on my observations and the provider's statements to me.     Kyle Dow MD   9/17/2019   [facility] EMERGENCY DEPARTMENT       Kyle Dow MD  09/17/19 [id number]

## 2019-09-17 NOTE — TELEPHONE ENCOUNTER
"Called , multiple concerns, facial numbness, back pain, also due for annual visit, talked to pt, see notes, will huddle with Sekou Craig MD, please advise if eye eval, UC or ER? Route to inform pt or  of plan    Additional Information    Negative: Followed an eye injury    Negative: Eye pain from chemical in the eye    Negative: Eye pain from foreign body in eye    Negative: Has sinus pain or pressure    Negative: Severe eye pain    Negative: Complete loss of vision in one or both eyes    Negative: Eyelids are very swollen (shut or almost) and fever    Negative: Eyelid (outer) is very red and fever    Negative: Foreign body sensation ('feels like something is in there') and irrigation didn't help    Blurred vision and new or worsening    Eye pain/discomfort and more than mild    Negative: Painful rash near eye and multiple small blisters grouped together    Negative: Pain followed bright light exposure from welding    Eye pain present > 24 hours    Answer Assessment - Initial Assessment Questions  1. ONSET: \"When did the pain start?\" (e.g., minutes, hours, days)      3-4 days ago  2. TIMING: \"Does the pain come and go, or has it been constant since it started?\" (e.g., constant, intermittent, fleeting)      Steady, started slowly  3. SEVERITY: \"How bad is the pain?\"   (Scale 1-10; mild, moderate or severe)      - MODERATE (4-7): interferes with normal activities or awakens from sleep     4. LOCATION: \"Where does it hurt?\"  (e.g., eyelid, eye, cheekbone)      Forehead, bump, pain radiates toward cheekbone  5. CAUSE: \"What do you think is causing the pain?\"      No idea, thinks tooth or ear infection  6. VISION: \"Do you have blurred vision or changes in your vision?\"       2 days ago, has double vision, left  7. EYE DISCHARGE: \"Is there any discharge (pus) from the eye(s)?\"  If yes, ask: \"What color is it?\"       Watery, minimal  8. FEVER: \"Do you have a fever?\" If so, ask: \"What is it, how was it " "measured, and when did it start?\"       No   9. OTHER SYMPTOMS: \"Do you have any other symptoms?\" (e.g., headache, nasal discharge, facial rash)      no  10. PREGNANCY: \"Is there any chance you are pregnant?\" \"When was your last menstrual period?\"        n/a    Protocols used: EYE PAIN-A-[place]    Alecia Howe RN, BSN  Message handled by Nurse Triage.    "

## 2019-09-17 NOTE — ED TRIAGE NOTES
Patient presents to the ED reporting a lump to the forehead. States noticed something that looked like a pimple on Friday. Reports lump has gotten larger and as it has grown she has began to feel some numbness around the lump that radiates to scalp and face. Denies head injury.

## 2019-09-17 NOTE — LETTER
Transition Communication Hand-off for Care Transitions to Next Level of Care Provider    Name: Claudia Simon  : 1945  MRN #: [id number]  Primary Care Provider: Sekou Craig     Primary Clinic: [address] [facility] [id number]     Reason for Hospitalization:  new dx brain abraham  Brain mass  Admit Date/Time: 2019  9:54 PM  Discharge Date: 19  Payor Source: Payor: MEDICARE / Plan: MEDICARE / Product Type: Medicare /          Reason for Communication Hand-off Referral: Multiple providers/specialties    Discharge Plan: Home with home care. New diagnosis of mets.        Concern for non-adherence with plan of care:   Y/N N  Discharge Needs Assessment:  Needs      Most Recent Value   Equipment Currently Used at Home  -- [4WW, built in shower seat]         Follow-up plan:    Future Appointments   Date Time Provider Department Center   2019  1:30 PM Sekou Craig MD [facility]       Any outstanding tests or procedures:        Referrals     Future Labs/Procedures    Onc/Heme Adult Referral     Home care nursing referral     Comments:    [facility] P: [phone] F: [phone]    RN skilled nursing visit. RN to assess vital signs and weight, respiratory and cardiac status, patients ability to take and record daily blood pressure, temp and weight, pain level and activity tolerance, incision for signs/symptoms of infection, hydration, nutrition and bowel status and home safety.  RN to teach medication management.  RN to provide wound cares.    Your provider has ordered home care nursing services. If you have not been contacted within 2 days of your discharge please call the inpatient department phone number at [phone] .    Home Care OT Referral for Hospital Discharge     Comments:    OT to eval and treat    Your provider has ordered home care - occupational therapy. If you have not been contacted within 2 days of your discharge please call the department phone number  listed on the top of this document.    Home Care PT Referral for Hospital Discharge     Comments:    PT to eval and treat    Your provider has ordered home care - physical therapy. If you have not been contacted within 2 days of your discharge please call the department phone number listed on the top of this document.    Ophthalmology Adult Referral     Comments:    Hospital follow-up; left sphenoorbital tumor p/w CN6 palsy. 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            Key Recommendations:      Sally Bueno RN    AVS/Discharge Summary is the source of truth; this is a helpful guide for improved communication of patient story

## 2019-09-17 NOTE — PHARMACY-ADMISSION MEDICATION HISTORY
Admission medication history interview status for this patient is complete. See [facility] admission navigator for allergy information, prior to admission medications and immunization status.     Medication history interview source(s):Patient  Medication history resources (including written lists, pill bottles, clinic record):None    Changes made to PTA medication list:  Added: vitamin D3  Deleted: atorvastatin  Changed: gabapentin from 300 mg tid to 300 mg at noon and 600 mg at bedtime    Actions taken by pharmacist (provider contacted, etc):None     Additional medication history information:None    Medication reconciliation/reorder completed by provider prior to medication history? No    Prior to Admission medications    Medication Sig Last Dose Taking? Auth Provider   ASPIRIN 81 MG OR TABS ONE DAILY 9/17/2019 Yes Sekou Craig MD   cholecalciferol (VITAMIN D3) 5000 units (125 mcg) capsule Take 5,000 Units by mouth daily 9/17/2019 Yes Reported, Patient   gabapentin (NEURONTIN) 300 MG capsule Take 300 mg by mouth daily At Noon. 9/17/2019 at noon Yes Reported, Patient   gabapentin (NEURONTIN) 300 MG capsule Take 600 mg by mouth At Bedtime 9/16/2019 Yes Reported, Patient   lisinopril (PRINIVIL/ZESTRIL) 40 MG tablet TAKE ONE TABLET BY MOUTH ONE TIME DAILY  9/17/2019 Yes Sekou Craig MD   metoprolol succinate ER (TOPROL-XL) 25 MG 24 hr tablet TAKE ONE TABLET BY MOUTH ONE TIME DAILY 9/16/2019 at PM Yes Sekou Craig MD   senna (SENOKOT) 8.6 MG tablet Take 1-2 tablets by mouth 2 times daily 9/17/2019 at am Yes Reported, Patient

## 2019-09-17 NOTE — ED NOTES
[facility]  ED Nurse Handoff Report    Claudia Simon is a 74 year old female   ED Chief complaint: Numbness and Wound Check  . ED Diagnosis:   Final diagnoses:   Brain metastasis (H)     Allergies:   Allergies   Allergen Reactions     Sulfa Drugs Anaphylaxis     Angioedema         Code Status: Full Code  Activity level - Baseline/Home:  Independent. Activity Level - Current:   Stand by Assist. Lift room needed: No. Bariatric: No   Needed: No   Isolation: No. Infection: Not Applicable.     Vital Signs:   Vitals:    09/17/19 1315 09/17/19 1330 09/17/19 1425 09/17/19 1430   BP:  122/69  131/77   Pulse:  55 62    Resp:       Temp:       SpO2: 96% 95% 97% 98%       Cardiac Rhythm:  ,      Pain level: 0-10 Pain Scale: 6  Patient confused: No. Patient Falls Risk: Yes.   Elimination Status: Has voided   Patient Report - Initial Complaint:facial numbness . Focused Assessment: Pt arrives to the ED due to left sided eye pain/blurriness/and eye opening difficulty. Pt states this began Friday after waking and continued to get worse. Pt states she then started to have numbness surrounding the eye and up and into the forehead/left side of face. Pt denies any numbness in left arm or left leg. C/o of headache since Friday as well. Lung sounds clear.   Tests Performed: lab work, MRI . Abnormal Results:   Abnormal Labs Reviewed   COMPREHENSIVE METABOLIC PANEL - Abnormal; Notable for the following components:       Result Value    Glucose 107 (*)     All other components within normal limits   .   Treatments provided: zofran  Family Comments:  at bedside and aware of plan of care  OBS brochure/video discussed/provided to patient:  N/A  ED Medications:   Medications   ondansetron (ZOFRAN) injection 4 mg (4 mg Intravenous Given 9/17/19 1319)   gadobutrol (GADAVIST) injection 7.5 mL (7.5 mLs Intravenous Given 9/17/19 1416)     Drips infusing:  No  For the majority of the shift, the patient's behavior  Green. Interventions performed were n/a.     Severe Sepsis OR Septic Shock Diagnosis Present: No      ED Nurse Name/Phone Number: Mary Castro RN,   3:05 PM

## 2019-09-17 NOTE — TELEPHONE ENCOUNTER
Patient's  walked in to ask if Dr Craig had any openings today.    He states his wife Claudia is have right side facial numbness and back pain.    I did not see any openings this week.    Please call patient and .

## 2019-09-18 PROBLEM — G93.89 BRAIN MASS: Status: ACTIVE | Noted: 2019-01-01

## 2019-09-18 NOTE — PLAN OF CARE
Status: Patient admitted on 9/17 with left periorbital numbness, pain, and diplopia since 9/13, found to have diffuse findings on MRI brain including lesion involving left orbit, concerning for brain mets.  Vitals: VSS  Neuros: A&Ox4, diplopia (eye patch in place over left eye), numbness left side of forehead, head, and around left eye, numbness of RLE in foot, and LLE from knee to toes  IV: PIV saline locked  Resp/trach: Lung sounds clear throughout  Diet: Regular  Bowel status: No BM today, Senna given  : Voiding spontanoeusly  Skin: Intact  Pain: Patient c/o headache and back pain relieved with Tylenol, Oxycodone, and lidoderm patches  Activity: Up with A1, walker, gait belt, ambulated in hallway 3x  Social: Family at bedside, supportive with cares  Plan: Plan for MRI, Oncology consult, possible lung biopsy, continue to monitor and follow POC

## 2019-09-18 NOTE — PLAN OF CARE
Status: Pt admitted after MRI showed lesion involving left orbit, concerning for brain mets  Vitals: VSS on RA.  Neuros: AOx4. L side of face numb/around orbital.  IV: PIV infusing NS @ 75 mL/hr.  Resp/trach: LS clear, lower lobes diminished.  Diet: NPO at 0000.  Bowel status: No BM today. BS+  : Voiding without issue.  Skin: Intact.  Pain: Givent tylenol for HA.  Activity: Assist x1, walker, gb.  Social: Family here most of shift, supportive with cares.  Plan: Continue to monitor and follow POC.

## 2019-09-18 NOTE — CONSULTS
"  OPHTHALMOLOGY CONSULT NOTE  09/18/19    Patient: Claudia Simon      HISTORY OF PRESENTING ILLNESS:     Claudia Simon is a 74 year old female with a history of type II diabetes mellitus, HTN, HLD, multiple back surgeries who was transferred from [facility] 09/17/19 with left-sided headache associated with diplopia, facial numbness, found to have multiple masses in brain and skull and CT chest with spiculated mass, concerning for metastatic lung cancer. MRI brain revealed a \"mass centered within the left sphenotemporal buttress with extraosseous extension into the orbit...\" Patient still requires a pathologic diagnosis but there was concern for bleeding with pulmonary biopsy given current ASA use. Ophthalmology was consulted to assist in workup of diplopia and to potentially perform biopsy of the orbital apex mass.    On my evaluation, the patient reports continued binocular diplopia (horizontal), worse in left gaze, and ptosis. These both have been progressively worsening overt he past 7 days. She feels dizzy when she looks around, particularly to the left. She denies any blurry vision. No flashes or floaters. No eye trauma. No history of eye surgery.     10+ review of systems were otherwise negative except for that which has been stated above.    OCULAR/MEDICAL/SURGICAL HISTORIES:     Past Ocular History:  No history of eye surgery or laser. No history of eye disease.     Family Ocular History:  Negative    Social history:  Former smoker for 34 years.     No past medical history on file.  HTN  Type II diabetes mellitus  Hyperlipidemia  Multiple back surgeries    Past Surgical History:   Procedure Laterality Date     C NONSPECIFIC PROCEDURE      back surgery x 3     C NONSPECIFIC PROCEDURE      ruptured discs x 2-1 yr apart     C NONSPECIFIC PROCEDURE      titanium cage       EXAMINATION:     Base Eye Exam     Visual Acuity (Snellen - Linear)       Right Left    Near cc 20/30 20/50+2    Correction:  Glasses "          Tonometry (Tonopen, 11:59 AM)       Right Left    Pressure 21 17          Pupils       Pupils Dark Light Shape React APD    Right PERRL 4 2 Round Brisk None    Left PERRL 4 2 Round Brisk None          Visual Fields       Left Right     Full Full          Extraocular Movement       Right Left     0 0 0   0  0   0 0 0    -tr -tr -2   -tr  -2   -tr -tr -1             Neuro/Psych     Oriented x3:  Yes    Mood/Affect:  Normal          Dilation     Both eyes:  1.0% Mydriacyl, 2.5% Alberto Synephrine @ 11:59 AM            Additional Tests     Color       Right Left    Ishihara 14/14 14/14            Slit Lamp and Fundus Exam     External Exam       Right Left    External Normal numbness V1 distribution          Portable Slit Lamp Exam       Right Left    Lids/Lashes Normal ptosis    Conjunctiva/Sclera White and quiet White and quiet    Cornea Clear Clear    Anterior Chamber Deep and quiet Deep and quiet    Iris Round and reactive Round and reactive    Lens NS NS    Vitreous Normal Normal          Fundus Exam       Right Left    Disc PPA PPA    C/D Ratio Vertical 0.35 0.2    C/D Ratio Horizontal 0.35 0.2    Macula Normal Normal    Vessels Normal Normal    Periphery Normal Normal              Labs/Studies/Imaging Performed  MRI brain w/o and w/ contrast (9/17/2019):  1. Mass centered within the left sphenotemporal buttress with  extraosseous extension into the orbit, consistent with metastatic  disease.  2. Extensive bilateral nodular dural thickening consistent with dural  metastatic disease (left worse than right). This results in occlusion  of the middle third of the superior sagittal sinus. Posterior third  flow-void is preserved.  3. A 1 cm enhancing mass within a sulcus along the right parietal  cortex, most consistent with metastatic disease. This may be  leptomeningeal or pedunculated from the dura.  4. Multiple (approximately 5-6) acute infarcts within the bilateral  posterior frontal and parietal lobes. Late  subacute infarct within the  left precentral gyrus with evolving laminar necrosis.  5. Multiple (3-4) nonspecific chronic microhemorrhages.  6. Other osseous metastatic disease involving the calvarium and  clivus.  7. The disease pattern nonspecific but statistically most commonly  seen in the setting of breast or lung malignancies, less commonly seen  in gastrointestinal or gynecologic malignancies.    MRA brain (9/17/2019)  Unremarkable MR angiogram of the head.      CTA head w/ contrast (9/18/2019):  Impression:   1. Head CTA demonstrates no evident aneurysm or stenosis of the major  intracranial arteries.  2. Extraconal soft tissue density in the left orbital cavity of the  lateral rectus muscle is better appreciated on 9/17/2018 dated MRI.    CTV head/neck with contrast (9/18/2019)  Abnormal nodular dural thickening along left frontal and left temporal  lobes. There is soft tissue extending to left orbital cavity  laterally. There is extension of the dural mass to anterior and middle  aspects of the superior sagittal sinus causing occlusion of the sinus.    CT Chest/abdomen/pelvis w/ contrast (9/18/2019):  IMPRESSION: In this patient with known brain metastasis;   1. There is a 2.5 cm solid, spiculated nodule in the left upper lobe  which is concerning for primary lung malignancy. There is a second 2.8  x 1.9 cm nodule in the anterior right upper lobe which may represent a  metastatic lesion versus a synchronous second primary. Each large  solid nodule is amenable to percutaneous tissue diagnosis.  2. Multiple subcentimeter groundglass nodules in the right upper lobe  are concerning for metastatic disease.     ASSESSMENT/PLAN:     Claudia Simon is a 74 year old female who presents for presumed lung cancer with metastases to multiple sites.     # Intraorbital mass, suspect metastasis  # Limitation of extraocular movements  # Ptosis, left upper lid  - One week of progressively worsening diplopia, ptosis, and  left-sided headache.  - BCVA 20/50, normal IOP, and normal color plates.   - Left eye extraocular movements are restricted in all gaze directions but most prominent with abduction; could indicate 6th nerve palsy vs restricted EOM.   - Given ptosis, CN III palsy is a possibility, although anisocoria was not appreciated on exam.   - CN V1 palsy present.    - MRI brain reveals a mass centered within the left sphenotemporal buttress with extraosseous extension into the orbit that appears to nasally displace the lateral rectus and optic nerve.   - Dilated fundus exam without evidence of choroidal or retinal metastasis.   - Would consider CT guided needle biopsy of lung lesions before pursuing biopsy of orbital mass.   - If biopsy of the mass within the orbital apex is required to obtain a tissue diagnosis, oculoplastics is willing to perform the procedure.   - Ophthalmology will continue to follow.      It is our pleasure to participate in this patient's care and treatment. Please contact us with any further questions or concerns.    Mulugeta Uribe MD  Ophthalmology PGY-2  [facility]  Pager: [phone]     Rodriguez Sanchez OD  Adjunct   Ophthalmology   Pager: [phone]      Discussed with Dr. Brown

## 2019-09-18 NOTE — CONSULTS
Consult Date:  09/18/2019      PROBLEM:  Pachymeningeal thickening and a left periorbital mass concerning for metastatic cancer.      Mrs. Simon was seen for initial consultation on the hospital floor, 6-2 at the request of Neurosurgery Service.      HISTORY OF PRESENT ILLNESS:  Mrs. Simon is a 74-year-old female with suspected metastatic cancer.  She initially noticed a small pimple-like lump on her left forehead last Friday.  This was followed by development of headache, double vision and numbness around her left eye.  She presented to the emergency room in [facility] on 09/17.  MR angiogram was negative for stroke.  Exam revealed a left 6th nerve palsy.  Therefore, MRI brain was performed.  This showed extensive nodular dural thickening most conspicuous along the left cerebral convexity, but also extended on the right frontoparietal convexity with obstruction of the middle third superior sagittal sinus.  There was an enhancing mass within the left sphenotemporal buttress extending into the lateral orbit, middle cranial fossa and suprazygomatic  space.  Thirdly, there was a 1 cm area of nodular enhancement involving the superior parietal lobe.  Additionally, diffuse osseous disease were seen to involve the calvarium and clivus.       Given these findings, Ms. Simon was transferred to the [place] for further evaluation and management.  This morning she underwent a CT of the head and CT angiogram which did not reveal new findings. She underwent CT of the chest, abdomen and pelvis which revealed a 2.5 cm spiculated solid nodule in the left upper lobe and a 2.8 x 1.9 cm nodule in the anterior right upper lobe along with multiple prominent mediastinal lymph nodes, the largest being 12 mm in the AP window.  There were multiple ground-glass nodules in the right upper lobe also suspicious for metastatic spread.  There were no abnormal findings in the abdomen or pelvis.      Given suspected  metastatic disease, we are asked to see Ms. Simon for a discussion of possible radiation therapy.  On interview today, she states that she was in her usual state of health except for noting slight fatigue over the last few weeks.  She denies appetite change, weight loss, cough, abdominal pain.  She does have a history multiple back surgeries with baseline weakness and numbness in her lower extremities.  This has not changed over the last several weeks.  She was a smoker of half pack a day for about 30 years and quit over 10 years ago.      PAST MEDICAL HISTORY AND PAST SURGICAL HISTORY:   1.  Borderline diabetes, diet-controlled.   2.  Hypertension.   3.  Hyperlipidemia.   4.  Irritable bowel syndrome.   5.  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      SYSTEMIC THERAPY HISTORY:  None.      PAST RADIATION HISTORY:  None.      IMPLANTABLE CARDIAC DEVICE:  None.      OUTPATIENT MEDICATIONS:   1.  Baby aspirin.   2.  Lipitor.   3.  Neurontin.   4.  Prinivil.   5.  Toprol XL.   6.  Senna.      ALLERGIES:  SULFA DRUGS.      FAMILY HISTORY:  Her daughter was recently diagnosed with breast cancer and is currently receiving treatment in [place].  She received radiation therapy at [facility].  There is no other family history of malignancy.      SOCIAL HISTORY:  Mrs. Simon is  and lives with her  in [place].  She smoked about a half pack of cigarettes a day starting in her early 20s for a total of 30 years.  She quit over 10 years ago.  She drinks alcohol only on social occasions.      REVIEW OF SYSTEMS:  As noted in history of present illness.  Positives only include recent finding of numbness and tenderness in the L periorbital region, diplopia, slight headache, baseline numbness and weakness in the lower extremities, otherwise negative.      PHYSICAL EXAMINATION:   GENERAL:  She is sitting in a recliner in no acute  distress, alert and oriented, speech fluent, interaction appropriate.   HEENT:  She has slight induration in the left forehead with mild erythema.  Face is symmetric.  Extraocular movement is notable for deficiency in left eye abduction, numbness to touch in the left forehead and left orbital area, but intact in the V3 distribution.   NECK:  Supple.   CARDIOVASCULAR:  Well perfused, no cyanosis.   RESPIRATORY:  Breathing comfortably on room air.   ABDOMEN:  Soft, nontender.   EXTREMITIES:  No ankle edema.   NEUROLOGIC:  Cranial nerves II-XII intact except for left 6th nerve palsy as well as numbness in left V1-V2 distribution.  Slight weakness in the left lower extremity and numbness in the left calf and right foot.  Gait and station not examined.     IMAGING:         ASSESSMENT AND PLAN:  In summary, Mrs. Simon is a 74-year-old female with acute development of left periorbital numbness and diplopia.  Workup so far revealed a left periorbital mass, pachymeningeal dural thickening, and calvarial and clival lesions, concerning for metastatic disease.  CT scan of the chest, abdomen and pelvis revealed 2 nodules in the lung along with prominent mediastinal lymph nodes.  She does have a history of smoking making lung likely the source of primary tumor.      I explained to Mrs. Simon and her family that the next step is to establish tissue diagnosis as this would help guide further treatment.  This may be either accomplished with a biopsy of the lung nodule or through the left orbital mass.  She is being evaluated by the Ophthalmology team and I was informed that oculoplastic service may also be involved.  Assuming this is metastatic disease, she likely will require whole brain radiation therapy for local control of the dural disease and the left periorbital mass.  Given the pachymeningeal spread, we also recommend additional imaging of the spine to rule out additional disease foci.  Once tissue diagnosis is  obtained and spine imaging completed, we will be able make recommendation regarding palliative radiotherapy.      Thanks for allowing us to participate in the care of this patient.  Please do not hesitate to contact us with questions or concerns.         DELIA STEVENS MD             D: 2019   T: 2019   MT:       Name:     CHYNA ROSARIO   MRN:      [id number]        Account:       [id number]   :      1945           Consult Date:  2019      Document: [id number]

## 2019-09-18 NOTE — PROGRESS NOTES
S: Complaining of monocular double/triple vision from left eye.  Nausea and dizziness when left eye open.    O:  Exam:  General: Awake;  Alert, In No Acute Distress  Pulm: Breathing Comfortably on room air  Mental status: Oriented x 3  Cranial Nerves: Cranial Nerves II-XII Intact Bilaterally, except partial left CN 6 palsy, diplopia  Strength:      Del Tr Bi WE WF Gr  R 5 5 5 5 5 5  L 5 5 5 5 5 5     HF KE KF DF PF   R 5 5 5 5 5   L 5 5 5 5 5     Pronator Drift: Absent  Sensory: Intact to Light Touch, except numbness around left periorbital region, rizwan forehead; numbness of right foot, and left calf and foot  Reflexes: No Hyperreflexia, Rodriguez s or Clonus Present; Toes Down-Going Bilaterally    Assessment:   Ms. Simon is a 75 yo F with PMH HTN, HLD, DM, multiple back surgeries, DVT, no prior oncologic history transferred from [facility] with left periorbital numbness, pain, and diplopia since 9/13, found to have diffuse findings on MRI brain including lesion involving left orbit, concerning for brain mets.    Plan:     - CT chest/abdomen/pelvis to evaluate for malignancy  - May resume diet given inability to have surgery today in setting of recent aspirin use  - may use left eye patch for comfort for diplopia  - Plan to consult ophthalmology, oncology, radiation oncology in the morning  - Will staff with attending with further plans in the morning regarding surgical and/or medical management      Oren Gillespie M.D.  Neurosurgery Resident, PGY-2    Please contact neurosurgery resident on call with questions.    Dial * * [phone], enter [phone] when prompted.

## 2019-09-18 NOTE — PLAN OF CARE
OT 6A. Cancel. OT orders acknowledged and appreciated. Pt declining therapy upon PM attempt due to being busy all day with other providers and wanting to spend time with family. Will reschedule OT evaluation for tomorrow.

## 2019-09-18 NOTE — PROGRESS NOTES
"SPIRITUAL HEALTH SERVICES  SPIRITUAL ASSESSMENT Progress Note  [facility] 5A   ON-CALL VISIT    REFERRAL SOURCE: Patient request for chaplaincy support upon admission    Claudia says she went to the ED because she thought she had an ear infection but found out she has \"cancer that has spread to her eye.\" She says her family is having a hard time adjusting to the news.     Claudia herself is not feeling afraid or worried as she feels she is being \"held in God's hands.\" Claudia is praying for \"God's will\" and is trusting that God will heal her but if God doesn't she \"has lived a very full life.\"     PLAN: Claudia knows how to request additional  visits through unit staff.    Bibiana AlmanzarFrontenac  Oncology   Pager [phone]    [facility] remains available 24/7 for emergent requests/referrals, either by having the switchboard page the on-call  or by entering an ASAP/STAT consult in Epic (this will also page the on-call ).      "

## 2019-09-18 NOTE — CONSULTS
Oncology consult note    Reason for consultation: New leptomeningeal malignant disease of uncertain etiology    History of present illness  Ms Claudia Simon is a 74 year old female with a history of type 2 diabetes, hypertension, hyperlipidemia, who presented to the emergency room yesterday with left-sided facial numbness and pain. The patient reports having a lump on the left side of her forehead that looked like a pimple; it appeared 4 days ago. She began to feel some numbness around the lump and her left eye, and the numbness and pain radiated up towards her scalp. She also reports back pain along her bra line. The patient reports diplopia, photophobia, and it is uncomfortable to open her left eyelid. In addition, she reports chest tightness, dizziness, leg weakness, neck pain, and a headache, which she has been treating with acetaminophen. The patient denies any chest pain or abdominal pain.     Scans performed in the emergency room demonstrated multifocal left meningeal disease consistent with solid tumor primary and she was admitted to neurosurgery for evaluation and work-up.    We were asked to investigate the potential primary sources and to help with management once a diagnosis is made.    Allergies:  Sulfa drugs     Medications:    Aspirin 81 mg  Lipitor  Neurontin  Prinivil  Toprol-XL  Senna laxative     Past Medical History:    Type 2 diabetes mellitus  Hypertension  Hyperlipidemia  IBS  Obesity  Disorder of bone and cartilage     Past Surgical History:    Back surgery x 3  Ruptured discs x 2-1 yr apart  Titanium cage nonspecific procedure     Family History:    Diabetes - mother  Alzheimer's disease - father  Cerebrovascular disease - sister     Social History:  Negative for tobacco use.  Negative for alcohol use.  Negative for drug use.  Patient accompanied to ER by her .  Marital Status:   [2]     Physical exam  NAD   Eyepatch  Appropriate and able to converse.            Imaging:  Radiology findings were communicated with the patient and family who voiced understanding of the findings.     MR Brain w/o contrast:  1. Mass centered within the left sphenotemporal buttress with  extraosseous extension into the orbit, consistent with metastatic  disease.  2. Extensive bilateral nodular dural thickening consistent with dural  metastatic disease (left worse than right). This results in occlusion  of the middle third of the superior sagittal sinus. Posterior third  flow-void is preserved.  3. A 1 cm enhancing mass within a sulcus along the right parietal  cortex, most consistent with metastatic disease. This may be  leptomeningeal or pedunculated from the dura.  4. Multiple (approximately 5-6) acute infarcts within the bilateral  posterior frontal and parietal lobes. Late subacute infarct within the  left precentral gyrus with evolving laminar necrosis.  5. Multiple (3-4) nonspecific chronic microhemorrhages.  6. Other osseous metastatic disease involving the calvarium and  clivus.  7. The disease pattern nonspecific but statistically most commonly  seen in the setting of breast or lung malignancies, less commonly seen  in gastrointestinal or gynecologic malignancies.    CT CAP    IMPRESSION: In this patient with known brain metastasis;     1. There is a 2.5 cm solid, spiculated nodule in the left upper lobe  which is concerning for primary lung malignancy. There is a second 2.8  x 1.9 cm nodule in the anterior right upper lobe which may represent a  metastatic lesion versus a synchronous second primary. Each large  solid nodule is amenable to percutaneous tissue diagnosis.  2. Multiple subcentimeter groundglass nodules in the right upper lobe  are concerning for metastatic disease.    Impression:  this is a 74-year-old woman with no prior oncologic history who now presents with central nervous system findings and symptoms related to what is likely metastatic disease  involving extraosseous extension into the orbit.  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      Recommendations  I spoke to the patient about this and advise to do a CT guided needle biopsy.   This may be preferable to tissue from an orbital decompression although that would be sufficient. The tissue will need to be sent for genomic sequencing for NSCLC and for PDL-1 expression    We can have her followup with our thoracic oncology team ( Oralia Roman or Jimmie Head) for definitive Rx after discharge    NSCLC, even stage IV, may be highly treatable with immunotherapy or targeted therapies, and the choice of therapy depends on these genomic and PDL-1 stain results .    Arnulfo Flores MD

## 2019-09-18 NOTE — CONSULTS
IR consulted for SIVAKUMAR lung lesion biopsy    This is a 74 year old female with PMH of HTN, HLD, DM, multiple back surgeries, DVT, and no prior oncologic history transferred from [facility] with left periorbital numbness, pain, and diplopia since 9/13, who was found to have diffuse findings on MRI brain including lesion involving left orbit, concerning for brain mets. CT CAP from 9/18 showed right and left upper lobe lung nodules. IR has therefore been consulted for SIVAKUMAR lung nodule biopsy.    Case and imaging reviewed with Dr. Cuevas. SIVAKUMAR lung nodule is amenable to CT guided percutaneous biopsy. Pt takes ASA, which needs to be held 5 days prior to biopsy. Her last dose was 9/16. IR would be able to offer biopsy of the lung lesion 9/23 at the earliest.     Recommendations were discussed with neurology at [phone] pager. After consultation with radiation oncology and oncology they will get back in touch with their decision regarding biopsy/timing.    Hailey Parekh DNP, APRN  Interventional Radiology   Pager: [phone]

## 2019-09-19 NOTE — CONSULTS
Oncology Consult Followup Note    Stopped by to review findings and plan with patient,  and daughter.   She is feeling well today and tolerating steroids ok  She is nervous about a 3 hour MRI scan.    I spent about 25 minutes explaining the CT findings and the plan of orbital decompression as a source of obtaining tissue.   Once tissue is obtained pls send for lung cancer panel incluging Ros1, Alk, EGFR, PDL1    Will follow    Ramses Flores

## 2019-09-19 NOTE — CONSULTS
Patient is on IR schedule 9/23/2019 for a CT guided SIVAKUMAR lung lesion biopsy.   Labs WNL for procedure.  Orders for NPO have been entered.   Medications to be held include: ASA  Consent will be done prior to procedure.     Please contact the IR [place] RN at [phone] for estimated time of procedure.     Case discussed with Dr. Cuevas from IR and neurology. Please see original consult note from 9/18. Plan for lung lesion biopsy for tissue sampling as this was deemed the least invasive method of obtaining tissue diagnosis. Biopsy has been scheduled for 9/23 as the patient's aspirin has to be held at least 5 days prior to the procedure. This is considered a high risk bleeding procedure.    Hailey Parekh DNP, APRN  Interventional Radiology  Pager: [phone]

## 2019-09-19 NOTE — PLAN OF CARE
Status: Patient admitted on 9/17 with left periorbital numbness, pain, and diplopia since 9/13, found to have diffuse findings on MRI brain including lesion involving left orbit, concerning for brain mets.  Vitals: VSS on RA  Neuros: A&Ox4, diplopia (eye patch in place over left eye), numbness left side of forehead, head, and around left eye, numbness of RLE in foot, and LLE from knee to toes. LLE weaker than RLE, Left side 4/5, Right side 5/5. Double vision improving.   IV: PIV saline locked  Resp/trach: LS clear  Diet: Regular Diet, tolerating well  Bowel status: No BM, BS+ Senna given  : Voiding spontaneously without difficulty  Skin: Intact  Pain: Patient c/o mild headache and back pain relieved with Tylenol, Oxycodone, and lidoderm patches  Activity: Up with A1, walker, gait belt  Social: Family at bedside  Plan: Plan for MRI at 3pm. Ativan and 5mg Oxycodone available before MRI, continue to monitor and follow POC

## 2019-09-19 NOTE — PLAN OF CARE
Discharge Planner OT   Patient plan for discharge: home with A  Current status: pt. Min/SBA with BADLs and functional mobility. Pt. Amb. With 4WW throughout room/unit with SBA. Pt. Amb. At functional pace with no overt LOB. Indep. With maneuvering around hallway obstacles without LOB.   Barriers to return to prior living situation: medical readiness  Recommendations for discharge: home with A  Rationale for recommendations: to max. Safety/indep. With ADLs/mobility in home/community setting.       Entered by: Bianca Gauthier 09/19/2019 3:11 PM

## 2019-09-19 NOTE — PROGRESS NOTES
"S: Continues to complain of double vision with rightward gaze.    O:  Temp:  [97.2  F (36.2  C)-97.9  F (36.6  C)] 97.7  F (36.5  C)  Pulse:  [52-64] 54  Resp:  [16-18] 16  BP: (116-136)/(53-71) 116/71  SpO2:  [94 %-96 %] 94 %    Exam:  General: Awake;  Alert, In No Acute Distress  Pulm: Breathing Comfortably on room air  Mental status: Oriented x 3  Cranial Nerves: smile, tongue protrusion and shrug symmetric. No dysarthria. left CN 6 palsy present otherwise other extraocular muscles intact   Strength: 5/5 in bilateral upper and lower extremities   Pronator Drift: Absent  Sensory: Intact to Light Touch, except numbness around left periorbital region, rizwan forehead; numbness of right foot, and left calf and foot    Assessment:   Concern for widely metastatic primary lung cancer. Awaiting ongoing workup.     Plan:   MRI brain and orbits as well as MRI C, T, and L spine with and without contrast due to presence of dural metastasis  IR consultation for biopsy of lung lesion   Possible decompression of left orbit  Regular diet   Left eye patch for comfort for diplopia  Decadron 4q8h for edema associated with mass lesion   Continue home anti-hypertensives; will need to hold lisinopril pre-operatively     Claude \"Kolton\" MD [place]   Neurosurgery, PGY-3    Please contact neurosurgery resident on call with questions.    Dial * * [phone], enter [phone] when prompted.     "

## 2019-09-19 NOTE — PLAN OF CARE
Status: Patient admitted on 9/17 with left periorbital numbness, pain, and diplopia since 9/13, found to have diffuse findings on MRI brain including lesion involving left orbit, concerning for brain mets.  Vitals: VSS  Neuros: A&Ox4, diplopia (eye patch in place over left eye), numbness left side of forehead, head, and around left eye, numbness of RLE in foot, and LLE from knee to toes. LLE weaker than RLE  IV: PIV saline locked  Resp/trach: Lung sounds clear throughout  Diet: Regular  Bowel status: No BM today, Senna given  : Voiding spontanoeusly  Skin: Intact  Pain: Patient c/o headache and back pain relieved with Tylenol, Oxycodone, and lidoderm patches  Activity: Up with A1, walker, gait belt, ambulated in hallway 1x  Social: Family at bedside, supportive with cares  Plan: Plan for MRI tomorrow, Oncology consult, possible lung biopsy, continue to monitor and follow POC

## 2019-09-19 NOTE — PROGRESS NOTES
"  Care Coordinator Progress Note    Admission Date/Time:  9/17/2019  Attending MD:  Dr Kang Contreras    Data  Chart reviewed, discussed with interdisciplinary team. In 6A Discharge Rounds Nessa Falk NP, reported plan is MRI; may need surgery for decompression. Oncology, Radiation Oncology and Ophthalmology consulting.     This morning OT reports pt did well; they anticipate discharge home with family. Pt has support from  and family.  was helping her prior to admission. Pt was using a walker prior to admission.     Patient was admitted for:  \"diffuse findings on MRI brain including lesion involving left orbit, concerning for brain mets.\"  Pt presented to [facility] ER with left periorbial numbness, pain, diplopia since 9-13-19.   Past history includes:  HTN; HLD; DM; multiple back surgeries.     Concerns with insurance coverage for discharge needs: None. Pt's insurance is Medicare & BCBS.   Current Living Situation: Patient lives with spouse.  Support System: Supportive    Coordination of Care and Referrals  Chart reviewed. Discussed with interdisciplinary team.      Assessment  Pt with newly diagnosed metastatic cancer. Treatment planning in progress.      Plan  Anticipated Discharge Date:  TBD  Anticipated Discharge Plan:   Anticipate discharge home with .   Possible lung biopsy in IR on 9-23.       Melvi August, RN Care Coordinator  Unit 6A, [facility]          "

## 2019-09-19 NOTE — PLAN OF CARE
Status: Patient admitted on 9/17 with left periorbital numbness, pain, and diplopia since 9/13, found to have diffuse findings on MRI brain including lesion involving left orbit, concerning for brain mets.  Vitals: VSS  Neuros: A&Ox4, diplopia (eye patch in place over left eye), numbness left side of forehead, head, and around left eye, numbness of RLE in foot, and LLE from knee to toes. LLE weaker than RLE  IV: PIV SL  Resp/trach: Lung sounds clear throughout  Diet: Regular  Bowel status: No BM today, BS+  : Voiding spontanoeusly  Skin: Intact  Pain: Denies  Activity: Up with A1, walker, gb.  Social: Family at bedside, supportive with cares  Plan: Plan for MRI today, Oncology consult, possible lung biopsy today. Continue to monitor and follow POC.

## 2019-09-19 NOTE — PROGRESS NOTES
"   09/19/19 1124   Quick Adds   Type of Visit Initial Occupational Therapy Evaluation   Living Environment   Lives With spouse   Living Arrangements apartment   Home Accessibility no concerns   Transportation Anticipated family or friend will provide   Living Environment Comment pt. lives with spouse, walk in shower with built in seat.   Self-Care   Usual Activity Tolerance good   Current Activity Tolerance moderate   Equipment Currently Used at Home   (4WW, built in shower seat)   Functional Level   Ambulation 1-->assistive equipment   Transferring 1-->assistive equipment   Toileting 0-->independent   Bathing 2-->assistive person   Dressing 2-->assistive person  (spouse A with LB dressing prn)   Eating 0-->independent   Communication 0-->understands/communicates without difficulty   Swallowing 0-->swallows foods/liquids without difficulty   Cognition 0 - no cognition issues reported   Fall history within last six months yes   Number of times patient has fallen within last six months 2   Which of the above functional risks had a recent onset or change? ambulation;transferring;fall history;bathing;dressing;toileting   Prior Functional Level Comment pt. indep. at baseline with BADLs and functional mobility, ambulating with 4WW. spouse A with IADLs, LB dressing prn and with initiating LEs in/out of bed prn.   General Information   Onset of Illness/Injury or Date of Surgery - Date 09/17/19   Referring Physician Luiz Rowley MD   Patient/Family Goals Statement \"to keep my strength and indep. up\"   Additional Occupational Profile Info/Pertinent History of Current Problem  74-year-old female with suspected metastatic cancer.  She initially noticed a small pimple-like lump on her left forehead last Friday.  This was followed by development of headache, double vision and numbness around her left eye. ;Pachymeningeal thickening and a left periorbital mass concerning for metastatic cancer.    Precautions/Limitations fall " precautions   General Info Comments activity orders: up with A   Cognitive Status Examination   Orientation orientation to person, place and time   Level of Consciousness alert   Cognitive Comment continue to assess/monitor prn   Visual Perception   Visual Perception Comments diplopia; pt. wearing eye patch to A with double vision   Pain Assessment   Patient Currently in Pain No   Range of Motion (ROM)   ROM Quick Adds No deficits were identified   Strength   Strength Comments strength WFL   Transfer Skill: Bed to Chair/Chair to Bed   Level of [place]: Bed to Chair contact guard   Physical Assist/Nonphysical Assist: Bed to Chair set-up required;1 person assist   Assistive Device - Transfer Skill Bed to Chair Chair to Bed Rehab Eval rolling walker   Transfer Skill: Sit to Stand   Level of [place]: Sit/Stand stand-by assist   Physical Assist/Nonphysical Assist: Sit/Stand set-up required;1 person assist   Assistive Device for Transfer: Sit/Stand rolling walker   Upper Body Dressing   Level of [place]: Dress Upper Body stand-by assist   Lower Body Dressing   Level of [place]: Dress Lower Body minimum assist (75% patients effort)   Physical Assist/Nonphysical Assist: Dress Lower Body set-up required;1 person assist   Eating/Self Feeding   Level of [place]: Eating independent   Instrumental Activities of Daily Living (IADL)   Previous Responsibilities   (spouse A with IADLs prn)   Activities of Daily Living Analysis   Impairments Contributing to Impaired Activities of Daily Living balance impaired;strength decreased   General Therapy Interventions   Planned Therapy Interventions ADL retraining;strengthening;transfer training;home program guidelines   Clinical Impression   Criteria for Skilled Therapeutic Interventions Met yes, treatment indicated   OT Diagnosis impaired ADLs/mobility   Influenced by the following impairments diplopia, medical dx.   Assessment of Occupational Performance 1-3  "Performance Deficits   Identified Performance Deficits bathing, dressing, toileting   Clinical Decision Making (Complexity) Low complexity   Therapy Frequency 5x/week   Predicted Duration of Therapy Intervention (days/wks) ~ 1 week   Anticipated Equipment Needs at Discharge   (TBD)   Anticipated Discharge Disposition Home with Assist   Risks and Benefits of Treatment have been explained. Yes   Patient, Family & other staff in agreement with plan of care Yes   [facility] TM \"6 Clicks\"   2016, Trustees of [facility], under license to [employer].  All rights reserved.   6 Clicks Short Forms Daily Activity Inpatient Short Form   [facility]  \"6 Clicks\" Daily Activity Inpatient Short Form   1. Putting on and taking off regular lower body clothing? 3 - A Little   2. Bathing (including washing, rinsing, drying)? 3 - A Little   3. Toileting, which includes using toilet, bedpan or urinal? 3 - A Little   4. Putting on and taking off regular upper body clothing? 4 - None   5. Taking care of personal grooming such as brushing teeth? 4 - None   6. Eating meals? 4 - None   Daily Activity Raw Score (Score out of 24.Lower scores equate to lower levels of function) 21   Total Evaluation Time   Total Evaluation Time (Minutes) 10     "

## 2019-09-20 NOTE — PROGRESS NOTES
"Neurosurgery Progress Note    S:  Ongoing diplopia.  States left sided head pain improving.  Thoracic metastasis noted on Thoracic MRI, patient states she has not experienced any acute back pain however notes long standing, chronic mid and low back pain.     O:  /65   Pulse 62   Temp 97.1  F (36.2  C) (Oral)   Resp 16   Ht 1.626 m (5' 4\")   Wt 68.5 kg (151 lb 1.6 oz)   SpO2 97%   BMI 25.94 kg/m    Exam:  General: Awake;  Alert, In No Acute Distress  Pulm: Breathing Comfortably on room air  Mental status: Oriented x 3  Cranial Nerves: smile, tongue protrusion and shrug symmetric. No dysarthria. left CN 6 palsy present otherwise other extraocular muscles intact   Strength: 5/5 in bilateral upper and lower extremities   Pronator Drift: Absent  Sensory: Intact to Light Touch, except numbness around left periorbital region, rizwan forehead; numbness of right foot, and left calf and foot    Assessment:   Concern for widely metastatic primary lung cancer. Awaiting ongoing workup.     Plan:   No Neurosurgical intervention at this time, will transfer to Medicine service until tissue diagnosis is made   IR consultation for biopsy of lung lesion 9/23  Regular diet   Left eye patch for comfort for diplopia  Decadron taper over one week   Continue home anti-hypertensives  PT/OT    Neurosurgery will sign off at this time.  Please call with any questions/concerns.       NILE Jolly, CNP  Department of Neurosurgery  Pager: [phone]    "

## 2019-09-20 NOTE — PLAN OF CARE
OT/6A:   Discharge Planner OT   Patient plan for discharge: Home with family assist.   Current status: Patient ambulates ~400 feet around unit with 4WW and SBA-CGA. No overt LOB noted. Cues required for pathfinding back to room. Educated on EC strategies to incorporate into ADL/IADL routines with use of handout to reinforce strategies.   Barriers to return to prior living situation: Medical Status  Recommendations for discharge: Home with assist  Rationale for recommendations: Patient would benefit from additional OT to maximize independence/safety with ADL tasks within the home and community. Anticipate 1-2 more sessions, pending medical course.        Entered by: Kaylynn Martinez 09/20/2019 4:45 PM

## 2019-09-20 NOTE — PLAN OF CARE
Status: Patient admitted on 9/17 with left periorbital numbness, pain, and diplopia since 9/13, found to have diffuse findings on MRI   Vitals: VSS on Ra, bradycardic at rest  Neuros: A&Ox4, slight dyscong R eye when both eyes open, L eye diplopia ( improving per pt), L facial and head numbness, L side 4/5, LLE numbness from knee to toe, RLE numbness on top of foot.  IV: PIV SL  Resp/trach: WNL  Diet: Regular  Bowel status: No BM this shift  : voiding spnt via BR  Skin: WNL  Pain: Denies pain  Activity: A1 + GB + Walker  Social:  + family member present and supportive  Plan: anticipated discharge home pending medical readiness, possible lug biopsy on 9/23. Continue to monitor and follow POC.

## 2019-09-20 NOTE — PROGRESS NOTES
Received a call from [facility] regarding transfer of Ms. Simon who is a 73 y/o F with hx of HTN, HLD, DMII transferred [facility] for new brain lesion and consideration of neurosugical intervention. Further w/u showed wide spread metastatic disease and plan is IR to bx on Monday. No neurosugical intervention planned, med and rad onc following. Patient will go to the admitting teaching team.

## 2019-09-20 NOTE — PLAN OF CARE
Status:  Patient admitted on 9/17 with left periorbital numbness, pain, and diplopia since 9/13, found to have diffuse findings on MRI brain including lesion involving left orbit, concerning for brain mets.  Vitals: VSS on RA, pt. bradycardic when resting (45-49 bpm), MD notified.   Neuros: AOX4, slightly dysconjugate R eye when both eyes open, L eye diplopia, L facial and head numbness, LUE/LLE (4/5) weaker than R, LLE numbness from knee to toe, RLE numbness on top of foot   IV: PIV SL'd   Resp/trach: WNL   Diet: Reg   Bowel status: no BM on overnight shift  : Voids spontaneously via bathroom  Skin: WNL   Pain: given PRN Tylenol for headache   Activity: Up with GB & walker, assist of 1, unsteady gait w/ L knee buckling   Social: no overnight visitors, wanted to make sure family knew that she switched rooms   Plan: anticipated d/c home with  pending medical readiness, possible lung biopsy on 9/23, continue to monitor and follow POC

## 2019-09-20 NOTE — PROGRESS NOTES
[facility]    Medicine Progress Note - Hospitalist Service, M5       Date of Admission:  9/17/2019  Assessment & Plan     74yoF with a PMHx of HTN and tobacco use presenting admitted after new left CN6 palsy and left periorital pain found with dural based metastatic disease including one met to the left sphenoorbital area, as well as two lung masses.  Since admission to neurosurgery, initiated on steroids, and seen by Onc and RadOnc.  Transferring to internal medicine.    Diplopia, brain mass  Suspect metastatic primary lung cancer  Left eye patch for comfort for diplopia  Decadron taper over one week: 4 mg Q 8 today, tomorrow decrease to 2 mg Q 12 x 3 days to 2 mg daily x 4 days   IR to biopsy Monday: NPO at midnight Monday  Once tissue is obtained pls send for lung cancer panel incluging Ros1, Alk, EGFR, PDL1  additional imaging of the spine to rule out additional disease foci  - PPI while on steroids    CAD: hold aspirin    HTN: continue metoprolol    DM2: diet controlled, continue insulin sliding scale.  May need more aggressive management pending eraction to steroids.    Diet: Consistent Carbohydrate Diet 1785-1396 Calories: Moderate Consistent CHO (4-6 CHO units/meal)    DVT Prophylaxis: Pneumatic Compression Devices  Phelan Catheter: not present  Code Status: full    Disposition Plan   Expected discharge: 2 - 3 days, recommended to prior living arrangement once masses biopsied, vision stable.  Entered: Mark Verde MD 09/20/2019, 12:50 PM       The patient's care was discussed with the Bedside Nurse, Patient and Patient's Family.    Mark Verde MD  Hospitalist Service,M 5  [facility]  Pager: [phone]  Please see sticky note for cross cover information  ______________________________________________________________________    Interval History   No acute events. Tolerating steroids.    No Chest pain, no SOB, no fevers, no new rashes,  otherwise ROS negative    Data reviewed today: I reviewed all medications, new labs and imaging results over the last 24 hours.    Physical Exam   Vital Signs: Temp: 97.1  F (36.2  C) Temp src: Oral BP: 121/65 Pulse: 62   Resp: 16 SpO2: 97 % O2 Device: None (Room air)    Weight: 151 lbs 1.6 oz  Gen: NAD, laying in bed, eye patch in place  HEENT: eye patch on left eye, R eye EOMI, PERRL, MMM  CV: extremities warm and well perfused, RRR, S1S2  Resp: CTA B/L, breathing comfortably on RA  Abd: soft, nontender  : deferred  Msk: no LE edema  Skin: no rashes

## 2019-09-20 NOTE — PLAN OF CARE
Status:  Patient admitted on 9/17 with left periorbital numbness, pain, and diplopia since 9/13, found to have diffuse findings on MRI brain including lesion involving left orbit, concerning for brain mets.  Vitals: VSS on RA. Continuous pulse ox in place.  Neuros: A&Ox4. Double vision in L eye w/out eye patch on, improving. L side forehead, head, eye numbness. RLE foot numbness. LLE knees to toes numbness. L side weaker than right side. L side extremities 4/5. R side extremities 5/5.  IV: PIV SL.  Resp/trach: LS clear all fields. Stable on RA.  Diet: Regular diet.  Bowel status: No BM this shift. BS active all quads.  : Voids spontaneously.  Skin: L eye a little swollen.  Pain: Denies.  Activity: Up w/ 1 GB and walker.  Social: Family visited earlier this evening. Involved and supportive with cares.  Plan: MRI completed this evening. Pt lethargic majority of shift after MRI. Continue to monitor and follow POC.

## 2019-09-21 NOTE — PLAN OF CARE
Status: Patient admitted on 9/17 with left periorbital numbness, pain, and diplopia since 9/13, found to have diffuse findings on MRI   Vitals: VSS on Ra, bradycardic at rest  Neuros: A&Ox4, slight dyscong R eye when both eyes open, L eye diplopia ( improving per pt), L facial and head numbness, L side 4/5, LLE numbness from knee to toe, RLE numbness on top of foot.  IV: PIV SL  Resp/trach: WNL  Diet: Regular  Bowel status: BM this shift  : voiding spnt via BR  Skin: WNL  Pain: PRN tylenol given for HA  Activity: A1 + GB + Walker  Social:  + family member present and supportive  Plan:lung biopsy on 9/23. Continue to monitor and follow POC.

## 2019-09-21 NOTE — PROGRESS NOTES
[facility]    Medicine Progress Note - Hospitalist Service, M5       Date of Admission:  9/17/2019  Assessment & Plan     74yoF with a PMHx of HTN and tobacco use presenting admitted after new left CN6 palsy and left periorital pain found with dural based metastatic disease including one met to the left sphenoorbital area, as well as two lung masses.  Since admission to neurosurgery, initiated on steroids, and seen by Onc and RadOnc.  Transferred to medicine on 9/20 pending Lung bx on 9/23.     Diplopia, brain mass  Suspect metastatic primary lung cancer  Left eye patch for comfort for diplopia  Decadron taper over one week: 4 mg Q 8 today, tomorrow decrease to 2 mg Q 12 x 3 days to 2 mg daily x 4 days   IR to biopsy Monday: NPO at midnight Monday  Once tissue is obtained pls send for lung cancer panel incluging Ros1, Alk, EGFR, PDL1  additional imaging of the spine to rule out additional disease foci  - PPI while on steroids    CAD: hold aspirin    HTN: continue metoprolol    DM2: diet controlled, continue insulin sliding scale.  May need more aggressive management pending eraction to steroids.    Diet: Consistent Carbohydrate Diet 7987-7460 Calories: Moderate Consistent CHO (4-6 CHO units/meal)    DVT Prophylaxis: Pneumatic Compression Devices  Phelan Catheter: not present  Code Status: full    Disposition Plan   Expected discharge: 2 - 3 days, recommended to prior living arrangement once masses biopsied, vision stable.  Entered: Diana Oenil MD 09/21/2019, 10:54 AM       The patient's care was discussed with the bedside RN, patient and staff, Dr. Verde.     Diana Oneil  PGY-2, [facility]  Pager: [phone]  Please see sticky note for cross cover information  ______________________________________________________________________    Interval History   No acute events. Tolerating steroids. She slept okay and has no concerns. ROS negative.      Data reviewed today: I reviewed all medications, new labs and imaging results over the last 24 hours.    Physical Exam   Vital Signs: Temp: 96.4  F (35.8  C) Temp src: Oral BP: 135/67 Pulse: 55   Resp: 18 SpO2: 98 % O2 Device: None (Room air)    Weight: 151 lbs 1.6 oz  Gen: NAD, laying in bed  HEENT: CN 6 palsy, however not assessed this AM  CV: extremities warm and well perfused, RRR, S1S2  Resp: CTA B/L, breathing comfortably on RA  Abd: soft, nontender  : deferred  Msk: no LE edema  Skin: no rashes    Internal Medicine Staff Addendum  Date of Service: 9/21/2019  I have seen and examined Ms. Simon, reviewed the data and discussed the plan of care with the patient and the care team on P&FC Rounds.  I agree with the above documentation     I discussed pt's care with bedside RN, case management/social work today.  I personally reviewed labs, medications and past 24 hr notes.  Assessment/Plan/Diagnoses: plan/dx as above, which contains my edits and reflects our joint medical decision-making.     Mark Verde MD  Internal Medicine/Pediatrics Hospitalist & Staff Physician   of Internal Medicine and Pediatrics  [facility]  Pager: [phone]

## 2019-09-21 NOTE — PLAN OF CARE
Status: On 6A w/ evidence of L orbit lesion, concerning for brain mets, 2 L lung masses.  Vitals: VSS on RA  Neuros: A&O x4, L eye diplopia & dysconjugate. L head/face numbness, LLE knee to toe numbness, R foot numbness. L side 4/5, R side 5/5.   IV: PIV SL.   Resp/trach: LS clear.  Diet: Regular diet.   Bowel status: No BM over shift, BS+.   : Voiding w/o difficulty.   Skin: L eye droopy/swollen.   Pain: Tylenol given x1 for eye & head pain.   Activity: Up w/ 1, GB, walker.   Social: No visitors overnight.   Plan: Continue to monitor & follow POC.

## 2019-09-21 NOTE — PLAN OF CARE
Status:  Patient admitted on 9/17 with left periorbital numbness, pain, and diplopia since 9/13, found to have diffuse findings on MRI brain including lesion involving left orbit, concerning for brain mets.  Vitals: VSS on RA.   Neuros: A&Ox4. Double vision in L eye w/out eye patch on, improving. L side forehead, head, eye numbness. RLE foot numbness. LLE knees to toes numbness. L side weaker than right side. L side extremities 4/5. R side extremities 5/5.  IV: PIV SL.  Resp/trach: LS clear all fields. Stable on RA.  Diet: Regular diet.  Bowel status: BM x 2 this shift. BS active all quads.  : Voids spontaneously.  Skin: L eye a little swollen.  Pain: Denies.  Activity: Up w/ 1 GB and walker. Walked in halls x 2 this shift.  Social: Family visited earlier this evening. Involved and supportive with cares.  Plan: Plan to discharge home once medically stable. Possible lung biopsy on 9/23. Continue to monitor and follow POC.

## 2019-09-22 NOTE — PLAN OF CARE
"/66   Pulse 51   Temp 97.1  F (36.2  C) (Oral)   Resp 18   Ht 1.626 m (5' 4\")   Wt 68.5 kg (151 lb 1.6 oz)   SpO2 98%   BMI 25.94 kg/m    Status: Patient admitted on 9/17 with left periorbital numbness, pain, and diplopia since 9/13, found to have diffuse findings on MRI brain including lesion involving left orbit, concern for brain mets.  VS: VSS on RA  Neuro: A&Ox4. Double vision in L eye w/out eye patch on, improving. L side forehead, head, eye numbness. RLE foot numbness. LLE knees to toes numbness. L side extremities 4/5. R side extremities 5/5.  Respiratory: Lung Sounds CTA, denies SOB  GI: Regular diet, no BM last night. BS+  : voids spontaneously without difficulty  IV: PIV SL  Skin: L eye slightly swollen.  Pain: denies  Activity: A1 w/ GB+Walker  See flow sheets for further details and assessments.  Plan of Care: continue to monitor, notify MD of significant changes.  "

## 2019-09-22 NOTE — PROGRESS NOTES
[facility], [place]    Medicine Progress Note - Hospitalist Service, M5       Date of Admission:  9/17/2019  Assessment & Plan     74yoF with a PMHx of HTN and tobacco use presenting admitted after new left CN6 palsy and left periorital pain found with dural based metastatic disease including one met to the left sphenoorbital area, as well as two lung masses.  Since admission to neurosurgery, initiated on steroids, and seen by Onc and RadOnc.  Transferred to medicine on 9/20 pending Lung bx on 9/23.     Diplopia, brain mass  Suspect metastatic primary lung cancer  Left eye patch for comfort for diplopia  Decadron taper over one week: 4 mg Q 8 9/20, 9/21 decreased to 2 mg Q 12 x 3 days to 2 mg daily x 4 days (ordered by neurosurg team)  IR to biopsy Monday: NPO at midnight Monday  Once tissue is obtained pls send for lung cancer panel incluging Ros1, Alk, EGFR, PDL1 (ordered in epic)  - PPI while on steroids  - will discuss dispo and follow up plan with onc and rad onc prior to discharge.    Back pain:  Spine mets:  MRI: Multiple metastatic lesions in T4, T6, T7, T8, T9 vertebral bodies and T6 right pedicle, spinous process and lamina. There is ventral epidural extension of the metastatic lesions from T5 down to the T7-8 level results in mild to moderate canal stenosis. No cord compression. No evidence of metastatic disease in the cervical or lumbar spine  Outpatient plan per rad onc    CAD: hold aspirin    HTN: continue metoprolol    DM2: diet controlled, continue insulin sliding scale.  May need more aggressive management pending eraction to steroids.    Diet: Consistent Carbohydrate Diet 8582-4114 Calories: Moderate Consistent CHO (4-6 CHO units/meal)    DVT Prophylaxis: Pneumatic Compression Devices  Phelan Catheter: not present  Code Status: full    Disposition Plan   Expected discharge: 1-2 days, recommended to prior living arrangement once masses biopsied, vision stable, and follow  up plan known.  Entered: Mark Verde MD 09/22/2019, 8:20 AM       The patient's care was discussed with the bedside RN, patient and staff, Dr. Verde.     Mark Verde  [facility], [place]  Pager: [phone]  Please see sticky note for cross cover information  ______________________________________________________________________    Interval History   No acute events. Tolerating steroids. She slept okay and has no concerns. ROS negative.     Data reviewed today: I reviewed all medications, new labs and imaging results over the last 24 hours.    Physical Exam   Vital Signs: Temp: 96.5  F (35.8  C) Temp src: Oral BP: 117/60 Pulse: 56   Resp: 18 SpO2: 96 % O2 Device: None (Room air)    Weight: 151 lbs 1.6 oz  Gen: NAD, laying in bed  HEENT: CN 6 palsy, however not assessed this AM  CV: extremities warm and well perfused, RRR, S1S2  Resp: CTA B/L, breathing comfortably on RA  Abd: soft, nontender  : deferred  Msk: no LE edema  Skin: no rashes

## 2019-09-22 NOTE — PLAN OF CARE
Status: Patient admitted on 9/17 with left periorbital numbness, pain, and diplopia since 9/13, found to have diffuse findings on MRI   Vitals: VSS on Ra, bradycardic at rest  Neuros: A&Ox4, slight dyscong R eye when both eyes open, L eye diplopia ( improving per pt), L facial and head numbness, L side 4/5, LLE numbness from knee to toe, RLE numbness on top of foot.  IV: PIV SL  Resp/trach: WNL  Diet: Regular  Bowel status: BM this shift  : voiding spnt via BR  Skin: WNL  Pain: PRN tylenol given for HA + lidocaine patches for bilateral mid back pain.  Activity: A1 + GB + Walker, walked in halls x1 this shift.  Social:  + family member present and supportive  Plan: NPO at midnight for lung biopsy tomorrow. Continue to monitor and follow POC.

## 2019-09-23 NOTE — PLAN OF CARE
"/55   Pulse 56   Temp 97.1  F (36.2  C) (Oral)   Resp 16   Ht 1.626 m (5' 4\")   Wt 68.5 kg (151 lb 1.6 oz)   SpO2 98%   BMI 25.94 kg/m     Status: Patient admitted on 9/17 with left periorbital numbness, pain, and diplopia since 9/13, found to have diffuse findings on MRI brain including lesion involving left orbit, concern for brain mets.  VS: VSS on RA  Neuro: A&Ox4. Double vision in L eye w/out eye patch on, improving. L side forehead, head, eye numbness. RLE foot numbness. LLE knees to toes numbness. L side extremities 4/5. R side extremities 5/5.  Respiratory: Lung Sounds CTA, denies SOB  GI: NPO since midnight for procedure, no BM over night. BS+  : voids spontaneously without difficulty x2 over night  IV: PIV SL  Skin: L eye slightly swollen.  Pain: denies  Activity: A1 w/ GB+Walker  See flow sheets for further details and assessments.  Plan of Care: Lung biopsy today at 8am. Continue to monitor and follow POC.  "

## 2019-09-23 NOTE — CONSULTS
Patient is on IR schedule 09/23/2019 for a Left chest placement . Labs WNL for procedure.   No NPO required.   Consent will be done prior to procedure.     Please contact the IR charge RN at [phone] for estimated time of procedure.     Discussed with Dr. Mason Nguyen IR RPA  [phone] Call pager  [phone] pager

## 2019-09-23 NOTE — PLAN OF CARE
Status: Patient admitted on 9/17 with left periorbital numbness, pain, and diplopia since 9/13, found to have diffuse findings on MRI brain including lesion involving left orbit, concerning for brain mets. Patient arrived from PACU at 0930 s/p lung biopsy, patient arrived from IR at 1400 s/p left anterior chest tube placement  Vitals: VSS  Neuros: A&Ox4, diplopia (eye patch in place over left eye), numbness left side of forehead, head, and around left eye, numbness of RLE in foot, and LLE from knee to toes. LLE weaker than RLE  IV: PIV saline locked  Resp/trach: Lung sounds with pleural rub, patient reported SOB and shallow breathing noted, (MD notified), repeat chest x-ray revealed large pneumothorax, chest tube placed in IR, Left chest tube water sealed at -20cm H2O  Diet: Regular  Bowel status: No BM today, Senna given  : Voiding spontanoeusly  Skin: Intact  Pain: Patient c/o chest pain relieved with Tylenol, Oxycodone, and lidoderm patches on back  Activity: Up with A1, walker, gait belt, ambulated in hallway 1x  Social: Family at bedside, supportive with cares  Plan: Plan to continue with chest tube to treat pneumothorax, continue to monitor and follow POC

## 2019-09-23 NOTE — PROCEDURES
[facility], [place]    Procedure: IR Procedure Note  Date/Time: 9/23/2019 1:49 PM  Performed by: Krzysztof Leavitt PA-C  Authorized by: Krzysztof Leavitt PA-C   IR Fellow Physician:  Other(s) attending procedure: Assist: Chanel Harper: Vasiliy    UNIVERSAL PROTOCOL   Site Marked: NA  Prior Images Obtained and Reviewed:  Yes  Required items: Required blood products, implants, devices and special equipment available    Patient identity confirmed:  Verbally with patient  Patient was reevaluated immediately before administering moderate or deep sedation or anesthesia  Confirmation Checklist:  Relevant allergies, procedure was appropriate and matched the consent or emergent situation and correct equipment/implants were available  Time out: Immediately prior to the procedure a time out was called    Preparation: Patient was prepped and draped in usual sterile fashion    ESBL (mL):  1     ANESTHESIA    Local Anesthetic: Lidocaine 1% without epinephrine  Anesthetic Total (mL):  10      SEDATION    Patient Sedated: Yes    Vital signs: Vital signs monitored during sedation    Fluoroscopy Time: 1 minute(s)  See dictated procedure note for full details.  Findings: Fluoroscopy guided placement of 10 Hebrew Flexima non locking J tipped chest tube.  Manual decompression with removal of 1000cc of air from left pleural space.      Specimens: none    Complications: None    Condition: Stable    Plan: Follow up per primary team    PROCEDURE   Patient Tolerance:  Patient tolerated the procedure well with no immediate complications    Time of Sedation in Minutes by Physician:  0

## 2019-09-23 NOTE — PRE-PROCEDURE
GENERAL PRE-PROCEDURE:   Date/Time:  9/23/2019 7:58 AM    Verbal consent obtained?: Yes    Written consent obtained?: Yes    Risks and benefits: Risks, benefits and alternatives were discussed    Consent given by:  Patient  Patient states understanding of procedure being performed: Yes    Patient's understanding of procedure matches consent: Yes    Procedure consent matches procedure scheduled: Yes    Appropriately NPO:  Yes  ASA Class:  Class 2- mild systemic disease, no acute problems, no functional limitations  Mallampati  :  Grade 2- soft palate, base of uvula, tonsillar pillars, and portion of posterior pharyngeal wall visible  Lungs:  Lungs clear with good breath sounds bilaterally  Heart:  Normal heart sounds and rate  History & Physical reviewed:  History and physical reviewed and no updates needed  Statement of review:  I have reviewed the lab findings, diagnostic data, medications, and the plan for sedation

## 2019-09-23 NOTE — PROGRESS NOTES
[facility], [place]    Medicine Progress Note - Danitza 5       Date of Admission:  9/17/2019    Assessment & Plan    74yoF with a PMHx of HTN and tobacco use disorder, presenting with new left CN6 palsy and left periorital pain found with dural based metastatic disease including one met to the left sphenoorbital area, as well as two lung masses.  Since admission to neurosurgery, initiated on steroids, and seen by Onc and RadOnc. Transferred to medicine on 9/20 s/p lung bx c/b PTX s/p chest tube on 9/23.     Updates for today  - lung biopsy c/b left PTX s/p chest tube  - home tomorrow if PTX resolved  - continue dexamethasone    Left pneumothorax  Complication of 9/23 lung bx.  - chest tube in place  - AM CXR    Diplopia 2/2 left sphenorbital mass compression  Suspicion for metastatic lung cancer  Lung mass biopsied on 9/23.   - Left eye patch for comfort for diplopia   - Decadron taper over one week: 4 mg Q 8 9/20, 9/21 decreased to 2 mg Q 12 x 3 days to 2 mg daily x 4 days (ordered by neurosurg team)   - Once tissue is obtained pls send for lung cancer panel incluging Ros1, Alk, EGFR, PDL1 (ordered in epic)  - PPI while on steroids  - will discuss dispo and follow up plan with onc and rad onc prior to discharge.    Back pain 2/2 spine mets  MRI: Multiple metastatic lesions in T4, T6, T7, T8, T9 vertebral bodies and T6 right pedicle, spinous process and lamina. There is ventral epidural extension of the metastatic lesions from T5 down to the T7-8 level results in mild to moderate canal stenosis. No cord compression. No evidence of metastatic disease in the cervical or lumbar spine  - Outpatient plan per rad onc    CAD: hold aspirin    HTN: continue metoprolol succinate and lisinopril    DM2: diet controlled, continue insulin sliding scale.  May need more aggressive management pending steroid course    Diet: Consistent Carbohydrate Diet 0973-4315 Calories: Moderate Consistent CHO (4-6 CHO  units/meal)    DVT Prophylaxis: Pneumatic Compression Devices  Phelan Catheter: not present  Code Status: full    Disposition Plan   Expected discharge: 1-2 days, recommended to prior living arrangement once resolution of PTX, and follow up plan known.  Entered: Nessa Gillespie MD 09/23/2019, 7:23 AM     The patient's care was discussed with the bedside RN, patient and staff, Dr. Verde.     Nessa Gillespie MD  Internal Medicine PGY2  Pager: [phone]    ______________________________________________________________________    Interval History   No acute events. Seen after lung bx, c/o pain at the site. Worried about bx results. Eager to go home.     Data reviewed today: I reviewed all medications, new labs and imaging results over the last 24 hours.    Physical Exam   Vital Signs: Temp: 97.1  F (36.2  C) Temp src: Oral BP: 109/55 Pulse: 56   Resp: 16 SpO2: 98 % O2 Device: None (Room air)    Weight: 151 lbs 1.6 oz     Gen: NAD, lying in bed  HEENT: eye patch over left eye  CV: extremities warm and well perfused, RRR, S1S2  Resp: CTA B/L, breathing comfortably on RA  Chest: clear patch in place over left chest wall biopsy site without bleeding, edema, ecchymosis  Abd: soft, nontender    Internal Medicine Staff Addendum  Date of Service: 9/23/2019  I have seen and examined Ms Simon, reviewed the data and discussed the plan of care with the patient, her family, and the care team on P&FC Rounds.  I agree with the above documentation     I discussed pt's care with bedside RN, case management/social work today.  I personally reviewed imaging, labs, medications and past 24 hr notes.  Assessment/Plan/Diagnoses: plan/dx as above, which contains my edits and reflects our joint medical decision-making.     Mark Verde MD  Internal Medicine/Pediatrics Hospitalist & Staff Physician   of Internal Medicine and Pediatrics  [facility]  Pager: [phone]

## 2019-09-23 NOTE — PROCEDURES
[facility], [place]    Procedure: IR Procedure Note  Date/Time: 9/23/2019 8:54 AM  Performed by: Hilario Cuevas MD  Authorized by: Hilario Cuevas MD     UNIVERSAL PROTOCOL   Site Marked: Yes  Prior Images Obtained and Reviewed:  Yes  Required items: Required blood products, implants, devices and special equipment available    Patient identity confirmed:  Verbally with patient  Patient was reevaluated immediately before administering moderate or deep sedation or anesthesia  Confirmation Checklist:  Patient's identity using two indicators, relevant allergies, procedure was appropriate and matched the consent or emergent situation and correct equipment/implants were available  Time out: Immediately prior to the procedure a time out was called    Preparation: Patient was prepped and draped in usual sterile fashion    ESBL (mL):  0     ANESTHESIA    Local Anesthetic: Lidocaine 1% without epinephrine  Anesthetic Total (mL):  10      SEDATION    Patient Sedated: Yes    Sedation Type:  Moderate (conscious) sedation  Sedation:  Fentanyl and midazolam  Vital signs: Vital signs monitored during sedation    See dictated procedure note for full details.  Findings: 5 20 gauge cores from left upper lobe lesion. Small pneumothorax after biopsy. Patient remained asymptomatic.    Specimens: core needle biopsy specimens sent for pathological analysis    Complications: Pneumothorax    Condition: Stable    Plan: Immediate chest x-ray. Repeat chest x ray in 1 hour    PROCEDURE   Patient Tolerance:  Patient tolerated the procedure well with no immediate complications    Time of Sedation in Minutes by Physician:  20

## 2019-09-23 NOTE — PLAN OF CARE
Status:  Patient admitted on 9/17 with left periorbital numbness, pain, and diplopia since 9/13, found to have diffuse findings on MRI brain including lesion involving left orbit, concerning for brain mets.  Vitals: VSS on RA.   Neuros: A&Ox4. Double vision in L eye w/out eye patch on, improving. L side forehead, head, eye numbness. RLE foot numbness. LLE knees to toes numbness. L side weaker than right side. L side extremities 4/5. R side extremities 5/5.  IV: PIV SL.  Resp/trach: LS clear all fields. Stable on RA.  Diet: Regular diet. NPO at midnight for lung biopsy tomorrow.  Bowel status: BM x 1 this shift. BS active all quads.  : Voids spontaneously.  Skin: L eye a little swollen.  Pain: PRN Tylenol and Oxy given x 1 for head and back pain.  Activity: Up w/ 1 GB and walker. Walked in halls x 3 this shift.  Social: Family visited earlier this evening. Involved and supportive with cares.  Plan: Plan to discharge home once medically stable. Possible lung biopsy tomorrow, time TBD. Continue to monitor and follow POC.

## 2019-09-24 NOTE — PROCEDURES
Patient with spiculated left upper lobe lesion status post IR CT-guided biopsy yesterday, gated by expanding pneumothorax prompting left apical chest tube placement.  Serial x-rays have showed no recurrence of pneumothorax after clamping of chest tube so IR has been consulted for removal    Left apical chest tube removed at bedside on the floor without difficulty or complication.  Occlusive dressing applied.    Bishop Bedolla PA-C  Interventional Radiology  [phone]

## 2019-09-24 NOTE — DISCHARGE SUMMARY
"General acute hospital, [place]  Discharge Summary - Medicine & Pediatrics       Date of Admission:  9/17/2019  Date of Discharge:  9/24/2019  Discharging Provider: Ozzy Ramirez MD  Discharge Service: [facility]    Discharge Diagnoses   Left Sixth cranial nerve palsy 2/2 supraorbital mass  NSCLC, likely metatstatic  Lung biopsy c/b pneumothorax  Cancer-related pain  Leukocytosis 2/2 steroids  Hyponatremia 2/2 SIADH    Follow-ups Needed After Discharge   Follow-up Appointments     Adult [email or web address] Follow-up and recommended labs and tests      Follow up with primary care provider, Sekou Craig, within 7   days for hospital follow- up.  The following labs/tests are recommended:   BMP.      Appointments on [place] and/or [facility] (with [facility] [[email or web address]] or [facility]   provider or service). Call [phone] if you haven't heard regarding   these appointments within 7 days of discharge.             Unresulted Labs Ordered in the Past 30 Days of this Admission     Date and Time Order Name Status Description    9/23/2019 0826 Surgical Path Exam In process       These results will be followed up by oncology.    Discharge Disposition   Discharged home with home care.  Condition at discharge: Fair condition    Hospital Course   Claudia Simon was admitted on 9/17/2019 for acute left CN6 palsy and left periorital pain found to have likely metastatic lung cancer. The following problems were addressed during her hospitalization:    New diagnosis of NSCLC, likely metastatic to brain and bone   Diplopia 2/2 left sphenorbital mass compression  Lung mass biopsied on 9/23. Radiologic evidence of mets to spine. Path report returned shortly after discharge showing \"NON-SMALL CELL CARCINOMA, FAVOR ADENOCARCINOMA, poorly differentiated\". The molecular testing is still pending.   - lung bx pending   - Left eye patch for comfort for diplopia   - continue Decadron taper over one week: 4 mg Q 8 9/20, 9/21 decreased to 2 mg Q " 12 x 3 days to 2 mg daily x 4 days    - lung cancer panel attempted to add on after discharge   - outpatient follow-up with oncology, rad once, and ophthalmology    Left pneumothorax  Complication of 9/23 lung bx. Chest tube placed 9/23 and removed 9/24.  - patient counseled to return to ED with any SOB     Back pain 2/2 spine mets  MRI: Multiple metastatic lesions in T4, T6, T7, T8, T9 vertebral bodies and T6 right pedicle, spinous process and lamina. There is ventral epidural extension of the metastatic lesions from T5 down to the T7-8 level results in mild to moderate canal stenosis. No cord compression. No evidence of metastatic disease in the cervical or lumbar spine  - Outpatient plan per rad onc  - PRN oxycodone 5mg x 10 tabs  - lidocaine patches    Consultations This Hospital Stay   OCCUPATIONAL THERAPY ADULT IP CONSULT  INTERVENTIONAL RADIOLOGY ADULT/PEDS IP CONSULT  RADIATION ONCOLOGY IP CONSULT  OPHTHALMOLOGY IP CONSULT  ONCOLOGY ADULT IP CONSULT    Code Status   No Order       The patient was discussed with Dr. Ashley Gillespie MD  [address]  Pager: [phone]  ______________________________________________________________________    Physical Exam   Vital Signs: Temp: 97.7  F (36.5  C) Temp src: Oral BP: 118/59 Pulse: 51 Heart Rate: 55 Resp: 18 SpO2: 97 % O2 Device: None (Room air) Oxygen Delivery: 2 LPM  Weight: 151 lbs 1.6 oz     Gen: NAD, lying in bed  HEENT: eye patch over left eye  CV: extremities warm and well perfused, RRR, S1S2  Resp: CTA B/L, breathing comfortably on RA, left chest tube in place  Chest: clear patch in place over left chest wall biopsy site without bleeding, edema, ecchymosis  Abd: soft, nontender      Primary Care Physician   Sekou Craig    Discharge Orders      Ophthalmology Adult Referral      Onc/Heme Adult Referral      Reason for your hospital stay    You were admitted with double vision and we found  that you have a tumor behind your left eye, as well as two in your lungs. We are worried that this is lung cancer, and you had a lung biopsy before your discharged. You are taking steroids to help with your double vision and should follow up with the eye doctor about that. The oncologist (cancer doctor) and radiation oncologist (cancer radiation) doctors will call you this week once we have your biopsy results.     Adult [facility] Follow-up and recommended labs and tests    Follow up with primary care provider, Sekou Craig, within 7 days for hospital follow- up.  The following labs/tests are recommended: BMP.      Appointments on [place] and/or [facility] (with [facility] or [facility] provider or service). Call [phone] if you haven't heard regarding these appointments within 7 days of discharge.     When to contact your care team    Call your primary doctor if you have any of the following: temperature greater than 100.4,  increased shortness of breath or increased swelling. Call the eye doctor if your vision is worsening.     Discharge Instructions    Take the steroid (dexamethasone) twice a day today and tomorrow, then once a day for four days, then stop.    You should have follow-up appointment with your PCP, the eye doctor, oncology, and radiation oncology.    We recommend that you talk to your PCP about getting help to quit smoking.     Diet    Follow this diet upon discharge: Orders Placed This Encounter      Regular Diet Adult       Significant Results and Procedures   Most Recent 3 CBC's:  Recent Labs   Lab Test 09/24/19  0730 09/23/19  0636 09/22/19  0712   WBC 14.8* 13.3* 12.6*   HGB 15.4 16.3* 15.4   MCV 92 92 93    263 238     Most Recent 3 BMP's:  Recent Labs   Lab Test 09/24/19  0730 09/23/19  0636 09/22/19  0712   * 130* 131*   POTASSIUM 4.9 4.6 4.4   CHLORIDE 97 100 99   CO2 22 22 24   BUN 30 23 24   CR 0.73 0.68 0.61   ANIONGAP 10 8 8   BIBI 9.5 9.6 9.5   * 119* 122*    ,   Results for orders placed or performed during the hospital encounter of 09/17/19   CTA Head with Contrast    Narrative    CTA of the head 9/18/2019 5:19 AM    CT angiogram of the head with contrast  Reconstruction by the Radiologist on the 3D workstation    History: Preop planning for osseous/orbital lesions     Additional information obtained from EMR and prior imaging: Previous  MRI demonstrating a mass in the left sphenotemporal buttress extension  with extension into the left orbit and additional metastatic lesions  in the brain. CT chest demonstrates lung lesion, possibly the primary  tumor.    Comparison:  MRI yesterday    Technique:     CTA: Following rapid bolus intravenous injection of nonionic contrast  material, helical images were obtained using thin collimation  multidetector helical technique from the base of the skull through the  [place]. This CT angiogram data was reconstructed at thin  intervals with mild overlap. Images were sent to the [employer]  workstation, and 3D and multiplanar reconstructions were performed by  the technologist and the radiologist. The source images, multiplanar  reformations, and 3D reconstructions in both maximum intensity  projection display and volume rendered models were reviewed.    Contrast: 100 mL Omnipaque 350    Findings:  Head CTA demonstrates no evident aneurysm or stenosis of the major  intracranial arteries at the base of the brain. There is a left fetal  origin posterior cerebral artery.     Corresponding to abnormal signal abnormality in the left frontal bone  and sphenoid bone, no definite erosive changes on CT. There is soft  tissue in the lateral aspect of the left orbital cavity, extraconal  space with also likely involvement of the lateral rectus muscle and  extending to the orbital apex. These are better appreciated on the  recent MRI. Known pachymeningeal involvement and thickening in  particularly the left frontal region is not well  appreciated on the CT  angiogram images.      Impression    Impression:   1. Head CTA demonstrates no evident aneurysm or stenosis of the major  intracranial arteries.  2. Extraconal soft tissue density in the left orbital cavity of the  lateral rectus muscle is better appreciated on 9/17/2018 dated MRI.    I have personally reviewed the examination and initial interpretation  and I agree with the findings.    TERRENCE COREAS MD   CTV Head Neck w Contrast    Narrative    CT venogram  9/18/2019 5:56 AM    History:  Preop evaluation     Comparison: MRI 9/17/2019     Technique: Helically acquired thin section axial CT images were  obtained with 1 mm collimation through the brain after intravenous  bolus injection of iodinated contrast medium with an approximately  20-25 second delay between administration of contrast and scanning.  Image data were sent to the [employer] 3D workstation and postprocessed by  the radiologist using maximum intensity pixel (MIP), multiplanar and  volume rendered 3D reconstruction programs.     Contrast: 100 mL Omnipaque 350    Findings: There is enhancing nodular dural thickening along the left  frontal lobe (maximum thickness 1.0 cm) extending to anterior aspect  of the superior sagittal sinus. Posterior third of superior sagittal  sinus is patent.   The rest of the deep venous sinuses are patent.   Enhancing mass anterior to the greater wing of the left sphenoid bone  along the left orbital cavity extraconal space and along left anterior  temporal pole.       Impression    Impression:     Abnormal nodular dural thickening along left frontal and left temporal  lobes. There is soft tissue extending to left orbital cavity  laterally. There is extension of the dural mass to anterior and middle  aspects of the superior sagittal sinus causing occlusion of the sinus.      I have personally reviewed the examination and initial interpretation  and I agree with the findings.    TERRENCE COREAS MD   CT  Chest/Abdomen/Pelvis w Contrast    Narrative    EXAMINATION: CT CHEST/ABDOMEN/PELVIS W CONTRAST, 9/18/2019 5:59 AM    TECHNIQUE:  Helical CT images from the lung apices through the  symphysis pubis were obtained with contrast.  Coronal reformatted  images were generated at a workstation for further assessment.    CONTRAST:  100 cc Isovue 370 IV.    COMPARISON: 4/26/2006.    HISTORY: eval malignancy; chest, abdomen, pelvis, with and without  contrast    FINDINGS:  Chest:   Normal-sized heart and great vessels. Multiple prominent mediastinal  lymph nodes including a 9 mm prevascular lymph node (series 2 image  30) and a 12 mm AP window lymph node (series 2 image 39). The central  airways are patent.    There is a spiculated 2.5 cm solid nodule in the left upper lobe  (image 53). There is a 2.8 x 1.9 cm nodule in the anterior right upper  lobe (image 59). Multiple smaller groundglass nodules in the right  upper lobe. Upper lobe predominant emphysematous changes.    Abdomen and pelvis:   Liver: No suspicious liver lesions. Portal veins appear patent.  Gallbladder: No gallstones. No evidence of acute cholecystitis.  Spleen: Normal size.  Pancreas: No suspicious pancreatic lesions. The pancreatic duct is not  dilated.  Adrenal glands: No adrenal nodules.  Kidneys: Simple cyst in the right kidney. No kidney masses. No  hydronephrosis or obstructing renal stones.  Bladder / Pelvic organs: There is a 3.7 cm isodense uterine mass,  likely a fibroid.  Bowel: No bowel wall thickening. The appendix is unremarkable.  Diverticulosis without diverticulitis.  Lymph nodes: No retroperitoneal, mesenteric, or pelvic  lymphadenopathy.  Fluid: No free fluid within the abdomen.  Vessels: No infrarenal aortic aneurysm.     Bones and soft tissues: Fusion in the lumbar spine. No suspicious  lesions. Mild compression deformities in the thoracic spine that T6  and 7.      Impression    IMPRESSION: In this patient with known brain  metastasis;    1. There is a 2.5 cm solid, spiculated nodule in the left upper lobe  which is concerning for primary lung malignancy. There is a second 2.8  x 1.9 cm nodule in the anterior right upper lobe which may represent a  metastatic lesion versus a synchronous second primary. Each large  solid nodule is amenable to percutaneous tissue diagnosis.  2. Multiple subcentimeter groundglass nodules in the right upper lobe  are concerning for metastatic disease.    I have personally reviewed the examination and initial interpretation  and I agree with the findings.    NORBERTO NEW MD   CT Lung Mediastinum Biopsy    Narrative    PROCEDURES 9/23/2019:  1. CT-guided left upper lobe lesion biopsy.    CLINICAL HISTORY: Spiculated left upper lobe lesion. Biopsy requested.    COMPARISONS: CT 9/18/2019.    STAFF RADIOLOGIST: ABDOULAYE Cuevas MD    I, GAYATHRI CUEVAS MD, attest that I was present in the procedure room for  the entire procedure.    MEDICATIONS: The patient was placed on continuous monitoring. Vital  signs and sedation were monitored by the Interventional Radiology  nurse under the Attending Physician's supervision. 1.5 mg Versed and  75 mcg fentanyl IV. The patient remained stable throughout the  procedure.    ATTENDING FACE-TO-FACE SEDATION TIME: 20 minutes.    DOSE: 354 mGy*cm.    PROCEDURE: The patient understood the limitations, alternatives, and  risks of the procedure and requested the procedure be performed. Both  written and verbal consent were obtained.    Preprocedural scan demonstrated the bilateral upper lobe lesions.    The left anterolateral chest was prepped and draped in usual sterile  fashion. 1% lidocaine without epinephrine was used for local  anesthesia.    Under CT guidance, a 19-gauge coaxial needle was advanced into the  left upper lobe spiculated lesion. CT image documenting needle  position was saved in the patient's record.    Five 20-gauge core biopsies were obtained with a [employer] ACT  biopsy  needle through the coaxial needle and submitted to Pathology who  indicated that adequate samples were obtained. CT image documenting  each biopsy needle pass was saved in the patient's record.    Over the course of the procedure, a small left pneumothorax developed.  The patient remained asymptomatic. Patient denied dyspnea.    Needle removed. Sterile airtight dressing applied.    Limited postprocedural scan demonstrated the small left pneumothorax.  No significant peribiopsy hemorrhage. Representative CT images were  saved in the patient's record.      Impression    IMPRESSION:  1. Five 20-gauge core biopsies obtained of the left upper lobe  spiculated lesion under CT guidance.    2. Small left pneumothorax. Patient remained asymptomatic. Patient  denied dyspnea. Immediate chest x-ray before the patient returns to  her inpatient unit. Repeat chest x-ray in one hour. If patient becomes  dyspneic or the pneumothorax continues to enlarge, a chest tube may  need to be placed.    3. Postprocedural care and observation in the patient's inpatient care  unit.    GAYATHRI TRUJILLO MD   MR Cervical Spine w/o & w Contrast    Narrative    Complete spine MR without and with contrast    History: Assess for spinal involvement of dural metastasis.     Comparison:  CT 9/18/2019     Contrast Dose: 10 mL of Gadavist    Technique: Sagittal T1 and T2-weighted images of the entire spine, and  axial T1 and T2-weighted images of the lumbar spine were obtained  without intravenous contrast. Post intravenous contrast using  gadolinium sagittal T1-weighted images were obtained of the whole  spine with fat-saturation , with axial postcontrast T1-weighted images  at selected levels.    Findings:   Cervical spine:    The cervical vertebrae appear normally aligned.      No abnormal cord signal.    Multilevel cervical spondylosis with loss of disc height and disc  desiccation, facet hypertrophy and osteophyte formation.  Mild-to-moderate  spinal canal stenosis C5-6 and C6-7 secondary to disc  osteophyte complex. Central focal protrusion at C3-4 and C4-5 with  effacement of ventral CSF space. Severe left neural foraminal stenosis  at C5-6 there are mild bilateral at C6-7.    Contrast-enhanced images demonstrate no definite abnormal enhancement  in the visualized paraspinous tissues or in the spinal canal or  vertebra.    Partially visualized metastatic lesion in the clivus.    Thoracic spine:   Multiple T1 hypointense, T2 hyperintense enhancing lesions in T4, T6,  T7, T8, T9 vertebral bodies and T6 right pedicle, spinous process and  lamina. There is ventral epidural extension of the metastatic lesions  from T5 down to the T7-8 intervertebral disc space result mild to  moderate canal stenosis. No cord compression. No abnormal cord signal.    Multilevel disc height narrowing and disc desiccation. Right central  protrusion at T12-L1.    Lumbar spine:  There are 5 lumbar-type vertebrae assumed for the  purposes of this dictation.  The tip of the conus medullaris is at  approximately L1.  Cauda equina the roots are normal. The lumbar  vertebral column appears normally aligned. Postsurgical changes of  posterior fusion with instrumentation L3-L5 and anterior fusion  instrumentation L3-S1. Decompressive laminectomy L3, L4 and L5.    No suspicious lesions identified. Contrast-enhanced images demonstrate  no definite abnormal enhancement in the visualized lumbar paraspinous  tissues or in the spinal canal or vertebra.    Mild bilateral neural foraminal stenosis at L5-S1. Spinal canal and  neural foramina widely patent throughout the remainder of the lumbar  spine, although evaluation is limited due to susceptibility artifact  prone position hardware.    Large cyst in the right kidney.      Impression    Impression:    1. Multiple metastatic lesions in T4, T6, T7, T8, T9 vertebral bodies  and T6 right pedicle, spinous process and lamina. There is  ventral  epidural extension of the metastatic lesions from T5 down to the T7-8  level results in mild to moderate canal stenosis. No cord compression.    2. No evidence of metastatic disease in the cervical or lumbar spine.  3. Multilevel mild cervical spondylosis with mild to moderate spinal  canal stenosis C5-6 and C6-7. Severe left neural foraminal stenosis at  C5-6.  4. Postsurgical changes of fusion instrumentation lumbar spine.    I have personally reviewed the examination and initial interpretation  and I agree with the findings.    AZEEM VARELA MD   MR Thoracic Spine w/o & w Contrast    Narrative    Complete spine MR without and with contrast    History: Assess for spinal involvement of dural metastasis.     Comparison:  CT 9/18/2019     Contrast Dose: 10 mL of Gadavist    Technique: Sagittal T1 and T2-weighted images of the entire spine, and  axial T1 and T2-weighted images of the lumbar spine were obtained  without intravenous contrast. Post intravenous contrast using  gadolinium sagittal T1-weighted images were obtained of the whole  spine with fat-saturation , with axial postcontrast T1-weighted images  at selected levels.    Findings:   Cervical spine:    The cervical vertebrae appear normally aligned.      No abnormal cord signal.    Multilevel cervical spondylosis with loss of disc height and disc  desiccation, facet hypertrophy and osteophyte formation.  Mild-to-moderate spinal canal stenosis C5-6 and C6-7 secondary to disc  osteophyte complex. Central focal protrusion at C3-4 and C4-5 with  effacement of ventral CSF space. Severe left neural foraminal stenosis  at C5-6 there are mild bilateral at C6-7.    Contrast-enhanced images demonstrate no definite abnormal enhancement  in the visualized paraspinous tissues or in the spinal canal or  vertebra.    Partially visualized metastatic lesion in the clivus.    Thoracic spine:   Multiple T1 hypointense, T2 hyperintense enhancing lesions in T4,  T6,  T7, T8, T9 vertebral bodies and T6 right pedicle, spinous process and  lamina. There is ventral epidural extension of the metastatic lesions  from T5 down to the T7-8 intervertebral disc space result mild to  moderate canal stenosis. No cord compression. No abnormal cord signal.    Multilevel disc height narrowing and disc desiccation. Right central  protrusion at T12-L1.    Lumbar spine:  There are 5 lumbar-type vertebrae assumed for the  purposes of this dictation.  The tip of the conus medullaris is at  approximately L1.  Cauda equina the roots are normal. The lumbar  vertebral column appears normally aligned. Postsurgical changes of  posterior fusion with instrumentation L3-L5 and anterior fusion  instrumentation L3-S1. Decompressive laminectomy L3, L4 and L5.    No suspicious lesions identified. Contrast-enhanced images demonstrate  no definite abnormal enhancement in the visualized lumbar paraspinous  tissues or in the spinal canal or vertebra.    Mild bilateral neural foraminal stenosis at L5-S1. Spinal canal and  neural foramina widely patent throughout the remainder of the lumbar  spine, although evaluation is limited due to susceptibility artifact  prone position hardware.    Large cyst in the right kidney.      Impression    Impression:    1. Multiple metastatic lesions in T4, T6, T7, T8, T9 vertebral bodies  and T6 right pedicle, spinous process and lamina. There is ventral  epidural extension of the metastatic lesions from T5 down to the T7-8  level results in mild to moderate canal stenosis. No cord compression.    2. No evidence of metastatic disease in the cervical or lumbar spine.  3. Multilevel mild cervical spondylosis with mild to moderate spinal  canal stenosis C5-6 and C6-7. Severe left neural foraminal stenosis at  C5-6.  4. Postsurgical changes of fusion instrumentation lumbar spine.    I have personally reviewed the examination and initial interpretation  and I agree with the  findings.    AZEEM VARELA MD   MR Lumbar Spine w/o & w Contrast    Narrative    Complete spine MR without and with contrast    History: Assess for spinal involvement of dural metastasis.     Comparison:  CT 9/18/2019     Contrast Dose: 10 mL of Gadavist    Technique: Sagittal T1 and T2-weighted images of the entire spine, and  axial T1 and T2-weighted images of the lumbar spine were obtained  without intravenous contrast. Post intravenous contrast using  gadolinium sagittal T1-weighted images were obtained of the whole  spine with fat-saturation , with axial postcontrast T1-weighted images  at selected levels.    Findings:   Cervical spine:    The cervical vertebrae appear normally aligned.      No abnormal cord signal.    Multilevel cervical spondylosis with loss of disc height and disc  desiccation, facet hypertrophy and osteophyte formation.  Mild-to-moderate spinal canal stenosis C5-6 and C6-7 secondary to disc  osteophyte complex. Central focal protrusion at C3-4 and C4-5 with  effacement of ventral CSF space. Severe left neural foraminal stenosis  at C5-6 there are mild bilateral at C6-7.    Contrast-enhanced images demonstrate no definite abnormal enhancement  in the visualized paraspinous tissues or in the spinal canal or  vertebra.    Partially visualized metastatic lesion in the clivus.    Thoracic spine:   Multiple T1 hypointense, T2 hyperintense enhancing lesions in T4, T6,  T7, T8, T9 vertebral bodies and T6 right pedicle, spinous process and  lamina. There is ventral epidural extension of the metastatic lesions  from T5 down to the T7-8 intervertebral disc space result mild to  moderate canal stenosis. No cord compression. No abnormal cord signal.    Multilevel disc height narrowing and disc desiccation. Right central  protrusion at T12-L1.    Lumbar spine:  There are 5 lumbar-type vertebrae assumed for the  purposes of this dictation.  The tip of the conus medullaris is at  approximately L1.   Cauda equina the roots are normal. The lumbar  vertebral column appears normally aligned. Postsurgical changes of  posterior fusion with instrumentation L3-L5 and anterior fusion  instrumentation L3-S1. Decompressive laminectomy L3, L4 and L5.    No suspicious lesions identified. Contrast-enhanced images demonstrate  no definite abnormal enhancement in the visualized lumbar paraspinous  tissues or in the spinal canal or vertebra.    Mild bilateral neural foraminal stenosis at L5-S1. Spinal canal and  neural foramina widely patent throughout the remainder of the lumbar  spine, although evaluation is limited due to susceptibility artifact  prone position hardware.    Large cyst in the right kidney.      Impression    Impression:    1. Multiple metastatic lesions in T4, T6, T7, T8, T9 vertebral bodies  and T6 right pedicle, spinous process and lamina. There is ventral  epidural extension of the metastatic lesions from T5 down to the T7-8  level results in mild to moderate canal stenosis. No cord compression.    2. No evidence of metastatic disease in the cervical or lumbar spine.  3. Multilevel mild cervical spondylosis with mild to moderate spinal  canal stenosis C5-6 and C6-7. Severe left neural foraminal stenosis at  C5-6.  4. Postsurgical changes of fusion instrumentation lumbar spine.    I have personally reviewed the examination and initial interpretation  and I agree with the findings.    AZEEM VARELA MD   MR Orbit w/o & w Contrast    Addendum: 9/19/2019    Addendum:  There is metastatic lesion also in the left aspect of the clivus.    AZEEM VARELA MD      Narrative    MRI brain orbits with contrast 9/19/2019 9:24 PM    Provided History:  Evaluate orbital lesion, preop    Comparison:  MRI 9/17/2018    Technique:    1. MRI of the Brain:  Sagittal T1-weighted, axial turboFLAIR and axial  diffusion-weighted with ADC map images of the brain were obtained  without intravenous contrast.  After intravenous  administration of  gadolinium, axial T1-weighted images of the brain were obtained.    2. MRI of the Orbits focused on the orbits/visual pathways:  Axial  T2-weighted with inversion recovery and coronal T1-weighted images  were obtained without intravenous contrast. Axial and coronal  T1-weighted images with fat saturation were obtained after intravenous  gadolinium administration.    Contrast: 10 mL of Gadavist    Findings:  Again demonstrated nodular dural thickening along the  cerebral convexities, most severe in the left cerebral convexity in  the para and sagittal area superiorly, with resulting occlusion of the  mid superior sagittal sinus.    Unchanged appearance of the enhancing mass in the left sphenotemporal  buttress extending into the lateral orbit, middle cranial fossa, and  suprazygomatic  space. A 1 cm area of nodular enhancement is  present involving the right superior parietal lobule.     Unchanged scattered small foci of restricted diffusion in the right  pre and post central gyri, the left precentral gyrus, and the superior  parietal lobule.    The calvarium demonstrates extensive patchy enhancement most  conspicuous throughout the left parietal calvarium consistent with  osseous metastatic disease. Abnormal enhancement is present within the  left aspect of the clivus consistent with osseous metastatic disease.      Impression    Impression:    1. Grossly unchanged appearance of the enhancing mass in the left  sphenotemporal buttress extending into the lateral orbit, middle  cranial fossa, and suprazygomatic  space.  2. Unchanged extensive metastatic dural thickening, most significant  over the left cerebral convexity, with resulting occlusion of the  superior sagittal sinus  3. Unchanged scattered metastatic foci in the right pre and post  central gyri, the left precentral gyrus, and the superior parietal  lobule.  4. Unchanged extensive metastatic disease throughout the  calvarium.    I have personally reviewed the examination and initial interpretation  and I agree with the findings.    AZEEM VARELA MD   XR Chest 1 View     Value    Radiologist flags pneumothorax (Urgent)    Narrative    EXAM: XR CHEST 1 VW  9/23/2019 9:19 AM     HISTORY:  small pneumothorax after left upper lobe biopsy. baseline  xray.       COMPARISON:  9/18/2019, 9/23/2019    FINDINGS:     The trachea is midline. The cardiomediastinal silhouette is  well-defined. The pulmonary vasculature are distinct.     The included osseous thorax, soft tissues and upper abdomen and are  within normal limits.     No acute airspace opacity. Small left pneumothorax with the border of  the lung parenchyma approximately 1 cm from the chest wall at the  apex.      Impression    IMPRESSION: Small left pneumothorax.     I have personally reviewed the examination and initial interpretation  and I agree with the findings.    [Access Center: pneumothorax]    This report will be copied to the [facility] to ensure a  provider acknowledges the finding. Access Center is available Monday  through Friday 8am-3:30 pm.     MARIBEL MILLER MD    I have personally reviewed the examination and initial interpretation  and I agree with the findings.   XR Chest Port 1 View    Narrative    EXAM: XR CHEST PORT 1 VW  9/23/2019 11:56 AM     HISTORY:  follow up pneumothorax       COMPARISON:  9/18/2019, 9/23/2019    FINDINGS:     The trachea is midline. The cardiomediastinal silhouette is  well-defined. The pulmonary vasculature are distinct.     The included osseous thorax, soft tissues and upper abdomen and are  within normal limits.     No acute airspace opacity. Left pneumothorax measuring 37 mm from the  lung parenchyma to the left chest wall.      Impression    IMPRESSION: Increase in size in left pneumothorax.    Findings discussed with interventional radiology team@1210 hours.    I have personally reviewed the examination and  initial interpretation  and I agree with the findings.    MARIBEL MILLER MD   IR Chest Tube Place Non Tunneled Left    Narrative    Procedure date:    Procedure date: 9/23/2019.    History: The patient is a pleasant 74-year-old lady with a history of  1 lesion, status post CT-guided biopsy. Procedure, gated by left  pneumothorax. Left apical chest tube placement indicated for  decompression.    Preprocedure diagnosis: Left pneumothorax.    Post procedure diagnosis: Left pneumothorax, improved.    Informed consent was obtained and the site confirmed.    : Neftali Leavitt PA-C.    Assist: SALONI Coronado.    Medications: 1% buffered lidocaine, 5 cc subcutaneously.    Sedation face to face time: None.    Nursing: The patient was continuously monitored by IR nursing staff  under my supervision, and remained stable throughout the procedure.    Consent: The procedure, as well as risks and benefits was discussed  with the patient and her family. Informed written and verbal consent  was obtained prior to the procedure.    Findings: Patient was placed in the recumbent position on the  fluoroscopy table, and the anterior left hemithorax was prepped and  draped in usual sterile fashion. Fluoroscopy and physical exam are  used identify the second intercostal space. The skin and subcutaneous  tissues overlying the second intercostal space at the midclavicular  line were anesthetized with 1% lidocaine, and a 5 mm skin incision was  made with a # 11 blade. Under fluoroscopic guidance, a 5 Fr., 7 cm  Yueh needle/catheter combo was advanced over the top of the rib and  into the left pleural space, with aspiration returning air. Upon  return of air, the Yueh catheter was advanced off the needle. Under  fluoroscopic guidance, a 0.35 Bentson wire was advanced through the  catheter into the left apical pleural space. The subcutaneous tissues  were dilated with 8, then 10 Cymro dilators. During the dilatation,  the Bentson wire  kinked. Under fluoroscopic guidance, the Bentson wire  was exchanged for a 0.035 Amplatz wire, which was advanced into the  left apical pleural space. A 10 Nigerien nonlocking Flexima J-tip chest  tube was selected and prepared. Under ultrasound guidance, the 5 10  Nigerien dilator was exchanged over wire for a 10 Fr. non-locking  Flexima J tipped chest tube, which was advanced into the left apical  pleural space. Fluoroscopy confirmed proper placement within the  pleural space. The left pneumothorax was manually decompressed, with  removal of 1000 cc air. The catheter was secured to the skin with a  single 2-0 Ethilon suture,  and connected to water-seal drainage  apparatus. The site was cleansed and dressed with a sterile bandage.  Images were saved throughout the procedure. The procedure was  well-tolerated, with no immediate complications.     Contrast: None.    Fluoroscopy time: 1 minute.    Specimens: None.    Estimated blood loss: Less than 1 cc.      Impression    Impression: Fluoroscopy guided placement of left apical 10 Fr.  non-locking Flexima J-tipped chest tube. 1000 cc air removed from the  left pleural space.    Plan: Patient transported to the care of unit 6 a in stable condition.  Dressing changes per floor routine. Follow up per primary team.    Attestation: The physician assistant (PA) who performed this procedure  and signed the above report is licensed to practice in the [facility] pursuant to [place] Statute 147A.09.  This includes meeting the  Statute and [place] Board of Medical [employer] requirement of an  active Delegation Agreement, which documents delegation of services by  primary and alternate supervising physicians. All services rendered  are performed under a collaborative agreement with Dr. Rocael Chanel, Director of Interventional Radiology, [facility].    SHAMAR WHITE PA-C   XR Chest 2 Views    Narrative    EXAM: XR CHEST 2 VW  9/24/2019 9:15 AM      HISTORY:  PTX s/p chest tube please evaluate for improvement       COMPARISON:  9/23/2019    FINDINGS:   New left-sided chest tube in place with near-complete resolution of  previously seen left pneumothorax.     The trachea is midline. The cardiomediastinal silhouette is normal  appearance. The pulmonary vasculature are distinct.    The included osseous thorax, soft tissues and upper abdomen and are  within normal limits. Hardware of spinal fusion seen in the upper  abdomen.     There is few nodular opacities of the right medial lung, similar in  appearance to previous x-ray. Pleural effusion or pneumothorax.      Impression    IMPRESSION:   1. Resolution of previously seen left-sided pneumothorax with chest  tube in place.  2. Few nonspecific nodular opacities in the right midlung.    I have personally reviewed the examination and initial interpretation  and I agree with the findings.    MARIBEL MILLER MD   XR Chest Port 1 View    Narrative    EXAM: XR CHEST PORT 1 VW  9/24/2019 1:12 PM     HISTORY:  s/p clamping left chest tube       COMPARISON:  9/24/2019    FINDINGS:   Left chest tube position unchanged    The trachea is midline. The cardiomediastinal silhouette is  well-defined. The pulmonary vasculature are distinct.    The included osseous thorax, soft tissues and upper abdomen and are  within normal limits no acute airspace/interstitial opacities. No  pleural effusion or pneumothorax.      Impression    IMPRESSION: No pneumothorax. No acute airspace opacities.   XR Chest Port 1 View    Narrative    PRELIMINARY REPORT - The following report is a preliminary  interpretation.      Impression    Impression: No pneumothorax status post removal of left-sided chest  tube. No new airspace opacity.       Discharge Medications   Current Discharge Medication List      START taking these medications    Details   !! dexamethasone (DECADRON) 2 MG tablet Take 1 tablet (2 mg) by mouth every 12 hours  Qty: 3 tablet,  Refills: 0    Associated Diagnoses: Brain mass      !! dexamethasone (DECADRON) 2 MG tablet Take 1 tablet (2 mg) by mouth daily  Qty: 4 tablet, Refills: 0    Associated Diagnoses: Brain mass       !! - Potential duplicate medications found. Please discuss with provider.      CONTINUE these medications which have NOT CHANGED    Details   ASPIRIN 81 MG OR TABS ONE DAILY  Qty: 100, Refills: 3    Associated Diagnoses: Disorder of bone and cartilage, unspecified      cholecalciferol (VITAMIN D3) 5000 units (125 mcg) capsule Take 5,000 Units by mouth daily      !! gabapentin (NEURONTIN) 300 MG capsule Take 300 mg by mouth daily At Noon.      !! gabapentin (NEURONTIN) 300 MG capsule Take 600 mg by mouth At Bedtime      lisinopril (PRINIVIL/ZESTRIL) 40 MG tablet TAKE ONE TABLET BY MOUTH ONE TIME DAILY   Qty: 90 tablet, Refills: 2    Associated Diagnoses: Benign essential hypertension      metoprolol succinate ER (TOPROL-XL) 25 MG 24 hr tablet TAKE ONE TABLET BY MOUTH ONE TIME DAILY  Qty: 90 tablet, Refills: 0    Comments: Pt due for follow up office visit in clinic.  Associated Diagnoses: Essential hypertension, benign      senna (SENOKOT) 8.6 MG tablet Take 1-2 tablets by mouth 2 times daily       !! - Potential duplicate medications found. Please discuss with provider.        Allergies   Allergies   Allergen Reactions     Sulfa Drugs Anaphylaxis     Angioedema

## 2019-09-24 NOTE — PROGRESS NOTES
"  Care Coordinator Progress Note    Admission Date/Time:  9/17/2019  Attending MD:  Mark Verde MD    Data  Chart reviewed, discussed with interdisciplinary team.   Patient was admitted for: Brain mass.    Concerns with insurance coverage for discharge needs: None.  Current Living Situation: Patient lives with spouse.  Support System: Supportive  Services Involved: Home Care  Transportation at Discharge: Family or friend will provide  Transportation to Medical Appointments:   - Family  Barriers to Discharge: medically stable    Coordination of Care and Referrals: Provided patient/family with options for Home Care.        Assessment  Met with patient in room with daughter and family present. Patient expressed she had a \"bad night\" and has concerns about going home and her complex medical plan. Patient said her  works during the day and would not be around to help her. Daughter stated she would be available to help at home, as able. Patient expressed concern with not be able to get around home, use the bathroom by herself or doing all of her medical care (dressing changes, etc).      Home care was discussed and family would like this service. Patient has had home care in the past after back surgery several years ago and had RN and PT come to her house, unsure of which company was used. Patient would be interested in RN and PT again. PT has not seen patient this admission and team paged to see if PT could be ordered to assess mobility. Home care agencies were offered and patient would like a referral made to [facility].     Patient was overall overwhelmed with discharging to home due to complex medical plan, however, daughter reassured patient that her needs would be met by family/friends and home care services. [facility] will continue to follow.      Addendum 11:58: OT worked with patient this AM and patient feels better about discharge plan to home with services. PT cancelled. See OT note.     IMM provided " to patient and signed. Copy provided to patient and one placed in chart.     Plan  Anticipated Discharge Date:  Today vs Tomorrow.  Anticipated Discharge Plan:  Home with home care services.    Sally Bueon RN Care Coordinator  Phone: [phone] Pager: [phone]

## 2019-09-24 NOTE — PLAN OF CARE
OT 6A  Discharge Planner OT   Patient plan for discharge: Home  Current status: Min A supine to EOB. SBA sit<>stand with 4WW. SBA donning bilateral shoes while seated EOB. Pt completed ~300ft of functional mobility with 4WW and SBA  Barriers to return to prior living situation: fatigue, weakness, acute medical needs  Recommendations for discharge: Home with assist and HH OT/PT  Rationale for recommendations: Pt is primarily independent with ADL completion, however would benefit from continued skilled therapy to increase activity tolerance and independence with ADLs       Entered by: Leonarda Jesus 09/24/2019 11:32 AM

## 2019-09-24 NOTE — PLAN OF CARE
"/59 (BP Location: Right arm)   Pulse 63   Temp 97.6  F (36.4  C) (Oral)   Resp 16   Ht 1.626 m (5' 4\")   Wt 68.5 kg (151 lb 1.6 oz)   SpO2 93%   BMI 25.94 kg/m      A/Ox4. VSS and neuros unchanged. Pt stated her breathing improved throughout the day. Patient tolerated chest tube clamping trial. Chest tube removed at bedside by IR this evening and CXR was completed. Pt adequate for discharge. Discharge instructions went over with patient, her , and three daughters. Oncology doctor to follow up with lung biopsy results this week. Pt left unit at 1830 via wheelchair  "

## 2019-09-24 NOTE — PLAN OF CARE
Status: Patient admitted on 9/17 with left periorbital numbness, pain, and diplopia since 9/13, found to have diffuse findings on MRI brain including lesion involving left orbit, concerning for brain mets. Pt w/ lung biopsy today, developed pneumothorax and chest tube was placed earlier today.   Vitals: VSS.   Neuros: A&Ox4, diplopia (eye patch in place over left eye), dysconjugate L eye due to CN 6 palsy (MD aware), numbness left side of forehead, head, and around left eye, numbness of RLE in foot, and LLE from knee to toes. LLE weaker than RLE.   IV: PIV saline locked.   Resp/trach: Lung sounds on L with pleural rub (improving), diminished on the R. Left chest tube water sealed at -20cm H2O. Pt w/ dyspnea on exertion.   Diet: Regular. Tolerating it well.   Bowel status: No BM today, bowel meds administered.   : Voiding spontaneously.   Skin: Intact. Dressing on mid-left chest for chest tube, should be changed daily.   Pain: Pleural pain controlled with PRN oxycodone and Tylenol.   Activity: Up with A1, walker, gait belt.   Social: Family at bedside, supportive with cares.   Plan: Plan to continue with chest tube to treat pneumothorax, continue to monitor and follow POC.

## 2019-09-24 NOTE — PROGRESS NOTES
[facility]  Met with pt and daughters to discuss plans for HC.  Pt to be discharged home likely 9/24 after CXR at around 5pm,   and has agreed to have [facility] follow with services of SN, PT and OT.  Patient care support center processing referral.  Pt and daughters verbalized understanding that initial visit is scheduled for 1 to 2 days after discharge.    Pt has 24 hour phone number for [facility] for any questions or concerns.    Thank you  Kristy Marshall RN, BSN  [facility] Liaison  [facility]  [phone]

## 2019-09-24 NOTE — PLAN OF CARE
"/59 (BP Location: Right arm)   Pulse 51   Temp 97.7  F (36.5  C) (Oral)   Resp 18   Ht 1.626 m (5' 4\")   Wt 68.5 kg (151 lb 1.6 oz)   SpO2 97%   BMI 25.94 kg/m    Status: arrived from PACU last AM s/p lung biopsy, patient arrived from IR at 1400 s/p left anterior chest tube placement Patient admitted on 9/17 with left periorbital numbness, pain, and diplopia since 9/13, found to have diffuse findings on MRI brain including lesion involving left orbit, concern for brain mets.  VS: VSS on RA  Neuro: A&Ox4. Double vision in L eye w/out eye patch on, improving. L side forehead, head, eye numbness. RLE foot numbness. LLE knees to toes numbness.  LLE 4/5. Other extremities 5/5.  Respiratory: Lung Sounds diminished with plural rub minimally present. Denies SOB  GI: Regular diet, no BM over night. BS+  : voids spontaneously without difficulty x1 over night  IV: PIV SL  Skin: Intact ex Dressing on mid-left chest for chest tube, should be changed daily.   Pain: Pleural pain controlled with PRN oxycodone and Tylenol   Activity: A1 w/ GB+Walker  See flow sheets for further details and assessments.  Plan of Care: Plan to continue with chest tube to treat pneumothorax. Continue to monitor and follow POC.      "

## 2019-09-24 NOTE — TELEPHONE ENCOUNTER
ONCOLOGY INTAKE: Records Information      APPT INFORMATION:  Referring provider:  Nessa Gillespie MD  Referring provider s clinic:  [facility]  Reason for visit/diagnosis:  Hospital follow-up, like lung cancer  Has patient been notified of appointment date and time?: No    RECORDS INFORMATION:  Were the records received with the referral (via [employer])? No, Internal      ADDITIONAL INFORMATION:  LVM and Letter Sent

## 2019-09-25 NOTE — PROGRESS NOTES
"Future Appointments   Date Time Provider Department Center   9/30/2019  1:30 PM Sekou Craig MD [facility]     Appointment Notes for this encounter:   Hospital follow up px-annual medication review, DM, BP    Health Maintenance Due   Topic Date Due     ANNUAL REVIEW OF HM ORDERS  1945     FIT  05/08/1955     ZOSTER IMMUNIZATION (1 of 2) 05/08/1994     MEDICARE ANNUAL WELLNESS VISIT  08/03/2017     PNEUMOCOCCAL IMMUNIZATION 65+ LOW/MEDIUM RISK (2 of 2 - PPSV23) 08/03/2017     DIABETIC FOOT EXAM  10/26/2017     PHQ-2  01/01/2019     TSH W/FREE T4 REFLEX  06/26/2019     LIPID  07/27/2019     MICROALBUMIN  07/27/2019     FALL RISK ASSESSMENT  07/27/2019     INFLUENZA VACCINE (1) 09/01/2019     MAMMO SCREENING  07/27/2019       Pre-visit planning reviewed items:   Reviewed [facility] for upcoming orders in Health Maintenance., Reviewed [facility] for due questionnaires. and BestPractice Alerts.    Patient preferred phone number: [phone]   Patient contacted.   Spoke to patient via phone.   Extended appointment time.       Visit is not preventive.     Health Maintenance  Health Maintenance Due   Topic Date Due     ANNUAL REVIEW OF HM ORDERS  1945     FIT  05/08/1954     ZOSTER IMMUNIZATION (1 of 2) 05/08/1994     PNEUMOCOCCAL IMMUNIZATION 65+ HIGH/HIGHEST RISK (2 of 2 - PPSV23) 09/28/2016     MEDICARE ANNUAL WELLNESS VISIT  08/03/2017     DIABETIC FOOT EXAM  10/26/2017     PHQ-2  01/01/2019     TSH W/FREE T4 REFLEX  06/26/2019     LIPID  07/27/2019     MICROALBUMIN  07/27/2019     MAMMO SCREENING  07/27/2019     INFLUENZA VACCINE (1) 09/01/2019     Patient is due for:  hospital follow up      Appointment scheduled.    Questionnaire Review     Call Summary  \"Thank you for your time today.  If anything comes up before your appointment, please feel free to contact us at Dept: [phone].\"    Actions completed:   Confirmed that the visit type and provider(s) are appropriate for the pt's reason for " "visit.  Assigned any additional questionnaires.  Marked \"Pre-visit Planning Complete\" via Schedule activity.          "

## 2019-09-25 NOTE — PROGRESS NOTES
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     ASSESSMENT      Discharge Assessment  Patient reports symptoms are: Unchanged  Does the patient have all of their medications?: Yes  Does patient know what their new medications are for?: Yes  Does patient have a follow-up appointment scheduled?: Yes  Does patient have any other questions or concerns?: No    Post-op  Did the patient have surgery or a procedure: No  Fever: No  Chills: No  Eating & Drinking: eating and drinking without complaints/concerns(Per patient's daughter (Araceli) she was just eaing toast now.)  PO Intake: regular diet  Bowel Function: normal  Urinary Status: voiding without complaint/concerns    PLAN                                                      Outpatient Plan:      Follow up with primary care provider, Sekou Craig, within 7 days for hospital follow- up.  The following labs/tests are recommended: BMP.    You should have follow-up appointment with, the eye doctor, oncology, and radiation oncology.    Future Appointments   Date Time Provider Department Center   9/30/2019  1:30 PM Sekou Craig MD [facility]           [facility]

## 2019-09-25 NOTE — LETTER
[place] CARE COORDINATION  [address]  [facility] [address]    September 25, 2019    Claudia Tavareserson  [address]  [facility] [address]      Dear Claudia,    I am a clinic care coordinator who works with Sekou Craig MD at  [facility]. I wanted to thank you for spending the time to talk with me.  I wanted to introduce myself and provide you with my contact information so that you can call me with questions or concerns about your health care. Below is a description of clinic care coordination and how I can further assist you.     The clinic care coordinator is a registered nurse and/or  who understand the health care system. The goal of clinic care coordination is to help you manage your health and improve access to the [facility] system in the most efficient manner. The registered nurse can assist you in meeting your health care goals by providing education, coordinating services, and strengthening the communication among your providers. The  can assist you with financial, behavioral, psychosocial, chemical dependency, counseling, and/or psychiatric resources.    Please feel free to contact me at [phone], with any questions or concerns. We at [facility] are focused on providing you with the highest-quality healthcare experience possible and that all starts with you.     Sincerely,     Lawanda Hudson RN    Enclosed: I have enclosed a copy of the Complex Care Plan. This has helpful information and goals that we have talked about. Please keep this in an easy to access place to use as needed.

## 2019-09-25 NOTE — TELEPHONE ENCOUNTER
Gaby calling from  Home Care for orders-    Went home from hospital yesterday.  Did get home care referral.  Patient wants to start Friday instead as works better for her and her family.  Outside 48 hour window so need order to delay start of care.  Verbal order given.  Will fax for MD signature.  Angeli Juarez RN

## 2019-09-25 NOTE — PROGRESS NOTES
Clinic Care Coordination Contact  OUTREACH    Referral Information:  Referral Source: IP Handoff - CTS letter received and reviewed.     Primary Diagnosis: Oncology    Chief Complaint   Patient presents with     Clinic Care Coordination - Initial     brain mass     Clinic Care Coordination - Post Hospital        Universal Utilization: Patient was hospitalized at [facility] from 09/17 to 09/24 with a diagnosis of brain mass.   Clinic Utilization  Difficulty keeping appointments:: No  Compliance Concerns: No  No-Show Concerns: No  No PCP office visit in Past Year: Yes  Utilization    Last refreshed: 9/25/2019  9:53 AM:  Hospital Admissions 1           Last refreshed: 9/25/2019  9:53 AM:  ED Visits 1           Last refreshed: 9/25/2019  9:53 AM:  No Show Count (past year) 0              Current as of: 9/25/2019  9:53 AM            Clinical Concerns:  Current Medical Concerns:  Patient provided verbal consent to communicate with daughter. Patient not checking blood sugars. Receiving 24/7 supervision from family. Set up home care visit to come Friday. Wants to wait for pathology report prior to scheduling an appointment with ophthalmology so they can receive and review the report. Expecting a call from oncology and radiation oncology after biopsy results come in this week.    Current Behavioral Concerns: none noted.    Education Provided to patient: CC role, honoring choices, health care directives. Upcoming scheduled appointment. Cancer Care resources.   Pain  Pain (GOAL):: No  Health Maintenance Reviewed: Not assessed  Clinical Pathway: None    Medication Management:  Family oversees medication administration. No concerns with medication management at this time. Reports taking medications as prescribed.      Functional Status:  Dependent ADLs:: Bathing, Dressing  Dependent IADLs:: Cleaning, Cooking, Laundry, Shopping, Transportation, Meal Preparation, Medication Management  Bed or  wheelchair confined:: No  Mobility Status: Independent w/Device  Fallen 2 or more times in the past year?: Yes  Any fall with injury in the past year?: No    Living Situation:  Current living arrangement:: I live in a private home with family  Type of residence:: Apartment    Diet/Exercise/Sleep:  Diet:: Regular  Inadequate nutrition (GOAL):: No  Food Insecurity: No  Tube Feeding: No  Inadequate activity/exercise (GOAL):: No  Significant changes in sleep pattern (GOAL): No    Transportation:  Transportation concerns (GOAL):: No  Transportation means:: Family     Psychosocial:  Mental health management concern (GOAL):: No  Informal Support system:: Children, Tammy based, Spouse     Financial/Insurance:   Financial/Insurance concerns (GOAL):: No     Resources and Interventions:  Current Resources:   List of home care services:: Skilled Nursing, Physicial Therapy, Occupational Therapy;   Community Resources: None     Equipment Currently Used at Home: walker, rolling    Advance Care Plan/Directive  Advanced Care Plans/Directives on file:: No  Advanced Care Plan/Directive Status: Referral to Giovanna Valdez Facilitator    Referrals Placed: Giovanna Valdez     Goals:   Goals        General    1. Health Care Directive (pt-stated)     Notes - Note created  9/25/2019 10:33 AM by Lawanda Hudson RN    Goal Statement: I will complete a health care directive and share this with my care team.   Measure of Success: Completion of a valid health care directive.   Supportive Steps to Achieve: Continued CCRN support. CCRN will mail introduction letter, complex care plan, honoring choices brochure, and health care directive. Patient will complete health care directive and share this with the care team. Patient will take medications as prescribed. Follow up with providers as recommended.    Barriers: none identified.   Strengths: Motivated, engaged in care coordination, strong support system.   Date to Achieve By: 01/2020  Patient  expressed understanding of goal: Yes              Patient/Caregiver understanding: Patient/daughter verbalized understanding and denies any additional questions or concerns at this time. RNCC engaged in [employer] communications during encounter.     Outreach Frequency: monthly  Future Appointments              In 5 days Sekou Craig MD; CR EXAM ROOM 27 [facility]          Plan: RNCC to mail introduction letter, complex care plan, honoring choices brochure, health care directive, cancer care resources to patient. Patient was provided with writers contact information and encouraged to call with questions, concerns, support needs. RNCC will remain available as needed. RNCC will follow up with patient again next month.     Lawanda Hudson RN Care Coordinator  [facility]  Email: [email or web address]  Phone: [phone]

## 2019-09-25 NOTE — PLAN OF CARE
Occupational Therapy Discharge Summary    Reason for therapy discharge:    Discharged to home with home therapy.    Progress towards therapy goal(s). See goals on Care Plan in [facility] electronic health record for goal details.  Goals partially met.  Barriers to achieving goals:   discharge from facility.    Therapy recommendation(s):    Continued therapy is recommended.  Rationale/Recommendations:  to increase activity tolerance and safety with ADL/IADL completion.

## 2019-09-25 NOTE — LETTER
[facility]  Complex Care Plan  About Me:    Patient Name:  Claudia Simon    YOB: 1945  Age:         74 year old   [place] MRN:    [id number] Telephone Information:  Home Phone [phone]   Mobile Not on file.       Address:  [address] [place] [id number] Email address:  No e-mail address on record      Emergency Contact(s)    Name Relationship Lgl Grd Work Phone Home Phone Mobile Phone   1. BRIAN TAVERAS Daughter   [phone]           Primary language:  English     needed? No   [facility] [employer]:  [phone] op. 1  Other communication barriers:    Preferred Method of Communication:  Mail  Current living arrangement: I live in a private home with family  Mobility Status/ Medical Equipment: Independent w/Device    Health Maintenance  Health Maintenance Reviewed:   Health Maintenance Topics with due status: Overdue       Topic Date Due    FIT 05/08/1954    ZOSTER IMMUNIZATION 05/08/1994    MEDICARE ANNUAL WELLNESS VISIT 08/03/2017    PNEUMOCOCCAL IMMUNIZATION 65+ LOW/MEDIUM RISK 08/03/2017    DIABETIC FOOT EXAM 10/26/2017    PHQ-2 01/01/2019    TSH W/FREE T4 REFLEX 06/26/2019    LIPID 07/27/2019    MICROALBUMIN 07/27/2019    FALL RISK ASSESSMENT 07/27/2019    INFLUENZA VACCINE 09/01/2019     Health Maintenance Topics with due status: Due On       Topic Date Due    MAMMO SCREENING 07/27/2019     My Access Plan  Medical Emergency 911   Primary Clinic Line [facility] - [phone]   24 Hour Appointment Line [phone] or  [phone]) (toll-free)   24 Hour Nurse Line [phone] (toll-free)   Preferred Urgent Care (ER only)   Preferred Hospital [facility]  [phone]   Preferred Pharmacy [id number] - [place], [place] - [address]     Behavioral Health Crisis Line The National Suicide Prevention Lifeline at 1-666.918.7847 or 911     My Care  Team Members  Patient Care Team       Relationship Specialty Notifications Start End    Sekou Craig MD PCP - General   2/23/04     Phone: [phone] Fax: [phone] [address] [facility] [address]    Sekou Craig MD Assigned PCP   10/4/12     Phone: [phone] Fax: [phone] [address] [facility] [address]    Bee Talley, RN Personal Advocate & Liaison (PAL) Family Practice Admissions 9/25/19     Lawanda Hudson, RN Lead Care Coordinator   9/25/19     Phone: [phone]                 My Care Plans  Self Management and Treatment Plan  Goals and (Comments)  Goals        General    1. Health Care Directive (pt-stated)     Notes - Note created  9/25/2019 10:33 AM by Lawanda Hudson RN    Goal Statement: I will complete a health care directive and share this with my care team.   Measure of Success: Completion of a valid health care directive.   Supportive Steps to Achieve: Continued CCRN support. CCRN will mail introduction letter, complex care plan, honoring choices brochure, and health care directive. Patient will complete health care directive and share this with the care team. Patient will take medications as prescribed. Follow up with providers as recommended.    Barriers: none identified.   Strengths: Motivated, engaged in care coordination, strong support system.   Date to Achieve By: 01/2020  Patient expressed understanding of goal: Yes                 Action Plans on File:                       Advance Care Plans/Directives Type:        My Medical and Care Information  Problem List   Patient Active Problem List   Diagnosis     Disorder of bone and cartilage     Essential hypertension, benign     IBS (irritable bowel syndrome)     Obesity     HYPERLIPIDEMIA LDL GOAL <130     Hypertension goal BP (blood pressure) < 140/90     Advanced care planning/counseling discussion     Type 2 diabetes mellitus with diabetic neuropathy (H)     Brain mass       Current Medications and Allergies:  See printed Medication Report.    Care Coordination Start Date: 9/25/2019   Frequency of Care Coordination: monthly   Form Last Updated: 09/25/2019

## 2019-09-29 NOTE — PROGRESS NOTES
Department of Therapeutic Radiology--Radiation Oncology                   [facility] [address]  [address] [place]  [address]  Office:  [phone]  Fax:  [phone]   Radiation Oncology Clinic  [address] [place] [address]  Phone:  [phone]  Fax:  [phone]     RE: Claudia Simon : 1945   MRN: [id number] YAHIR: 10/1/2019     OUTPATIENT VISIT NOTE       DIAGNOSIS: Metastatic lung cancer     SUBJECTIVE:Mrs. Simon is a 74-year-old female with metastatic lung cancer.  She initially noticed a small pimple-like lump on her left forehead.  This was followed by development of headache, double vision and numbness around her left eye over a short period of time.  She presented to the emergency room in [facility] on . MRI of the brain showed extensive nodular dural thickening most conspicuous along the left cerebral convexity, but also extended on the right frontoparietal convexity with obstruction of the middle third superior sagittal sinus.  There was an enhancing mass within the left sphenotemporal buttress extending into the lateral orbit, middle cranial fossa and suprazygomatic  space.  Thirdly, there was a 1 cm area of nodular enhancement involving the superior parietal lobe.  Additionally, diffuse osseous disease were seen to involve the calvarium and clivus.       Given these findings, Ms. Simon was transferred to the [facility] for further evaluation and management. She underwent CT of the chest, abdomen and pelvis on  which revealed a 2.5 cm spiculated solid nodule in the left upper lobe and a 2.8 x 1.9 cm nodule in the anterior right upper lobe along with multiple prominent mediastinal lymph nodes, the largest being 12 mm in the AP window.  There were multiple ground-glass nodules in the right upper lobe also suspicious for metastatic spread.  There were no abnormal findings in the abdomen or pelvis.     She was initially seen as an  "inpatient.  Pathologic confirmation of metastatic disease was recommended. Due to the need to reverse anticoagulation, her biopsy was delayed until 9/23.  Ultimately CT guided biopsy of the left upper lobe lesion revealed non-small cell carcinoma, favor adenocarcinoma, poorly differentiated.  PD-L1 expression was low with TPS of 2-3%. Procedure was complicated by pneumothorax, which required left apical chest tube placement.     While an inpatient, Ms. Simon underwent additional workup with MRI of spine. Multiple T1 hypointense, T2 hyperintense enhancing lesions in T4, T6, T7, T8, T9 vertebral bodies and T6 right pedicle, spinous process and lamina. There was ventral epidural extension of the metastatic lesions from T5 down to the T7-8 intervertebral disc space resulting in mild to moderate canal stenosis, though without cord compression.     Ms. Simon is here today for discussion of palliative radiotherapy.  She is accompanied by her  and one daughter.  She has been very anxious over the last week, given all the unknowns.  She had fairly appetite and denies any difficulty with swallowing.  She is short of breath, but felt that could be due to her anxiety.  She is wearing an eye patch over the left eye due to double vision.  She notes that the left eye is much more swollen.  The bump on the forehead has grown in size and she has constant pressure like pain in the frontal region. Her numbness is limited to the left forehead.  She also endorses back pain at just below the shoulder blade.  She describe the pain as \"band like\".  It can get quite intense and it is exacerbated by movements.  She currently takes oxycodone 5 mg in the morning and evening and supplements with Tylenol during the day.  She is afraid to take more oxycodone as she is worried about being addicted.        OBJECTIVE: There were no vitals taken for this visit.   Gen: Somewhat anxious, no acute distress.  HEENT: a 1 cm firm lump in left " "forehead above the eyebrow, significantly enlarged compared to previous exam on 9/18.  After removing the eye patch, the left eye was very apparently protruding, with conjunctival injection and periorbital edema.  The extraocular muscle exam was difficult to perform due to ptosis, but lateral rectus palsy persists.  The right eye is also deficient in lateral gaze, which is new.  Numbness over the V1 distribution, but sensation to light touch is intact in V2 and V3 distribution on the left.    CV: well perfused  Resp: No audible wheezing, speaking full sentences.  Abdomen: soft non-tender.   Extremities: no edema.       IMAGING:            ASSESSMENT AND PLAN: In summary, Ms. Simon is a 75 yo female with newly diagnosed metastatic lung cancer. 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 She also has osseous metastases.  The disease in the thoracic vertebral bodies has extended into the spinal canal though there is no hany cord compression.      We discussed using whole brain radiation therapy to encompass her dural disease, periorbital metastases, parenchymal metastases and soft tissue nodule in the scalp.  I told her that it's hard to know if her exophthalmos and diplopia will improve.  At a minimum, we hope to slow down the tumor growth and delay further neurologic deficits.      We also discussed radiation to her thoracic spine.  It is quite a bit of marrow space from T4 to T9. I did entertain the idea of holding off the radiation and see if systemic therapy could get her bony disease under control.  However, given the rapidity of her disease progression in the last 2 weeks, her \"band-like\"   pain across her mid back, I am concerned that the extent of her epidural disease could increase quickly to cause more pain and new neurologic symptoms.  Thus I recommend treating her thoracic disease the same time as her brain radiotherapy.    Ms. Simon is eager " to start.  We went over the logistics and side effects.  She signed the consent form.    She will be simulated later today and will start treatment tomorrow.  Tentative plan is 3000 cGy in 10 fractions to both sites.  She will complete treatment on 10/16.      Ms. Simon is meeting with Dr. Roman next Tuesday to discuss systemic therapy.  I also recommend that she keeps appointment with Oculoplastics and/or Ophthalmology regarding eye care given the discomfort caused by the cliff-orbital disease.           Diomedes Valladares M.D./Ph.D.  Radiation Oncologist   Department of Therapeutic Radiology   [facility]  Phone: [phone]       CC  Patient Care Team:  Sekou Craig MD as PCP - General  Sekou Craig MD as Assigned PCP  Bee Talley, RN as Personal Advocate & Liaison (PAL) ([facility])  Lawanda Hudson, RN as Lead Care Coordinator  Glenys Roman MD

## 2019-09-30 NOTE — PATIENT INSTRUCTIONS
Patient Education   Personalized Prevention Plan  You are due for the preventive services outlined below.  Your care team is available to assist you in scheduling these services.  If you have already completed any of these items, please share that information with your care team to update in your medical record.  Health Maintenance Due   Topic Date Due     ANNUAL REVIEW OF HM ORDERS  1945     FIT Test  05/08/1954     Zoster (Shingles) Vaccine (1 of 2) 05/08/1994     Pneumococcal Vaccine (2 of 2 - PPSV23) 09/28/2016     Annual Wellness Visit  08/03/2017     Diabetic Foot Exam  10/26/2017     PHQ-2  01/01/2019     Thyroid Function Lab  06/26/2019     Cholesterol Lab  07/27/2019     Kidney Microalbumin Urine Test  07/27/2019     Mammogram  07/27/2019     Flu Vaccine (1) 09/01/2019

## 2019-09-30 NOTE — PROGRESS NOTES
SUBJECTIVE:   Hospital follow up visit. She'd been scheduled for wellness check but landed in hospital with metastatic cancer to her brain, left eye, chest , thoracic spine. This is her Hospital Follow up visit and I've been orchestrating [facility] care for her since her discharge.      Are you in the first 12 months of your Medicare coverage?  No    HPI   Do you feel safe in your environment? Yes    Do you have a Health Care Directive?       Fall risk   back back, new meds     Do you have sleep apnea, excessive snoring or daytime drowsiness?: no    Reviewed and updated as needed this visit by clinical staff         Reviewed and updated as needed this visit by Provider        Social History     Tobacco Use     Smoking status: Former Smoker     Years: 34.00     Last attempt to quit: 2/12/2009     Years since quitting: 10.6     Smokeless tobacco: Never Used   Substance Use Topics     Alcohol use: No         Alcohol Use 8/3/2016   Prescreen: >3 drinks/day or >7 drinks/week? The patient does not drink >3 drinks per day nor >7 drinks per week.       Current providers sharing in care for this patient include:   Patient Care Team:  Sekou Craig MD as PCP - General  Sekou Craig MD as Assigned PCP  Bee Talley, RN as Personal Advocate & Liaison (PAL) (Family Practice)  Lawanda Hudson, RN as Lead Care Coordinator    The following health maintenance items are reviewed in Epic and correct as of today:  Health Maintenance   Topic Date Due     ANNUAL REVIEW OF HM ORDERS  1945     FIT  05/08/1954     ZOSTER IMMUNIZATION (1 of 2) 05/08/1994     PNEUMOCOCCAL IMMUNIZATION 65+ HIGH/HIGHEST RISK (2 of 2 - PPSV23) 09/28/2016     MEDICARE ANNUAL WELLNESS VISIT  08/03/2017     DIABETIC FOOT EXAM  10/26/2017     PHQ-2  01/01/2019     TSH W/FREE T4 REFLEX  06/26/2019     LIPID  07/27/2019     MICROALBUMIN  07/27/2019     MAMMO SCREENING  07/27/2019     INFLUENZA VACCINE (1) 09/01/2019     A1C  03/18/2020      "EYE EXAM  09/18/2020     BMP  09/24/2020     FALL RISK ASSESSMENT  09/25/2020     ADVANCE CARE PLANNING  06/26/2022     DTAP/TDAP/TD IMMUNIZATION (3 - Td) 06/26/2027     DEXA  Completed     IPV IMMUNIZATION  Aged Out     MENINGITIS IMMUNIZATION  Aged Out     BP Readings from Last 3 Encounters:   09/30/19 122/60   09/24/19 116/59   09/17/19 130/70    Wt Readings from Last 3 Encounters:   09/30/19 65.5 kg (144 lb 4.8 oz)   09/17/19 68.5 kg (151 lb 1.6 oz)   07/27/18 73 kg (161 lb)                  Pneumonia Vaccine:Adults age 65+ who received Pneumovax (PPSV23) at 65 years or older: Should be given PCV13 > 1 year after their most recent PPSV23    Review of Systems  Constitutional, HEENT, cardiovascular, pulmonary, GI, , musculoskeletal, neuro, skin, endocrine and psych systems are negative, except as otherwise noted.    OBJECTIVE:   There were no vitals taken for this visit. Estimated body mass index is 25.94 kg/m  as calculated from the following:    Height as of 9/18/19: 1.626 m (5' 4\").    Weight as of 9/17/19: 68.5 kg (151 lb 1.6 oz).  Physical Exam  GENERAL: healthy, alert and moderate  Distress on analgesic with reasonable control   HENT: ear canals and TM's normal, nose and mouth without ulcers or lesions  NECK: no adenopathy, no asymmetry, masses, or scars and thyroid normal to palpation  RESP: no rales , no rhonchi and decreased breath sounds throughout  CV: regular rate and rhythm, normal S1 S2, no S3 or S4, no murmur, click or rub, no peripheral edema and peripheral pulses strong  MS: extremities normal- no gross deformities noted, limited back mobility   SKIN: no suspicious lesions or rashes  NEURO: Normal strength and tone, mentation intact and speech normal,   PSYCH: mentation appears normal, affect normal, she's always been stoic     Diagnostic Test Results:  Labs reviewed in Epic    ASSESSMENT / PLAN:   1. Type 2 diabetes mellitus with diabetic neuropathy, without long-term current use of insulin " "(H)  Reasonable labs while in hospital   - gabapentin (NEURONTIN) 300 MG capsule; Take 1 capsule (300 mg) by mouth daily At Noon.  Dispense: 90 capsule; Refill: 3 and qh6 oxycodone for multiple tumors, brain, spine and lung     2. Hyperlipidemia LDL goal <130  Reasonable control     3. Encounter for immunization  Flu shot utd    4. Encounter for Medicare annual wellness exam  Severe illness with multiple tumor masses, the nature of which will be clarified in her [facility] follow up     5. Brain mass  Left ear and eye pain   - oxyCODONE (ROXICODONE) 5 MG tablet; Take 1 tablet (5 mg) by mouth every 6 hours as needed for moderate to severe pain  Dispense: 60 tablet; Refill: 0  - gabapentin (NEURONTIN) 300 MG capsule; Take 1 capsule (300 mg) by mouth daily At Noon.  Dispense: 90 capsule; Refill: 3    6. Essential hypertension, benign  At goal  - metoprolol succinate ER (TOPROL-XL) 25 MG 24 hr tablet; TAKE ONE TABLET BY MOUTH ONE TIME DAILY  Dispense: 90 tablet; Refill: 0    7. Benign essential hypertension    - lisinopril (PRINIVIL/ZESTRIL) 40 MG tablet; Take 1 tablet (40 mg) by mouth daily  Dispense: 90 tablet; Refill: 2    End of Life Planning:  As her workup progresses, her  and daughter attend with her    Estimated body mass index is 25.94 kg/m  as calculated from the following:    Height as of 9/18/19: 1.626 m (5' 4\").    Weight as of 9/17/19: 68.5 kg (151 lb 1.6 oz).         reports that she quit smoking about 10 years ago. She quit after 34.00 years of use. She has never used smokeless tobacco.      Appropriate preventive services were discussed with this patient, including applicable screening as appropriate for cardiovascular disease, diabetes, osteopenia/osteoporosis, and glaucoma.  As appropriate for age/gender, discussed screening for colorectal cancer, prostate cancer, breast cancer, and cervical cancer. Checklist reviewing preventive services available has been given to the patient.    Reviewed patients " plan of care and provided an AVS. The Complex Care Plan (for patients with higher acuity and needing more deliberate coordination of services) for Claudia meets the Care Plan requirement. This Care Plan has been partially  established and reviewed with the disease specific, home care, palliative and comfort components .    Counseling Resources:  ATP IV Guidelines  Pooled Cohorts Equation Calculator  Breast Cancer Risk Calculator  FRAX Risk Assessment  ICSI Preventive Guidelines  Dietary Guidelines for Americans, 2010  USDA's MyPlate  ASA Prophylaxis  Lung CA Screening    Sekou Craig MD  [facility]    Identified Health Risks:

## 2019-10-01 NOTE — TELEPHONE ENCOUNTER
ONCOLOGY INTAKE: Records Information      APPT INFORMATION: 10/8/19 - Jessie - [facility]  Referring provider:  Blaine  Referring provider s clinic:  Rad Onc  Reason for visit/diagnosis: lung adenocarcinoma    Has patient been notified of appointment date and time?: Yes    RECORDS INFORMATION:  Were the records received with the referral (via [employer])? Internal referral    Has patient been seen for any external appt for this diagnosis? No    If yes, where? NA    Has patient had any imaging or procedures outside of [place]  [place] for this condition? No      If Yes, where? NA    ADDITIONAL INFORMATION:  Dr Roman is requesting a PET scan & lab appt prior - Unable to schedule as there are no orders enetered. I sent an [employer] message to Dr Roman on 9/30/19 asking for orders to be entered.     Below is from her discharge summary in Epic dated 9/24/19:      Claudia Simon was admitted on 9/17/2019 for acute left CN6 palsy and left periorital pain              found to have likely metastatic lung cancer.

## 2019-10-02 NOTE — LETTER
10/2/2019       RE: Claudia Simon  [address]  [facility] [address]     Dear Colleague,    Thank you for referring your patient, Claudia Simon, to the RADIATION ONCOLOGY CLINIC. Please see a copy of my visit note below.    Department of Therapeutic Radiology--Radiation Oncology                   [place] [address]  [address] [place]  [address]  Office:  [phone]  Fax:  [phone]   Radiation Oncology Clinic  [address] [place] [address]  Phone:  [phone]  Fax:  [phone]     RE: Claudia Simon : 1945   MRN: [id number] YAHIR: 10/1/2019     OUTPATIENT VISIT NOTE       DIAGNOSIS: Metastatic lung cancer     SUBJECTIVE:Mrs. Simon is a 74-year-old female with metastatic lung cancer.  She initially noticed a small pimple-like lump on her left forehead.  This was followed by development of headache, double vision and numbness around her left eye over a short period of time.  She presented to the emergency room in [facility] on . MRI of the brain showed extensive nodular dural thickening most conspicuous along the left cerebral convexity, but also extended on the right frontoparietal convexity with obstruction of the middle third superior sagittal sinus.  There was an enhancing mass within the left sphenotemporal buttress extending into the lateral orbit, middle cranial fossa and suprazygomatic  space.  Thirdly, there was a 1 cm area of nodular enhancement involving the superior parietal lobe.  Additionally, diffuse osseous disease were seen to involve the calvarium and clivus.       Given these findings, Ms. Simon was transferred to the [place] for further evaluation and management. She underwent CT of the chest, abdomen and pelvis on  which revealed a 2.5 cm spiculated solid nodule in the left upper lobe and a 2.8 x 1.9 cm nodule in the anterior right upper lobe along with multiple prominent mediastinal lymph  "nodes, the largest being 12 mm in the AP window.  There were multiple ground-glass nodules in the right upper lobe also suspicious for metastatic spread.  There were no abnormal findings in the abdomen or pelvis.     She was initially seen as an inpatient.  Pathologic confirmation of metastatic disease was recommended. Due to the need to reverse anticoagulation, her biopsy was delayed until 9/23.  Ultimately CT guided biopsy of the left upper lobe lesion revealed non-small cell carcinoma, favor adenocarcinoma, poorly differentiated.  PD-L1 expression was low with TPS of 2-3%. Procedure was complicated by pneumothorax, which required left apical chest tube placement.     While an inpatient, Ms. Simon underwent additional workup with MRI of spine. Multiple T1 hypointense, T2 hyperintense enhancing lesions in T4, T6, T7, T8, T9 vertebral bodies and T6 right pedicle, spinous process and lamina. There was ventral epidural extension of the metastatic lesions from T5 down to the T7-8 intervertebral disc space resulting in mild to moderate canal stenosis, though without cord compression.     Ms. Simon is here today for discussion of palliative radiotherapy.  She is accompanied by her  and one daughter.  She has been very anxious over the last week, given all the unknowns.  She had fairly appetite and denies any difficulty with swallowing.  She is short of breath, but felt that could be due to her anxiety.  She is wearing an eye patch over the left eye due to double vision.  She notes that the left eye is much more swollen.  The bump on the forehead has grown in size and she has constant pressure like pain in the frontal region. Her numbness is limited to the left forehead.  She also endorses back pain at just below the shoulder blade.  She describe the pain as \"band like\".  It can get quite intense and it is exacerbated by movements.  She currently takes oxycodone 5 mg in the morning and evening and supplements " "with Tylenol during the day.  She is afraid to take more oxycodone as she is worried about being addicted.        OBJECTIVE: There were no vitals taken for this visit.   Gen: Somewhat anxious, no acute distress.  HEENT: a 1 cm firm lump in left forehead above the eyebrow, significantly enlarged compared to previous exam on 9/18.  After removing the eye patch, the left eye was very apparently protruding, with conjunctival injection and periorbital edema.  The extraocular muscle exam was difficult to perform due to ptosis, but lateral rectus palsy persists.  The right eye is also deficient in lateral gaze, which is new.  Numbness over the V1 distribution, but sensation to light touch is intact in V2 and V3 distribution on the left.    CV: well perfused  Resp: No audible wheezing, speaking full sentences.  Abdomen: soft non-tender.   Extremities: no edema.       IMAGING:            ASSESSMENT AND PLAN: In summary, Ms. Simon is a 73 yo female with newly diagnosed metastatic lung cancer. 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 She also has osseous metastases.  The disease in the thoracic vertebral bodies has extended into the spinal canal though there is no hany cord compression.      We discussed using whole brain radiation therapy to encompass her dural disease, periorbital metastases, parenchymal metastases and soft tissue nodule in the scalp.  I told her that it's hard to know if her exophthalmos and diplopia will improve.  At a minimum, we hope to slow down the tumor growth and delay further neurologic deficits.      We also discussed radiation to her thoracic spine.  It is quite a bit of marrow space from T4 to T9. I did entertain the idea of holding off the radiation and see if systemic therapy could get her bony disease under control.  However, given the rapidity of her disease progression in the last 2 weeks, her \"band-like\"   pain across her mid " back, I am concerned that the extent of her epidural disease could increase quickly to cause more pain and new neurologic symptoms.  Thus I recommend treating her thoracic disease the same time as her brain radiotherapy.    Ms. Simon is eager to start.  We went over the logistics and side effects.  She signed the consent form.    She will be simulated later today and will start treatment tomorrow.  Tentative plan is 3000 cGy in 10 fractions to both sites.  She will complete treatment on 10/16.      Ms. Simon is meeting with Dr. Roman next Tuesday to discuss systemic therapy.  I also recommend that she keeps appointment with Oculoplastics and/or Ophthalmology regarding eye care given the discomfort caused by the cliff-orbital disease.           Diomedes Valladares M.D./Ph.D.  Radiation Oncologist   Department of Therapeutic Radiology   [facility]  Phone: [phone] cc  Patient Care Team:  Sekou Craig MD as PCP - Bee Rosales, RN as Personal Advocate & Liaison (PAL) ([facility])  Lawanda Hudson RN as Lead Care Coordinator  Glenys Roman MD

## 2019-10-02 NOTE — NURSING NOTE
Radiation Therapy Patient Education    Person involved with teaching: Patient,  and Daughter    Patient educational needs for self management of treatment-related side effects assessment completed.  EPIC Patient Ed tab contains Patient Learning Assessment    Education Materials Given  Sim pamphlet and schedule    Educational Topics Discussed  Side effects expected, Pain management, Skin care, Activity, Nutrition and weight loss and When to call MD/RN    Response To Teaching  Verbalizes understanding. Daughter taking notes for pt to refer back to.     Referrals sent: None    Chemotherapy?  No

## 2019-10-05 NOTE — TELEPHONE ENCOUNTER
senna (SENOKOT) 8.6 MG tablet      Last Written Prescription Date:    Last Fill Quantity: ,   # refills:   Last Office Visit: 09/30/2019  Future Office visit:       Routing refill request to provider for review/approval because:  Drug not on the [facility], [facility] or  Health refill protocol or controlled substance    Colt PURDY

## 2019-10-07 NOTE — LETTER
10/7/2019         RE:  :  MRN: Claudia Simon  1945  [id number]     Dear Dr. Diomedes Valladares,    Thank you for asking me to see your patient, Claudia Simon, for an oculoplastic   consultation.  My assessment and plan are below.  For further details, please see my attached clinic note.      Chief Complaint(s) and History of Present Illness(es)     orbital tumor     Comments: Severe illness with multiple tumor masses              Third Nerve Palsy     Laterality: left eye    Onset: new    Quality: oblique    Frequency: constantly    Course: rapidly worsening    Associated symptoms: droopy eyelid, headaches, unequal pupil size, eye   pain and blurred vision    Comments: Acute 6th, but now appears to have 3rd. And Right 6th.              Comments     Noted oblique diplopia 2 weeks ago, ptosis ~ 2 weeks as well. Wearing   patch to help. Overall vision is poor, usually sits with her eyes closed,   worsening in LE rapidly, but possibly hard to tell with complete ptosis.   PET scan done this am. Radiation apt today at 2pm. Pain temporally with   left side of face, feels two areas that are swollen.   Inf: pt,       Dramatic vision loss over two weeks.   Currently undergoing palliative radiation, and may start chemo in near future.    Pain is moderate but has pain all over her body.         Assessment & Plan     Claudia Simon is a 74 year old female with the following diagnoses:   1. Sixth nerve palsy of both eyes    2. 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 Brain mass    4. Orbital mass    5. Adenocarcinoma of lung, unspecified laterality (H)    6. Compressive optic neuropathy       Enlarging brain met to left orbit. Bilateral sixth nerve palsy ? Related to brain mets rather than orbit disease on the right as I don't see any evidence of tumor in the right orbit on toda'ys PET scan or prior MRI.    She has significant optic neuropathy on the left, probably  compressive.    Discussed prognosis is guarded. Options are:  1. Surgical - either orbitotomy approach or craniotomy approach to debulking the mass  2. Steroid (would have to discuss with oncology, has had issues with blood sugars in the past)  3. Observation    I think regardless prognosis is poor. After discussing these options they would like to consider using steroids. Will touch base with oncology and if ok start steroids and see her back in 2-3 weeks. Sooner with changes in the right eye.       Again, thank you for allowing me to participate in the care of your patient.      Sincerely,    Danisha Wiggins MD  Department of Ophthalmology and Visual Neurosciences  [facility]    CC: Sekou Craig MD  [address] [facility] [address]  VIA In Basket     Diomedes Valladares MD  [address]  [facility] [address]  VIA In Basket     Bee Talley, RN  VIA In Basket     Lawanda Hudson, GUERO  VIA In Basket     Glenys Roman MD  [address]  [facility] [address]  VIA In Basket

## 2019-10-07 NOTE — PROGRESS NOTES
[facility] PHYSICIANS  SPECIALIZING IN BREAKTHROUGHS  Radiation Oncology    On Treatment Visit Note      Claudia Simon      Date: 10/7/2019   MRN: [id number]   : 1945         Reason for Visit:  On Radiation Treatment Visit     Treatment Summary to Date   1. Brain 2.  T spine   900 cGy/3000 cGy   3 fx / 10 fx       ED Visit/Hosiptal Admission: None    Treatment Breaks: None    Subjective:   Ms. Simon tolerated the first three treatments well.  However, she's had a rough last couple of days.  Her left frontal headaches has gotten worse, as has her back pain.  She takes oxycodone 5 mg about every 6 hours. She states that her pain usually gets bad again about 1.5 hours after taking the medication.  It can get as bad as almost a 10 out of 10.  She feels the subcutaneous nodule above her left eye brow has grown some more.  She saw Dr. Wilkerson earlier today.  Surgical debulking was not felt to be in Claudia's best interest.  Family has been applying eye drops to keep her eye lubricated.  She has worsening ptosis and can no longer see out of the left eye without retracting the lid manually.     Objective:   There were no vitals taken for this visit.  Gen: NAD, wearing eye patch.  Skin: No erythema, subcutaneous nodule is a little bigger compared to last Wednesday.  Exophthalmos and ptosis of the left eye, also worsen compared to last week.      Labs:  CBC RESULTS:   Recent Labs   Lab Test 19  0730   WBC 14.8*   RBC 5.05   HGB 15.4   HCT 46.4   MCV 92   MCH 30.5   MCHC 33.2   RDW 13.5        ELECTROLYTES:  Recent Labs   Lab Test 10/07/19  0802 19  0730   NA  --  130*   POTASSIUM  --  4.9   CHLORIDE  --  97   BIBI  --  9.5   CO2  --  22   BUN  --  30   CR  --  0.73   * 122*       Assessment:    Tolerating radiation therapy well.  All questions and concerns addressed.    Toxicities:  Fatigue: Grade 2: Fatigue not relieved by rest; limiting instrumental ADL  Headache: Grade  2: Moderate pain; limiting instrumental ADL  Dermatitis: Grade 0: No toxicity    Plan:   1. Continue current therapy.    2. Pain: she can take oxycodone 2 tablets every 4 hours as needed for pain.  Further pain management will be determined by Dr. Roman.  3. L orbital pain and swelling: She was on decadron as an inpatient.  This was tapered off very quickly for her.  Reordered Decadron 4 mg tablets.  She will take 1 tablet tonight and 1 tablet tomorrow morning.  Further instructions will be given by Dr. Roman.        Mosaiq chart and setup information reviewed  Ports checked                  Diomedes Valladares MD/PhD  [phone] clinic  Pager [phone]    Please do not send letter to referring physician.

## 2019-10-07 NOTE — PROGRESS NOTES
Chief Complaint(s) and History of Present Illness(es)     orbital tumor     Comments: Severe illness with multiple tumor masses              Third Nerve Palsy     Laterality: left eye    Onset: new    Quality: oblique    Frequency: constantly    Course: rapidly worsening    Associated symptoms: droopy eyelid, headaches, unequal pupil size, eye   pain and blurred vision    Comments: Acute 6th, but now appears to have 3rd. And Right 6th.              Comments     Noted oblique diplopia 2 weeks ago, ptosis ~ 2 weeks as well. Wearing   patch to help. Overall vision is poor, usually sits with her eyes closed,   worsening in LE rapidly, but possibly hard to tell with complete ptosis.   PET scan done this am. Radiation apt today at 2pm. Pain temporally with   left side of face, feels two areas that are swollen.   Inf: pt,       Dramatic vision loss over two weeks.   Currently undergoing palliative radiation, and may start chemo in near future.    Pain is moderate but has pain all over her body.         Assessment & Plan     Claudia Simon is a 74 year old female with the following diagnoses:   1. Sixth nerve palsy of both eyes    2. 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 Brain mass    4. Orbital mass    5. Adenocarcinoma of lung, unspecified laterality (H)    6. Compressive optic neuropathy       Enlarging brain met to left orbit. Bilateral sixth nerve palsy ? Related to brain mets rather than orbit disease on the right as I don't see any evidence of tumor in the right orbit on toda'ys PET scan or prior MRI.    She has significant optic neuropathy on the left, probably compressive.    Discussed prognosis is guarded. Options are:  1. Surgical - either orbitotomy approach or craniotomy approach to debulking the mass  2. Steroid (would have to discuss with oncology, has had issues with blood sugars in the past)  3. Observation    I think regardless prognosis is poor.  After discussing these options they would like to consider using steroids. Will touch base with oncology and if ok start steroids and see her back in 2-3 weeks. Sooner with changes in the right eye.     Addendum: Discussed with oncology. Likely to start immunotherapy and steroids would make that less effective. Will hold on steroids. I left voicemail for Mrs. Simon. Unfortunately prognosis for vision is very poor in that left eye.        Attending Physician Attestation:  Complete documentation of historical and exam elements from today's encounter can be found in the full encounter summary report (not reduplicated in this progress note).  I personally obtained the chief complaint(s) and history of present illness.  I confirmed and edited as necessary the review of systems, past medical/surgical history, family history, social history, and examination findings as documented by others; and I examined the patient myself.  I personally reviewed the relevant tests, images, and reports as documented above.  I formulated and edited as necessary the assessment and plan and discussed the findings and management plan with the patient and family. - Danisha Wiggins MD

## 2019-10-07 NOTE — LETTER
Date:October 8, 2019      Provider requested that no letter be sent. Do not send.       [facility] Health Information

## 2019-10-07 NOTE — NURSING NOTE
Chief Complaint(s) and History of Present Illness(es)     orbital tumor     Comments: Severe illness with multiple tumor masses,               Sixth Nerve Palsy     Laterality: left eye    Comments: acute left CN6 palsy and left periorital pain found to have likely metastatic lung cancer.               Comments     Noted diplopia 2 weeks ago, ptosis ~ 2 weeks as well. Wearing patch to help. Overall vision is poor, usually sits with her eyes closed. Recent PET scan done. Radiation apt today at 2pm. Pain temporally with left side of face, feels two areas that are swollen.   Inf: pt,

## 2019-10-07 NOTE — DISCHARGE INSTRUCTIONS

## 2019-10-07 NOTE — LETTER
10/7/2019       RE: Claudia Simon  [id number]  [facility] [address]     Dear Colleague,    Thank you for referring your patient, Claudia Simon, to the RADIATION ONCOLOGY CLINIC. Please see a copy of my visit note below.    [facility]  SPECIALIZING IN BREAKTHROUGHS  Radiation Oncology    On Treatment Visit Note      Claudia Simon      Date: 10/7/2019   MRN: [id number]   : 1945         Reason for Visit:  On Radiation Treatment Visit     Treatment Summary to Date   1. Brain 2.  T spine   900 cGy/3000 cGy   3 fx / 10 fx       ED Visit/Hosiptal Admission: None    Treatment Breaks: None    Subjective:   Ms. Simon tolerated the first three treatments well.  However, she's had a rough last couple of days.  Her left frontal headaches has gotten worse, as has her back pain.  She takes oxycodone 5 mg about every 6 hours. She states that her pain usually gets bad again about 1.5 hours after taking the medication.  It can get as bad as almost a 10 out of 10.  She feels the subcutaneous nodule above her left eye brow has grown some more.  She saw Dr. Wilkerson earlier today.  Surgical debulking was not felt to be in Claudia's best interest.  Family has been applying eye drops to keep her eye lubricated.  She has worsening ptosis and can no longer see out of the left eye without retracting the lid manually.     Objective:   There were no vitals taken for this visit.  Gen: NAD, wearing eye patch.  Skin: No erythema, subcutaneous nodule is a little bigger compared to last Wednesday.  Exophthalmos and ptosis of the left eye, also worsen compared to last week.      Labs:  CBC RESULTS:   Recent Labs   Lab Test 19  0730   WBC 14.8*   RBC 5.05   HGB 15.4   HCT 46.4   MCV 92   MCH 30.5   MCHC 33.2   RDW 13.5        ELECTROLYTES:  Recent Labs   Lab Test 10/07/19  0802 19  0730   NA  --  130*   POTASSIUM  --  4.9   CHLORIDE  --  97   BIBI  --  9.5   CO2  --   22   BUN  --  30   CR  --  0.73   * 122*       Assessment:    Tolerating radiation therapy well.  All questions and concerns addressed.    Toxicities:  Fatigue: Grade 2: Fatigue not relieved by rest; limiting instrumental ADL  Headache: Grade 2: Moderate pain; limiting instrumental ADL  Dermatitis: Grade 0: No toxicity    Plan:   1. Continue current therapy.    2. Pain: she can take oxycodone 2 tablets every 4 hours as needed for pain.  Further pain management will be determined by Dr. Roman.  3. L orbital pain and swelling: She was on decadron as an inpatient.  This was tapered off very quickly for her.  Reordered Decadron 4 mg tablets.  She will take 1 tablet tonight and 1 tablet tomorrow morning.  Further instructions will be given by Dr. Roman.        Mosaiq chart and setup information reviewed  Ports checked                  Diomedes Valladares MD/PhD  [phone] clinic  Pager [phone]    Please do not send letter to referring physician.      Again, thank you for allowing me to participate in the care of your patient.      Sincerely,    Diomedes Valladares MD

## 2019-10-07 NOTE — TELEPHONE ENCOUNTER
Routing refill request to provider for review/approval because:  Medication is reported/historical    Abi Pierre RN -- [facility]

## 2019-10-08 PROBLEM — C34.92 PRIMARY LUNG ADENOCARCINOMA, LEFT (H): Status: ACTIVE | Noted: 2019-01-01

## 2019-10-08 NOTE — PROGRESS NOTES
[id number]: Informed Consent Note     The patient was provided with a copy of the IRB-approved consent form. The form was reviewed, and all questions were answered before the patient signed the consent form. A copy of the signed form was provided to the patient. No procedures specific to this study were performed prior to the patient signing the consent form.  The specific study procedures that were completed today were:  Questionnaire completed: Patient will be bringing back to clinic.  Was urine collection collected during this visit: yes  Brushing of the teeth occurred prior to collection of mouth specimens. Patient waited at least 15 minutes without eating, drinking, chewing gum and smoking.   Was oral rinse collected during this visit: yes  Were the right and left buccal swabs collected during this visit: yes  Was baseline blood collected during this visit: yes    The patient will be bringing back the toenail collection next visit.  All samples are being processed and stored by [facility].     Consent Version Date: [id number]  Consent obtained by: Kiara Kelly    Date: 08OCT2019  HIPAA authorization signed?: yes  HIPAA authorization version date: [id number]  IRB #: [id number]    Kiara Kelly CMA    :    Glenys Roman  Pager: [phone]  Clinical Research Coordinator:  Kiara Kelly  Phone: [phone]  Pager: [phone]

## 2019-10-08 NOTE — PATIENT INSTRUCTIONS
Breast Cancer Screening: During our visit today, we discussed that it is recommended you receive breast cancer screening. Please call or make an appointment with your primary care provider to discuss this with them. You may also call the [facility] scheduling line ([phone]) to set up a mammography appointment at the Breast Center within the [facility].

## 2019-10-08 NOTE — LETTER
10/8/2019       RE: Claudia Simon  [address]  [facility] [address]     Dear Colleague,    Thank you for referring your patient, Claudia Simon, to the [facility]. Please see a copy of my visit note below.    [facility]    NEW PATIENT VISIT NOTE    PATIENT NAME: Claudia Simon MRN # [id number]  DATE OF VISIT: October 8, 2019 YOB: 1945    CANCER TYPE:  STAGE:   ECOG PS:     Cancer Staging  No matching staging information was found for the patient.    PD-L1: 2-3%  Lung panel: EGFR G719A mutation  NGS:    SUMMARY    9/17/19 ED @ [place].   9/17/19 Brain MRI  9/18/19 CT CAP  9/23/19 [facility] bx. PD-L1 2-3%, poorly diff NSCLC, TTF-1 +amita, p40-amita. EGFR G719A mutation. C/b PTX requiring chest tube, removed 9/24   MRI spine    SUBJECTIVE    8/13 pimple show, blurry vision,   Pain    Oxycodone increased to 10 mg q4h prn - 8 per day  5 mg only lasted about 45 min    Constipation - last BM last night. 2 tab BID    Using walker for a few years      PAST MEDICAL HISTORY    DM - no meds  HTN   Dyslipidemia    CURRENT OUTPATIENT MEDICATIONS  Current Outpatient Medications   Medication Sig Dispense Refill     cholecalciferol (VITAMIN D3) 5000 units (125 mcg) capsule Take 5,000 Units by mouth daily       gabapentin (NEURONTIN) 300 MG capsule Take 1 capsule (300 mg) by mouth daily At Noon. 90 capsule 3     lidocaine (LIDODERM) 5 % patch Place 2 patches onto the skin every 24 hours To prevent lidocaine toxicity, patient should be patch free for 12 hrs daily. 60 patch 1     lisinopril (PRINIVIL/ZESTRIL) 40 MG tablet Take 1 tablet (40 mg) by mouth daily 90 tablet 2     metoprolol succinate ER (TOPROL-XL) 25 MG 24 hr tablet TAKE ONE TABLET BY MOUTH ONE TIME DAILY 90 tablet 0     oxyCODONE (ROXICODONE) 5 MG tablet Take 1 tablet (5 mg) by mouth every 6 hours as needed for moderate to severe pain 60 tablet 0     senna (SENOKOT) 8.6 MG tablet  Take 1-2 tablets by mouth 2 times daily       ASPIRIN 81 MG OR TABS ONE DAILY (Patient not taking: No sig reported) 100 3     dexamethasone (DECADRON) 4 MG tablet Take 1 tablet twice daily by mouth for the first 2 dose.  Further dose adjustment will be made by Dr. Roman. (Patient not taking: Reported on 10/8/2019) 30 tablet 0     SM SENNA LAXATIVE 8.6 MG tablet TAKE TWO TABLETS BY MOUTH TWICE DAILY  (Patient not taking: Reported on 10/8/2019) 120 tablet 0     ALLERGIES  Allergies   Allergen Reactions     Sulfa Drugs Anaphylaxis     Angioedema       SOCIAL HISTORY:   Grew up in [place]  Lived in [place] for a while too.   3 daughters     FAMILY HISTORY:     REVIEW OF SYSTEMS  As above in the HPI, o/w complete 12-point ROS was negative.    PHYSICAL EXAM  /72 (BP Location: Right arm, Patient Position: Sitting, Cuff Size: Adult Regular)   Pulse 64   Temp 97.7  F (36.5  C) (Oral)   Resp 16   Wt 65.3 kg (143 lb 14.4 oz)   SpO2 97%   BMI 24.70 kg/m     Wt Readings from Last 3 Encounters:   10/08/19 65.3 kg (143 lb 14.4 oz)   10/07/19 66.3 kg (146 lb 1.6 oz)   09/30/19 65.5 kg (144 lb 4.8 oz)     GEN: NAD  HEENT: EOMI, no icterus, injection or pallor. Oropharynx is clear.  NECK: no cervical or supraclavicular lymphadenopathy  LUNGS: clear bilaterally  CV: regular, no murmurs, rubs, or gallops  ABDOMEN: soft, non-tender, non-distended, normal bowel sounds  EXT: warm, well perfused, no edema  NEURO: alert  SKIN: no rashes    LABORATORY AND IMAGING STUDIES    Results for orders placed or performed in visit on 10/07/19   PET Oncology Whole Body    Narrative    Combined Report of:    PET and CT on  10/7/2019 9:41 AM :    1. PET of the neck, chest, abdomen, and pelvis.  2. PET CT Fusion for Attenuation Correction and Anatomical  Localization:    3. Diagnostic CT scan of the chest, abdomen, and pelvis with  intravenous contrast for interpretation.  3. CT of the chest, abdomen and pelvis obtained for  diagnostic  interpretation.  4. 3D MIP and PET-CT fused images were processed on an independent  workstation and archived to PACS and reviewed by a radiologist.    Technique:    1. PET: The patient received 16.3 mCi of F-18-FDG; the serum glucose  was 143 prior to administration, body weight was 65.5 kg. Images were  evaluated in the axial, sagittal, and coronal planes as well as the  rotational whole body MIP. Images were acquired from the Vertex to the  Feet.    UPTAKE WAS MEASURED AT 60 MINUTES.     BACKGROUND:  Liver SUV max= 3.36,   Aorta Blood SUV Max: 2.42.     2. CT: Volumetric acquisition for clinical interpretation of the  chest, abdomen, and pelvis acquired at 3 mm sections . The chest,  abdomen, and pelvis were evaluated at 5 mm sections in bone, soft  tissue, and lung windows.      The patient received 100 cc. Of Optiray 320 intravenously for the  examination.    Oral Contrast: Breeza 500 ml     3. 3D MIP and PET-CT fused images were processed on an independent  workstation and archived to PACS and reviewed by a radiologist.    INDICATION: newly diagnosed lung adenocarcinoma, staging; Primary lung  adenocarcinoma, left (H)    ADDITIONAL INFORMATION OBTAINED FROM EMR: 74-year-old female with  recently diagnosed metastatic non-small cell lung cancer. Metastatic  disease involves the brain with both parenchymal and pachymeningeal  involvement, osseous metastases to the thoracic vertebral bodies with  extension into the spinal canal, periorbital mets, soft tissue met to  the forehead.     COMPARISON: MRI cervical, thoracic, lumbar spine 9/19/2019. MR face  9/19/2019. CT CAP 9/18/2019. CT head 9/18/2019. MR head 9/17/2019.    FINDINGS:     HEAD/NECK:    Please see same day head and neck PET CT.    CHEST:  Heart size is normal. There is no pericardial effusion. The great  vessels are normal in diameter and configuration. Mild coronary artery  calcifications. No central pulmonary embolus. The  central  tracheobronchial tree is patent.    Prevascular lymph node (series 4, image 22) with mildly increased FDG  uptake of 3.5, lymph node are not enlarged by size criteria on CT.  Para-aortic lymph node (series 3, image 369) with increased SUV uptake  of 5.55. Multiple paratracheal and prevascular lymph nodes with  increased FDG uptake with maximal SUV of 7.10 (series 3, image 342),  nodes are not enlarged by size criteria.    Left upper lobe spiculated pulmonary mass measuring 3.8 x 2.8 cm with  max SUV of 8.47 (series 10, image 61) with spiculations extending to  the pleura with associated pleural nodularity, mass is unchanged in  size from 9/18/2019. Medial right upper lobe subpleural mass measuring  2.8 x 2.2 cm, previously measured 1.9 x 1.9 cm with max SUV of 12.84.  Subpleural pulmonary nodule, right upper lobe (series 10, image 84)  measuring 0.8 x 0.8 cm with minimally increased FDG uptake with  maximum SUV of 1.36, nodule is increased in conspicuity with increased  solid component though stable in size. Nodular solid opacity in the  lateral right upper lobe measuring 0.8 x 0.6 cm minimal increased FDG  uptake with maximum SUV of 0.81. Nodular cystic opacity along the  lateral right upper lobe measuring approximately 0.6 x 0.5 cm (series  10, image 52), no associated increased FDG uptake.    Advanced centrilobular and paraseptal emphysematous change. No pleural  effusion. No pneumothorax. Bronchial wall thickening. Multiple  pulmonary nodules for example 4 mm solid pulmonary nodule (series 10,  image 70), left lower lobe no increased FDG uptake. 3 mm solid  pulmonary nodule lateral left upper lobe, no increased FDG uptake  (series 10, image 37). Reticular opacity in the anterior right upper  lobe (series 10, image 93), there is no associated increased FDG  uptake.    ABDOMEN AND PELVIS:  Subcentimeter hypodense cystic lesion in the right lobe the liver  (series 4, image 65), no associated increased FDG  uptake, likely  hepatic cyst. Liver is otherwise unremarkable. Gallbladder is  unremarkable. Common bile duct measures 1.1 cm. There is no  intrahepatic biliary dilation. Pancreas is unremarkable. No pancreatic  ductal dilation. 2.5 x 4.4 cm right parapelvic cyst, no increased FDG  uptake. Left kidney is unremarkable. Right adrenal gland is  unremarkable. Mild nodular thickening of the left adrenal gland.  Spleen is within normal limits. The major vasculature of the abdomen  and pelvis is patent. Mild to moderate atheromatous plaque throughout  the abdominal aorta and its major branches. No infrarenal abdominal  aortic aneurysm. Sigmoid diverticulosis. No evidence of  diverticulitis. No dilated loops of small and large bowel. Appendix is  normal. No focal areas of bowel wall thickening. Surgical clips within  the left lower quadrant. A small fat-containing umbilical hernia. No  free fluid within the abdomen or pelvis. Urinary bladder is  unremarkable.    There is no suspicious FDG uptake in the abdomen or pelvis.    LOWER EXTREMITIES:   No abnormal masses or hypermetabolic lesions.    BONES:   Increased FDG uptake within T4-T9 and S1 vertebral bodies with max SUV  of 5.99 within the vertebral body of T6.Sclerotic lesion within the  left ilium, increased FDG uptake with maximum SUV of 5.76. Mildly  increased FDG uptake at the site of the L5-S1 disc spacer, this may be  related to presence of hardware. Increased FDG uptake within the  inferior aspect of the left SI joint with max SUV of 3.91, mild  sclerosis on CT. Minimally increased FDG uptake along the right ilium  with Max SUV 3.51, there is no CT correlate abnormality. Mild  increased FDG uptake within the lateral aspect of right rib 2 with  maximum SUV of 3.49, no CT correlate abnormality.    Multilevel degenerative changes. Disc spacer at L5-S1. Spinal fusion  hardware including screws and rods at L3-L5. Decreased mineralization  lower lumbar spine. Superior  endplate collapse of T9. Wedge deformity  of T6-T8. Superior endplate depression of T4.    SOFT TISSUE:  Mild increased FDG uptake within the musculature along the greater  trochanters, this is likely physiologic.      Impression    IMPRESSION:   In this patient with metastatic adenocarcinoma of the lun. Metastatic adenocarcinoma of the lung; Left upper lobe spiculated  mass measuring up to 3.8 x 2.8 with increased FDG uptake with maximum  SUV of 8.47. Medial right upper lobe pleural mass measuring up to 2.8  cm with max SUV of 12.4, lesion previously measured 1.9 cm on  2019.    2. Metastatic lesions include:  a. Multiple mediastinal and left hilar lymph nodes.  b. Right supraclavicular/retroclavicular lymph nodes.  c. Multiple thoracic vertebral bodies and S1 vertebral body.   d. Left and right ilium, left SI joint, and right rib 2.  e. No evidence of metastatic disease within the abdomen or pelvis.    3. Please see same-day head and neck PET/CT.    4. Extensive emphysema.    I have personally reviewed the examination and initial interpretation  and I agree with the findings.    NORBERTO NEW MD     Imaging was personally reviewed     ASSESSMENT AND PLAN  NSCLC, adenoarcinoma, EGFR exon 18 G719A mutation: Osimertinib. If cost-prohibitive, not covered, etc., could consider afatinib. I prefer osimertinib for its CNS penetration. Visit next Wed with Rita or one of her colleagues to deterimine readiness to start. Will be finishing radiation that day. Need to push and not delay too long, even if PS might be a bit marginal, given rapidly progressive disease. Restage with CT CAP 6 weeks after. EKG and TTE prior to starting osi.     Paraspinal mass: Getting RT    L eye/dura/brain mets: Getting radiation    Pain: Using oxycodone regularly every 4 hours or so. Good relief with 10 mg at a time. Gave new script for 10 mg q4h prn, will try to get 2 weeks worth, or #100. Add dexamethasone; take 8 mg tomorrow AM and  4 mg daily thereafter. Will review need for PPI or H2 blocker next visit. Can add occasional doses of ibuprofen if absolutely needed. Hoping that radiation provides palliation to the point we don't need a long acting opioid. Would consider methadone given it's neuropathic properties - the L face/ear/HA pain has a neuropathic component. Added bowel regimen - was taking senna, added miralax daily. Do not want her straining and increasing ICP    Coping: Overwhelmed. Will refer to Palliative Care in [place] - still needs to be requested - too much other stuff to talk about today    Mild shortness of breath: Suspect due to COPD. TTE as above to r/o cardiac etiology    Mild nausea: SCript for ondansetron given.     RHM: Influenza today    ADDENDUM: Hyponatremia. Will ask her to go to lab tomorrow on the 3rd floor of the hospital to recheck, also Usom, serum osm, Maria A. Likely SIADH. May need salt tabs - will start 1 g daily after labs are drawn. Script sent to discharge pharmacy. Assuming low, should have checked again Fri.    A total of 70 minutes was spent with the patient, >50% of which was spent in counseling and coordination of care.    Glenys Roman MD    Hematology, Oncology and Transplantation

## 2019-10-08 NOTE — NURSING NOTE
Chief Complaint   Patient presents with     Oncology Clinic Visit     New - Primary lung adenocarcinoma, left      Blood Draw     Labs drawn via PIV placed by RN in lab. VS taken. Patient checked in for next appt.     Labs drawn from PIV placed by RN. Line flushed with saline. Line de-accessed. Vitals taken. Pt checked in for appointment.    Lashon Espitia RN

## 2019-10-08 NOTE — NURSING NOTE
"Oncology Rooming Note    October 8, 2019 1:47 PM   Claudia Simon is a 74 year old female who presents for:    Chief Complaint   Patient presents with     Oncology Clinic Visit     New - Primary lung adenocarcinoma, left      Initial Vitals: /72 (BP Location: Right arm, Patient Position: Sitting, Cuff Size: Adult Regular)   Pulse 64   Temp 97.7  F (36.5  C) (Oral)   Resp 16   Wt 65.3 kg (143 lb 14.4 oz)   SpO2 97%   BMI 24.70 kg/m   Estimated body mass index is 24.7 kg/m  as calculated from the following:    Height as of 9/18/19: 1.626 m (5' 4\").    Weight as of this encounter: 65.3 kg (143 lb 14.4 oz). Body surface area is 1.72 meters squared.  Severe Pain (7) Comment: Data Unavailable   No LMP recorded. Patient is postmenopausal.  Allergies reviewed: Yes  Medications reviewed: Yes    Medications: Medication refills not needed today.  Pharmacy name entered into [facility]: [id number] - [place], [place] - [address]    Clinical concerns: Lab Results and concerns about Gi Discomfort in the evenings, with nausea, questions about pain medication and its effectiveness, as well as the dexamethasone.       Tae Moncada              "

## 2019-10-08 NOTE — PROGRESS NOTES
[facility] CANCER CLINIC    NEW PATIENT VISIT NOTE    PATIENT NAME: Claudia Simon MRN # [id number]  DATE OF VISIT: October 8, 2019 YOB: 1945    CANCER TYPE: NSCLC, adenocarcinoma, EGFR G719A mutation  STAGE: Metastatic. [id number] (IV)  ECOG PS: 1-2    Cancer Staging  No matching staging information was found for the patient.    PD-L1: 2-3%  Lung panel: EGFR G719A mutation  NGS: N/A    SUMMARY  9/17/19 ED @ [facility]. Presented with facial numbness, back pain  9/17/19 Brain MRI. 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 MRA unremarkable. CTA, CTV confirmed MRI findings.   9/18/19 CT CAP  9/19/19 MRI orbit  9/19/19 MRI T and L spine. T4, T6, T7, T8, T9 mets, ventral epidural extension of T5~T7/8 lesions causing mild-mod canal stenosis.   9/23/19 SIVAKUMAR bx. PD-L1 2-3%, poorly diff NSCLC, TTF-1 +amita, p40-amita. EGFR G719A mutation. C/b PTX requiring chest tube, removed 9/24  10/7/19 PET/CT. Prevascular, para-aortic, paratracheal, prevascular, L hilar, R supraclavicular/retroclavicular MURTAZA. 3.8 x 2.8 cm SIVAKUMAR mass (SUV 8.47), 2.8 x 2.2 cm RUL mass, 0.8 x 0.8 cm RUL nodule, 0.8 x 0.6 cm RUL, 0.6 x 0.5 cm RUL. Multiple other small bilateral pulmonary nodules, T4-9, S1, L ilium, L SI joint, R ilium? R rib  10/2~current RT to brain and T spine (Dr. Valladares)    SUBJECTIVE  Ms. Simon is a 73 yo female who presents today to establish care with me for newly diagnosed lung adenocarcinoma. She initially developed a pimple over her brow, blurry vision and eye pain 8/13. She was seen in the ED for stroke like symptoms in 9/17. Evaluation as above.     Unfortunately, the eye  pain/HA has increased quite a bit over the last couple of weeks. Oxycodone increased to 10 mg q4h prn - using 8 per day with much better relief. 5 mg only lasted about 45 min.     Constipation - last BM last night. Using senna 2 tab BID    Has used a walker for a few years due to prior back surgeries but no changes recently. Tired but moving without too much difficulty. Breathing feels ok. No N/V. Wearing a patch on her left eye - helps with the blurry vision. Appetite somewhat down but no weight loss. No chest pain/pressure. Urinating ok. No numbness/tingling.     PAST MEDICAL HISTORY  NSCLC as above  DM - no meds  Multiple back surgeries  HTN   Dyslipidemia    CURRENT OUTPATIENT MEDICATIONS  Current Outpatient Medications   Medication Sig Dispense Refill     cholecalciferol (VITAMIN D3) 5000 units (125 mcg) capsule Take 5,000 Units by mouth daily       gabapentin (NEURONTIN) 300 MG capsule Take 1 capsule (300 mg) by mouth daily At Noon. 90 capsule 3     lidocaine (LIDODERM) 5 % patch Place 2 patches onto the skin every 24 hours To prevent lidocaine toxicity, patient should be patch free for 12 hrs daily. 60 patch 1     lisinopril (PRINIVIL/ZESTRIL) 40 MG tablet Take 1 tablet (40 mg) by mouth daily 90 tablet 2     metoprolol succinate ER (TOPROL-XL) 25 MG 24 hr tablet TAKE ONE TABLET BY MOUTH ONE TIME DAILY 90 tablet 0     oxyCODONE (ROXICODONE) 5 MG tablet Take 1 tablet (5 mg) by mouth every 6 hours as needed for moderate to severe pain 60 tablet 0     senna (SENOKOT) 8.6 MG tablet Take 1-2 tablets by mouth 2 times daily       ASPIRIN 81 MG OR TABS ONE DAILY (Patient not taking: No sig reported) 100 3     dexamethasone (DECADRON) 4 MG tablet Take 1 tablet twice daily by mouth for the first 2 dose.  Further dose adjustment will be made by Dr. Roman. (Patient not taking: Reported on 10/8/2019) 30 tablet 0     SM SENNA LAXATIVE 8.6 MG tablet TAKE TWO TABLETS BY MOUTH TWICE DAILY  (Patient not taking: Reported on  10/8/2019) 120 tablet 0     ALLERGIES  Allergies   Allergen Reactions     Sulfa Drugs Anaphylaxis     Angioedema       SOCIAL HISTORY:   Grew up in [place]  Lived in [place] for a while too.   3 daughters.  Former smoker, quit in 2009, about 30 PY  No current ETOH  .      FAMILY HISTORY:   Family History   Problem Relation Age of Onset     Diabetes Mother      Alzheimer Disease Father      Cerebrovascular Disease Sister      Cerebrovascular Disease Sister      REVIEW OF SYSTEMS  As above in the HPI, o/w complete 12-point ROS was negative.    PHYSICAL EXAM  /72 (BP Location: Right arm, Patient Position: Sitting, Cuff Size: Adult Regular)   Pulse 64   Temp 97.7  F (36.5  C) (Oral)   Resp 16   Wt 65.3 kg (143 lb 14.4 oz)   SpO2 97%   BMI 24.70 kg/m    Wt Readings from Last 3 Encounters:   10/08/19 65.3 kg (143 lb 14.4 oz)   10/07/19 66.3 kg (146 lb 1.6 oz)   09/30/19 65.5 kg (144 lb 4.8 oz)     GEN: NAD  HEENT: EOMI, no icterus, injection or pallor. Oropharynx clear. L eye patch  LUNGS: clear bilaterally  CV: regular, no murmurs, rubs, or gallops  ABDOMEN: soft, non-tender, non-distended  EXT: warm, no edema  NEURO: alert  SKIN: no rashes    LABORATORY AND IMAGING STUDIES  Results for CHYNA ROSARIO (MRN [id number]) as of 10/20/2019 20:50   10/8/2019 13:41   Sodium 126 (L)   Potassium 4.4   Chloride 93 (L)   Carbon Dioxide 25   Urea Nitrogen 19   Creatinine 0.60   GFR Estimate 89   GFR Estimate If Black >90   Calcium 9.4   Anion Gap 7   Albumin 3.5   Protein Total 6.9   Bilirubin Total 1.1   Alkaline Phosphatase 108   ALT 24   AST 17   Lactate Dehydrogenase 433 (H)   Glucose 149 (H)   WBC 12.5 (H)   Hemoglobin 14.1   Hematocrit 41.0   Platelet Count 209   RBC Count 4.54   MCV 90   MCH 31.1   MCHC 34.4   RDW 13.3   Diff Method Automated Method   % Neutrophils 93.2   % Lymphocytes 2.5   % Monocytes 3.0   % Eosinophils 0.0   % Basophils 0.2   % Immature Granulocytes 1.1   Nucleated RBCs 0    Absolute Neutrophil 11.6 (H)   Absolute Lymphocytes 0.3 (L)   Absolute Monocytes 0.4   Absolute Eosinophils 0.0   Absolute Basophils 0.0   Abs Immature Granulocytes 0.1   Absolute Nucleated RBC 0.0     Results for orders placed or performed in visit on 10/07/19   PET Oncology Whole Body    Narrative    Combined Report of:    PET and CT on  10/7/2019 9:41 AM :    1. PET of the neck, chest, abdomen, and pelvis.  2. PET CT Fusion for Attenuation Correction and Anatomical  Localization:    3. Diagnostic CT scan of the chest, abdomen, and pelvis with  intravenous contrast for interpretation.  3. CT of the chest, abdomen and pelvis obtained for diagnostic  interpretation.  4. 3D MIP and PET-CT fused images were processed on an independent  workstation and archived to PACS and reviewed by a radiologist.    Technique:    1. PET: The patient received 16.3 mCi of F-18-FDG; the serum glucose  was 143 prior to administration, body weight was 65.5 kg. Images were  evaluated in the axial, sagittal, and coronal planes as well as the  rotational whole body MIP. Images were acquired from the Vertex to the  Feet.    UPTAKE WAS MEASURED AT 60 MINUTES.     BACKGROUND:  Liver SUV max= 3.36,   Aorta Blood SUV Max: 2.42.     2. CT: Volumetric acquisition for clinical interpretation of the  chest, abdomen, and pelvis acquired at 3 mm sections . The chest,  abdomen, and pelvis were evaluated at 5 mm sections in bone, soft  tissue, and lung windows.      The patient received 100 cc. Of Optiray 320 intravenously for the  examination.    Oral Contrast: Breeza 500 ml     3. 3D MIP and PET-CT fused images were processed on an independent  workstation and archived to PACS and reviewed by a radiologist.    INDICATION: newly diagnosed lung adenocarcinoma, staging; Primary lung  adenocarcinoma, left (H)    ADDITIONAL INFORMATION OBTAINED FROM EMR: 74-year-old female with  recently diagnosed metastatic non-small cell lung cancer.  Metastatic  disease involves the brain with both parenchymal and pachymeningeal  involvement, osseous metastases to the thoracic vertebral bodies with  extension into the spinal canal, periorbital mets, soft tissue met to  the forehead.     COMPARISON: MRI cervical, thoracic, lumbar spine 9/19/2019. MR face  9/19/2019. CT CAP 9/18/2019. CT head 9/18/2019. MR head 9/17/2019.    FINDINGS:     HEAD/NECK:    Please see same day head and neck PET CT.    CHEST:  Heart size is normal. There is no pericardial effusion. The great  vessels are normal in diameter and configuration. Mild coronary artery  calcifications. No central pulmonary embolus. The central  tracheobronchial tree is patent.    Prevascular lymph node (series 4, image 22) with mildly increased FDG  uptake of 3.5, lymph node are not enlarged by size criteria on CT.  Para-aortic lymph node (series 3, image 369) with increased SUV uptake  of 5.55. Multiple paratracheal and prevascular lymph nodes with  increased FDG uptake with maximal SUV of 7.10 (series 3, image 342),  nodes are not enlarged by size criteria.    Left upper lobe spiculated pulmonary mass measuring 3.8 x 2.8 cm with  max SUV of 8.47 (series 10, image 61) with spiculations extending to  the pleura with associated pleural nodularity, mass is unchanged in  size from 9/18/2019. Medial right upper lobe subpleural mass measuring  2.8 x 2.2 cm, previously measured 1.9 x 1.9 cm with max SUV of 12.84.  Subpleural pulmonary nodule, right upper lobe (series 10, image 84)  measuring 0.8 x 0.8 cm with minimally increased FDG uptake with  maximum SUV of 1.36, nodule is increased in conspicuity with increased  solid component though stable in size. Nodular solid opacity in the  lateral right upper lobe measuring 0.8 x 0.6 cm minimal increased FDG  uptake with maximum SUV of 0.81. Nodular cystic opacity along the  lateral right upper lobe measuring approximately 0.6 x 0.5 cm (series  10, image 52), no  associated increased FDG uptake.    Advanced centrilobular and paraseptal emphysematous change. No pleural  effusion. No pneumothorax. Bronchial wall thickening. Multiple  pulmonary nodules for example 4 mm solid pulmonary nodule (series 10,  image 70), left lower lobe no increased FDG uptake. 3 mm solid  pulmonary nodule lateral left upper lobe, no increased FDG uptake  (series 10, image 37). Reticular opacity in the anterior right upper  lobe (series 10, image 93), there is no associated increased FDG  uptake.    ABDOMEN AND PELVIS:  Subcentimeter hypodense cystic lesion in the right lobe the liver  (series 4, image 65), no associated increased FDG uptake, likely  hepatic cyst. Liver is otherwise unremarkable. Gallbladder is  unremarkable. Common bile duct measures 1.1 cm. There is no  intrahepatic biliary dilation. Pancreas is unremarkable. No pancreatic  ductal dilation. 2.5 x 4.4 cm right parapelvic cyst, no increased FDG  uptake. Left kidney is unremarkable. Right adrenal gland is  unremarkable. Mild nodular thickening of the left adrenal gland.  Spleen is within normal limits. The major vasculature of the abdomen  and pelvis is patent. Mild to moderate atheromatous plaque throughout  the abdominal aorta and its major branches. No infrarenal abdominal  aortic aneurysm. Sigmoid diverticulosis. No evidence of  diverticulitis. No dilated loops of small and large bowel. Appendix is  normal. No focal areas of bowel wall thickening. Surgical clips within  the left lower quadrant. A small fat-containing umbilical hernia. No  free fluid within the abdomen or pelvis. Urinary bladder is  unremarkable.    There is no suspicious FDG uptake in the abdomen or pelvis.    LOWER EXTREMITIES:   No abnormal masses or hypermetabolic lesions.    BONES:   Increased FDG uptake within T4-T9 and S1 vertebral bodies with max SUV  of 5.99 within the vertebral body of T6.Sclerotic lesion within the  left ilium, increased FDG uptake with  maximum SUV of 5.76. Mildly  increased FDG uptake at the site of the L5-S1 disc spacer, this may be  related to presence of hardware. Increased FDG uptake within the  inferior aspect of the left SI joint with max SUV of 3.91, mild  sclerosis on CT. Minimally increased FDG uptake along the right ilium  with Max SUV 3.51, there is no CT correlate abnormality. Mild  increased FDG uptake within the lateral aspect of right rib 2 with  maximum SUV of 3.49, no CT correlate abnormality.    Multilevel degenerative changes. Disc spacer at L5-S1. Spinal fusion  hardware including screws and rods at L3-L5. Decreased mineralization  lower lumbar spine. Superior endplate collapse of T9. Wedge deformity  of T6-T8. Superior endplate depression of T4.    SOFT TISSUE:  Mild increased FDG uptake within the musculature along the greater  trochanters, this is likely physiologic.      Impression    IMPRESSION:   In this patient with metastatic adenocarcinoma of the lun. Metastatic adenocarcinoma of the lung; Left upper lobe spiculated  mass measuring up to 3.8 x 2.8 with increased FDG uptake with maximum  SUV of 8.47. Medial right upper lobe pleural mass measuring up to 2.8  cm with max SUV of 12.4, lesion previously measured 1.9 cm on  2019.    2. Metastatic lesions include:  a. Multiple mediastinal and left hilar lymph nodes.  b. Right supraclavicular/retroclavicular lymph nodes.  c. Multiple thoracic vertebral bodies and S1 vertebral body.   d. Left and right ilium, left SI joint, and right rib 2.  e. No evidence of metastatic disease within the abdomen or pelvis.    3. Please see same-day head and neck PET/CT.    4. Extensive emphysema.    I have personally reviewed the examination and initial interpretation  and I agree with the findings.    NORBERTO NEW MD     Imaging was personally reviewed, including the above PET/CT and all prior MRIs.      ASSESSMENT AND PLAN  NSCLC, adenoarcinoma, EGFR exon 18 G719A mutation: We  spent some time discussing the diagnosis and significance of the G719A EGFR mutation, which is the most common of the uncommon EGFR mutations. There is some good data to support using afatinib. However, I recommended osimertinib because of its superior CNS penetration. If cost-prohibitive, not covered, etc., would consider afatinib. We discussed that getting these might be difficult. I'd like her to have a visit next Wed with Rita or one of her colleagues to deterimine readiness to start. Will be finishing radiation that day. Need to push and not delay too long, even if PS might be a bit marginal, given rapidly progressive disease. Restage with CT CAP 6 weeks after. EKG and TTE prior to starting osi if that's the way we go.    Paraspinal mass: Getting RT    L eye/dura/brain mets: Getting RT    Cancer-related pain: Using oxycodone regularly every 4 hours or so. Good relief with 10 mg at a time. Gave new script for 10 mg q4h prn, will try to get 2 weeks worth, or #100. Add dexamethasone; take 8 mg tomorrow AM and 4 mg daily thereafter. Will review need for PPI or H2 blocker next visit. Can add occasional doses of ibuprofen if absolutely needed. Hoping that radiation provides palliation to the point we don't need a long acting opioid. Would consider methadone given it's neuropathic properties - the L face/ear/HA pain has a neuropathic component. Added bowel regimen - was taking senna, added miralax daily. 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 Should get another MRV with the routine restaging brain MRI in 3 months.     Coping: Overwhelmed. Will refer to Palliative Care in [place] - still needs to be requested - too much other stuff to talk about today    Mild shortness of breath: Suspect due to COPD. TTE as above to r/o cardiac etiology    Mild nausea: Script for ondansetron given.     RHM: Influenza  today    ADDENDUM: Hyponatremia. Will ask her to go to lab tomorrow on the 3rd floor of the hospital to recheck, also Usom, serum osm, Maria A. Likely SIADH. May need salt tabs - could start 1 g daily after labs are drawn. Assuming low, should have checked again Fri.    A total of 70 minutes was spent with the patient, >50% of which was spent in counseling and coordination of care.    Glenys Roman MD    Hematology, Oncology and Transplantation

## 2019-10-08 NOTE — PROGRESS NOTES
Met with patient and family after clinic consultation appt with Dr. Roman .  Pt  verbalizes understanding of Dr. Roman's plan of care and denies questions or barriers to care today.    Introduced self and role of RN Care Coordinator at [facility].  Provided my contact information.  Provided overview of supportive services at [facility] including SW and ACS Navigator to assist with resources as needed.  Answered question regarding scheduling and calling clinic.     Pt voiced understanding and appreciation of above information and denies any further questions, and she understands that I will follow and provide coordination as needed.

## 2019-10-08 NOTE — TELEPHONE ENCOUNTER
Prior Authorization Approval    Authorization Effective Date: 7/10/2019  Authorization Expiration Date: 10/7/2022  Medication: Tagrisso-pa approved  Approved Dose/Quantity: 30/30ds  Reference #: Key: [id number]   Insurance Company: [employer] - Phone [phone] Fax [phone]  Expected CoPay: $2605.36     CoPay Card Available: No    Foundation Assistance Needed: free drug  Which Pharmacy is filling the prescription (Not needed for infusion/clinic administered): [facility] - [place], [place] - [address]  Pharmacy Notified:    Patient Notified:

## 2019-10-08 NOTE — TELEPHONE ENCOUNTER
PA Initiation    Medication: Tagrisso-pa pending  Insurance Company: [employer] - Phone [phone] Fax [phone]  Pharmacy Filling the Rx: [facility] - [place], [place] - [facility] [address]  Filling Pharmacy Phone:    Filling Pharmacy Fax:    Start Date: 10/8/2019

## 2019-10-09 NOTE — TELEPHONE ENCOUNTER
Prior Authorization Approval    Authorization Effective Date: 7/11/2019  Authorization Expiration Date: 10/8/2020  Medication: Ondansetron 8MG dispersible tablets-pa approved  Approved Dose/Quantity: 30/10ds  Reference #: Key: [id number]    Insurance Company: [employer] - [employer] [phone] Fax [phone]  Expected CoPay:       CoPay Card Available:      Foundation Assistance Needed:    Which Pharmacy is filling the prescription (Not needed for infusion/clinic administered): [id number] - [place], [place] [facility] [address] [address]  Pharmacy Notified:    Patient Notified:

## 2019-10-09 NOTE — TELEPHONE ENCOUNTER
10/9/19 - Called patient about getting scheduled for next Wednesday: SUSANNE appt on Wed after 9 am radiation appt that day with Rita with labs and EKG  2) Appt with me in 7 weeks with CT CAP ([place]) and labs    Also needs Echocardiogram sometime in the next week at hospital when there for radiation - sg

## 2019-10-09 NOTE — TELEPHONE ENCOUNTER
"----- Message from Jim Jeffers RN sent at 10/9/2019 11:53 AM CDT -----  I called patient a couple times this morning but no answer - left a voicemail letting her know that we need labs (blood & urine) checked today when she arrives to her radiation appt at 2:30pm due to low sodium levels. I instructed her to go to 3rd floor pharmacy to  new RX for salt tablets too.    And to recheck labs on Fri.    Lelo, since she's 74 yrs old, and not answering her phone - could you please page the lab techs to come draw her when she arrives for her radiation appt this afternoon? The acute care  says they would be willing to draw her and would \"get there right away,\" once you call them. Thank you!    [facility] at [facility]    ----- Message -----  From: Lelo Juarez RN  Sent: 10/9/2019   9:08 AM CDT  To: Glenys Roman MD, Nessa Austin, GUERO    Hi Dr Roman and Nessa,   Patients can no longer go to acute care lab here:(  She will have to go there:(  ----- Message -----  From: Glenys Roman MD  Sent: 10/8/2019   7:34 PM CDT  To: Lelo Juarez RN, GUERO Hernandez and Lelo,     Sodium quite low.     I'd like her to get BMP, urine osm, urine sodium, serum osm drawn tomorrow on the 3rd floor of the hospital in the acute care lab. Orders in    Should have labs rechecked on Fri. Standing orders in.     I sent script for sodium chloride 1 g daily to the discharge pharmacy. She should start as soon as she gets the labs done tomorrow.     Can you let her know? Thanks!Glenys"

## 2019-10-09 NOTE — TELEPHONE ENCOUNTER
PA Initiation    Medication: Ondansetron 8MG dispersible tablets-pa pending  Insurance Company: [employer] - Phone [phone] Fax [phone]  Pharmacy Filling the Rx: [id number] - [place], [place] - [address]  Filling Pharmacy Phone:    Filling Pharmacy Fax:    Start Date: 10/9/2019

## 2019-10-09 NOTE — TELEPHONE ENCOUNTER
Spoke to Claudia's daughter Araceli.    Once we see the results of today's sodium, medications can be called into [facility] in [place] for family to .    Dr. Roman aware of plan.

## 2019-10-09 NOTE — TELEPHONE ENCOUNTER
Prior Authorization Approval    Authorization Effective Date: 7/11/2019  Authorization Expiration Date: 10/8/2022  Medication: Gilotrif- pa approved  Approved Dose/Quantity: 30/30ds  Reference #: Key: [id number]   Insurance Company: [employer] - Phone [phone] Fax [phone]  Expected CoPay: $2272.91     CoPay Card Available:      Foundation Assistance Needed:    Which Pharmacy is filling the prescription (Not needed for infusion/clinic administered): [facility] - [place], [place] - [address]  Pharmacy Notified:    Patient Notified:

## 2019-10-14 NOTE — NURSING NOTE
Falls Risk Screening Questions - Weekly OTV    1. Have you fallen in the past one week?  No.  2. Have you felt unsteady when walking or standing in the past one week?  No.  Pt uses a walker.

## 2019-10-14 NOTE — LETTER
10/14/2019       RE: Claudia Simon  [address]  [facility] [address]     Dear Colleague,    Thank you for referring your patient, Claudia Simon, to the RADIATION ONCOLOGY CLINIC. Please see a copy of my visit note below.    [facility] PHYSICIANS  SPECIALIZING IN BREAKTHROUGHS  Radiation Oncology    On Treatment Visit Note      Claudia Simon      Date: 10/14/2019   MRN: [id number]   : 1945  Diagnosis: Lung mets      Reason for Visit:  On Radiation Treatment Visit     Treatment Summary to Date  Treatment Site: whole brain & T3-T10(2 sites same dosefraction.) Current Dose: 2400/3000 cGy Fractions: 8/10           ED Visit/Hosiptal Admission: None     Treatment Breaks: None     Subjective:   Claudia says her pain is much improved with increased use of oxycodone.  She is now taking 10 mg oxycodone 4 times a day. She regrets not starting that sooner.  She is able to function a lot better with improved pain control. She is somewhat constipated with increased use of narcotics.   Dr. Roman adjusted steroid dose and she is now on 4 mg once daily.  She continues to wear an eye patch on the left.  Unfortunately the scalp nodule continues to grow and she now has a satellite nodule just adjacent.     Nursing ROS:   Nutrition Alteration  Diet Type: Patient's Preference  Skin  Skin Reaction: 0 - No changes  CNS Alteration  CNS note: Pressure behind eye, Dr Valladares restarted decadron 4 mg 2 tabs in the am        Gastrointestinal  GI Note: WNL     Psychosocial  Pyschosocial Note: some fatigue with treatment  Pain Assessment  Pain Note: see above      Objective:   /63   Pulse 87   Wt 64.4 kg (142 lb)   SpO2 98%   BMI 24.37 kg/m     Gen: Appears well, in no acute distress  Skin: Mild diffuse erythema over treatment field    Labs:  CBC RESULTS:   Recent Labs   Lab Test 10/14/19  1216   WBC 10.4   RBC 4.81   HGB 15.0   HCT 45.7   MCV 95   MCH 31.2   MCHC 32.8   RDW 14.5         ELECTROLYTES:  Recent Labs   Lab Test 10/14/19  1216   *   POTASSIUM 4.6   CHLORIDE 94   BIBI 9.8   CO2 26   BUN 20   CR 1.03   *       Assessment:    Tolerating radiation therapy well.  All questions and concerns addressed.    Toxicities:  Alopecia: Grade 1: Hair loss <50% of normal; not obvious from a distance; does not require a wig or hair peice to camoflage  Fatigue: Grade 2: Fatigue not relieved by rest; limiting instrumental ADL  Pain: Grade 1: Mild pain  Dermatitis: Grade 1: Faint erythema or dry desquamation    Plan:   1. Continue current therapy.  Will complete treatment in 2 more fractions on Wednesday.  Follow up brain MRI in 4-6 weeks. Will coordinate with Dr. Roman.  2. Starting chemo per Dr. Roman.  3. Headache: much better controlled with oxycodone 10 mg 4 times daily. She got a script of 100 tabs from Dr. Roman. Will add Miralax to her bowel regimen.        Mosaiq chart and setup information reviewed  Ports checked    Medication Review  Med Note: needing to adjust stool softner with pain medicaiton    Educational Topic Discussed  Additional Instructions: MRI in 4-6 weeks with F/U.   Education Instructions: reviewed.        Diomedes Valladares MD/PhD  [phone] clinic  Pager [phone]  Please do not send letter to referring physician.        Again, thank you for allowing me to participate in the care of your patient.      Sincerely,    iDomedes Valladares MD

## 2019-10-14 NOTE — PROGRESS NOTES
[facility] PHYSICIANS  SPECIALIZING IN BREAKTHROUGHS  Radiation Oncology    On Treatment Visit Note      Claudia Simon      Date: 10/14/2019   MRN: [id number]   : 1945  Diagnosis: Lung mets      Reason for Visit:  On Radiation Treatment Visit     Treatment Summary to Date  Treatment Site: whole brain & T3-T10(2 sites same dosefraction.) Current Dose: 2400/3000 cGy Fractions: 8/10           ED Visit/Hosiptal Admission: None     Treatment Breaks: None     Subjective:   Claudia says her pain is much improved with increased use of oxycodone.  She is now taking 10 mg oxycodone 4 times a day. She regrets not starting that sooner.  She is able to function a lot better with improved pain control. She is somewhat constipated with increased use of narcotics.   Dr. Roman adjusted steroid dose and she is now on 4 mg once daily.  She continues to wear an eye patch on the left.  Unfortunately the scalp nodule continues to grow and she now has a satellite nodule just adjacent.     Nursing ROS:   Nutrition Alteration  Diet Type: Patient's Preference  Skin  Skin Reaction: 0 - No changes  CNS Alteration  CNS note: Pressure behind eye, Dr Valldaares restarted decadron 4 mg 2 tabs in the am        Gastrointestinal  GI Note: WNL     Psychosocial  Pyschosocial Note: some fatigue with treatment  Pain Assessment  Pain Note: see above      Objective:   /63   Pulse 87   Wt 64.4 kg (142 lb)   SpO2 98%   BMI 24.37 kg/m    Gen: Appears well, in no acute distress  Skin: Mild diffuse erythema over treatment field    Labs:  CBC RESULTS:   Recent Labs   Lab Test 10/14/19  1216   WBC 10.4   RBC 4.81   HGB 15.0   HCT 45.7   MCV 95   MCH 31.2   MCHC 32.8   RDW 14.5        ELECTROLYTES:  Recent Labs   Lab Test 10/14/19  1216   *   POTASSIUM 4.6   CHLORIDE 94   BIBI 9.8   CO2 26   BUN 20   CR 1.03   *       Assessment:    Tolerating radiation therapy well.  All questions and concerns  addressed.    Toxicities:  Alopecia: Grade 1: Hair loss <50% of normal; not obvious from a distance; does not require a wig or hair peice to camoflage  Fatigue: Grade 2: Fatigue not relieved by rest; limiting instrumental ADL  Pain: Grade 1: Mild pain  Dermatitis: Grade 1: Faint erythema or dry desquamation    Plan:   1. Continue current therapy.  Will complete treatment in 2 more fractions on Wednesday.  Follow up brain MRI in 4-6 weeks. Will coordinate with Dr. Roman.  2. Starting chemo per Dr. Roman.  3. Headache: much better controlled with oxycodone 10 mg 4 times daily. She got a script of 100 tabs from Dr. Roman. Will add Miralax to her bowel regimen.        Mosaiq chart and setup information reviewed  Ports checked    Medication Review  Med Note: needing to adjust stool softner with pain medicaiton    Educational Topic Discussed  Additional Instructions: MRI in 4-6 weeks with F/U.   Education Instructions: reviewed.        Diomedes Valladares MD/PhD  [phone] clinic  Pager [phone]    Please do not send letter to referring physician.

## 2019-10-14 NOTE — LETTER
Date:October 16, 2019      Provider requested that no letter be sent. Do not send.       [facility] Health Information

## 2019-10-14 NOTE — PATIENT INSTRUCTIONS
Continuing Management of the Effects of Radiation Treatment    The side effects of radiation therapy should gradually decrease in 2 to 3 weeks after you have finished radiation.  Some effects take longer to resolve.    Skin reactions:  Skin changes (such as redness or irritation) should begin to get better gradually.  Some people will have a permanent change in skin color.  Their skin may be more pink or  tan  than the untreated skin.  The skin may be thinner or more fragile than before treatment.  Continue to use a gentle moisturizing lotion for several months.  You should always protect the skin in the area that was treated by using sunscreen of spf 30 or higher.      Other skin care instructions:    Fatigue caused by radiation therapy will decrease and your energy will improve.    For concerns or questions call Department of Therapeutic Radiology [phone]

## 2019-10-16 NOTE — NURSING NOTE
Chief Complaint   Patient presents with     Blood Draw     Labs drawn via  by RN in lab. VS taken.      Richelle Moran RN

## 2019-10-16 NOTE — PROGRESS NOTES
Met with Claudia, her  and daughter to discuss chemotherapy and resources available at the [facility].  Provided patient with  My Cancer Guidebook  and [email or web address] printouts on Carboplatin, Pemetrexed, and Pembrolizumab.  Reviewed administration, side effects and ongoing symptom support management by APPs in clinic. Provided phone numbers for triage and after hours care. Highlighted steps to expect when getting chemotherapy (check in, labs, pre meds, infusion).  Discusses that chemo may be delayed by a day or two due to blood counts, infusion schedule or patient s need to modify.

## 2019-10-16 NOTE — NURSING NOTE
B12 injection given in Right Deltoid. Patient tolerated well.    Lucie Turner CMA on 10/16/2019 at 1:55 PM

## 2019-10-16 NOTE — NURSING NOTE
"Oncology Rooming Note    October 16, 2019 12:31 PM   Claudia Simon is a 74 year old female who presents for:    Chief Complaint   Patient presents with     Blood Draw     Labs drawn via  by RN in lab. VS taken.      Oncology Clinic Visit     Return; Lung adenocarcinoma     Initial Vitals: /70 (BP Location: Right arm, Patient Position: Sitting, Cuff Size: Adult Regular)   Pulse 105   Temp 97.7  F (36.5  C) (Oral)   Resp 18   Wt 64.9 kg (143 lb)   SpO2 98%   BMI 24.55 kg/m   Estimated body mass index is 24.55 kg/m  as calculated from the following:    Height as of 9/18/19: 1.626 m (5' 4\").    Weight as of this encounter: 64.9 kg (143 lb). Body surface area is 1.71 meters squared.  Extreme Pain (8) Comment: Data Unavailable   No LMP recorded. Patient is postmenopausal.  Allergies reviewed: Yes  Medications reviewed: Yes    Medications: MEDICATION REFILLS NEEDED TODAY. Provider was notified.  Pharmacy name entered into [employer]: [id number] - [place], [place] - [address]    Clinical concerns: Refill on oxycodone; no new concerns.       Lucie Turner CMA              "

## 2019-10-16 NOTE — Clinical Note
"10/16/2019       RE: Claudia Simon  [address]  [facility] [address]     Dear Colleague,    Thank you for referring your patient, Claudia Simon, to the [facility]. Please see a copy of my visit note below.    [facility]    FOLLOW-UP VISIT NOTE    PATIENT NAME: Claudia Simon MRN # [id number]  DATE OF VISIT: Oct 16, 2019   YOB: 1945  CANCER TYPE: NSCLC adenocarcinoma  STAGE: IV   ECOG PS: 2    Cancer Staging  No matching staging information was found for the patient.    PD-L1: 2-3%  Lung panel: EGFR G719A mutation  NGS: pending    SUMMARY      9/17/19 ED @ [facility] with L sided facial numbness and pain, L eye diplopia, photophobia, and ptosis, as well as L forehead \"pimple\"  9/17/19 Brain MRI left sphenotemporal buttress mass with extraosseous extensive into orbit, extensive dural thickening, 1 cm mass within sulcus along right parietal cortex, calvarial and clivus mets, multiple acute infarcts. She was started on steroids.   9/18/19 CT CAP 2.5 cm solid spiculated nodule in SIVAKUMAR, 2.8 x 1.9 cm nodule in RUL, multiple nodules in RUL  9/19/19             MRI spine multiple metastatic lesions in T spine with epidural extension of metastatic disease from T5-T8 resulting in mild to moderate canal stenosis. No cord compression.   9/23/19 SIVAKUMAR bx. PD-L1 2-3%, poorly diff NSCLC, TTF-1 +amita, p40-amita. EGFR G719A mutation. C/b PTX requiring chest tube, removed 9/24  10/3-10/16/19    XRT to T-spine and brain   10/7/19             PET/CT showed SIVAKUMAR mass, RUL pleural based mass, mediastinal and L hilar nodes, R supraclavicular nodes, bony disease in T spine, S1, L and R ilium, L SI joint, right second rib.   10/8/19            Consult with Dr. Roman. Recommendation to start osimertinib or afatinib if cost prohibitive.      SUBJECTIVE  Claudia is here today in follow-up for consideration of starting therapy. She is here with her "  and daughter. She continues to have headaches but note these are slightly better on steroids and XRT. She is taking 4 mg of dexamethasone once per day. She is having sore throat and burning when she swallows. Also has a lot of GERD. Has nausea acutely after XRT. No vomiting. Bowel movements are typically constipated but she used Miralax yesterday and had a softer BM this morning. She is taking 10 mg of oxycodone every 4 hours. Rx from Dr. Roman needed a PA so pharmacy only gave her 42 tabs. They need a new script.     She is eating softer foods. Tolerating fluids well and urinating well. No fevers/chills. No change to breathing. ROS otherwise negative.     PAST MEDICAL HISTORY  DM - no meds  HTN   Dyslipidemia    CURRENT OUTPATIENT MEDICATIONS  Current Outpatient Medications   Medication Sig Dispense Refill     cholecalciferol (VITAMIN D3) 5000 units (125 mcg) capsule Take 5,000 Units by mouth daily       dexamethasone (DECADRON) 2 MG tablet Take 4 mg with supper on 10/17, 4 mg twice daily with meals on 10/18, 4 mg with breakfast on 10/19, 2 mg with breakfast x 3 days, 1 mg with breakfast x 3 days, then off 15 tablet 0     folic acid (FOLVITE) 1 MG tablet Take 1 tablet (1 mg) by mouth daily 30 tablet 0     gabapentin (NEURONTIN) 300 MG capsule Take 1 capsule (300 mg) by mouth daily At Noon. 90 capsule 3     lidocaine (LIDODERM) 5 % patch Place 2 patches onto the skin every 24 hours To prevent lidocaine toxicity, patient should be patch free for 12 hrs daily. 60 patch 1     lisinopril (PRINIVIL/ZESTRIL) 40 MG tablet Take 1 tablet (40 mg) by mouth daily 90 tablet 2     metoprolol succinate ER (TOPROL-XL) 25 MG 24 hr tablet TAKE ONE TABLET BY MOUTH ONE TIME DAILY 90 tablet 0     nystatin (MYCOSTATIN) 093736 UNIT/ML suspension Swish and swallow 5 mL four times daily 400 mL 1     omeprazole 20 MG tablet Take 1 tablet (20 mg) by mouth daily 30 tablet 0     ondansetron (ZOFRAN-ODT) 8 MG ODT tab Take 1 tablet (8  mg) by mouth every 8 hours as needed for nausea 30 tablet 11     oxyCODONE (ROXICODONE) 5 MG tablet Take 2 tablets (10 mg) by mouth every 4 hours as needed for moderate to severe pain 100 tablet 0     polyethylene glycol (MIRALAX/GLYCOLAX) packet Take 17 g by mouth daily 30 packet 11     senna (SENOKOT) 8.6 MG tablet Take 1-2 tablets by mouth 2 times daily       SM SENNA LAXATIVE 8.6 MG tablet TAKE TWO TABLETS BY MOUTH TWICE DAILY  120 tablet 0     sucralfate (CARAFATE) 1 GM/10ML suspension Take 10 mLs (1 g) by mouth 4 times daily 420 mL 0     [START ON 10/23/2019] dexamethasone (DECADRON) 4 MG tablet Take 1 tablet (4 mg) by mouth 2 times daily (with meals) Start one day prior to Pemetrexed (Alimta), continue on the day of treatment, and the day following Pemetrexed. 6 tablet 11     folic acid (FOLVITE) 1 MG tablet Take 1 tablet (1,000 mcg) by mouth daily Begin 7 days before Pemetrexed (Alimta) starts and continue for 21 days after Pemetrexed is discontinued. 30 tablet 11     LORazepam (ATIVAN) 0.5 MG tablet Take 1 tablet (0.5 mg) by mouth every 6 hours as needed (Nausea/Vomiting) 30 tablet 3     ondansetron (ZOFRAN) 8 MG tablet Take 1 tablet (8 mg) by mouth every 8 hours as needed for nausea (vomiting) 30 tablet 11     prochlorperazine (COMPAZINE) 10 MG tablet Take 0.5 tablets (5 mg) by mouth every 6 hours as needed (Nausea/Vomiting) 30 tablet 11     ALLERGIES  Allergies   Allergen Reactions     Sulfa Drugs Anaphylaxis     Angioedema       SOCIAL HISTORY:   Grew up in [place]  Lived in [place] for a while too.   3 daughters     FAMILY HISTORY:     REVIEW OF SYSTEMS  As above in the HPI, o/w complete 12-point ROS was negative.    PHYSICAL EXAM  /70 (BP Location: Right arm, Patient Position: Sitting, Cuff Size: Adult Regular)   Pulse 105   Temp 97.7  F (36.5  C) (Oral)   Resp 18   Wt 64.9 kg (143 lb)   SpO2 98%   BMI 24.55 kg/m     Wt Readings from Last 3 Encounters:   10/16/19 64.9 kg (143 lb)    10/14/19 64.4 kg (142 lb)   10/08/19 65.3 kg (143 lb 14.4 oz)     GEN: NAD, somewhat frail appearing.   HEENT: Wearing eye patch. Left eyelid completely closed. Pupil is non reactive to light. R pupil is reactive with normal EOM. Oropharynx is moist however posterior pharynx has mild erythema and some thrush along soft palate and posterior pharynx.   NECK: no cervical or supraclavicular lymphadenopathy  LUNGS: clear bilaterally  CV: regular, no murmurs, rubs, or gallops  ABDOMEN: soft, non-tender, non-distended, normal bowel sounds  EXT: warm, well perfused, no edema  NEURO: alert  SKIN: no rashes    LABORATORY AND IMAGING STUDIES   10/16/2019 12:07   Sodium 128 (L)   Potassium 4.8   Chloride 94   Carbon Dioxide 24   Urea Nitrogen 26   Creatinine 0.74   GFR Estimate 78   GFR Estimate If Black >90   Calcium 9.4   Anion Gap 10   Albumin 3.2 (L)   Protein Total 6.8   Bilirubin Total 0.9   Alkaline Phosphatase 116   ALT 58 (H)   AST 26   Lactate Dehydrogenase Unsatisfactory specimen - hemolyzed   Glucose 352 (H)   WBC 8.3   Hemoglobin 14.6   Hematocrit 43.9   Platelet Count 160   RBC Count 4.64   MCV 95   MCH 31.5   MCHC 33.3   RDW 14.6   Diff Method Automated Method   % Neutrophils 91.8   % Lymphocytes 1.9   % Monocytes 3.8   % Eosinophils 0.0   % Basophils 0.6   % Immature Granulocytes 1.9   Nucleated RBCs 0   Absolute Neutrophil 7.6   Absolute Lymphocytes 0.2 (L)   Absolute Monocytes 0.3   Absolute Eosinophils 0.0   Absolute Basophils 0.1   Abs Immature Granulocytes 0.2   Absolute Nucleated RBC 0.0       ASSESSMENT AND PLAN  NSCLC, adenoarcinoma, EGFR exon 18 G719A mutation: Rx for osimertinib and afatinib were ran through insurance and with PAs are covered however co pay is >$2000 per month for both drugs. Dr. Roman recommended starting carboplatin, pemetrexed, and pembrolizumab while awaiting bee application. I reviewed common side effects of myelosuppression, fatigue, alopecia, n/v/d/c, possible rash, rarer  risk of ototoxicity, nephrotoxicity, neurotoxicity. She is willing to proceed today. Having some toxicities from XRT and steroids, see below. Will plan to taper steroids after she completes the three days of dexamethasone as part of the treatment plan. Should have a toxicity assessment and labs in a week given fragility. Plan for 2 cycles followed by restaging with CT CAP.     Paraspinal mass with epidural extension: Completing XRT today. Will be tapering steroids in a few days.     L eye/dura/brain mets: Completing XRT today. No change in vision/ptosis so far. Will be tapering steroids as above. Follow-up brain MRI in 4-6 weeks.     Esophagitis/GERD secondary to XRT and steroids: Start omeprazole 20 mg before breakfast as well as carafate PRN up to QID. Start Nystatin swish and swallow QID for thrush. Continue to eat softer, moist foods.     Pain control: Continue oxycodone 10 mg every 4 hours as needed for pain. Refill provided. For dexamethasone will take 8 mg in AM tomorrow (IV + oral with chemo), 4 mg BID on 10/18, 4 mg in AM on 10/19, then 2 mg in AM x 3 days, 1 mg in AM x 3 days, then off. Continue bowel regimen with Senna and daily Miralax.     Hyponatremia: Na is stable at 128.     Diabetes mellitus: Currently not on meds. BS returned today at 352--not seen during visit as CMP resulted later. Will need management with steroids.           Again, thank you for allowing me to participate in the care of your patient.      Sincerely,    Katlyn Mock PA-C

## 2019-10-16 NOTE — LETTER
"10/16/2019      RE: Claudia Simon  [address]  [facility] [id number]       [facility] CANCER CLINIC    FOLLOW-UP VISIT NOTE    PATIENT NAME: Claudia Simon MRN # [id number]  DATE OF VISIT: Oct 16, 2019   YOB: 1945  CANCER TYPE: NSCLC adenocarcinoma  STAGE: IV   ECOG PS: 2    Cancer Staging  No matching staging information was found for the patient.    PD-L1: 2-3%  Lung panel: EGFR G719A mutation  NGS: pending    SUMMARY      9/17/19 ED @ [facility] with L sided facial numbness and pain, L eye diplopia, photophobia, and ptosis, as well as L forehead \"pimple\"  9/17/19 Brain MRI left sphenotemporal buttress mass with extraosseous extensive into orbit, extensive dural thickening, 1 cm mass within sulcus along right parietal cortex, calvarial and clivus mets, multiple acute infarcts. She was started on steroids.   9/18/19 CT CAP 2.5 cm solid spiculated nodule in SIVAKUMAR, 2.8 x 1.9 cm nodule in RUL, multiple nodules in RUL  9/19/19             MRI spine multiple metastatic lesions in T spine with epidural extension of metastatic disease from T5-T8 resulting in mild to moderate canal stenosis. No cord compression.   9/23/19 SIVAKUMAR bx. PD-L1 2-3%, poorly diff NSCLC, TTF-1 +amita, p40-amita. EGFR G719A mutation. C/b PTX requiring chest tube, removed 9/24  10/3-10/16/19    XRT to T-spine and brain   10/7/19             PET/CT showed SIVAKUMAR mass, RUL pleural based mass, mediastinal and L hilar nodes, R supraclavicular nodes, bony disease in T spine, S1, L and R ilium, L SI joint, right second rib.   10/8/19            Consult with Dr. Roman. Recommendation to start osimertinib or afatinib if cost prohibitive.      SUBJECTIVE  Claudia is here today in follow-up for consideration of starting therapy. She is here with her  and daughter. She continues to have headaches but note these are slightly better on steroids and XRT. She is taking 4 mg of dexamethasone once per day. " She is having sore throat and burning when she swallows. Also has a lot of GERD. Has nausea acutely after XRT. No vomiting. Bowel movements are typically constipated but she used Miralax yesterday and had a softer BM this morning. She is taking 10 mg of oxycodone every 4 hours. Rx from Dr. Roman needed a PA so pharmacy only gave her 42 tabs. They need a new script.     She is eating softer foods. Tolerating fluids well and urinating well. No fevers/chills. No change to breathing. ROS otherwise negative.     PAST MEDICAL HISTORY  DM - no meds  HTN   Dyslipidemia    CURRENT OUTPATIENT MEDICATIONS  Current Outpatient Medications   Medication Sig Dispense Refill     cholecalciferol (VITAMIN D3) 5000 units (125 mcg) capsule Take 5,000 Units by mouth daily       dexamethasone (DECADRON) 2 MG tablet Take 4 mg with supper on 10/17, 4 mg twice daily with meals on 10/18, 4 mg with breakfast on 10/19, 2 mg with breakfast x 3 days, 1 mg with breakfast x 3 days, then off 15 tablet 0     folic acid (FOLVITE) 1 MG tablet Take 1 tablet (1 mg) by mouth daily 30 tablet 0     gabapentin (NEURONTIN) 300 MG capsule Take 1 capsule (300 mg) by mouth daily At Noon. 90 capsule 3     lidocaine (LIDODERM) 5 % patch Place 2 patches onto the skin every 24 hours To prevent lidocaine toxicity, patient should be patch free for 12 hrs daily. 60 patch 1     lisinopril (PRINIVIL/ZESTRIL) 40 MG tablet Take 1 tablet (40 mg) by mouth daily 90 tablet 2     metoprolol succinate ER (TOPROL-XL) 25 MG 24 hr tablet TAKE ONE TABLET BY MOUTH ONE TIME DAILY 90 tablet 0     nystatin (MYCOSTATIN) 149289 UNIT/ML suspension Swish and swallow 5 mL four times daily 400 mL 1     omeprazole 20 MG tablet Take 1 tablet (20 mg) by mouth daily 30 tablet 0     ondansetron (ZOFRAN-ODT) 8 MG ODT tab Take 1 tablet (8 mg) by mouth every 8 hours as needed for nausea 30 tablet 11     oxyCODONE (ROXICODONE) 5 MG tablet Take 2 tablets (10 mg) by mouth every 4 hours as needed for  moderate to severe pain 100 tablet 0     polyethylene glycol (MIRALAX/GLYCOLAX) packet Take 17 g by mouth daily 30 packet 11     senna (SENOKOT) 8.6 MG tablet Take 1-2 tablets by mouth 2 times daily       SM SENNA LAXATIVE 8.6 MG tablet TAKE TWO TABLETS BY MOUTH TWICE DAILY  120 tablet 0     sucralfate (CARAFATE) 1 GM/10ML suspension Take 10 mLs (1 g) by mouth 4 times daily 420 mL 0     [START ON 10/23/2019] dexamethasone (DECADRON) 4 MG tablet Take 1 tablet (4 mg) by mouth 2 times daily (with meals) Start one day prior to Pemetrexed (Alimta), continue on the day of treatment, and the day following Pemetrexed. 6 tablet 11     folic acid (FOLVITE) 1 MG tablet Take 1 tablet (1,000 mcg) by mouth daily Begin 7 days before Pemetrexed (Alimta) starts and continue for 21 days after Pemetrexed is discontinued. 30 tablet 11     LORazepam (ATIVAN) 0.5 MG tablet Take 1 tablet (0.5 mg) by mouth every 6 hours as needed (Nausea/Vomiting) 30 tablet 3     ondansetron (ZOFRAN) 8 MG tablet Take 1 tablet (8 mg) by mouth every 8 hours as needed for nausea (vomiting) 30 tablet 11     prochlorperazine (COMPAZINE) 10 MG tablet Take 0.5 tablets (5 mg) by mouth every 6 hours as needed (Nausea/Vomiting) 30 tablet 11     ALLERGIES  Allergies   Allergen Reactions     Sulfa Drugs Anaphylaxis     Angioedema       SOCIAL HISTORY:   Grew up in [place]  Lived in [place] for a while too.   3 daughters     FAMILY HISTORY:     REVIEW OF SYSTEMS  As above in the HPI, o/w complete 12-point ROS was negative.    PHYSICAL EXAM  /70 (BP Location: Right arm, Patient Position: Sitting, Cuff Size: Adult Regular)   Pulse 105   Temp 97.7  F (36.5  C) (Oral)   Resp 18   Wt 64.9 kg (143 lb)   SpO2 98%   BMI 24.55 kg/m     Wt Readings from Last 3 Encounters:   10/16/19 64.9 kg (143 lb)   10/14/19 64.4 kg (142 lb)   10/08/19 65.3 kg (143 lb 14.4 oz)     GEN: NAD, somewhat frail appearing.   HEENT: Wearing eye patch. Left eyelid completely  closed. Pupil is non reactive to light. R pupil is reactive with normal EOM. Oropharynx is moist however posterior pharynx has mild erythema and some thrush along soft palate and posterior pharynx.   NECK: no cervical or supraclavicular lymphadenopathy  LUNGS: clear bilaterally  CV: regular, no murmurs, rubs, or gallops  ABDOMEN: soft, non-tender, non-distended, normal bowel sounds  EXT: warm, well perfused, no edema  NEURO: alert  SKIN: no rashes    LABORATORY AND IMAGING STUDIES   10/16/2019 12:07   Sodium 128 (L)   Potassium 4.8   Chloride 94   Carbon Dioxide 24   Urea Nitrogen 26   Creatinine 0.74   GFR Estimate 78   GFR Estimate If Black >90   Calcium 9.4   Anion Gap 10   Albumin 3.2 (L)   Protein Total 6.8   Bilirubin Total 0.9   Alkaline Phosphatase 116   ALT 58 (H)   AST 26   Lactate Dehydrogenase Unsatisfactory specimen - hemolyzed   Glucose 352 (H)   WBC 8.3   Hemoglobin 14.6   Hematocrit 43.9   Platelet Count 160   RBC Count 4.64   MCV 95   MCH 31.5   MCHC 33.3   RDW 14.6   Diff Method Automated Method   % Neutrophils 91.8   % Lymphocytes 1.9   % Monocytes 3.8   % Eosinophils 0.0   % Basophils 0.6   % Immature Granulocytes 1.9   Nucleated RBCs 0   Absolute Neutrophil 7.6   Absolute Lymphocytes 0.2 (L)   Absolute Monocytes 0.3   Absolute Eosinophils 0.0   Absolute Basophils 0.1   Abs Immature Granulocytes 0.2   Absolute Nucleated RBC 0.0       ASSESSMENT AND PLAN  NSCLC, adenoarcinoma, EGFR exon 18 G719A mutation: Rx for osimertinib and afatinib were ran through insurance and with PAs are covered however co pay is >$2000 per month for both drugs. Dr. Roman recommended starting carboplatin, pemetrexed, and pembrolizumab while awaiting bee application. I reviewed common side effects of myelosuppression, fatigue, alopecia, n/v/d/c, possible rash, rarer risk of ototoxicity, nephrotoxicity, neurotoxicity. She is willing to proceed today. Having some toxicities from XRT and steroids, see below. Will plan to  taper steroids after she completes the three days of dexamethasone as part of the treatment plan. Should have a toxicity assessment and labs in a week given fragility. Plan for 2 cycles followed by restaging with CT CAP.     Paraspinal mass with epidural extension: Completing XRT today. Will be tapering steroids in a few days.     L eye/dura/brain mets: Completing XRT today. No change in vision/ptosis so far. Will be tapering steroids as above. Follow-up brain MRI in 4-6 weeks.     Esophagitis/GERD secondary to XRT and steroids: Start omeprazole 20 mg before breakfast as well as carafate PRN up to QID. Start Nystatin swish and swallow QID for thrush. Continue to eat softer, moist foods.     Pain control: Continue oxycodone 10 mg every 4 hours as needed for pain. Refill provided. For dexamethasone will take 8 mg in AM tomorrow (IV + oral with chemo), 4 mg BID on 10/18, 4 mg in AM on 10/19, then 2 mg in AM x 3 days, 1 mg in AM x 3 days, then off. Continue bowel regimen with Senna and daily Miralax.     Hyponatremia: Na is stable at 128. Corrected for hyperglycemia, sodium level is 133. Osmolality okay; likely mild hypovolemic hyponatremia.     Diabetes mellitus: Currently not on meds. BS returned today at 352--not seen during visit as CMP resulted later. Will need management with steroids.     Greater than 40 minutes was spent with this patient with greater than 20 minutes spent in counseling and coordination of care.    Katlyn Mock PA-C    [facility]  [address] [place] [id number]  [phone]    ADDENDUM: Repeat BS checked prior to chemo is 183. She is not currently checking BS at all. Start checking fasting every morning and 2 hours post-prandial. RNCC called PCP office and made appointment for this coming Tuesday for management. See plan for steroids as above. Once dexamethasone taper is complete she will have 4 mg BID of dex x 3 days with chemo as well as 8 mg IV every 3 weeks.-ANDI

## 2019-10-17 NOTE — PROGRESS NOTES
"Infusion Nursing Note:  Claudia Simon presents today for Cycle 1 Day 1 Keytruda, Alimta, Carboplatin.    Patient seen by provider today: No   present during visit today: Not Applicable.    Note: Reports no changes in status since seeing Katlyn BLANCHARD yesterday afternoon.  She is here today with her  Raúl. She uses a walker and has for 20 years because of her multiple back surgeries.    New to infusion.  Has received chemotherapy teaching prior to today's infusion appointment.  I have provided handouts on chemo drugs today as well as a thermometer.  Upon arrival rates her pain at an 8, located in head, back and \"pretty much everywhere\".  She took and Oxycodone this AM.  After resting in chair for about an hour, she said her pain had improved to a 5.  Patient does not check her blood sugars at home.  B12 injection given yesterday.  She has started Folic Acid this morning.      Blood sugar yesterday 352.  Sodium 128.  SARINA Davis RN / Katlyn BLANCHARD.  1.  Check blood sugar now  Blood sugar 183 @ 0910.  States she had apple cinnamon oatmeal, cranberry juice and milk for breakfast this AM @ 0630    SARINA Davis RN / Katlyn BLANCHARD  1.  Patient to check her blood sugars at home BID -  first thing in AM (fasting), then 2 hours after a meal.  2.  Katlyn will bring over Dexamethasone taper rx (she discussed this patient/)  3.  Katlyn will decrease IV Dex pre med  4.  Do not fill Dex pre Alimta Rx, but keep on medication profile  5.  Nessa Austin will call Dr. Craig's office to get in to see him within one week.  (he manages her diabetes).  6.  Give 500 ml IV NS with today's chemo and change this on treatment plan for today and future cycles.    Claudia up to bathroom x4 to urinate with assist of walker and infusion staff.    Intravenous Access:  Peripheral IV placed by vascular access using U/S.    Treatment Conditions:  Lab Results   Component Value Date    " HGB 14.6 10/16/2019     Lab Results   Component Value Date    WBC 8.3 10/16/2019      Lab Results   Component Value Date    ANEU 7.6 10/16/2019     Lab Results   Component Value Date     10/16/2019      Lab Results   Component Value Date     10/16/2019                   Lab Results   Component Value Date    POTASSIUM 4.8 10/16/2019           Lab Results   Component Value Date    MAG 2.3 09/24/2019            Lab Results   Component Value Date    CR 0.74 10/16/2019                   Lab Results   Component Value Date    BIBI 9.4 10/16/2019                Lab Results   Component Value Date    BILITOTAL 0.9 10/16/2019           Lab Results   Component Value Date    ALBUMIN 3.2 10/16/2019                    Lab Results   Component Value Date    ALT 58 10/16/2019           Lab Results   Component Value Date    AST 26 10/16/2019       Results reviewed, labs MET treatment parameters, ok to proceed with treatment.      Post Infusion Assessment:  Patient tolerated infusion without incident.  Blood return noted pre and post infusion.  Site patent and intact, free from redness, edema or discomfort.  No evidence of extravasations.  Access discontinued per protocol.       Discharge Plan:   Prescription refills given for Ativan, Compazine, Dexamethasone taper.  (has folic acid and zofran)  Copy of AVS reviewed with patient and/or family.  Patient will return 10/25/19 labs/PA at [facility] for wellness check, 11/7/19 labs/PA/chemo at [facility] for next appointment.  Patient discharged in stable condition accompanied by: .  Departure Mode: Ambulatory with walker.  Face to Face time: >25 minutes.    Yu Davis

## 2019-10-17 NOTE — PROGRESS NOTES
"[facility] CANCER CLINIC    FOLLOW-UP VISIT NOTE    PATIENT NAME: Claudia Simon MRN # [id number]  DATE OF VISIT: Oct 16, 2019   YOB: 1945  CANCER TYPE: NSCLC adenocarcinoma  STAGE: IV   ECOG PS: 2    Cancer Staging  No matching staging information was found for the patient.    PD-L1: 2-3%  Lung panel: EGFR G719A mutation  NGS: pending    SUMMARY      9/17/19 ED @ [facility] with L sided facial numbness and pain, L eye diplopia, photophobia, and ptosis, as well as L forehead \"pimple\"  9/17/19 Brain MRI left sphenotemporal buttress mass with extraosseous extensive into orbit, extensive dural thickening, 1 cm mass within sulcus along right parietal cortex, calvarial and clivus mets, multiple acute infarcts. She was started on steroids.   9/18/19 CT CAP 2.5 cm solid spiculated nodule in SIVAKUMAR, 2.8 x 1.9 cm nodule in RUL, multiple nodules in RUL  9/19/19             MRI spine multiple metastatic lesions in T spine with epidural extension of metastatic disease from T5-T8 resulting in mild to moderate canal stenosis. No cord compression.   9/23/19 SIVAKUMAR bx. PD-L1 2-3%, poorly diff NSCLC, TTF-1 +amita, p40-amita. EGFR G719A mutation. C/b PTX requiring chest tube, removed 9/24  10/3-10/16/19    XRT to T-spine and brain   10/7/19             PET/CT showed SIVAKUMAR mass, RUL pleural based mass, mediastinal and L hilar nodes, R supraclavicular nodes, bony disease in T spine, S1, L and R ilium, L SI joint, right second rib.   10/8/19            Consult with Dr. Roman. Recommendation to start osimertinib or afatinib if cost prohibitive.      SUBJECTIVE  Claudia is here today in follow-up for consideration of starting therapy. She is here with her  and daughter. She continues to have headaches but note these are slightly better on steroids and XRT. She is taking 4 mg of dexamethasone once per day. She is having sore throat and burning when she swallows. Also has a lot of GERD. Has nausea " acutely after XRT. No vomiting. Bowel movements are typically constipated but she used Miralax yesterday and had a softer BM this morning. She is taking 10 mg of oxycodone every 4 hours. Rx from Dr. Roman needed a PA so pharmacy only gave her 42 tabs. They need a new script.     She is eating softer foods. Tolerating fluids well and urinating well. No fevers/chills. No change to breathing. ROS otherwise negative.     PAST MEDICAL HISTORY  DM - no meds  HTN   Dyslipidemia    CURRENT OUTPATIENT MEDICATIONS  Current Outpatient Medications   Medication Sig Dispense Refill     cholecalciferol (VITAMIN D3) 5000 units (125 mcg) capsule Take 5,000 Units by mouth daily       dexamethasone (DECADRON) 2 MG tablet Take 4 mg with supper on 10/17, 4 mg twice daily with meals on 10/18, 4 mg with breakfast on 10/19, 2 mg with breakfast x 3 days, 1 mg with breakfast x 3 days, then off 15 tablet 0     folic acid (FOLVITE) 1 MG tablet Take 1 tablet (1 mg) by mouth daily 30 tablet 0     gabapentin (NEURONTIN) 300 MG capsule Take 1 capsule (300 mg) by mouth daily At Noon. 90 capsule 3     lidocaine (LIDODERM) 5 % patch Place 2 patches onto the skin every 24 hours To prevent lidocaine toxicity, patient should be patch free for 12 hrs daily. 60 patch 1     lisinopril (PRINIVIL/ZESTRIL) 40 MG tablet Take 1 tablet (40 mg) by mouth daily 90 tablet 2     metoprolol succinate ER (TOPROL-XL) 25 MG 24 hr tablet TAKE ONE TABLET BY MOUTH ONE TIME DAILY 90 tablet 0     nystatin (MYCOSTATIN) 311060 UNIT/ML suspension Swish and swallow 5 mL four times daily 400 mL 1     omeprazole 20 MG tablet Take 1 tablet (20 mg) by mouth daily 30 tablet 0     ondansetron (ZOFRAN-ODT) 8 MG ODT tab Take 1 tablet (8 mg) by mouth every 8 hours as needed for nausea 30 tablet 11     oxyCODONE (ROXICODONE) 5 MG tablet Take 2 tablets (10 mg) by mouth every 4 hours as needed for moderate to severe pain 100 tablet 0     polyethylene glycol (MIRALAX/GLYCOLAX) packet Take  17 g by mouth daily 30 packet 11     senna (SENOKOT) 8.6 MG tablet Take 1-2 tablets by mouth 2 times daily       SM SENNA LAXATIVE 8.6 MG tablet TAKE TWO TABLETS BY MOUTH TWICE DAILY  120 tablet 0     sucralfate (CARAFATE) 1 GM/10ML suspension Take 10 mLs (1 g) by mouth 4 times daily 420 mL 0     [START ON 10/23/2019] dexamethasone (DECADRON) 4 MG tablet Take 1 tablet (4 mg) by mouth 2 times daily (with meals) Start one day prior to Pemetrexed (Alimta), continue on the day of treatment, and the day following Pemetrexed. 6 tablet 11     folic acid (FOLVITE) 1 MG tablet Take 1 tablet (1,000 mcg) by mouth daily Begin 7 days before Pemetrexed (Alimta) starts and continue for 21 days after Pemetrexed is discontinued. 30 tablet 11     LORazepam (ATIVAN) 0.5 MG tablet Take 1 tablet (0.5 mg) by mouth every 6 hours as needed (Nausea/Vomiting) 30 tablet 3     ondansetron (ZOFRAN) 8 MG tablet Take 1 tablet (8 mg) by mouth every 8 hours as needed for nausea (vomiting) 30 tablet 11     prochlorperazine (COMPAZINE) 10 MG tablet Take 0.5 tablets (5 mg) by mouth every 6 hours as needed (Nausea/Vomiting) 30 tablet 11     ALLERGIES  Allergies   Allergen Reactions     Sulfa Drugs Anaphylaxis     Angioedema       SOCIAL HISTORY:   [place]  Lived in [place] for a while too.   3 daughters     FAMILY HISTORY:     REVIEW OF SYSTEMS  As above in the HPI, o/w complete 12-point ROS was negative.    PHYSICAL EXAM  /70 (BP Location: Right arm, Patient Position: Sitting, Cuff Size: Adult Regular)   Pulse 105   Temp 97.7  F (36.5  C) (Oral)   Resp 18   Wt 64.9 kg (143 lb)   SpO2 98%   BMI 24.55 kg/m    Wt Readings from Last 3 Encounters:   10/16/19 64.9 kg (143 lb)   10/14/19 64.4 kg (142 lb)   10/08/19 65.3 kg (143 lb 14.4 oz)     GEN: NAD, somewhat frail appearing.   HEENT: Wearing eye patch. Left eyelid completely closed. Pupil is non reactive to light. R pupil is reactive with normal EOM. Oropharynx is moist  however posterior pharynx has mild erythema and some thrush along soft palate and posterior pharynx.   NECK: no cervical or supraclavicular lymphadenopathy  LUNGS: clear bilaterally  CV: regular, no murmurs, rubs, or gallops  ABDOMEN: soft, non-tender, non-distended, normal bowel sounds  EXT: warm, well perfused, no edema  NEURO: alert  SKIN: no rashes    LABORATORY AND IMAGING STUDIES   10/16/2019 12:07   Sodium 128 (L)   Potassium 4.8   Chloride 94   Carbon Dioxide 24   Urea Nitrogen 26   Creatinine 0.74   GFR Estimate 78   GFR Estimate If Black >90   Calcium 9.4   Anion Gap 10   Albumin 3.2 (L)   Protein Total 6.8   Bilirubin Total 0.9   Alkaline Phosphatase 116   ALT 58 (H)   AST 26   Lactate Dehydrogenase Unsatisfactory specimen - hemolyzed   Glucose 352 (H)   WBC 8.3   Hemoglobin 14.6   Hematocrit 43.9   Platelet Count 160   RBC Count 4.64   MCV 95   MCH 31.5   MCHC 33.3   RDW 14.6   Diff Method Automated Method   % Neutrophils 91.8   % Lymphocytes 1.9   % Monocytes 3.8   % Eosinophils 0.0   % Basophils 0.6   % Immature Granulocytes 1.9   Nucleated RBCs 0   Absolute Neutrophil 7.6   Absolute Lymphocytes 0.2 (L)   Absolute Monocytes 0.3   Absolute Eosinophils 0.0   Absolute Basophils 0.1   Abs Immature Granulocytes 0.2   Absolute Nucleated RBC 0.0       ASSESSMENT AND PLAN  NSCLC, adenoarcinoma, EGFR exon 18 G719A mutation: Rx for osimertinib and afatinib were ran through insurance and with PAs are covered however co pay is >$2000 per month for both drugs. Dr. Roman recommended starting carboplatin, pemetrexed, and pembrolizumab while awaiting bee application. I reviewed common side effects of myelosuppression, fatigue, alopecia, n/v/d/c, possible rash, rarer risk of ototoxicity, nephrotoxicity, neurotoxicity. She is willing to proceed today. Having some toxicities from XRT and steroids, see below. Will plan to taper steroids after she completes the three days of dexamethasone as part of the treatment plan.  Should have a toxicity assessment and labs in a week given fragility. Plan for 2 cycles followed by restaging with CT CAP.     Paraspinal mass with epidural extension: Completing XRT today. Will be tapering steroids in a few days.     L eye/dura/brain mets: Completing XRT today. No change in vision/ptosis so far. Will be tapering steroids as above. Follow-up brain MRI in 4-6 weeks.     Esophagitis/GERD secondary to XRT and steroids: Start omeprazole 20 mg before breakfast as well as carafate PRN up to QID. Start Nystatin swish and swallow QID for thrush. Continue to eat softer, moist foods.     Pain control: Continue oxycodone 10 mg every 4 hours as needed for pain. Refill provided. For dexamethasone will take 8 mg in AM tomorrow (IV + oral with chemo), 4 mg BID on 10/18, 4 mg in AM on 10/19, then 2 mg in AM x 3 days, 1 mg in AM x 3 days, then off. Continue bowel regimen with Senna and daily Miralax.     Hyponatremia: Na is stable at 128. Corrected for hyperglycemia, sodium level is 133. Osmolality okay; likely mild hypovolemic hyponatremia.     Diabetes mellitus: Currently not on meds. BS returned today at 352--not seen during visit as CMP resulted later. Will need management with steroids.     Greater than 40 minutes was spent with this patient with greater than 20 minutes spent in counseling and coordination of care.    Katlyn Mock PA-C    [facility]  [address] [place] [id number]  [phone]    ADDENDUM: Repeat BS checked prior to chemo is 183. She is not currently checking BS at all. Start checking fasting every morning and 2 hours post-prandial. RNCC called PCP office and made appointment for this coming Tuesday for management. See plan for steroids as above. Once dexamethasone taper is complete she will have 4 mg BID of dex x 3 days with chemo as well as 8 mg IV every 3 weeks.-ANDI

## 2019-10-17 NOTE — PATIENT INSTRUCTIONS
Check your blood sugars first thing in the morning before eating breakfast and 2 hours after any meal at home as instructed by Katlyn BLANCHARD until you receive further instructions from Dr. Craig      [facility] Main Line: [phone]    Call triage nurse with chills and/or temperature greater than or equal to 100.4, uncontrolled nausea/vomiting, diarrhea, constipation, dizziness, shortness of breath, chest pain, bleeding, unexplained bruising, or any new/concerning symptoms, questions/concerns.   If you are having any concerning symptoms or wish to speak to a provider before your next infusion visit, please call your care coordinator or triage to notify them so we can adequately serve you.   Triage Nurse Line: [phone]    If after hours, weekends, or holidays [phone]

## 2019-10-19 NOTE — TELEPHONE ENCOUNTER
Requested Prescriptions   Pending Prescriptions Disp Refills     metoprolol succinate ER (TOPROL-XL) 25 MG 24 hr tablet [Pharmacy Med Name: Metoprolol Succinate ER Oral Tablet Extended Release 24 Hour 25 MG] 90 tablet 0     Sig: TAKE ONE TABLET BY MOUTH ONE TIME DAILY. DUE FOR OFFICE VISIT PER MD.   Last Written Prescription Date:  9/30/19  Last Fill Quantity: 90,  # refills: 0   Last Office Visit: 9/30/2019 Kiara      Return in about 4 weeks (around 10/28/2019) for Annual Wellness Visit.     Future Office Visit:    Next 5 appointments (look out 90 days)    Oct 22, 2019  2:50 PM CDT  Office Visit with Sekou Craig MD, CR EXAM ROOM 36  [facility] [address] [place] [address]  [phone]   Oct 25, 2019  2:00 PM CDT  Return Visit with  ONCOLOGY NURSE  [facility] ([facility]  [address] [place], [place] [address]   [facility] [address]  [phone]   Oct 25, 2019  2:30 PM CDT  Return Visit with María Kirk PA-C  [facility]  [address]   [facility] [address]  [phone]   Nov 07, 2019 10:00 AM CST  Return Visit with María Kirk PA-C  [facility] ([facility]) [facility]  [address]   [facility] [address]  [phone]             Beta-Blockers Protocol Passed - 10/19/2019  7:00 AM        Passed - Blood pressure under 140/90 in past 12 months     BP Readings from Last 3 Encounters:   10/17/19 112/69   10/16/19 121/70   10/14/19 111/63                 Passed - Patient is age 6 or older        Passed - Recent (12 mo) or future (30 days) visit within the authorizing provider's specialty     Patient has had an office visit with the authorizing provider or a provider within the authorizing providers  "department within the previous 12 mos or has a future within next 30 days. See \"Patient Info\" tab in inbasket, or \"Choose Columns\" in Meds & Orders section of the refill encounter.              Passed - Medication is active on med list        "

## 2019-10-20 NOTE — TELEPHONE ENCOUNTER
Prescription approved per [facility] Refill Protocol.  Meghan Serna RN

## 2019-10-22 NOTE — PROGRESS NOTES
Subjective     Claudia BLAZE Simon is a 74 year old female who presents to clinic today for the following health issues:    HPI   Diabetes Follow-up and primary brain tumor with steroid therapy : dexamethsone destabilzes her glucose,       How often are you checking your blood sugar? Two times daily    What time of day are you checking your blood sugars (select all that apply)?  Before and after meals    Have you had any blood sugars above 200?  Yes few    Have you had any blood sugars below 70?  No    What symptoms do you notice when your blood sugar is low?  Shaky and Dizzy    What concerns do you have today about your diabetes? Other: blood sugars out of control because of the steroids     Do you have any of these symptoms? (Select all that apply)  Numbness in feet and Burning in feet     Have you had a diabetic eye exam in the last 12 months? Yes- Date of last eye exam: Septermber 2019 [facility]     BP Readings from Last 2 Encounters:   10/17/19 112/69   10/16/19 121/70     Hemoglobin A1C (%)   Date Value   09/18/2019 6.1 (H)   07/27/2018 6.4 (H)     LDL Cholesterol Calculated (mg/dL)   Date Value   07/27/2018 131 (H)   06/26/2017 84       Diabetes Management Resources      How many servings of fruits and vegetables do you eat daily?  0-1    On average, how many sweetened beverages do you drink each day (soda, juice, sweet tea, etc)?   0    How many days per week do you miss taking your medication? 0        Past Medical History:   Diagnosis Date     Lung cancer (H) 09/23/2019       Past Surgical History:   Procedure Laterality Date     C NONSPECIFIC PROCEDURE      back surgery x 3     C NONSPECIFIC PROCEDURE      ruptured discs x 2-1 yr apart     C NONSPECIFIC PROCEDURE      titanium cage     IR CHEST TUBE PLACEMENT NON-TUNNELLED LEFT  9/23/2019     IR FOLLOW UP VISIT INPATIENT  9/24/2019       Family History   Problem Relation Age of Onset     Diabetes Mother      Alzheimer Disease Father      Cerebrovascular Disease  Sister      Cerebrovascular Disease Sister        Social History     Tobacco Use     Smoking status: Former Smoker     Years: 34.00     Last attempt to quit: 2/12/2009     Years since quitting: 10.6     Smokeless tobacco: Never Used   Substance Use Topics     Alcohol use: No         BP Readings from Last 3 Encounters:   10/22/19 105/72   10/17/19 112/69   10/16/19 121/70    Wt Readings from Last 3 Encounters:   10/22/19 63.9 kg (140 lb 14.4 oz)   10/16/19 64.9 kg (143 lb)   10/14/19 64.4 kg (142 lb)                    Reviewed and updated as needed this visit by Provider         Review of Systems   ROS COMP: Constitutional, HEENT, cardiovascular, pulmonary, GI, , musculoskeletal, neuro, skin, endocrine and psych systems are negative, except as otherwise noted.      Objective    There were no vitals taken for this visit.  There is no height or weight on file to calculate BMI.  Physical Exam   GENERAL: healthy, alert and no distress  EYES: Eyes grossly normal to inspection, PERRL and conjunctivae and sclerae normal  HENT: ear canals and TM's normal, nose and mouth without ulcers or lesions  NECK: no adenopathy, no asymmetry, masses, or scars and thyroid normal to palpation  RESP: lungs clear to auscultation - no rales, rhonchi or wheezes  BREAST: normal without masses, tenderness or nipple discharge and no palpable axillary masses or adenopathy  CV: regular rate and rhythm, normal S1 S2, no S3 or S4, no murmur, click or rub, no peripheral edema and peripheral pulses strong  ABDOMEN: soft, nontender, no hepatosplenomegaly, no masses and bowel sounds normal  MS: no gross musculoskeletal defects noted, no edema  SKIN: no suspicious lesions or rashes  NEURO: Normal strength and tone, mentation intact and speech normal  PSYCH: mentation appears normal, affect normal/bright    Diagnostic Test Results:  Labs reviewed in Epic        Assessment & Plan     1. 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 New diagnosis, unstable glucose.  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    Sekou Craig MD  [facility]

## 2019-10-23 NOTE — PROGRESS NOTES
..[facility] utilizes an encounter to take the place of a direct phone call to your office. Please take a moment to review the below request. Please reply or route message to author of this encounter.  Message will act as a verbal OK of orders requested below. Thank you.    Ongoing skilled nursing visits 1x/week for 4 weeks and 3 PRN    Thank you,    Nancy Swartz, RN  [phone]

## 2019-10-23 NOTE — PROGRESS NOTES
Oncology/Hematology Visit Note    Oct 25, 2019    Reason for visit: Follow up metastatic NSCLC, EGFR G719A mutation    Oncology HPI: Claudia Simon is a 74 year old female with metastatic NSCLC.  She presented to the emergency department at [facility] on 9/17/2019 with facial numbness and back pain.  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  She also had multiple acute infarcts.  Further work-up revealed thoracic and lumbar spine metastasis with ventral epidural extension of T5-T5/7 lesions.  She underwent left upper lobe biopsy on 9/23/2019 revealed poorly differentiated NSCLC and developed pneumothorax, requiring chest tube.  PET scan on 10/7/2019 revealed multiple areas of lymphadenopathy and lung masses bilaterally.  She underwent radiation therapy to the brain and thoracic spine starting on 10/2/2019.  She established care with Dr. Roman on 10/8/2019 and they discussed her G719A EGFR mutation.  Dr. Roman initially recommended osimertinib and afatinib, however copay was was too high with insurance coverage, therefore Dr. Roman recommended chemoimmunotherapy with carboplatin, pemetrexed and pembrolizumab, cycle 1 completed on 10/16/2019.  She continues with radiation and steroids.    She is here today for toxicity assessment.     Interval History: Claudia is here with her , Benito, and daughter, Araceli.  She tolerated her first cycle of chemoimmunotherapy last week, but has been having some chronic issues that are persistent. she has been having a really difficult time sleeping this past week due to pain, frequent urination and she has been up about every hour.  This is been challenging for her  and daughter as they are both taking care of her and both trying to work themselves.  She continues to have pain on the right side of rib since radiation and  continues to have left-sided headache and eye pain since diagnosis.  She also has some skin changes to the left hip from radiation.  She has been taking her dexamethasone taper and her last dose will be in a few days.  She also has been taking oxycodone 10 mg every 4 hours scheduled with minimal pain relief and she has topical lidocaine patches with some relief.  She has been having diarrhea twice yesterday, but nothing today.  Denies hematochezia, black or tarry stools.  She does have a rash to the left hip, but that is been ongoing since radiation no new changes.  She admits to persistent reflux and a little better with omeprazole and Carafate.  She has not had any fever recently they have been checking daily.  She has no new dizziness or lightheadedness and has been able to walk, but uses wheelchair in clinic today.  Denies chest pain, shortness of breath, no abdominal pain, nausea, vomiting, bleeding or vision changes.    Review of Systems: See interval hx. Denies fevers, chills, new HA, dizziness, n/t, new changes in vision, cough, sore throat, CP, SOB, abdominal pain, N/V, changes in urination, bleeding, bruising, rash.     PMHx and Social Hx reviewed per EPIC.      Medications:  Current Outpatient Medications   Medication Sig Dispense Refill     blood glucose (CONTOUR NEXT TEST) test strip Use to test blood sugar 2 times daily or as directed. 60 strip 11     cholecalciferol (VITAMIN D3) 5000 units (125 mcg) capsule Take 5,000 Units by mouth daily       dexamethasone (DECADRON) 2 MG tablet Take 4 mg with supper on 10/17, 4 mg twice daily with meals on 10/18, 4 mg with breakfast on 10/19, 2 mg with breakfast x 3 days, 1 mg with breakfast x 3 days, then off 15 tablet 0     dexamethasone (DECADRON) 4 MG tablet Take 1 tablet (4 mg) by mouth 2 times daily (with meals) Start one day prior to Pemetrexed (Alimta), continue on the day of treatment, and the day following Pemetrexed. 6 tablet 11     folic acid (FOLVITE)  1 MG tablet Take 1 tablet (1,000 mcg) by mouth daily Begin 7 days before Pemetrexed (Alimta) starts and continue for 21 days after Pemetrexed is discontinued. 30 tablet 11     folic acid (FOLVITE) 1 MG tablet Take 1 tablet (1 mg) by mouth daily 30 tablet 0     gabapentin (NEURONTIN) 300 MG capsule Take 1 capsule (300 mg) by mouth daily At Noon. 90 capsule 3     lidocaine (LIDODERM) 5 % patch Place 2 patches onto the skin every 24 hours To prevent lidocaine toxicity, patient should be patch free for 12 hrs daily. 60 patch 1     lisinopril (PRINIVIL/ZESTRIL) 40 MG tablet Take 1 tablet (40 mg) by mouth daily 90 tablet 2     LORazepam (ATIVAN) 0.5 MG tablet Take 1 tablet (0.5 mg) by mouth every 6 hours as needed (Nausea/Vomiting) 30 tablet 3     metFORMIN (GLUCOPHAGE-XR) 500 MG 24 hr tablet Take 1 tablet (500 mg) by mouth daily (with dinner) 30 tablet 3     metoprolol succinate ER (TOPROL-XL) 25 MG 24 hr tablet TAKE ONE TABLET BY MOUTH ONE TIME DAILY. DUE FOR OFFICE VISIT PER MD. 90 tablet 0     metoprolol succinate ER (TOPROL-XL) 25 MG 24 hr tablet TAKE ONE TABLET BY MOUTH ONE TIME DAILY 90 tablet 0     nystatin (MYCOSTATIN) 273114 UNIT/ML suspension Swish and swallow 5 mL four times daily 400 mL 1     omeprazole 20 MG tablet Take 1 tablet (20 mg) by mouth daily 30 tablet 0     ondansetron (ZOFRAN) 8 MG tablet Take 1 tablet (8 mg) by mouth every 8 hours as needed for nausea (vomiting) 30 tablet 11     ondansetron (ZOFRAN-ODT) 8 MG ODT tab Take 1 tablet (8 mg) by mouth every 8 hours as needed for nausea 30 tablet 11     oxyCODONE (ROXICODONE) 5 MG tablet Take 2 tablets (10 mg) by mouth every 4 hours as needed for moderate to severe pain 100 tablet 0     polyethylene glycol (MIRALAX/GLYCOLAX) packet Take 17 g by mouth daily 30 packet 11     prochlorperazine (COMPAZINE) 10 MG tablet Take 0.5 tablets (5 mg) by mouth every 6 hours as needed (Nausea/Vomiting) 30 tablet 11     senna (SENOKOT) 8.6 MG tablet Take 1-2 tablets by  "mouth 2 times daily       SM SENNA LAXATIVE 8.6 MG tablet TAKE TWO TABLETS BY MOUTH TWICE DAILY  120 tablet 0     sucralfate (CARAFATE) 1 GM/10ML suspension Take 10 mLs (1 g) by mouth 4 times daily 420 mL 0       Allergies   Allergen Reactions     Sulfa Drugs Anaphylaxis     Angioedema         EXAM:    BP (!) 83/62   Pulse 106   Temp 98.9  F (37.2  C) (Tympanic)   Resp 16   Ht 1.626 m (5' 4\")   Wt 63.5 kg (140 lb)   SpO2 95%   BMI 24.03 kg/m       Vital Signs 10/25/2019 10/25/2019 10/25/2019   Systolic 83 87 91   Diastolic 62 64 60   Pulse 106     Temperature 98.9     Respirations 16     Weight (LB) 140 lb     Height 5' 4\"     BMI (Calculated) 24.03     Pain      O2 95         GENERAL:  Female, in no acute distress.  Alert and oriented x3.   HEENT:  Normocephalic, left eye closed, ptosis. Right PERRL. Oropharynx clear with no sores or thrush.   LYMPH NODES:  No palpable pre/post-auricular, cervical, axillary lymphadenopathy appreciated.  CV:  RRR, No murmurs, gallops, or rubs.   LUNGS:  Clear to auscultation bilaterally.   ABDOMEN:  Soft, nontender and nondistended.  Bowel sounds heard x4.  No apparent hepatosplenomegaly.   EXTREMITIES:  No clubbing, cyanosis, or edema.   SKIN: No rash  PSYCH: Mood stable      Labs:   Results for CHYNA ROSARIO (MRN [id number]) as of 10/25/2019 16:27   10/25/2019 13:59   Sodium 126 (L)   Potassium 4.7   Chloride 95   Carbon Dioxide 25   Urea Nitrogen 25   Creatinine 0.88   GFR Estimate 64   GFR Estimate If Black 74   Calcium 9.3   Anion Gap 6   Albumin 2.8 (L)   Protein Total 6.6 (L)   Bilirubin Total 1.3   Alkaline Phosphatase 120    (H)   AST 31   T4 Free 0.84   TSH 3.30   Glucose 192 (H)   WBC 0.5 (LL)   Hemoglobin 12.6   Hematocrit 38.1   Platelet Count 38 (LL)   RBC Count 3.99   MCV 96   MCH 31.6   MCHC 33.1   RDW 14.6   Diff Method Manual Differential   % Neutrophils 75.0   % Lymphocytes 12.0   % Monocytes 12.0   % Eosinophils 1.0   % Basophils 0.0 "   Nucleated RBCs 1 (H)   Absolute Neutrophil 0.4 (LL)   Absolute Lymphocytes 0.1 (L)   Absolute Monocytes 0.1   Absolute Eosinophils 0.0   Absolute Basophils 0.0   Absolute Nucleated RBC 0.0       Imaging: n/a    Impression/Plan: Claudia Simon is a 74 year old female with metastatic NSCLC currently undergoing chemo immunotherapy with carboplatin, pemetrexed and pembrolizumab.    NSCLC: Dr. Roman initially recommended osimertinib and afatinib, however the co-pay was too high, therefore she is undergoing treatment with carboplatin, pemetrexed and pembrolizumab.  She tolerated cycle 1 on 10/17/2019 fairly well.  Reviewed common toxicities with chemotherapy and immunotherapy again today.  She was recently approved for afatinib, however Dr. Roman would prefer to continue this treatment and this discussed with the patient and family today.  She is neutropenic with ANC 0.4 we discussed neutropenic precautions in great detail today.  She will monitor her temperature over the weekend and go to the emergency department if temperature  >100.4.   She has many chronic issues that are persistent, see below. She will return in 2 weeks for cycle 2 and plan for 2 cycles, followed by restaging CT CAP.  She will call the clinic with any questions or concerns.  --11/7 María, carboplatin/pemetrexed/pembrolizumab cycle 2  --11/26 CT cap  --11/27 Dr Roman    Leukopenia/neutropenia: WBC 0.5 and ANC 0.4, secondary to chemotherapy.  She will monitor her temperature and go to the emergency department if temperature > 100.4.  We discussed good hand hygiene and she will wear a mask in large crowds.    BP: Blood pressure on arrival 83/62 and she had no new dizziness or lightheadedness.  She was given a liter of fluids and blood pressure did improved to 91/60.  She was in a wheelchair and had no symptoms.  She was instructed to go to the emergency department over the weekend if any lightheadedness, dizziness, near syncope, syncope or  any worsening symptoms.    Paraspinal mass: With epidural extension and she continues with radiation and will be tapering steroids, last dose is Sunday.  She will continue with dexamethasone with treatment as well as premedication.    Brain/eye/dural metastasis: Completed radiation and no change in vision/ptosis.  Follow-up brain MRI scheduled on 11/20/2019 and follow-up with radiation oncology same day.    Esophagitis/GERD: Likely secondary to radiation and steroids she was started on omeprazole and Carafate.  Symptoms have improved, however certain foods do trigger her symptoms. She was also started on nystatin with improvement.    FEN: Hyponatremia and sodium continues to decline, today 126.  Corrected for hyperglycemia is 128.  She was given a liter of normal saline and also recommended she limit her free water, push more Gatorade/Powerade.  Electrolytes are otherwise normal.  Glucose elevated, known diabetic, likely from dexamethasone.    Pain: Currently tapering off dexamethasone, last dose in 2 days.  Also on oxycodone 10 mg every 4 hours.  We would prefer she not strain to prevent increased ICP, therefore she will continue senna and MiraLAX.  She would benefit from palliative care, therefore referral placed today.    Insomnia: Likely secondary to steroid use and chemotherapy.  Suggested she try melatonin.    LFTs: ALT has risen from 58 to 234 and 1 week.  She has no jaundice, no abdominal pain outside the ordinary and no abdominal tenderness.  Certainly could be from pembrolizumab and we will monitor closely.  I have placed a request for CMP to be drawn at home and the home health nurses over at their house next week.      Chart documentation with Dragon Voice recognition Software. Although reviewed after completion, some words and grammatical errors may remain.      María Stevens PA-C  Hematology/Oncology  [facility]

## 2019-10-24 NOTE — PROGRESS NOTES
[facility] now requests orders and shares plan of care/discharge summaries for some patients through [employer].  Please REPLY TO THIS MESSAGE OR ROUTE BACK TO THE AUTHOR in order to give authorization for orders when needed.  This is considered a verbal order, you will still receive a faxed copy of orders for signature.  Thank you for your assistance in improving collaboration for our patients.    ORDER Requesting order for a second follow up social work visit in order to meet with spouse re [facility] long term [facility] berenice.

## 2019-10-24 NOTE — TELEPHONE ENCOUNTER
Fax from [facility], medicare requires BRAND name for glucose testing strips, wants resent for contour NEXT strips  Prescription approved per G Refill Protocol.  Alecia Howe, RN, BSN

## 2019-10-24 NOTE — PROGRESS NOTES
Clinic Care Coordination Contact  [facility]/Voicemail    Clinical Data: Care Coordinator Outreach  Outreach attempted x 1.  Patient was not available to complete outreach - was sleeping. Person answering phone reported patient was doing well and was sitting with a  right now. Health care directive completed and on file in chart. Enrollment status changed to maintenance.   Plan: Care Coordinator sent care coordination introduction letter on 09/25/2019 via mail. Care Coordinator will try to reach patient again in 2 months.     Lawanda Hudson RN Care Coordinator  [facility]  Email: [email or web address]  Phone: [phone]

## 2019-10-24 NOTE — PROCEDURES
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     Treatment Summary:  Radiation Oncology - Course: 1 Protocol:   Treatment Site Current Dose Modality From To Elapsed Days Fx.  1 Whole Brain  3,000 cGy 06 X 10/03/2019 10/16/2019  13 10  2 T3-T10               3,000 cGy    10 X  10/03/2019 10/16/2019  13 10           Dose per Fraction:     300 cGy   Total Dose:      3000 cGy        COMMENTS:      Mrs. Rosario is a 74-year-old female with metastatic lung cancer.  She initially noticed a   small pimple-like lump on her left forehead.  This was followed by development of headache, double vision and   numbness around her left eye over a short period of time. MRI of the brain showed extensive nodular dural   thickening most conspicuous along the left cerebral convexity, but also extended on the right frontoparietal   convexity with obstruction of the middle third superior sagittal sinus.  There was an enhancing mass within the   left sphenotemporal buttress extending into the lateral orbit, middle cranial fossa and suprazygomatic    space.  Thirdly, there was a 1 cm area of nodular enhancement involving the superior parietal   lobe.  Additionally, diffuse osseous disease were seen to involve the calvarium and clivus.  She underwent CT   of the chest, abdomen and pelvis on  which revealed a 2.5 cm spiculated solid nodule in the left upper lobe   and a 2.8 x 1.9 cm nodule in the anterior right upper lobe along with multiple prominent mediastinal lymph    nodes, the largest being 12 mm in the AP window.  There were multiple ground-glass nodules in the right upper   lobe also suspicious for metastatic spread.  CT guided biopsy of the left upper lobe lesion revealed non-small   cell carcinoma, favor adenocarcinoma, poorly differentiated.  PD-L1 expression was low with TPS of 2-3%.   Procedure was complicated by pneumothorax, which required left apical chest tube placement. Additional   workup with MRI of spine showed multiple T1 hypointense, T2 hyperintense enhancing lesions in T4, T6, T7,   T8, T9 vertebral bodies and T6 right pedicle, spinous process and lamina. There was ventral epidural extension   of the metastatic lesions from T5 down to the T7-8 intervertebral disc space resulting in mild to moderate canal   stenosis, though without cord compression.      She then proceeded with a course of palliative radiotherapy to the whole brain, encompassing the left periorbital   mass, and T spine.  Her pain initially was not well controlled due to not adequately utilizing narcotic pain   medication.  Once she increased oxycodone to 10 mg 4 times daily, her quality of life improved quite   dramatically.  Unfortunately, her ptosis of the left eye remained significant and per ophthalmology, her vision is   unlikely to recover. Steroid was added for symptom control. She has been using an eye patch.          ED visits/hospitalizations: None      Missed treatments: None      Acute Toxicity Profile by CTC v5.0:  Alopecia: Grade 1: Hair loss <50% of normal; not obvious from a distance; does not require a wig or hair peice to   camoflage  Fatigue: Grade 2: Fatigue not relieved by rest; limiting instrumental ADL  Pain: Grade 1: Mild pain  Dermatitis: Grade 1: Faint erythema or dry desquamation     PAIN MANAGEMENT:  Oxycodone                   FOLLOW UP PLAN:    1. Follow up in our clinic in 4-6 weeks with repeat brain MRI.  2. Follow up with Medical Oncology as scheduled.                            Staff Physician: Diomedes Valladares M.D.  Physicist: Krystian Claire, PhD     CC: MD Sekou Segovia MD                                        Radiation Oncology:  [facility] [address] [place] [address]

## 2019-10-24 NOTE — PROGRESS NOTES
[facility] now requests orders and shares plan of care/discharge summaries for some patients through [employer].  Please REPLY TO THIS MESSAGE OR ROUTE BACK TO THE AUTHOR in order to give authorization for orders when needed.  This is considered a verbal order, you will still receive a faxed copy of orders for signature.  Thank you for your assistance in improving collaboration for our patients.    ORDER Requesting order for Follow-up SW visit to continue to address community resource and financial needs. Visit scheduled this afternoon.

## 2019-10-24 NOTE — TELEPHONE ENCOUNTER
RN Care Coordination Note  Outgoing Call: Placed call to patient to explain recommendations from Dr Roman. Patient was resting, spoke with daughter, Alissa.     Reviewed patient should continue on current treatment until CT shows progression or side effects become intolerable. Daughter states they did already request shipment of Afatinib. Instructed them to hold onto drug and to not start until instructed to do so by Dr Roman or SUSANNE.     Also reviewed upcoming schedule. Daughter voiced understanding and will discuss recommendations with patient and . She had no further questions or concerns at this time.       Cristin Lynn RN, BSN, OCN   Care Coordinator   [facility]

## 2019-10-24 NOTE — TELEPHONE ENCOUNTER
Per call to [facility], pt was approved to receive free Gilotrif through 12/31/2020. Requested they please send out approval letter.

## 2019-10-24 NOTE — TELEPHONE ENCOUNTER
----- Message from Glenys Roman MD sent at 10/24/2019  2:30 PM CDT -----  Regarding: RE: Gilotrif approved  Sukhdeep Pitts,     She's already on another treatment and so should wait to request the first shipment. We will complete 2 cycles of her current treatment and restage with CT. We will not switch to afatinib until the cancer progresses or she has side effects from her current treatment that would make us want to change.     Cristin, can you call pt/ and let them know? Also, even though it seems obvious, if they have already requested the shipment, they should not start until instructed to do so by either me or one of the APPs.     I'm cc'ing María, who will see the patient in [facility] next week.     I've also sent a request to scheduling to get her on my calendar 11/27 with CT,. Thanks!Glenys  ----- Message -----  From: Gisselle Walden  Sent: 10/24/2019   2:05 PM CDT  To: Glenys Roman MD, [facility]  Subject: Gilotrif approved                                Claudia Martin was approved to receive Gilotrif through the  at no cost. I called and let her daughter know and she was going to have her father call to schedule the first shipment. Let me know if there is anything more you need from me. Should we cancel free drug application for Tagrisso or keep going since this was preferred?    Thanks!  Thank you, if you have any further questions please feel free to contact me.    Gisselle Walden  Specialty and Oncology Float Patricio [email or web address]  Phone: [phone]  Fax: [phone]      Covering Michael Smith today and tomorrow.

## 2019-10-25 NOTE — NURSING NOTE
"Oncology Rooming Note    October 25, 2019 2:09 PM   Claudia Simon is a 74 year old female who presents for:    Chief Complaint   Patient presents with     Oncology Clinic Visit     Primary lung adenocarcinoma, left      Initial Vitals: BP (!) 83/62   Pulse 106   Temp 98.9  F (37.2  C) (Tympanic)   Resp 16   Ht 1.626 m (5' 4\")   Wt 63.5 kg (140 lb)   SpO2 95%   BMI 24.03 kg/m   Estimated body mass index is 24.03 kg/m  as calculated from the following:    Height as of this encounter: 1.626 m (5' 4\").    Weight as of this encounter: 63.5 kg (140 lb). Body surface area is 1.69 meters squared.  No Pain (0) Comment: Data Unavailable   No LMP recorded. Patient is postmenopausal.  Allergies reviewed: Yes  Medications reviewed: Yes    Medications: Medication refills not needed today.  Pharmacy name entered into [employer]: [id number] - [place], [place] - [address]    Clinical concerns: follow up       Laurie Gibson CMA                 "

## 2019-10-25 NOTE — NURSING NOTE
Medical Assistant Note:  Claudia Simon presents today for blood draw.    Patient seen by provider today: Yes: María Stevens.   present during visit today: Not Applicable.    Concerns: No Concerns.    Procedure:  Labs drawn    Post Assessment:  Labs drawn without difficulty: Yes.    Discharge Plan:  Departure Mode: Ambulatory.    Face to Face Time: 10 min.    Becki Singh CMA

## 2019-10-25 NOTE — LETTER
10/25/2019         RE: Claudia Simon  [address]  [place] [address]        Dear Colleague,    Thank you for referring your patient, Claudia Simon, to the [facility]. Please see a copy of my visit note below.    See nursing note.    Becki Singh, DANIEL on 10/25/2019 at 2:03 PM      Again, thank you for allowing me to participate in the care of your patient.        Sincerely,        [facility] Oncology Nurse

## 2019-10-25 NOTE — PROGRESS NOTES
Results for CHYNA ROSARIO (MRN [id number]) as of 10/25/2019 15:06   Ref. Range 10/25/2019 13:59   Absolute Neutrophil Latest Ref Range: 1.6 - 8.3 10e9/L 0.4 (LL)       Writer received the above critical value from lab staff.     Writer will route to María Stevens and Dr. Roman for notification.     Margie Bran, RN, BSN  Patient Care Coordinator  [facility]  [phone]

## 2019-10-25 NOTE — PROGRESS NOTES
Called and relayed critical lab values (WBC 0.5, Platelets 38) to SANTO Massey at [facility].    Infusion Nursing Note:  Claudia THAKUR Alfred presents today for IV Fluids.    Patient seen by provider today: Yes: SANTO Massey   present during visit today: Not Applicable.    Note: N/A.    Intravenous Access:  Peripheral IV placed.    Treatment Conditions:  Not Applicable.      Post Infusion Assessment:  Patient tolerated infusion without incident.  Blood return noted pre and post infusion.  Site patent and intact, free from redness, edema or discomfort.  No evidence of extravasations.  Access discontinued per protocol.       Discharge Plan:   Discharge instructions reviewed with: Patient.  Patient and/or family verbalized understanding of discharge instructions and all questions answered.  AVS to patient via [employer].  Patient will return 11/7/19 for next appointment.     Karin Carlos RN

## 2019-10-25 NOTE — PROGRESS NOTES
María Prescott, MARI  Cc: Margie Bran RN Glenys Roman MD   Caller: Unspecified (Today,  3:06 PM)             Thanks, Margie. She is afebrile and I discussed neutropenic precautions in detail with pt and her family today. She will monitor her temp closely at home this weekend.          Margie Bran RN, BSN  Patient Care Coordinator  [facility]  [phone]

## 2019-10-25 NOTE — LETTER
10/25/2019         RE: Claudia Simon  [address]  [facility] [address]        Dear Colleague,    Thank you for referring your patient, Claudia Simon, to the [facility]. Please see a copy of my visit note below.    Oncology/Hematology Visit Note    Oct 25, 2019    Reason for visit: Follow up metastatic NSCLC, EGFR G719A mutation    Oncology HPI: Claudia Simon is a 74 year old female with metastatic NSCLC.  She presented to the emergency department at [facility] on 9/17/2019 with facial numbness and back pain.  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  She also had multiple acute infarcts.  Further work-up revealed thoracic and lumbar spine metastasis with ventral epidural extension of T5-T5/7 lesions.  She underwent left upper lobe biopsy on 9/23/2019 revealed poorly differentiated NSCLC and developed pneumothorax, requiring chest tube.  PET scan on 10/7/2019 revealed multiple areas of lymphadenopathy and lung masses bilaterally.  She underwent radiation therapy to the brain and thoracic spine starting on 10/2/2019.  She established care with Dr. Roman on 10/8/2019 and they discussed her G719A EGFR mutation.  Dr. Roman initially recommended osimertinib and afatinib, however copay was was too high with insurance coverage, therefore Dr. Roman recommended chemoimmunotherapy with carboplatin, pemetrexed and pembrolizumab, cycle 1 completed on 10/16/2019.  She continues with radiation and steroids.    She is here today for toxicity assessment.     Interval History: Claudia is here with her , Benito, and daughter, Araceli.  She tolerated her first cycle of chemoimmunotherapy last week, but has been having some chronic issues that are persistent. she has been having a really difficult time sleeping this past week due to pain,  frequent urination and she has been up about every hour.  This is been challenging for her  and daughter as they are both taking care of her and both trying to work themselves.  She continues to have pain on the right side of rib since radiation and continues to have left-sided headache and eye pain since diagnosis.  She also has some skin changes to the left hip from radiation.  She has been taking her dexamethasone taper and her last dose will be in a few days.  She also has been taking oxycodone 10 mg every 4 hours scheduled with minimal pain relief and she has topical lidocaine patches with some relief.  She has been having diarrhea twice yesterday, but nothing today.  Denies hematochezia, black or tarry stools.  She does have a rash to the left hip, but that is been ongoing since radiation no new changes.  She admits to persistent reflux and a little better with omeprazole and Carafate.  She has not had any fever recently they have been checking daily.  She has no new dizziness or lightheadedness and has been able to walk, but uses wheelchair in clinic today.  Denies chest pain, shortness of breath, no abdominal pain, nausea, vomiting, bleeding or vision changes.    Review of Systems: See interval hx. Denies fevers, chills, new HA, dizziness, n/t, new changes in vision, cough, sore throat, CP, SOB, abdominal pain, N/V, changes in urination, bleeding, bruising, rash.     PMHx and Social Hx reviewed per EPIC.      Medications:  Current Outpatient Medications   Medication Sig Dispense Refill     blood glucose (CONTOUR NEXT TEST) test strip Use to test blood sugar 2 times daily or as directed. 60 strip 11     cholecalciferol (VITAMIN D3) 5000 units (125 mcg) capsule Take 5,000 Units by mouth daily       dexamethasone (DECADRON) 2 MG tablet Take 4 mg with supper on 10/17, 4 mg twice daily with meals on 10/18, 4 mg with breakfast on 10/19, 2 mg with breakfast x 3 days, 1 mg with breakfast x 3 days, then off 15  tablet 0     dexamethasone (DECADRON) 4 MG tablet Take 1 tablet (4 mg) by mouth 2 times daily (with meals) Start one day prior to Pemetrexed (Alimta), continue on the day of treatment, and the day following Pemetrexed. 6 tablet 11     folic acid (FOLVITE) 1 MG tablet Take 1 tablet (1,000 mcg) by mouth daily Begin 7 days before Pemetrexed (Alimta) starts and continue for 21 days after Pemetrexed is discontinued. 30 tablet 11     folic acid (FOLVITE) 1 MG tablet Take 1 tablet (1 mg) by mouth daily 30 tablet 0     gabapentin (NEURONTIN) 300 MG capsule Take 1 capsule (300 mg) by mouth daily At Noon. 90 capsule 3     lidocaine (LIDODERM) 5 % patch Place 2 patches onto the skin every 24 hours To prevent lidocaine toxicity, patient should be patch free for 12 hrs daily. 60 patch 1     lisinopril (PRINIVIL/ZESTRIL) 40 MG tablet Take 1 tablet (40 mg) by mouth daily 90 tablet 2     LORazepam (ATIVAN) 0.5 MG tablet Take 1 tablet (0.5 mg) by mouth every 6 hours as needed (Nausea/Vomiting) 30 tablet 3     metFORMIN (GLUCOPHAGE-XR) 500 MG 24 hr tablet Take 1 tablet (500 mg) by mouth daily (with dinner) 30 tablet 3     metoprolol succinate ER (TOPROL-XL) 25 MG 24 hr tablet TAKE ONE TABLET BY MOUTH ONE TIME DAILY. DUE FOR OFFICE VISIT PER MD. 90 tablet 0     metoprolol succinate ER (TOPROL-XL) 25 MG 24 hr tablet TAKE ONE TABLET BY MOUTH ONE TIME DAILY 90 tablet 0     nystatin (MYCOSTATIN) 937729 UNIT/ML suspension Swish and swallow 5 mL four times daily 400 mL 1     omeprazole 20 MG tablet Take 1 tablet (20 mg) by mouth daily 30 tablet 0     ondansetron (ZOFRAN) 8 MG tablet Take 1 tablet (8 mg) by mouth every 8 hours as needed for nausea (vomiting) 30 tablet 11     ondansetron (ZOFRAN-ODT) 8 MG ODT tab Take 1 tablet (8 mg) by mouth every 8 hours as needed for nausea 30 tablet 11     oxyCODONE (ROXICODONE) 5 MG tablet Take 2 tablets (10 mg) by mouth every 4 hours as needed for moderate to severe pain 100 tablet 0     polyethylene  "glycol (MIRALAX/GLYCOLAX) packet Take 17 g by mouth daily 30 packet 11     prochlorperazine (COMPAZINE) 10 MG tablet Take 0.5 tablets (5 mg) by mouth every 6 hours as needed (Nausea/Vomiting) 30 tablet 11     senna (SENOKOT) 8.6 MG tablet Take 1-2 tablets by mouth 2 times daily       SM SENNA LAXATIVE 8.6 MG tablet TAKE TWO TABLETS BY MOUTH TWICE DAILY  120 tablet 0     sucralfate (CARAFATE) 1 GM/10ML suspension Take 10 mLs (1 g) by mouth 4 times daily 420 mL 0       Allergies   Allergen Reactions     Sulfa Drugs Anaphylaxis     Angioedema         EXAM:    BP (!) 83/62   Pulse 106   Temp 98.9  F (37.2  C) (Tympanic)   Resp 16   Ht 1.626 m (5' 4\")   Wt 63.5 kg (140 lb)   SpO2 95%   BMI 24.03 kg/m        Vital Signs 10/25/2019 10/25/2019 10/25/2019   Systolic 83 87 91   Diastolic 62 64 60   Pulse 106     Temperature 98.9     Respirations 16     Weight (LB) 140 lb     Height 5' 4\"     BMI (Calculated) 24.03     Pain      O2 95         GENERAL:   Female, in no acute distress.  Alert and oriented x3.   HEENT:  Normocephalic , left eye closed. Right PERRL . Oropharynx clear with no sores or thrush.   LYMPH NODES:  No palpable pre/post-auricular, cervical, axillary lymphadenopathy appreciated.  CV:  RRR, No murmurs, gallops, or rubs.   LUNGS:  Clear to auscultation bilaterally.   ABDOMEN:  Soft, nontender and nondistended.  Bowel sounds heard x4.  No apparent hepatosplenomegaly.   EXTREMITIES:  No clubbing, cyanosis, or edema.   SKIN: No rash  PSYCH: Mood stable      Labs:   Results for CHYNA ROSARIO (MRN [id number]) as of 10/25/2019 16:27   10/25/2019 13:59   Sodium 126 (L)   Potassium 4.7   Chloride 95   Carbon Dioxide 25   Urea Nitrogen 25   Creatinine 0.88   GFR Estimate 64   GFR Estimate If Black 74   Calcium 9.3   Anion Gap 6   Albumin 2.8 (L)   Protein Total 6.6 (L)   Bilirubin Total 1.3   Alkaline Phosphatase 120    (H)   AST 31   T4 Free 0.84   TSH 3.30   Glucose 192 (H)   WBC 0.5 (LL) "   Hemoglobin 12.6   Hematocrit 38.1   Platelet Count 38 (LL)   RBC Count 3.99   MCV 96   MCH 31.6   MCHC 33.1   RDW 14.6   Diff Method Manual Differential   % Neutrophils 75.0   % Lymphocytes 12.0   % Monocytes 12.0   % Eosinophils 1.0   % Basophils 0.0   Nucleated RBCs 1 (H)   Absolute Neutrophil 0.4 (LL)   Absolute Lymphocytes 0.1 (L)   Absolute Monocytes 0.1   Absolute Eosinophils 0.0   Absolute Basophils 0.0   Absolute Nucleated RBC 0.0       Imaging: n/a    Impression/Plan: Claudia Simon is a 74 year old female with metastatic NSCLC currently undergoing chemo immunotherapy with carboplatin, pemetrexed and pembrolizumab.    NSCLC: Dr. Roman initially recommended osimertinib and afatinib, however the co-pay was too high, therefore she is undergoing treatment with carboplatin, pemetrexed and pembrolizumab.  She tolerated cycle 1 on 10/17/2019 fairly well.  Reviewed common toxicities with chemotherapy and immunotherapy again today.  She was recently approved for afatinib, however Dr. Roman would prefer to continue this treatment and this discussed with the patient and family today.  She is neutropenic with ANC 0.4 we discussed neutropenic precautions in great detail today.  She will monitor her temperature over the weekend and go to the emergency department if temperature  >100.4.   She has many chronic issues that are persistent, see below. She will return in 2 weeks for cycle 2 and plan for 2 cycles, followed by restaging CT CAP.  She will call the clinic with any questions or concerns.  --11/7 María, carboplatin/pemetrexed/pembrolizumab cycle 2  --11/26 CT cap  --11/27 Dr Roman    Leukopenia/neutropenia: WBC 0.5 and ANC 0.4, secondary to chemotherapy.  She will monitor her temperature and go to the emergency department if temperature > 100.4.  We discussed good hand hygiene and she will wear a mask in large crowds.    BP: Blood pressure on arrival 83/62 and she had no new dizziness or  lightheadedness.  She was given a liter of fluids and blood pressure did improved to 91/60.  She was in a wheelchair and had no symptoms.  She was instructed to go to the emergency department over the weekend if any lightheadedness, dizziness, near syncope, syncope or any worsening symptoms.    Paraspinal mass: With epidural extension and she continues with radiation and will be tapering steroids, last dose is Sunday.  She will continue with dexamethasone with treatment as well as premedication.    Brain/eye/dural metastasis: Completed radiation and no change in vision/ptosis.  Follow-up brain MRI scheduled on 11/20/2019 and follow-up with radiation oncology same day.    Esophagitis/GERD: Likely secondary to radiation and steroids she was started on omeprazole and Carafate.  Symptoms have improved, however certain foods do trigger her symptoms. She was also started on nystatin with improvement.    FEN: Hyponatremia and sodium continues to decline, today 126.  Corrected for hyperglycemia is 128.  She was given a liter of normal saline and also recommended she limit her free water, push more Gatorade/Powerade.  Electrolytes are otherwise normal.  Glucose elevated, known diabetic, likely from dexamethasone.    Pain: Currently tapering off dexamethasone, last dose in 2 days.  Also on oxycodone 10 mg every 4 hours.  We would prefer she not strain to prevent increased ICP, therefore she will continue senna and MiraLAX.  She would benefit from palliative care, therefore referral placed today.    Insomnia: Likely secondary to steroid use and chemotherapy.  Suggested she try melatonin.    LFTs: ALT has risen from 58 to 234 and 1 week.  She has no jaundice, no abdominal pain outside the ordinary and no abdominal tenderness.  Certainly could be from pembrolizumab and we will monitor closely.  I have placed a request for CMP to be drawn at home and the home health nurses over at their house next week.      Chart documentation  with Dragon Voice recognition Software. Although reviewed after completion, some words and grammatical errors may remain.      María Stevens PA-C  Hematology/Oncology  [facility]                  Again, thank you for allowing me to participate in the care of your patient.        Sincerely,        María Stevens PA-C

## 2019-10-27 PROBLEM — R50.81 NEUTROPENIC FEVER (H): Status: ACTIVE | Noted: 2019-01-01

## 2019-10-27 PROBLEM — D70.9 NEUTROPENIC FEVER (H): Status: ACTIVE | Noted: 2019-01-01

## 2019-10-27 NOTE — ED TRIAGE NOTES
Patient currently being treated for lung cancer with mets to brain and bones. Patient presents today with diarrhea and frequent bloody nose.

## 2019-10-27 NOTE — PHARMACY-ADMISSION MEDICATION HISTORY
Admission medication history interview status for this patient is complete. See [facility] admission navigator for allergy information, prior to admission medications and immunization status.     Medication history interview source(s):Family  Medication history resources (including written lists, pill bottles, clinic record): discharge list  Primary pharmacy: unknown    Changes made to PTA medication list:  Added: melatonin  Deleted: none (removed duplicate metoprolol and senna listings)  Changed: gabapentin (added bedtime dose of 600 mg), metformin ER (updated to note she only takes if BS > 140)    Actions taken by pharmacist (provider contacted, etc): Spoke with family in the room and reviewed discharge list against PTA med list. Confirmed last doses.    Additional medication history information:None    Medication reconciliation/reorder completed by provider prior to medication history?  Yes     Do you take OTC medications (eg tylenol, ibuprofen, fish oil, eye/ear drops, etc)? Yes       Prior to Admission medications    Medication Sig Last Dose Taking? Auth Provider   cholecalciferol (VITAMIN D3) 5000 units (125 mcg) capsule Take 5,000 Units by mouth daily 10/26/2019 at AM Yes Reported, Patient   dexamethasone (DECADRON) 2 MG tablet Take 4 mg with supper on 10/17, 4 mg twice daily with meals on 10/18, 4 mg with breakfast on 10/19, 2 mg with breakfast x 3 days, 1 mg with breakfast x 3 days, then off 10/26/2019 at complete Yes Katlyn Mock PA-C   folic acid (FOLVITE) 1 MG tablet Take 1 tablet (1 mg) by mouth daily 10/26/2019 at AM Yes Katlyn Mock PA-C   gabapentin (NEURONTIN) 300 MG capsule Take 600 mg by mouth At Bedtime 10/26/2019 at PM Yes Reported, Patient   gabapentin (NEURONTIN) 300 MG capsule Take 1 capsule (300 mg) by mouth daily At Noon. 10/26/2019 at 1200 Yes Sekou Craig MD   lidocaine (LIDODERM) 5 % patch Place 2 patches onto the skin every 24 hours To prevent lidocaine  toxicity, patient should be patch free for 12 hrs daily. 10/27/2019 at removed at 0800 Yes Nessa Gillespie MD   lisinopril (PRINIVIL/ZESTRIL) 40 MG tablet Take 1 tablet (40 mg) by mouth daily 10/26/2019 at AM Yes Sekou Craig MD   LORazepam (ATIVAN) 0.5 MG tablet Take 1 tablet (0.5 mg) by mouth every 6 hours as needed (Nausea/Vomiting) 10/26/2019 Yes Glenys Roman MD   melatonin 5 MG tablet Take 5 mg by mouth nightly as needed for sleep 10/26/2019 at PM Yes Reported, Patient   metoprolol succinate ER (TOPROL-XL) 25 MG 24 hr tablet TAKE ONE TABLET BY MOUTH ONE TIME DAILY 10/26/2019 at 2100 Yes Sekou Craig MD   nystatin (MYCOSTATIN) 499061 UNIT/ML suspension Swish and swallow 5 mL four times daily 10/26/2019 Yes Katlyn Mock PA-C   omeprazole 20 MG tablet Take 1 tablet (20 mg) by mouth daily 10/26/2019 at PM Yes Katlyn Mock PA-C   ondansetron (ZOFRAN) 8 MG tablet Take 1 tablet (8 mg) by mouth every 8 hours as needed for nausea (vomiting) 10/27/2019 at 0200 Yes Glenys Roman MD   oxyCODONE (ROXICODONE) 5 MG tablet Take 2 tablets (10 mg) by mouth every 4 hours as needed for moderate to severe pain 10/26/2019 Yes Katlyn Mock PA-C   polyethylene glycol (MIRALAX/GLYCOLAX) packet Take 17 g by mouth daily 10/26/2019 at AM Yes Glenys Roman MD   senna (SENOKOT) 8.6 MG tablet Take 1-2 tablets by mouth 2 times daily 10/26/2019 Yes Reported, Patient   sucralfate (CARAFATE) 1 GM/10ML suspension Take 10 mLs (1 g) by mouth 4 times daily 10/26/2019 at PM Yes María Prescott PA-C   blood glucose (CONTOUR NEXT TEST) test strip Use to test blood sugar 2 times daily or as directed.   Sekou Craig MD   dexamethasone (DECADRON) 4 MG tablet Take 1 tablet (4 mg) by mouth 2 times daily (with meals) Start one day prior to Pemetrexed (Alimta), continue on the day of treatment, and the day following Pemetrexed.  at complete  Glenys Roman MD   folic  acid (FOLVITE) 1 MG tablet Take 1 tablet (1,000 mcg) by mouth daily Begin 7 days before Pemetrexed (Alimta) starts and continue for 21 days after Pemetrexed is discontinued.  at ongoing  Glenys Roman MD   metFORMIN (GLUCOPHAGE-XR) 500 MG 24 hr tablet Take 500 mg by mouth daily (with dinner) Only take if blood sugar is above 140. Unknown  Reported, Patient   ondansetron (ZOFRAN-ODT) 8 MG ODT tab Take 1 tablet (8 mg) by mouth every 8 hours as needed for nausea Unknown  Glenys Roman MD   prochlorperazine (COMPAZINE) 10 MG tablet Take 0.5 tablets (5 mg) by mouth every 6 hours as needed (Nausea/Vomiting) Unknown  Glenys Roman MD

## 2019-10-27 NOTE — LETTER
Key Recommendations:  Pt is admitted with Neutropenic Fever.  She has Lung Ca with Bone and Brain mets . She follows with Dr Livingston with [facility].  She is scheduled for f/u with [facility] PA and a new visit with Dr Lassiter with palliative care.  Pt is frail.  Her dtg and  assist her with getting into bed at night.  She needed this level of support for 30 years.  Otherwise, she is able to ambulate with a walker independently.  She did have a recent fall at her [facility] appt.    Recommendation are ***    Pt was discharged to home with spouse and dtg and [facility] RN/PT/SW/HHA ***    Hyun Stinson RN    AVS/Discharge Summary is the source of truth; this is a helpful guide for improved communication of patient story

## 2019-10-27 NOTE — ED PROVIDER NOTES
History     Chief Complaint:  Diarrhea      HPI   Claudia Simon is a 74 year old female who with a history of lung cancer with metastases to brain and bone, type II DM, IBS, obesity, and hypertension who presents with diarrhea. The patient is currently being treated for lung cancer with mets to her brain and bones, with one round of chemotherapy three weeks ago (carboplatin, pemetrexed and pembrolizumab) and 10 rounds of radiation. Claudia is followed by Dr. Roman in Oncology. She presents today with diarrhea and frequent epistaxis. Patient's loose stools began yesterday, and these were watery. The patient endorses associated abdominal pain and ongoing, baseline nausea secondary to chemoptherapy. Furthermore, she has had epistaxis intermittently x2 weeks; her episode this morning was significant and constant. In addition to the diarrhea and epistaxis, patient had a temperature to 100.8 this morning.   Denies new urinary urgency, sore throat, and cough.     Allergies:  Sulfa Drugs      Medications:    Dexamethasone   Folvite   Gabapentin  Ativan   Lisinopril   Metformin   Metoprolol succinate ER   Zofran  Oxycodone   Miralax   Senna   Compazine   Carafate     Past Medical History:    Lung cancer   Type II DM   Brain mass   IBS   Obesity   Hypertension   Hyperlipidemia    Disorder of bone and cartilage     Past Surgical History:    Back surgery x3   Ruptured discs x2   Titanium cage placement   IR chest tube placement non tunneled left  IR follow up visit, inpatient     Family History:    Mother - diabetes  Father - Alzheimer's Disease  Sister - cerebrovascular disease     Social History:  The patient was accompanied to the ED by daughter and .  Smoking Status: Former smoker  Smokeless Tobacco: Never  Alcohol Use: No   Marital Status:        Review of Systems   Constitutional: Positive for fever.   HENT: Positive for nosebleeds. Negative for sore throat.    Respiratory: Negative for cough.     Gastrointestinal: Positive for abdominal pain and diarrhea. Negative for nausea (not worse).   Genitourinary: Negative for urgency.   All other systems reviewed and are negative.      Physical Exam     Patient Vitals for the past 24 hrs:   BP Temp Temp src Pulse Heart Rate Resp SpO2   10/27/19 1347 -- 98.7  F (37.1  C) Oral -- -- -- --   10/27/19 1345 98/61 -- -- 103 -- -- 98 %   10/27/19 1329 103/63 99.4  F (37.4  C) -- 99 -- 16 --   10/27/19 1320 103/63 -- -- 99 -- 20 --   10/27/19 1319 -- -- -- -- 105 21 --   10/27/19 1318 -- -- -- -- 99 18 --   10/27/19 1317 -- -- -- -- 105 18 --   10/27/19 1315 (!) 87/61 -- -- 103 102 18 --   10/27/19 1314 -- -- -- -- 103 18 --   10/27/19 1313 -- -- -- -- 102 15 --   10/27/19 1311 -- -- -- -- 101 15 --   10/27/19 1310 -- -- -- -- 101 16 --   10/27/19 1309 -- -- -- -- 100 16 --   10/27/19 1307 -- -- -- -- 104 18 --   10/27/19 1306 -- -- -- -- 101 23 --   10/27/19 1305 -- -- -- -- 101 17 --   10/27/19 1304 -- -- -- -- 99 19 --   10/27/19 1302 -- -- -- -- 101 17 --   10/27/19 1301 -- -- -- -- 101 19 --   10/27/19 1300 (!) 86/63 -- -- 101 -- -- --   10/27/19 1259 -- -- -- -- 101 21 --   10/27/19 1258 -- -- -- -- 101 24 --   10/27/19 1257 -- -- -- -- 101 22 --   10/27/19 1255 -- -- -- -- 100 18 --   10/27/19 1254 -- -- -- -- 99 19 --   10/27/19 1253 -- -- -- -- 97 18 --   10/27/19 1252 -- -- -- -- 99 17 96 %   10/27/19 1251 -- -- -- -- -- -- 97 %   10/27/19 1250 -- -- -- -- 96 17 97 %   10/27/19 1249 -- -- -- -- 98 19 96 %   10/27/19 1248 -- -- -- -- 100 20 96 %   10/27/19 1246 -- -- -- -- 100 20 96 %   10/27/19 1245 93/58 -- -- 100 99 20 97 %   10/27/19 1244 -- -- -- -- 101 22 98 %   10/27/19 1243 -- -- -- -- 102 23 96 %   10/27/19 1241 -- -- -- -- 97 21 94 %   10/27/19 1240 -- -- -- -- 97 23 98 %   10/27/19 1239 -- -- -- -- 98 21 100 %   10/27/19 1237 -- -- -- -- 98 22 98 %   10/27/19 1236 -- -- -- -- 97 22 98 %   10/27/19 1235 -- -- -- -- 101 19 --   10/27/19 1234 -- -- -- --  -- -- (!) 80 %   10/27/19 1233 -- -- -- -- 103 24 (!) 69 %   10/27/19 1232 -- -- -- -- 103 22 --   10/27/19 1231 -- -- -- -- 102 24 (!) 88 %   10/27/19 1230 101/60 -- -- 99 -- -- --   10/27/19 1229 -- -- -- -- 93 18 97 %   10/27/19 1228 -- -- -- -- 97 21 --   10/27/19 1227 -- -- -- -- 93 12 --   10/27/19 1225 -- -- -- -- 96 13 (!) 87 %   10/27/19 1224 -- -- -- -- 95 13 --   10/27/19 1223 -- -- -- -- 93 14 --   10/27/19 1222 -- -- -- -- 94 13 98 %   10/27/19 1220 -- -- -- -- 96 13 --   10/27/19 1219 -- -- -- -- 96 13 --   10/27/19 1218 -- -- -- -- 97 14 --   10/27/19 1217 -- -- -- -- -- -- (!) 89 %   10/27/19 1216 -- -- -- -- 98 17 --   10/27/19 1215 101/62 -- -- 100 97 16 90 %   10/27/19 1200 98/54 -- -- 94 96 17 --   10/27/19 1145 98/58 -- -- 101 100 17 --   10/27/19 1130 92/56 -- -- 99 97 14 94 %   10/27/19 1115 92/57 -- -- 100 101 20 94 %   10/27/19 1100 (!) 85/59 -- -- 121 122 26 --   10/27/19 1042 (!) 88/62 99.8  F (37.7  C) Temporal 112 -- 16 94 %      Physical Exam  Constitutional: Alert, attentive, GCS 15  HENT:    Nose: Nose normal.    Mouth/Throat: Oropharynx is clear, mucous membranes are dry  Eyes: EOM are normal, anicteric, conjugate gaze  CV: tachycardic; no murmurs  Chest: Effort normal and breath sounds clear without wheezing or rales, symmetric bilaterally   GI: mild RLQ tenderness, No distension. No guarding or rebound.    MSK: No LE edema, no tenderness to palpation of BLE.  Neurological: Alert, attentive, moving all extremities equally.   Skin: Skin is warm and dry.    Emergency Department Course     Imaging:  Radiology findings were communicated with the patient, family and Admitting MD who voiced understanding of the findings.    CT Chest/Abdomen/Pelvis w Contrast    (Results Pending)       Laboratory:  Laboratory findings were communicated with the patient, family and Admitting MD who voiced understanding of the findings.    CBC: WBC 0.3 (LL), HGB 11.4 (L), PLT 16 (L) o/w WNL.    CMP: Sodium  129 (L), Glucose 163 (H), Bilirubin total 1.4 (H), Albumin 2.5 (L), Protein total 6.6 (L),  (H) o/w WNL (Creatinine 0.77)    ABO/Rh type and screen: ABO: B, Rh(D) positive, Antibody screen negative    ISTAT gases lactate rahat POCT: pH Venous 7.39, PCO2 Venous 42, PO2 Venous 23, Bicarbonate Venous 25, O2 Sat Venous 39, Lactic Acid 1.6     Creatinine POCT (1114): AWNL   UA: Yellow, clear urine. Protein albumin urine 20, urobilinogen mg/dL 3.0 (H), mucous urine present, hyaline casts 6 (H) o/w WNL      Platelets prepare order unit: Completed   Blood Component: Completed     Blood cultures x2: pending     Procedures:  None      Interventions:  1108 - NS Bolus 1,000mL IV   1152 - NS Bolus 1,000mL IV   1213 - Cefepime 2g IV  1320 - NS Bolus 1,000mL IV   1340 - Vancomycin 1500mg IV     Emergency Department Course:  Past medical records, nursing notes, and vitals reviewed.    1101: I performed an exam of the patient as documented above.     IV was inserted and blood was drawn for laboratory testing, results above.  The patient provided a urine sample here in the emergency department. This was sent for laboratory testing, findings above.     The patient was placed on continuous cardiac and pulse ox monitoring.    1242: Patient rechecked and updated.      1405: I spoke with Dr. Tristan of the Hospitalist service regarding patient's presentation, findings, and plan of care.    Findings and plan explained to the Patient and spouse and daughter who consents to admission. Discussed the patient with Dr. Tristan, who will admit the patient to an inpatient medical bed for further monitoring, evaluation, and treatment.     I personally reviewed the laboratory and imaging results with the Patient and spouse and daughter and answered all related questions prior to admission.      Impression & Plan     Medical Decision Making:  Claudia Simon is a 74 year old female metastatic breast cancer to brain and spine on chemotherapy after  radiation presenting for diarrhea and fever to 100.8 at home.  She was found to have neutropenia with a WBC of 0.3 unable to do neutrophil count as well as thrombocytopenia but it has of 16 associated with mucosal bleeding of the right nare that was hemostatic on arrival.  Blood pressure on arrival was 80s over 50s, heart rate in the 120s and sepsis evaluation was triggered of course.  She was given 2500 cc of IV fluid with improved heart rate to the 100s, blood pressure stabilized at 100 over 60s, lactate was within normal limits to begin with.  Blood cultures and urine pending.  She was given Vanco and cefepime with CT chest abdomen pelvis pending for further infectious work-up including pneumonia or hollow viscus perforation, obstruction.  I have low suspicion for the latter as she has no significant abdominal tenderness other than mild right lower quadrant.  Given her persistent diarrhea, c diff ordered.  WIll admit for continued monitoring, IV abx and following cultures.     Discharge Diagnosis:    ICD-10-CM    1. Neutropenic fever (H) D70.9 UA with Microscopic    R50.81 Blood culture     ABO/Rh type and screen     Blood culture     Platelets prepare order unit     Blood component     Blood component   2. Thrombocytopenia (H) D69.6    3. Epistaxis R04.0        Disposition:  Admitted under the care of Dr. Tristan to an inpatient medical bed.     Vipul Abraham MD  [employer]  2:31 PM 10/27/19     Scribe Disclosure:  ISharifa, am serving as a scribe at 10:54 AM on 10/27/2019 to document services personally performed by Vipul Abraham MD based on my observations and the provider's statements to me.   Sharifa Avilez  10/27/2019   [facility] EMERGENCY DEPARTMENT       Vipul Abraham MD  10/27/19 0494

## 2019-10-27 NOTE — LETTER
Transition Communication Hand-off for Care Transitions to Next Level of Care Provider    Name: Claudia Simon  : 1945  MRN #: [id number]  Primary Care Provider: Sekou Craig  Primary Care MD Name: Kiara  Primary Clinic: [address] [facility] [address]  Primary Care Clinic Name: [facility]   Reason for Hospitalization:  Epistaxis [R04.0]  Thrombocytopenia (H) [D69.6]  Neutropenic fever (H) [D70.9, R50.81]  Admit Date/Time: 10/27/2019 10:48 AM  Discharge Date:   Payor Source: Payor: MEDICARE / Plan: MEDICARE / Product Type: Medicare /     Readmission Assessment Measure (HOLDEN) Risk Score/category: Elevated         Reason for Communication Hand-off Referral: Fragility    Discharge Plan:    Concern for non-adherence with plan of care:   No  Discharge Needs Assessment:  Needs      Most Recent Value   Equipment Currently Used at Home  walker, rolling   Current Discharge Risk  terminally ill   # of Referrals Placed by [facility]  Internal Clinic Care Coordination   Hospice  [facility] [phone], Fax: [phone]          Already enrolled in Tele-monitoring program and name of program:  NA  Follow-up specialty is recommended: No    Follow-up plan:    Future Appointments   Date Time Provider Department Center   2019  9:00 AM [address]   2019 10:30 AM Diomedes Valladares MD [facility]IN   2019 10:00 AM [facility]1 [facility]   2019 10:40 AM [facility]1 [facility]   2019 12:45 PM Glenys Roman MD [facility]   2019 12:00 PM RH INFUSION CHAIR [facility] RID       Any outstanding tests or procedures:    Procedures     Future Labs/Procedures    Oxygen Adult     Comments:    [place] Oxygen Order 1-2 liter(s) by nasal cannula continuously with use of portable tank. Expected treatment length is indefinite (99 months).. Test on conserving device as applicable.    Patients who qualify for home O2 coverage under the CMS guidelines require  ABG tests or O2 sat readings obtained closest to, but no earlier than 2 days prior to the discharge, as evidence of the need for home oxygen therapy. Testing must be performed while patient is in the chronic stable state. See notes for O2 sats.    I certify that this patient, Claudia Simon has been under my care and that I, or a nurse practitioner or physician's assistant working with me, had a face-to-face encounter that meets the face-to-face encounter requirements with this patient on 11/7/2019. The patient, Claudia Simon was evaluated or treated in whole, or in part, for the following medical condition, which necessitates the use of the ordered oxygen. Treatment Diagnosis: metastatic lung cancer - hospice    Attending Provider: Lindsey Sousa DO  Physician signature: See electronic signature associated with these discharge orders  Date of Order: November 7, 2019                 Key Recommendations:  Pt is admitted with Neutropenic Fever.  She has Lung Ca with Bone and Brain mets . She follows with Dr Livingston with [facility].  Pt was discharged to home with spouse, dtr and [facility].     Hyun Stinson RN/sent by HALIE Tao RN     AVS/Discharge Summary is the source of truth; this is a helpful guide for improved communication of patient story

## 2019-10-27 NOTE — ED NOTES
"[facility]  ED Nurse Handoff Report    Claudia Simon is a 74 year old female   ED Chief complaint: Diarrhea  . ED Diagnosis:   Final diagnoses:   Neutropenic fever (H)   Thrombocytopenia (H)   Epistaxis     Allergies:   Allergies   Allergen Reactions     Sulfa Drugs Anaphylaxis     Angioedema         Code Status: Full Code  Activity level - Baseline/Home:  Stand by Assist. Activity Level - Current:   Assist X 2. Lift room needed: No. Bariatric: No   Needed: No   Isolation: yes. Infection: Neutropenic, enteric      Vital Signs:   Vitals:    10/27/19 1347 10/27/19 1400 10/27/19 1415 10/27/19 1520   BP:  91/58 99/63 108/75   Pulse:  92 94 104   Resp:       Temp: 98.7  F (37.1  C)      TempSrc: Oral      SpO2:  98% 99%        Cardiac Rhythm:  ,   Cardiac  Cardiac Rhythm: Sinus tachycardia  Pain level:    Patient confused: no  Patient Falls Risk: Yes.   Elimination Status: Pt was straight cathed for urine in the ED  Patient Report - Initial Complaint: Diarrhea. Focused Assessment:   12:28 Neurological Cognitive - Cognitive/Neuro/Behavioral WDL: -WDL except; motor response  Level Of Consciousness:  (pt states, \"i feel weak and tired\") Arousal Level: opens eyes spontaneously  Orientation: oriented x 4  Follows Commands: yes  Speech: clear; logical  Best Language: 0 - No aphasia   Motor Response - General Motor Response:  (weakness in all extremities)      12:30 Cardiac Cardiac - Cardiac WDL: -WDL except; rhythm  Cardiac Rhythm: tachycardic   Review of Systems (Cardiac) - Cardiovascular Symptoms/Conditions:  (hypotensive on arrival )  Cardiac Monitoring - EKG Monitoring: Yes  Cardiac Regularity: Regular  Cardiac Rhythm: ST        Tests Performed: ekg, labs, imaging Abnormal Results:   CT Chest/Abdomen/Pelvis w Contrast   Preliminary Result   IMPRESSION:   1. Possible cholecystitis, consider right upper quadrant ultrasound   for further evaluation.   2. Otherwise no infectious process " demonstrated in the abdomen and   pelvis.         Labs Ordered and Resulted from Time of ED Arrival Up to the Time of Departure from the ED   CBC WITH PLATELETS DIFFERENTIAL - Abnormal; Notable for the following components:       Result Value    WBC 0.3 (*)     RBC Count 3.60 (*)     Hemoglobin 11.4 (*)     Hematocrit 34.2 (*)     Platelet Count 16 (*)     All other components within normal limits   COMPREHENSIVE METABOLIC PANEL - Abnormal; Notable for the following components:    Sodium 129 (*)     Glucose 163 (*)     Bilirubin Total 1.4 (*)     Albumin 2.5 (*)     Protein Total 6.6 (*)      (*)     All other components within normal limits   ROUTINE UA WITH MICROSCOPIC - Abnormal; Notable for the following components:    Protein Albumin Urine 20 (*)     Urobilinogen mg/dL 3.0 (*)     Mucous Urine Present (*)     Hyaline Casts 6 (*)     All other components within normal limits   ISTAT  GASES LACTATE OLIVER POCT - Abnormal; Notable for the following components:    PO2 Venous 23 (*)     All other components within normal limits   CREATININE POCT   ISTAT CG4 GASES LACTATE OLIVER NURSING POCT   ISTAT CREATININE NURSING POCT   STRAIGHT CATH FOR URINE   ABO/RH TYPE AND SCREEN   PLATELETS PREPARE ORDER UNIT   BLOOD COMPONENT   BLOOD CULTURE   BLOOD CULTURE   CLOSTRIDIUM DIFFICILE TOXIN B     CT Chest/Abdomen/Pelvis w Contrast   Preliminary Result   IMPRESSION:   1. Possible cholecystitis, consider right upper quadrant ultrasound   for further evaluation.   2. Otherwise no infectious process demonstrated in the abdomen and   pelvis.         .   Treatments provided: See MAR  Family Comments: Daughter and  at bedside  OBS brochure/video discussed/provided to patient:  No  ED Medications:   Medications   oxymetazoline (AFRIN) 0.05 % spray 2 spray (0 sprays Nasal Hold 10/27/19 1528)   0.9% sodium chloride BOLUS (0 mLs Intravenous Stopped 10/27/19 1433)   0.9% sodium chloride BOLUS (0 mLs Intravenous Stopped 10/27/19  1352)   0.9% sodium chloride BOLUS (0 mLs Intravenous Stopped 10/27/19 1152)   ceFEPIme (MAXIPIME) 2 g vial to attach to  ml bag for ADULTS or 50 ml bag for PEDS (0 g Intravenous Stopped 10/27/19 1352)   vancomycin 1500 mg in 0.9% NaCl 250 ml intermittent infusion 1,500 mg (1,500 mg Intravenous Given 10/27/19 1340)   0.9% sodium chloride BOLUS (0 mLs Intravenous Stopped 10/27/19 1528)   iopamidol (ISOVUE-370) solution 500 mL (71 mLs Intravenous Given 10/27/19 1444)   Saline Flush (62 mLs Other Given 10/27/19 1444)     Drips infusing:  Yes  For the majority of the shift, the patient's behavior Green. Interventions performed were N/A.     Severe Sepsis OR Septic Shock Diagnosis Present: No      ED Nurse Name/Phone Number: Hyun Azul RN,   12:43 PM    RECEIVING UNIT ED HANDOFF REVIEW    Above ED Nurse Handoff Report was reviewed: Yes  Reviewed by: Mara Montemayor RN on October 27, 2019 at 3:38 PM

## 2019-10-27 NOTE — H&P
[facility]  Hospitalist Admission Note  October 27, 2019  Name: Claudia Simon    MRN: [id number]  YOB: 1945    Age: 74 year old  Date of admission: 10/27/2019  Primary care provider: Sekou Craig      Summary:  Patient is a 74-year-old female with a history of metastatic lung cancer with known metastases to brain and bone, type 2 diabetes, irritable bowel syndrome, hypertension, and hyperlipidemia who presents here with diarrhea and epistaxis.  Patient is currently undergoing chemotherapy along with radiation.  Patient is followed by Dr. Roman who I believe this with [place] [facility].  In addition to diarrhea, patient does report some abdominal cramping and discomfort.  Stools are described as loose but nonbloody.  In terms of chemotherapy, patient has undergone 1 round approximately 3 weeks ago with carboplatin, pemetrexed, and pembrolizumab and has had 10 cycles of radiation therapy.  She does report some chronic baseline nausea with her chemo.  In addition, she does have epistaxis intermittently every 2 weeks.  She did report one episode of epistaxis.  She reports a temperature 100.8 this morning but denies any cough, shortness of breath, sore throat, rashes, or dysuria.  On presentation to the ED, patient was noted to have a low white blood count of 0.3.  Sodium was 129.  Urinalysis was negative but CT of the abdomen did suggest the possibility of acute cholecystitis.  She was given vancomycin and cefepime and I was asked to admit the patient for further inpatient IV antibiotic for neutropenic fever and possible cholecystitis.     Problem list/Plan:  1. Neutropenic fever: Despite diarrheal complaints which I feel may be related to chemotherapy, CT of the abdomen suggest the possibility of cholecystitis.  Will make patient n.p.o. and will order HIDA scan in the morning to determine if she really does have acute cholecystitis.  If so, will request  general surgery consultation.  Continue vancomycin and cefepime for now.  We will check C. difficile tox and enteric panel to rule out any significant infectious source.  2. Abdominal cramping and diarrhea: Checking enteric panel and C. difficile tox.  Enteric precautions.  We will start patient on probiotics.  3. Hypertension: Patient is chronically on lisinopril 40 mg p.o. daily, and Toprol-XL 25 mg p.o. every 24 hours.  We will hold these medications as patient is mildly hypotensive and at risk for septic shock with neutropenic fever.  Restart as able.  4. Type 2 diabetes: Chronically on metformin  mg p.o. daily.  Will hold for now while patient is n.p.o.  Medium intensity sliding scale n.p.o. protocol.  5. Thrush: Continue nystatin swish and swallow 4 times daily.  6. History of GERD: While n.p.o., will place patient on Protonix 40 mg IV every 24 hours  7. History of metastatic lung cancer: We will consult oncology in regards to significant neutropenia to determine if she is a candidate for Neupogen injections.  8. Neutropenic precautions  9. History of left eye blindness      -Additional workup plan which will include HIDA scan and possible surgical consultation    All lab work and imaging data independently reviewed by myself    Prophylaxis;  PCD/Ambulation.     Code status: Full code  Discharge: To be determined    Chief Complaint:   Abdominal cramping, fever, diarrhea   HPI  Patient is a 74-year-old female with a history of metastatic lung cancer with known metastases to brain and bone, type 2 diabetes, irritable bowel syndrome, hypertension, and hyperlipidemia who presents here with diarrhea and epistaxis.  Patient is currently undergoing chemotherapy along with radiation.  Patient is followed by Dr. Roman who I believe this with [place] [facility].  In addition to diarrhea, patient does report some abdominal cramping and discomfort.  Stools are described as loose but nonbloody.  In terms of  chemotherapy, patient has undergone 1 round approximately 3 weeks ago with carboplatin, pemetrexed, and pembrolizumab and has had 10 cycles of radiation therapy.  She does report some chronic baseline nausea with her chemo.  In addition, she does have epistaxis intermittently every 2 weeks.  She did report one episode of epistaxis.  She reports a temperature 100.8 this morning but denies any cough, shortness of breath, sore throat, rashes, or dysuria.  On presentation to the ED, patient was noted to have a low white blood count of 0.3.  Sodium was 129.  Urinalysis was negative but CT of the abdomen did suggest the possibility of acute cholecystitis.  She was given vancomycin and cefepime and I was asked to admit the patient for further inpatient IV antibiotic for neutropenic fever and possible cholecystitis.  I did discuss the case in detail with the ED physician.     Past Medical History:     Past Medical History:   Diagnosis Date     Lung cancer (H) 09/23/2019     Past Surgical History:     Past Surgical History:   Procedure Laterality Date     C NONSPECIFIC PROCEDURE      back surgery x 3     C NONSPECIFIC PROCEDURE      ruptured discs x 2-1 yr apart     C NONSPECIFIC PROCEDURE      titanium cage     IR CHEST TUBE PLACEMENT NON-TUNNELLED LEFT  9/23/2019     IR FOLLOW UP VISIT INPATIENT  9/24/2019     Social History:     Social History     Tobacco Use     Smoking status: Former Smoker     Years: 34.00     Last attempt to quit: 2/12/2009     Years since quitting: 10.7     Smokeless tobacco: Never Used   Substance Use Topics     Alcohol use: No     Drug use: No     Family History:  Family history reviewed. NO pertinent family history     Allergies:     Allergies   Allergen Reactions     Sulfa Drugs Anaphylaxis     Angioedema       Medications:     Medications Prior to Admission   Medication Sig Dispense Refill Last Dose     blood glucose (CONTOUR NEXT TEST) test strip Use to test blood sugar 2 times daily or as  directed. 60 strip 11 Taking     cholecalciferol (VITAMIN D3) 5000 units (125 mcg) capsule Take 5,000 Units by mouth daily   Taking     dexamethasone (DECADRON) 2 MG tablet Take 4 mg with supper on 10/17, 4 mg twice daily with meals on 10/18, 4 mg with breakfast on 10/19, 2 mg with breakfast x 3 days, 1 mg with breakfast x 3 days, then off 15 tablet 0 Taking     dexamethasone (DECADRON) 4 MG tablet Take 1 tablet (4 mg) by mouth 2 times daily (with meals) Start one day prior to Pemetrexed (Alimta), continue on the day of treatment, and the day following Pemetrexed. 6 tablet 11 Taking     folic acid (FOLVITE) 1 MG tablet Take 1 tablet (1,000 mcg) by mouth daily Begin 7 days before Pemetrexed (Alimta) starts and continue for 21 days after Pemetrexed is discontinued. 30 tablet 11 Taking     folic acid (FOLVITE) 1 MG tablet Take 1 tablet (1 mg) by mouth daily 30 tablet 0 Taking     gabapentin (NEURONTIN) 300 MG capsule Take 1 capsule (300 mg) by mouth daily At Noon. 90 capsule 3 Taking     lidocaine (LIDODERM) 5 % patch Place 2 patches onto the skin every 24 hours To prevent lidocaine toxicity, patient should be patch free for 12 hrs daily. 60 patch 1 Taking     lisinopril (PRINIVIL/ZESTRIL) 40 MG tablet Take 1 tablet (40 mg) by mouth daily 90 tablet 2 Taking     LORazepam (ATIVAN) 0.5 MG tablet Take 1 tablet (0.5 mg) by mouth every 6 hours as needed (Nausea/Vomiting) 30 tablet 3 Taking     metFORMIN (GLUCOPHAGE-XR) 500 MG 24 hr tablet Take 1 tablet (500 mg) by mouth daily (with dinner) 30 tablet 3 Taking     metoprolol succinate ER (TOPROL-XL) 25 MG 24 hr tablet TAKE ONE TABLET BY MOUTH ONE TIME DAILY. DUE FOR OFFICE VISIT PER MD. 90 tablet 0 Taking     metoprolol succinate ER (TOPROL-XL) 25 MG 24 hr tablet TAKE ONE TABLET BY MOUTH ONE TIME DAILY 90 tablet 0 Taking     nystatin (MYCOSTATIN) 534388 UNIT/ML suspension Swish and swallow 5 mL four times daily 400 mL 1 Taking     omeprazole 20 MG tablet Take 1 tablet (20 mg)  by mouth daily 30 tablet 0 Taking     ondansetron (ZOFRAN) 8 MG tablet Take 1 tablet (8 mg) by mouth every 8 hours as needed for nausea (vomiting) 30 tablet 11 Taking     ondansetron (ZOFRAN-ODT) 8 MG ODT tab Take 1 tablet (8 mg) by mouth every 8 hours as needed for nausea 30 tablet 11 Taking     oxyCODONE (ROXICODONE) 5 MG tablet Take 2 tablets (10 mg) by mouth every 4 hours as needed for moderate to severe pain 100 tablet 0 Taking     polyethylene glycol (MIRALAX/GLYCOLAX) packet Take 17 g by mouth daily 30 packet 11 Taking     prochlorperazine (COMPAZINE) 10 MG tablet Take 0.5 tablets (5 mg) by mouth every 6 hours as needed (Nausea/Vomiting) 30 tablet 11 Taking     senna (SENOKOT) 8.6 MG tablet Take 1-2 tablets by mouth 2 times daily   Taking     SM SENNA LAXATIVE 8.6 MG tablet TAKE TWO TABLETS BY MOUTH TWICE DAILY  120 tablet 0 Taking     sucralfate (CARAFATE) 1 GM/10ML suspension Take 10 mLs (1 g) by mouth 4 times daily 420 mL 1        Review of Systems:   A Comprehensive greater than 10 system review of systems was carried out.  Pertinent positives and negatives are noted above.  Otherwise negative for contributory information.        Physical Exam:  Blood pressure 111/69, pulse 104, temperature 99.5  F (37.5  C), temperature source Oral, resp. rate 16, SpO2 99 %, not currently breastfeeding.  Gen: Alert, nontoxic-appearing.  Otherwise, in no acute distress.  HEENT: NCAT. EOMI. PERRL.  Neck: Normal inspection. No bruit, JVD or thyromegaly.  Lungs: Normal respiratory effort. Clear to auscultation bilaterally with no crackles or wheezes.  Card: N s1s2. RRR. No M/R/G.  Peripheral pulses present and symmetric.   Abd: Soft NT ND. No mass. Normal bowel sounds.  Skin: No rash. Warm to the touch  Extr: No edema. CMS intact  Psychiatric: Patient alert oriented ×3.  Normal affect  Neurologic: Cranial nerves II-XII are intact.  Sensation normal.  Motor strength 5/5    Data:     Recent Labs   Lab 10/27/19  1109  10/25/19  1359   WBC 0.3* 0.5*   HGB 11.4* 12.6   HCT 34.2* 38.1   MCV 95 96   PLT 16* 38*     Recent Labs   Lab 10/27/19  1114 10/27/19  1109 10/25/19  1359   NA  --  129* 126*   POTASSIUM  --  4.6 4.7   CHLORIDE  --  97 95   CO2  --  24 25   ANIONGAP  --  8 6   GLC  --  163* 192*   BUN  --  25 25   CR  --  0.77 0.88   GFRESTIMATED 70 75 64   GFRESTBLACK 85 87 74   BIBI  --  9.5 9.3   PROTTOTAL  --  6.6* 6.6*   ALBUMIN  --  2.5* 2.8*   BILITOTAL  --  1.4* 1.3   ALKPHOS  --  110 120   AST  --  28 31   ALT  --  133* 234*     No results for input(s): TROPONIN, TROPI, TROPR in the last 168 hours.    Invalid input(s): TROP, TROPONINIES  Recent Labs   Lab 10/27/19  1234   COLOR Yellow   APPEARANCE Clear   URINEGLC Negative   URINEBILI Negative   URINEKETONE Negative   SG 1.017   UBLD Negative   URINEPH 6.0   PROTEIN 20*   NITRITE Negative   LEUKEST Negative   RBCU <1   WBCU 1       Imaging:   Recent Results (from the past 24 hour(s))   CT Chest/Abdomen/Pelvis w Contrast    Narrative    CT CHEST, ABDOMEN, AND PELVIS WITH CONTRAST 10/27/2019 2:47 PM     HISTORY: Fever, neutropenia.    COMPARISON: Head CT from October 7, 2019.    TECHNIQUE: Volumetric helical acquisition of CT images from the lung  apices through the symphysis pubis after the administration of 71mL  Isovue-370 intravenous contrast. Radiation dose for this scan was  reduced using automated exposure control, adjustment of the mA and/or  kV according to patient size, or iterative reconstruction technique.    FINDINGS:   Chest: Compression deformities of T6 and T7 with a soft tissue lesion  possibly arising from the right aspect of T6. Compression deformity of  T9 also suspected to correlate with a pathologic fracture. 1.8 cm  spiculated nodule in the anterior left upper lobe. 2.9 x 1.9 cm  suspected mass in the medial aspect of the right upper lobe. Probable  adenopathy measuring 1.3 x 1.2 cm in the AP window. Additional  prominent lymph nodes measuring 1.6 x  1.1 cm in AP window. No definite  acute infiltrates. No pleural or pericardial effusion.    Abdomen and pelvis: Gallbladder appears distended. Question some  minimal surrounding inflammatory change. Multifocal lesions throughout  the liver which are indeterminate, one measures 1.5 cm in the inferior  right lobe. Minimal adrenal thickening bilaterally. Pancreas, spleen,  and left kidney unremarkable. Cystic lesion in the right kidney  similar to previous. No bowel obstruction. No definite inflammation in  the abdomen and pelvis. No frankly destructive bony lesions.      Impression    IMPRESSION:  1. Possible cholecystitis, consider right upper quadrant ultrasound  for further evaluation.  2. Otherwise no infectious process demonstrated in the abdomen and  pelvis.     MD Aneudy BARBOZA MD Pager [phone]

## 2019-10-28 NOTE — CONSULTS
Care Transition Initial Assessment - RN        Met with: Patient and Kassandra Maxwell who is staying with her parents to help out.  DATA   Active Problems:    Neutropenic fever (H)       Cognitive Status: awake, alert and oriented.  Primary Care Clinic Name: [facility]   Primary Care MD Name: Kiara  Contact information and PCP information verified: Yes  Lives With: spouse(aKssandra Maxwell staying with them to help out.  )  still works at [employer].          Description of Support System: Supportive   Who is your support system?:        Insurance concerns: No Insurance issues identified  ASSESSMENT  Patient currently receives the following services:  [facility] RN/PT/SW/HHA support.          Identified issues/concerns regarding health management: Pt is admitted with Neutropenic Fever.  She has Lung Ca with Bone and Brain mets . She follows with Dr Livingston with [facility].  She is scheduled for f/u with [facility] PA and a new visit with Dr Lassiter with palliative care.  Pt is frail.  Her dtg and  assist her with getting into bed at night.  She needed this level of support for 30 years.  Otherwise, she is able to ambulate with a walker independently.  She did have a recent fall at her Primary Care Clinic appt.  Will follow along for needs.  Will give hand off to clinic RN CTS at time of discharge.      PLAN    Patient given options and choices for discharge yes .  Patient/family is agreeable to the plan?  Yes:   Patient anticipates discharging to home with kassandra Maxwell and  and [facility] RN/PT/SW/HHA .        Patient anticipates needs for home equipment: No  Transportation/person available to transport on day of discharge  is her  and he is aware.  Plan/Disposition: Home   Appointments: as scheduled:    Nov 07, 2019 10:00 AM CST  Return Visit with María Kirk PA-C  [facility] ([facility]) [facility]  [address]  [address]  [facility]  [address]  [phone]      Nov 07, 2019 11:00 AM CST  Level 2 with  INFUSION CHAIR 11  [facility] ([facility]) [facility]  [id number] [place] [address] [address]   [facility] [address]  [phone]      Nov 08, 2019  8:00 AM CST  New Visit with Delicia Lassiter MD  [facility] ([facility]) [facility]  [address]   [facility] [address]  [phone]       Care  (CTS) will continue to follow as needed.    Chary JACK CTS [phone]

## 2019-10-28 NOTE — PROGRESS NOTES
Patient on our list. Chart reviewed. Patient will be seen tomorrow by oncology. Start [facility] today.

## 2019-10-28 NOTE — CONSULTS
Consult Date:  10/28/2019      MEDICAL ONCOLOGY CONSULTATION      IDENTIFYING DATA:  Claudia Walden is a 74-year-old patient whom we have been asked to see by Dr. Tristan for Medical Oncology consultation.  She has known metastatic lung cancer.      CHIEF COMPLAINT:  Metastatic lung cancer with known metastases to brain and bone.      HISTORY OF PRESENTING COMPLAINT:  Claudia is a 74-year-old patient with metastatic lung cancer to bone and brain.  She also has had a tumor behind her left eye and so she has left eye blindness.  She follows with Dr. Roman at the [facility].  Since her diagnosis was quite recent, she started chemotherapy on 10/16/2019.  She had her first dose of carboplatin, pemetrexed and pembrolizumab.  She presented with some nosebleeds which she has been having intermittently and she also had a fever up to 100.8 and was found to be neutropenic and hyponatremic in the emergency room.  She was also complaining of abdominal pain.  She did have a CT scan done of her chest, abdomen and pelvis on admission, which raised the possibility of acute cholecystitis.  She was admitted for workup and treatment.  The patient went on to have right upper quadrant ultrasound which showed a distended bladder, but no evidence of cholecystitis.  She is also due to have HIDA scan to evaluate this further.  We have been asked to see to help with her management.      PAST MEDICAL HISTORY:   1.  Remarkable for stage IV lung cancer.   2.  History of hypertension.   3.  History of type 2 diabetes.   4.  History of hyperlipidemia.   5.  History of irritable bowel syndrome.   6.  History of gastroesophageal reflux.   7.  History of left eye blindness.      PAST SURGICAL HISTORY:  History of back surgery x3, history of port placement, history of biopsy.      MEDICATIONS AND ALLERGIES:  Outlined in the nursing records.      FAMILY HISTORY:  Unremarkable for lung cancer.      COMPREHENSIVE REVIEW OF SYSTEMS:  A 14-point  comprehensive review of systems has been done with her today.  Overall, she is starting to feel better.  She has not had any nosebleeds in the last 24 hours.  She did get platelets yesterday for a platelet count of 16, she still feels overall weak.  She has got some pain with swallowing.  Her abdominal pain has improved quite a lot.  She gets some intermittent nausea.  The rest of her 14-point comprehensive review of systems is unremarkable.      SOCIAL HISTORY:  The patient has a 34-year history of smoking.  She does not use alcohol.      PHYSICAL EXAMINATION:   GENERAL:  She is a pale lady in no acute distress.   VITAL SIGNS:  As charted.  T-max was 100.2, current temperature 98.8.  The rest of her vital signs are stable.   HEENT:  Oropharynx is remarkable for a moderate grade 2 mucositis.  Trachea is central.   NECK:  No cervical, supraclavicular or infraclavicular adenopathy.   CHEST:  Clear to auscultation and percussion bilaterally.   HEART:  Sounds 1, 2 normal.  No added sounds or murmurs.   ABDOMEN:  Soft and nontender, no hepatosplenomegaly.   EXTREMITIES:  Legs are without tenderness or edema.   SKIN:  No rashes or lesions.  She did fall in the early part of last week and she has a large bruise on her left leg just below her knee.   NEUROLOGIC:  The patient is alert and oriented x3.  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      DATA REVIEW:  I have reviewed all of her scans, including a CT scan, right upper quadrant ultrasound and she is awaiting HIDA scan.  Blood counts today, white cell count 0.3, hemoglobin 8.5, platelets 26.  Sodium 136, creatinine 0.46, ALT elevated at 74, total protein 5.0, albumin of 1.7.      IMPRESSION:  This is a 74-year-old patient with metastatic lung cancer, status post 1 cycle of chemotherapy with carboplatin, pemetrexed and pembrolizumab.  She had that treatment on 10/16.  She is due for another treatment on 11/07.   She is here with pancytopenia.  She is currently on Neupogen and will continue on Neupogen until her neutropenia resolves.  She is also on broad-spectrum antibiotics because of neutropenic fever.  With regard to her abdominal pain, she is awaiting HIDA scan that will determine if she really does have acute cholecystitis, although her abdominal pain today is significantly better.  She did have some diarrhea, which has been worked up and her diarrhea is resolving today.  She has grade 2 mucositis and will continue with nystatin swish and swallow 4 times a day as she also had some evidence of thrush when she was admitted.        I have discussed with the patient today the necessity of remaining in the hospital until we see a trend upwards in her blood counts.  We would transfuse for a hemoglobin of less than 8 and a platelet count of less than 20, or if there is any further nosebleeds, we would transfuse platelets.  It may take a couple of days for her white cell count to improve.      Thank you for allowing us to participate in her care.         JOSE ALEJANDRO BRAUN MD             D: 10/28/2019   T: 10/28/2019   MT: PADMA      Name:     CHYNA ROSARIO   MRN:      [id number]        Account:       [id number]   :      1945           Consult Date:  10/28/2019      Document: [id number]

## 2019-10-28 NOTE — PROGRESS NOTES
[facility]  Patient is currently open to home care services with [facility].  The patient is currently receiving RN PT NAHID HHA services.  [facility]  and team have been notified of patient admission.  [facility] liaison will continue to follow patient during stay.  If appropriate provide orders to resume home care at time of discharge.

## 2019-10-28 NOTE — CONSULTS
"CLINICAL NUTRITION SERVICES  -  ASSESSMENT NOTE    Recommendations Ordered by Registered Dietitian (RD):  Diet per MD    Recommend monitoring for signs and symptoms of dysphagia. Recommend softer/moist foods for ease of swallowing.     High protein supplements --> pt expressed interest in Boost Breeze per RN     Admit wt ordered.     Malnutrition:   % Weight Loss:  Up to 5% in 1 month (non-severe malnutrition)  % Intake:  <75% for > 7 days (non-severe malnutrition)  Subcutaneous Fat Loss:  Orbital region mild/moderate depletion - will not use given only one indicator   Muscle Loss:  Acromion bone region moderate depletion, Anterior thigh region moderate depletion and Posterior calf region moderate depletion, clavicle region mild/moderate depletion   Fluid Retention:  None noted    Malnutrition Diagnosis: Non-Severe malnutrition  In Context of:  Acute illness or injury  Chronic illness or disease      REASON FOR ASSESSMENT  Claudia Simon is a 74 year old female seen by Registered Dietitian for Admission Nutrition Risk Screen for unintentional loss of 10# or more in the past two months and RN Consult - \"wt loss of 10 lbs, no appetite since chemo\".     NUTRITION HISTORY  - Information obtained from patient and chart   - Patient with a h/o metastatic lung CA (to brain and bone, currently undergoing chemo, ?completed XRT), DMII (oral agent PTA), IBS.  Admit 2/2 diarrhea, neutropenic fever, concerns for cholecystitis.  - Pt admitted for neutropenic fever, abdominal cramping and diarrhea   - Nausea with chemo   - Prior to admission pt wasn't eating very well since her last round or radiation which was around 20/9 per the pt's daughter. She also hasn't eaten anything since Saturday night (10/26).   - Breakfast: oatmeal  - Lunch: shake with glucerna  - Dinner: jello, hotdish, oatmeal or yogurt --> pt describes her meals at wet and cold for ease of swallowing. Pt states that since her radiation she has had a very dry " "throat/mouth and burning with swallowing. This prevents her from eating many meats.   - NKFA    CURRENT NUTRITION ORDERS  Diet Order:     Low fat diet     Current Intake/Tolerance:  Pt was recently changed to a low fat diet, therefore no data is available.     NUTRITION FOCUSED PHYSICAL ASSESSMENT FOR DIAGNOSING MALNUTRITION)  Completed:  Yes Full assessment         Observed:    Muscle wasting (refer to documentation in Malnutrition section)    Obtained from Chart/Interdisciplinary Team:  Blind in L eye  C/o pain with swallowing  Skin tears  Stooling patterns reviewed  Per surgery note on 10/29: the pt has acute acalculous cholecystitis and is not currently a surgical candidate due to her severe pancytopenia.     ANTHROPOMETRICS  Height: 5' 4\"  Weight: 65.6 kg (144 lb 11.2 oz)  BMI: 24.84 kg/m^2  Weight Status:  Normal BMI  IBW: 55 kg (120 lbs)  % IBW: 119% +/- 10%  Weight History: weight loss of 7 lbs in the last ~1 month. This equates to a weight loss of 4.6%. Pt states that her normal body weight is around 143-144 lbs and that she has not noticed any weight loss.   Wt Readings from Last 10 Encounters:   10/25/19 63.5 kg (140 lb)   10/22/19 63.9 kg (140 lb 14.4 oz)   10/16/19 64.9 kg (143 lb)   10/14/19 64.4 kg (142 lb)   10/08/19 65.3 kg (143 lb 14.4 oz)   10/07/19 66.3 kg (146 lb 1.6 oz)   09/30/19 65.5 kg (144 lb 4.8 oz)   09/17/19 68.5 kg (151 lb 1.6 oz)   07/27/18 73 kg (161 lb)   06/26/17 74.9 kg (165 lb 1.6 oz)     LABS  Labs reviewed including C. Diff negative    Recent Labs   Lab Test 10/29/19  0631 10/28/19  0751 10/27/19  1109 10/25/19  1359 10/16/19  1207   POTASSIUM 3.5 3.7 4.6 4.7 4.8     Recent Labs   Lab Test 09/24/19  0730 09/23/19  0636 09/22/19  0712 09/21/19  0755 09/20/19  0540   PHOS 4.4 3.7 3.4 3.4 3.9     Recent Labs   Lab Test 09/24/19  0730 09/23/19  0636 09/22/19  0712 09/21/19  0755 09/20/19  0540   MAG 2.3 2.2 2.2 2.4* 2.2     Recent Labs   Lab Test 10/29/19  0631 10/28/19  0751 " 10/27/19  1109 10/25/19  1359 10/16/19  1207    136 129* 126* 128*     Recent Labs   Lab Test 10/29/19  0631 10/28/19  0751 10/27/19  1109 10/25/19  1359 10/16/19  1207   CR 0.61 0.46* 0.77 0.88 0.74     Recent Labs   Lab 10/29/19  0631 10/28/19  0751 10/27/19  1109 10/25/19  1359   * 120* 163* 192*     Lab Results   Component Value Date    A1C 6.1 09/18/2019    A1C 6.4 07/27/2018    A1C 6.5 06/26/2017    A1C 7.4 06/23/2015     Triglycerides   Date Value Ref Range Status   07/27/2018 162 (H) <150 mg/dL Final     Comment:     Borderline high:  150-199 mg/dl  High:             200-499 mg/dl  Very high:       >499 mg/dl  Fasting specimen     06/26/2017 234 (H) <150 mg/dL Final     Comment:     Borderline high:  150-199 mg/dl   High:             200-499 mg/dl   Very high:       >499 mg/dl   Fasting specimen     08/03/2016 360 (H) <150 mg/dL Final     Comment:     Borderline high:  150-199 mg/dl   High:             200-499 mg/dl   Very high:       >499 mg/dl   Fasting specimen        MEDICATIONS  Medications reviewed    Folic acid 1 mg, daily   Reviewed insulin, regimen   IVF: D5 @ 100 mL/hr     ASSESSED NUTRITION NEEDS PER APPROVED PRACTICE GUIDELINES:    Dosing Weight 65.5 kg (based on an actual weight)  Estimated Energy Needs: 8270-7612+ kcals (25-30+ Kcal/Kg)  Justification: maintenance  Estimated Protein Needs: 66-79+ grams protein (1-1.2+ g pro/Kg)  Justification: maintenance  Estimated Fluid Needs: >1 mL/Kcal  Justification: maintenance    MALNUTRITION:  % Weight Loss:  Up to 5% in 1 month (non-severe malnutrition)  % Intake:  <75% for > 7 days (non-severe malnutrition)  Subcutaneous Fat Loss:  Orbital region mild/moderate depletion - will not use given only one indicator   Muscle Loss:  Acromion bone region moderate depletion, Anterior thigh region moderate depletion and Posterior calf region moderate depletion, clavicle region mild/moderate depletion   Fluid Retention:  None noted    Malnutrition  Diagnosis: Non-Severe malnutrition  In Context of:  Acute illness or injury  Chronic illness or disease    NUTRITION DIAGNOSIS:  Inadequate oral intake related to recent trouble swallowing due to burning sensation and dry mouth/throat area as evidenced by pt likely consuming <75% of nutritional needs prior to admission for >7 days and noted muscle/weight loss.     NUTRITION INTERVENTIONS  Recommendations / Nutrition Prescription  Diet per MD    Recommend monitoring for signs and symptoms of dysphagia. Recommend softer/moist foods for ease of swallowing.     High protein supplements --> pt expressed interest in Boost Breeze per RN     Admit wt ordered.      Implementation  Nutrition education: Provided education on the different types of oral nutritional supplements available   Medical Food Supplement: as above  Collaboration and Referral of Nutrition care: discussed pt during interdisciplinary rounds this morning     Nutrition Goals  Pt to consume % of nutritional needs + 2 high protein supplements per day.     MONITORING AND EVALUATION:  Progress towards goals will be monitored and evaluated per protocol and Practice Guidelines    Monica Adames RD, LD  Clinical Dietitian

## 2019-10-28 NOTE — PLAN OF CARE
A&O. Somewhat lethargic, ease to arouse. Ax1 GB and walker. NPO ex ice and meds. D5 1/2 NS+K at 75ml/hr. BG Q4, 111, 123. Vanco, rocephin and flagyl continue. Enteric isolation discontinued. Protective precautions remain in place. Oxy PRN Q4. CC, Nut and SH consulted.

## 2019-10-28 NOTE — PLAN OF CARE
Patient repositioned w/assist of two. Multiple loose incontinent BM's this shift. Barrier cream applied- stool sample sent to lab. C-DIFF came back negative. Bruises on shins and skin tears on knees, and left elbow from recent fall per pt. Statement. Abdominal ultrasound completed this shift. NPO w/ice chips. C/O pain when swallowing. Bowel sounds active. Complains of back pain. Given 10 mg PRN oxycodone. Lidocaine patches on mid-back. IVF 75 mL/hr.

## 2019-10-28 NOTE — PHARMACY-VANCOMYCIN DOSING SERVICE
Pharmacy Vancomycin Initial Note  Date of Service 2019  Patient's  1945  74 year old, female    Indication: Febrile Neutropenia    Current estimated CrCl = Estimated Creatinine Clearance: 63.3 mL/min (based on SCr of 0.77 mg/dL).    Creatinine for last 3 days  10/25/2019:  1:59 PM Creatinine 0.88 mg/dL  10/27/2019: 11:09 AM Creatinine 0.77 mg/dL    Recent Vancomycin Level(s) for last 3 days  No results found for requested labs within last 72 hours.      Vancomycin IV Administrations (past 72 hours)                   vancomycin 1500 mg in 0.9% NaCl 250 ml intermittent infusion 1,500 mg (mg) 1,500 mg Given 10/27/19 1340                Nephrotoxins and other renal medications (From now, onward)    Start     Dose/Rate Route Frequency Ordered Stop    10/28/19 0000  vancomycin 1500 mg in 0.9% NaCl 250 ml intermittent infusion 1,500 mg      1,500 mg  over 90 Minutes Intravenous EVERY 12 HOURS 10/27/19 2046            Contrast Orders - past 72 hours (72h ago, onward)    Start     Dose/Rate Route Frequency Ordered Stop    10/27/19 1326  iopamidol (ISOVUE-370) solution 500 mL      500 mL Intravenous ONCE 10/27/19 1325 10/27/19 1444                Plan:  1.  Start vancomycin  1500 mg IV q12h.   2.  Goal Trough Level: 15-20 mg/L   3.  Pharmacy will check trough levels as appropriate in 1-3 Days.    4. Serum creatinine levels will be ordered daily for the first week of therapy and at least twice weekly for subsequent weeks.    5. [place] method utilized to dose vancomycin therapy: Method 1    Courtney Duque [facility]

## 2019-10-28 NOTE — PROGRESS NOTES
"SPIRITUAL HEALTH SERVICES Progress Note  [facility] Surg. 5    Saw pt Claudia Simon per her request for  support.  Her daughter Shira was present.  Provided reflective conversation to facilitate the processing of thoughts and feelings; offered validation of feelings, affirmation, and prayer.      Illness Narrative - Claudia shared that she lost her vision in one eye within 24-hours in September and soon discovered that theoptical nerve was pinched because she has \"cancer all over [her] body\" (lung cancer with metastases to the brain and bone).  She explained that there are mets in her spine that threaten to progress into her spinal cord, which would cause paralysis.  Claudia is undergoing chemo.      Distress - She denied having an concerns: \"I am in God's hands.'      Coping -   o Claudia named her daughters and a [place] of friends, several of whom live in the same building and participate in the same Bible group that meets there.  o She welcomed prayer, and Shira joined us.  o Shira reported that she is coping with Claudia's new diagnosis and shared that she is being treated for stage I breast cancer.  She mentioned having a [place] of friends who are praying for her.      Meaning-Making - Claudia reflected briefly on her marilyn in the context of her illness, bearing witness to her trust in God's care.      Plan - This author and other chaplains remain available per pt/family request.    Dayday Evans M.Div., [facility]  Staff   Pager [phone]  "

## 2019-10-28 NOTE — PROVIDER NOTIFICATION
Pagedarryl JAUREGUI regarding patients NPO status, BG 79, and fluids w/dextrose being D/C'd.     Awaiting response.    Update: New fluid orders received.     Mara Montemayor RN on 10/28/2019 at 5:47 PM

## 2019-10-28 NOTE — PROGRESS NOTES
[facility]    Hospitalist Progress Note      Assessment & Plan   Patient is a 74-year-old female with a history of metastatic lung cancer with known metastases to brain and bone, type 2 diabetes, irritable bowel syndrome, hypertension, and hyperlipidemia who presents here with diarrhea and epistaxis.  Patient is currently undergoing chemotherapy along with radiation.  Patient is followed by Dr. Roman who I believe this with [place] [facility].  In addition to diarrhea, patient does report some abdominal cramping and discomfort.  Stools are described as loose but nonbloody.  In terms of chemotherapy, patient has undergone 1 round approximately 3 weeks ago with carboplatin, pemetrexed, and pembrolizumab and has had 10 cycles of radiation therapy.  She does report some chronic baseline nausea with her chemo.  In addition, she does have epistaxis intermittently every 2 weeks.  She did report one episode of epistaxis.  She reports a temperature 100.8 this morning but denies any cough, shortness of breath, sore throat, rashes, or dysuria.      On presentation to the ED, patient was noted to have a low white blood count of 0.3.  Sodium was 129.  Urinalysis was negative but CT of the abdomen did suggest the possibility of acute cholecystitis.  She was given vancomycin and cefepime and I was asked to admit the patient for further inpatient IV antibiotic for neutropenic fever and possible cholecystitis.      Problem list/Plan:  #Neutropenic fever: Despite diarrheal complaints which I feel may be related to chemotherapy, CT of the abdomen suggest the possibility of cholecystitis.  Ultrasound did seem to indicate some distention.  Otherwise denies any shortness of breath, cough, new chest discomfort, dysuria.  C. difficile and enteric panel have come back negative.  -HIDA scan to determine if she really does have acute cholecystitis.  If so, will request general surgery consultation.    -Continue vancomycin and  cefepime.  We will add Flagyl for anaerobic coverage.  -Oncology has been consulted, appreciate recs.  Did receive dose of Neupogen on 10/27.   -Neutropenic precautions    #Pancytopenia: Patient with pancytopenia on presentation secondary to chemotherapy.  -We will need to monitor closely for any signs of bleeding.  -Neupogen administered per oncology    #Abdominal cramping and diarrhea: Definite enteric panel negative.  Patient started on probiotics.  Likely secondary to chemotherapy    #Hypertension: Patient is chronically on lisinopril 40 mg p.o. daily, and Toprol-XL 25 mg p.o. every 24 hours.    -We will hold these medications given neutropenic fever.  Blood pressure is currently controlled.  Restart as able.    #Type 2 diabetes: Chronically on metformin  mg p.o. daily.  Will hold for now while patient is n.p.o.  Medium intensity sliding scale n.p.o. protocol.    #Thrush: Continue nystatin swish and swallow 4 times daily.    #History of GERD: While n.p.o., will place patient on Protonix 40 mg IV every 24 hours    #History of metastatic lung cancer: Oncology consulted, appreciate recs    #History of left eye blindness     Prophylaxis;  PCD/Ambulation thrombocytopenia  Expected discharge: Patient will require another 2 to 4 days of inpatient stay for treatment of neutropenic fever with close monitoring of counts  Code status: Full code, confirmed with patient today.  She states she has not given much thought and is not sure.  She will discuss with her  and daughter.    Luis Angel Wilson MD  Text Page    Interval History   No acute events overnight.  Patient states this a.m. she feels better compared to yesterday.  She states she has more energy.  She still endorsing some abdominal cramping.  Denies any nausea or vomiting.  No worsening diarrhea.  She has not noticed any blood in her stools.  No black or tarry stools.  She denies any dysuria, shortness of breath, cough, worsening or new chest pressure, new  or worsening back pain.  No known sick contacts.    -Data reviewed today: I reviewed all new labs and imaging results over the last 24 hours.     Physical Exam   Temp: 100.2  F (37.9  C) Temp src: Axillary BP: 110/62 Pulse: 106 Heart Rate: 102 Resp: 20 SpO2: 95 % O2 Device: None (Room air)    There were no vitals filed for this visit.  Vital Signs with Ranges  Temp:  [98.7  F (37.1  C)-100.2  F (37.9  C)] 100.2  F (37.9  C)  Pulse:  [] 106  Heart Rate:  [] 102  Resp:  [12-24] 20  BP: ()/(53-75) 110/62  SpO2:  [69 %-100 %] 95 %  I/O last 3 completed shifts:  In: 321.25 [I.V.:321.25]  Out: -     Constitutional: Alert, not in acute distress  HEENT: Mucous membranes moist, no oral lesions noted.  Respiratory: Normal respiratory effort, clear bilaterally without any crackles or wheezes noted  Cardiovascular: RRR, no murmurs or rubs.  GI: Bowel sounds present, nondistended.  Mildly tender in the right abdomen.  No rebound or guarding. bowel sounds present  Skin/Integumen: No rashes noted.  Extremities warm and well-perfused  Ext: No edema noted  Neuro: Nerves II through XII intact, patient moves all extremities.  Sensation intact in all extremities.  Psych alert and appropriate    Medications     dextrose 5% and 0.45% NaCl + KCl 20 mEq/L 75 mL/hr at 10/27/19 1943     sodium chloride Stopped (10/27/19 1942)       ceFEPIme (MAXIPIME) IV  1 g Intravenous Q12H     filgrastim (NEUPOGEN/GRANIX) subcutaneous  480 mcg Subcutaneous Daily at 8 pm     folic acid  1 mg Oral Daily     gabapentin  600 mg Oral At Bedtime     insulin aspart  1-6 Units Subcutaneous Q4H     lactobacillus rhamnosus (GG)  1 capsule Oral BID     lidocaine  2 patch Transdermal Q24h    And     lidocaine   Transdermal Q24H    And     lidocaine   Transdermal Q8H     metFORMIN  500 mg Oral Daily with supper     metroNIDAZOLE  500 mg Oral TID     nystatin  500,000 Units Swish & Swallow 4x Daily     oxymetazoline  2 spray Nasal Once      pantoprazole (PROTONIX) IV  40 mg Intravenous Daily with breakfast     sodium chloride (PF)  3 mL Intracatheter Q8H     vancomycin (VANCOCIN) IV  1,500 mg Intravenous Q12H       Data   Recent Labs   Lab 10/28/19  0751 10/27/19  1109 10/25/19  1359   WBC 0.3* 0.3* 0.5*   HGB 8.5* 11.4* 12.6   MCV 95 95 96   PLT 26* 16* 38*    129* 126*   POTASSIUM 3.7 4.6 4.7   CHLORIDE 109 97 95   CO2 21 24 25   BUN 11 25 25   CR 0.46* 0.77 0.88   ANIONGAP 6 8 6   BIBI 8.2* 9.5 9.3   * 163* 192*   ALBUMIN 1.7* 2.5* 2.8*   PROTTOTAL 5.0* 6.6* 6.6*   BILITOTAL 0.8 1.4* 1.3   ALKPHOS 75 110 120   ALT 74* 133* 234*   AST 27 28 31       Recent Results (from the past 24 hour(s))   CT Chest/Abdomen/Pelvis w Contrast    Narrative    CT CHEST, ABDOMEN, AND PELVIS WITH CONTRAST 10/27/2019 2:47 PM     HISTORY: Fever, neutropenia.    COMPARISON: Head CT from October 7, 2019.    TECHNIQUE: Volumetric helical acquisition of CT images from the lung  apices through the symphysis pubis after the administration of 71mL  Isovue-370 intravenous contrast. Radiation dose for this scan was  reduced using automated exposure control, adjustment of the mA and/or  kV according to patient size, or iterative reconstruction technique.    FINDINGS:   Chest: Compression deformities of T6 and T7 with a soft tissue lesion  possibly arising from the right aspect of T6. Compression deformity of  T9 also suspected to correlate with a pathologic fracture. 1.8 cm  spiculated nodule in the anterior left upper lobe. 2.9 x 1.9 cm  suspected mass in the medial aspect of the right upper lobe. Probable  adenopathy measuring 1.3 x 1.2 cm in the AP window. Additional  prominent lymph nodes measuring 1.6 x 1.1 cm in AP window. No definite  acute infiltrates. No pleural or pericardial effusion.    Abdomen and pelvis: Gallbladder appears distended. Question some  minimal surrounding inflammatory change. Multifocal lesions throughout  the liver which are indeterminate,  one measures 1.5 cm in the inferior  right lobe. Minimal adrenal thickening bilaterally. Pancreas, spleen,  and left kidney unremarkable. Cystic lesion in the right kidney  similar to previous. No bowel obstruction. No definite inflammation in  the abdomen and pelvis. No frankly destructive bony lesions.      Impression    IMPRESSION:  1. Possible cholecystitis, consider right upper quadrant ultrasound  for further evaluation.  2. Otherwise no infectious process demonstrated in the abdomen and  pelvis.     JOSE RAFAEL PRATER MD   US Abdomen Limited    Narrative    EXAM: US ABDOMEN LIMITED  LOCATION: [facility]  DATE/TIME: 10/27/2019 9:33 PM    INDICATION: Possible cholecystitis seen on CT from earlier today.  COMPARISON: CT abdomen and pelvis 10/27/2019  TECHNIQUE: Limited abdominal ultrasound.    FINDINGS:    GALLBLADDER: Gallbladder is moderately distended with 4 mm polyp. No stones, wall thickening, or pericholecystic fluid    BILE DUCTS: No intrahepatic biliary dilatation. The common duct is borderline dilated measuring 7 mm.    LIVER: Several small echogenic lesions the largest measuring 1.3 x 1.3 x 1.5 cm and compatible with hemangiomas.     RIGHT KIDNEY: No hydronephrosis. Upper pole cyst measuring 4.0 x 3.3 x 4.1 cm.    PANCREAS: The visualized portions are normal.    No ascites.      Impression    IMPRESSION:  1.  Distended gallbladder with small polyp. No evidence for cholecystitis.  2.  Small echogenic foci in the liver compatible with cavernous hemangiomas.  3.  Right renal cyst.

## 2019-10-29 NOTE — PHARMACY-VANCOMYCIN DOSING SERVICE
Pharmacy Vancomycin Note  Date of Service 2019  Patient's  1945   74 year old, female    Indication: Febrile Neutropenia  Goal Trough Level: 15-20 mg/L  Day of Therapy: 3  Current Vancomycin regimen:  1500 mg IV q12h    Current estimated CrCl = Estimated Creatinine Clearance: 82.5 mL/min (based on SCr of 0.61 mg/dL).    Creatinine for last 3 days  10/27/2019: 11:09 AM Creatinine 0.77 mg/dL  10/28/2019:  7:51 AM Creatinine 0.46 mg/dL  10/29/2019:  6:31 AM Creatinine 0.61 mg/dL    Recent Vancomycin Levels (past 3 days)  10/29/2019:  3:50 PM Vancomycin Level 21.8 mg/L    Vancomycin IV Administrations (past 72 hours)                   vancomycin 1500 mg in 0.9% NaCl 250 ml intermittent infusion 1,500 mg (mg) 1,500 mg Given 10/29/19 0526     1,500 mg Given 10/28/19 1805     1,500 mg Given  0355                Nephrotoxins and other renal medications (From now, onward)    Start     Dose/Rate Route Frequency Ordered Stop    10/29/19 2200  vancomycin 1500 mg in 0.9% NaCl 250 ml intermittent infusion 1,500 mg      1,500 mg  over 90 Minutes Intravenous EVERY 18 HOURS 10/29/19 1738               Contrast Orders - past 72 hours (72h ago, onward)    Start     Dose/Rate Route Frequency Ordered Stop    10/28/19 1445  technetium Tc 99m mebrofenin (CHOLETEC) radioisotope injection 6 mCi      6 mCi Intravenous ONCE 10/28/19 1430 10/28/19 1436    10/27/19 1326  iopamidol (ISOVUE-370) solution 500 mL      500 mL Intravenous ONCE 10/27/19 1325 10/27/19 1444          Interpretation of levels and current regimen:  Trough level is  supratherapeutic    Has serum creatinine changed > 50% in last 72 hours: No    Urine output:  diminished urine output    Renal Function: Stable    Plan:  1.  decrease frequency to 1500mg IV q18h  2.  Pharmacy will check trough levels as appropriate in 1-3 Days.    3. Serum creatinine levels will be ordered daily for the first week of therapy and at least twice weekly for subsequent weeks.       Vipul Olsen [facility]        .

## 2019-10-29 NOTE — PLAN OF CARE
Ambulatory Status:  Pt up with 2 and Isamar Steady at noc.  VS:  Stable, Afebrile   Pain:  2/10 pt declined pain meds.  Repositioning helped.  Resp: LS Diminished  GI:  Denies nausea.  NPO with Ice chips  ; 94 no coverage needed.  Passing flatus.  Last BM 10/28/19.  :  Up to the bathroom with 2 voided x2  Skin:  Blanchable redness L buttocks

## 2019-10-29 NOTE — PLAN OF CARE
A & O x4. Up w/assist of 1 and gait belt. Unsteady and shaky on feet at times. IV Vancomycin and Maxipime. Lidocaine patches applied to mid-back. Given PRN oxycodone for RUQ pain. Very difficult time taking PO medications d/t throat pain from chemotherapy, takes medications one at a time. Voiding w/out difficulty. IVF 1/2 NS w/D5 @ 100mL/hr. BG 79, and 99. NPO continues. Surgery consulted this evening.

## 2019-10-29 NOTE — PLAN OF CARE
A&O. Ambulating Ax1 with gait belt and walker. Advanced to low fat diet, tolerating well. NPO at midnight. Vanco, rocephin, and flagyl continue. 1 unit of red blood cells given. 1 unit of platelets infusing. Protective precautions remain in place. Surg and onc following.

## 2019-10-29 NOTE — CONSULTS
SURGICAL CONSULTATION   REQUESTING PROVIDER:  Dr Wilson   HISTORY OF PRESENT ILLNESS:  I was asked by Dr. Wilson to see Claudia Simon who is a 74 year old female admitted with neutropenic fever, diarrhea and nose bleeds following chemotherapy for metastatic lung cancer last week. She has severe pancytopenia from the chemotherapy and is not currently a surgical candidate due to this.  She denies abdominal pain, does have back pain due to known bony metastasis.     She has been unable to eat since Saturday (3 days ago).  She does not have a history of fatty food intolerance.   She denies a history of jaundice or prior episodes of pancreatitis. Prior abdominal surgery includes anterior approach for back surgery.   PAST MEDICAL HISTORY:  has a past medical history of Lung cancer (H) (09/23/2019).  Smoking History:  quit smoking some time ago.  total abstinence  PAST SURGICAL HISTORY:    Past Surgical History:   Procedure Laterality Date     C NONSPECIFIC PROCEDURE      back surgery x 3     C NONSPECIFIC PROCEDURE      ruptured discs x 2-1 yr apart     C NONSPECIFIC PROCEDURE      titanium cage     IR CHEST TUBE PLACEMENT NON-TUNNELLED LEFT  9/23/2019     IR FOLLOW UP VISIT INPATIENT  9/24/2019     MEDICATIONS: ioversol (OPTIRAY 320) iv solution 68% 100 mL    cholecalciferol (VITAMIN D3) 5000 units (125 mcg) capsule, Take 5,000 Units by mouth daily  dexamethasone (DECADRON) 2 MG tablet, Take 4 mg with supper on 10/17, 4 mg twice daily with meals on 10/18, 4 mg with breakfast on 10/19, 2 mg with breakfast x 3 days, 1 mg with breakfast x 3 days, then off  folic acid (FOLVITE) 1 MG tablet, Take 1 tablet (1 mg) by mouth daily  gabapentin (NEURONTIN) 300 MG capsule, Take 600 mg by mouth At Bedtime  gabapentin (NEURONTIN) 300 MG capsule, Take 1 capsule (300 mg) by mouth daily At Noon.  lidocaine (LIDODERM) 5 % patch, Place 2 patches onto the skin every 24 hours To prevent lidocaine toxicity, patient should be patch free for 12  hrs daily.  lisinopril (PRINIVIL/ZESTRIL) 40 MG tablet, Take 1 tablet (40 mg) by mouth daily  LORazepam (ATIVAN) 0.5 MG tablet, Take 1 tablet (0.5 mg) by mouth every 6 hours as needed (Nausea/Vomiting)  melatonin 5 MG tablet, Take 5 mg by mouth nightly as needed for sleep  metoprolol succinate ER (TOPROL-XL) 25 MG 24 hr tablet, TAKE ONE TABLET BY MOUTH ONE TIME DAILY  nystatin (MYCOSTATIN) 962894 UNIT/ML suspension, Swish and swallow 5 mL four times daily  omeprazole 20 MG tablet, Take 1 tablet (20 mg) by mouth daily  ondansetron (ZOFRAN) 8 MG tablet, Take 1 tablet (8 mg) by mouth every 8 hours as needed for nausea (vomiting)  oxyCODONE (ROXICODONE) 5 MG tablet, Take 2 tablets (10 mg) by mouth every 4 hours as needed for moderate to severe pain  polyethylene glycol (MIRALAX/GLYCOLAX) packet, Take 17 g by mouth daily  senna (SENOKOT) 8.6 MG tablet, Take 1-2 tablets by mouth 2 times daily  sucralfate (CARAFATE) 1 GM/10ML suspension, Take 10 mLs (1 g) by mouth 4 times daily  blood glucose (CONTOUR NEXT TEST) test strip, Use to test blood sugar 2 times daily or as directed.  dexamethasone (DECADRON) 4 MG tablet, Take 1 tablet (4 mg) by mouth 2 times daily (with meals) Start one day prior to Pemetrexed (Alimta), continue on the day of treatment, and the day following Pemetrexed.  folic acid (FOLVITE) 1 MG tablet, Take 1 tablet (1,000 mcg) by mouth daily Begin 7 days before Pemetrexed (Alimta) starts and continue for 21 days after Pemetrexed is discontinued.  metFORMIN (GLUCOPHAGE-XR) 500 MG 24 hr tablet, Take 500 mg by mouth daily (with dinner) Only take if blood sugar is above 140.  ondansetron (ZOFRAN-ODT) 8 MG ODT tab, Take 1 tablet (8 mg) by mouth every 8 hours as needed for nausea  prochlorperazine (COMPAZINE) 10 MG tablet, Take 0.5 tablets (5 mg) by mouth every 6 hours as needed (Nausea/Vomiting)                                        ceFEPIme (MAXIPIME) IV  1 g Intravenous Q12H     filgrastim (NEUPOGEN/GRANIX)  "subcutaneous  480 mcg Subcutaneous Daily at 8 pm     folic acid  1 mg Oral Daily     gabapentin  600 mg Oral At Bedtime     insulin aspart  1-6 Units Subcutaneous Q4H     lactobacillus rhamnosus (GG)  1 capsule Oral BID     lidocaine  2 patch Transdermal Q24h    And     lidocaine   Transdermal Q24H    And     lidocaine   Transdermal Q8H     metFORMIN  500 mg Oral Daily with supper     metroNIDAZOLE  500 mg Oral TID     nystatin  500,000 Units Swish & Swallow 4x Daily     oxymetazoline  2 spray Nasal Once     pantoprazole (PROTONIX) IV  40 mg Intravenous Daily with breakfast     sodium chloride (PF)  3 mL Intracatheter Q8H     vancomycin (VANCOCIN) IV  1,500 mg Intravenous Q12H     FAMILY HISTORY:    Reviewed and non-contributory  ALLERGIES: This patient is allergic to is allergic to sulfa drugs.   REVIEW OF SYSTEMS: Negative except the HPI.   PHYSICAL EXAMINATION:   GENERAL: Pleasant, pale and frail appearing elderly female, In no obvious distress.  Receiving transfusion.  /56 (BP Location: Right arm)   Pulse 102   Temp 97.9  F (36.6  C) (Oral)   Resp 20   Ht 1.626 m (5' 4\")   Wt 65.6 kg (144 lb 11.2 oz)   SpO2 94%   BMI 24.84 kg/m    HEENT: Normocephalic, atraumatic. No scleral icterus.   NECK/LYMPH: Supple.  No adenopathy.   LUNGS: Respirations unlabored.   CARDIOVASCULAR: Regular rhythm and rate, no murmurs.  ABDOMEN:  Abdomen is protuberant. Tenderness, marked and involuntary guarding and  RUQ.  The gallbladder is palpable and tender. normal bowel sounds.  No hernia  noted.   EXTREMITIES: Without edema or deformity.  NEUROLOGIC: Alert and oriented, moves all extremities with good strength. Speech clear.   LABORATORY DATA: WBC-   WBC   Date Value Ref Range Status   10/29/2019 0.4 (LL) 4.0 - 11.0 10e9/L Final     Comment:     .   Critical result, provider not notified due to previous critical result   notification.     , HgB-   Hemoglobin   Date Value Ref Range Status   10/29/2019 7.7 (L) 11.7 - 15.7 " g/dL Final     Platelets 21,000  Liver function studies: Total Bilirubin 0.7, Alkaline Phosphatase 76, ALT 77, AST 34.    IMAGING:  All imaging personally reviewed  Gallbladder ultrasound:   Common bile duct measures normal.  No wall thickening.  One 5 mm polyp seen.   No pericholecystic fluid seen.  Moderate distension     Hepatobiliary (HIDA) scan:   No radioactivity seen within the gallbladder consistent with cholecystitis     PET scan:  Positive to multiple metastatic sites, bone, lymph nodes, brain.  Lung primary.    ASSESSMENT AND PLAN: Claudia Simon  has Acute Acalculous Cholecystitis.  She is not currently a surgical candidate due to her severe pancytopenia.  I think her gallbladder should be addressed when her counts recover and before she gets additional chemotherapy.  For now she can try a low fat diet, but if it worsens her pain, it will need to be discontinued.  I would recommend making her NPO after MN each evening and until her labs come back.  She should have lap shmuel or percutaneous cholecystostomy tube when her counts recover enough for this to be safely done.  Will follow along with you.  ABRIL YOST MD     Please route or send letter to:  Primary Care Provider (PCP) and Referring Provider

## 2019-10-29 NOTE — PROGRESS NOTES
Med Onc     Patient seen and examined     Fatigued and weak    Counts low , will get blood and plts today     No more abd pain , awaiting input from surgeon re opinion on acute cholecystitis . Probably will watch until counts are recovered   No nausea   No vomiting       O/E   VSS   Scooter   Mucositis   HEART:  Sounds 1, 2 normal.  No added sounds or murmurs.   ABDOMEN:  Soft and nontender, no hepatosplenomegaly.   EXTREMITIES:  Legs are without tenderness or edema.   SKIN:  No rashes or lesions.  She did fall in the early part of last week and she has a large bruise on her left leg just below her knee.   NEUROLOGIC:  The patient is alert and oriented x3.  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      WCC 0,4   Hb 7.7   Plts 21     IMP     Met lung ca   Acute Dory per HIDA scan   No abd pain   Surgeon to address when counts recover   Continue neupogen    Support with transfusions today     30 mins       Bob Callaway

## 2019-10-29 NOTE — PROGRESS NOTES
[facility]    Hospitalist Progress Note      Assessment & Plan   Patient is a 74-year-old female with a history of metastatic lung cancer with known metastases to brain and bone, type 2 diabetes, irritable bowel syndrome, hypertension, and hyperlipidemia who presents here with diarrhea and epistaxis.  Patient is currently undergoing chemotherapy along with radiation.  Patient is followed by Dr. Roman who I believe this with [place] [facility].  In addition to diarrhea, patient does report some abdominal cramping and discomfort.  Stools are described as loose but nonbloody.  In terms of chemotherapy, patient has undergone 1 round approximately 3 weeks ago with carboplatin, pemetrexed, and pembrolizumab and has had 10 cycles of radiation therapy.  She does report some chronic baseline nausea with her chemo.  In addition, she does have epistaxis intermittently every 2 weeks.  She did report one episode of epistaxis.  She reports a temperature 100.8 this morning but denies any cough, shortness of breath, sore throat, rashes, or dysuria.       On presentation to the ED, patient was noted to have a low white blood count of 0.3.  Sodium was 129.  Urinalysis was negative but CT of the abdomen did suggest the possibility of acute cholecystitis.  She was given vancomycin and cefepime and I was asked to admit the patient for further inpatient IV antibiotic for neutropenic fever and probable cholecystitis.      Problem list/Plan:  #Neutropenic fever: Despite diarrheal complaints which I feel may be related to chemotherapy, CT of the abdomen suggest the possibility of cholecystitis.  Ultrasound did seem to indicate some distention.    HIDA scan performed on 10/28 did show evidence of cholecystitis.  Otherwise denies any shortness of breath, cough, new chest discomfort, dysuria.  C. difficile and enteric panel have come back negative.  -Surgery consulted, will try for conservative management with IV antibiotics.   Will need to assess daily.  Patient placed on low-fat diet, n.p.o. at midnight.  -Continue vancomycin and cefepime.    Flagyl for anaerobic coverage  -Oncology has been consulted, appreciate recs.  Did receive dose of Neupogen on 10/27.   -Neutropenic precautions     #Pancytopenia: Patient with pancytopenia on presentation secondary to chemotherapy.  -We will need to monitor closely for any signs of bleeding.  -Neupogen administered per oncology  -Patient's hemoglobin less than 8.0 on 10/29, receiving 1 unit PRBCs.  Patient also with some evidence of nosebleed this a.m.  Receiving 1 unit of platelets     #Abdominal cramping and diarrhea: enteric panel negative.  Patient started on probiotics.  Likely secondary to chemotherapy     #Hypertension: Patient is chronically on lisinopril 40 mg p.o. daily, and Toprol-XL 25 mg p.o. every 24 hours.    -We will hold these medications given neutropenic fever.  Blood pressure is currently controlled.  Restart as able.     #Type 2 diabetes: Chronically on metformin  mg p.o. daily.  Will hold for now while patient is n.p.o.  Medium intensity sliding scale n.p.o. protocol.     #Thrush: Continue nystatin swish and swallow 4 times daily.     #History of GERD: While n.p.o., will place patient on Protonix 40 mg IV every 24 hours     #History of metastatic lung cancer: Oncology consulted, appreciate recs     #History of left eye blindness     Prophylaxis;  PCD/Ambulation thrombocytopenia  Expected discharge: Patient will require another 2 to 4 days of inpatient stay for treatment of neutropenic fever with close monitoring of counts  Code status: Full code, confirmed with patient today.  She states she has not given much thought and is not sure.  She will discuss with her  and daughter.    Luis Angel Wilson MD  Text Page    Interval History   HIDA scan positive for likely cholecystitis.  Patient otherwise states that she feels a bit better compared to yesterday.  Energy level is a  bit up.  She still complains of chronic pain, relatively well controlled on pain medications.  She denies any subjective fevers or chills, no shortness of breath, cough, skin changes, worsening abdominal pain, nausea or vomiting.    -Data reviewed today: I reviewed all new labs and imaging results over the last 24 hours.    Physical Exam   Temp: 97.9  F (36.6  C) Temp src: Oral BP: 102/56 Pulse: 102 Heart Rate: 85 Resp: 20 SpO2: 94 % O2 Device: None (Room air)    Vitals:    10/28/19 1425   Weight: 65.6 kg (144 lb 11.2 oz)     Vital Signs with Ranges  Temp:  [97.8  F (36.6  C)-99.3  F (37.4  C)] 97.9  F (36.6  C)  Pulse:  [] 102  Heart Rate:  [85-98] 85  Resp:  [18-20] 20  BP: ()/(51-75) 102/56  SpO2:  [92 %-100 %] 94 %  I/O last 3 completed shifts:  In: 2123.75 [I.V.:2123.75]  Out: -     Constitutional: Alert, short of breath walking from bathroom to the bed.  HEENT:  Normocephalic, mucous membranes moist.  Respiratory:      Some increased respiratory effort walking from bathroom to the bed, otherwise clear bilaterally without any crackles or wheezes noted.  Cardiovascular: RRR, no murmurs or rubs.  GI: Bowel sounds present, nondistended.  Mildly tender in the right abdomen.  No rebound or guarding. bowel sounds present  Skin/Integumen: No rashes noted.  Extremities warm and well-perfused  Ext: No edema noted  Neuro: Nerves II through XII intact, patient moves all extremities.  Sensation intact in all extremities.  Psych alert and appropriate    Medications     dextrose 5% and 0.45% NaCl 100 mL/hr at 10/29/19 0522       ceFEPIme (MAXIPIME) IV  1 g Intravenous Q12H     filgrastim (NEUPOGEN/GRANIX) subcutaneous  480 mcg Subcutaneous Daily at 8 pm     folic acid  1 mg Oral Daily     gabapentin  600 mg Oral At Bedtime     insulin aspart  1-6 Units Subcutaneous Q4H     lactobacillus rhamnosus (GG)  1 capsule Oral BID     lidocaine  2 patch Transdermal Q24h    And     lidocaine   Transdermal Q24H    And      lidocaine   Transdermal Q8H     metFORMIN  500 mg Oral Daily with supper     metroNIDAZOLE  500 mg Oral TID     nystatin  500,000 Units Swish & Swallow 4x Daily     oxymetazoline  2 spray Nasal Once     pantoprazole (PROTONIX) IV  40 mg Intravenous Daily with breakfast     sodium chloride (PF)  3 mL Intracatheter Q8H     vancomycin (VANCOCIN) IV  1,500 mg Intravenous Q12H       Data   Recent Labs   Lab 10/29/19  0631 10/28/19  0751 10/27/19  1109   WBC 0.4* 0.3* 0.3*   HGB 7.7* 8.5* 11.4*   MCV 97 95 95   PLT 21* 26* 16*    136 129*   POTASSIUM 3.5 3.7 4.6   CHLORIDE 109 109 97   CO2 21 21 24   BUN 11 11 25   CR 0.61 0.46* 0.77   ANIONGAP 6 6 8   BIBI 8.6 8.2* 9.5   * 120* 163*   ALBUMIN 1.6* 1.7* 2.5*   PROTTOTAL 5.0* 5.0* 6.6*   BILITOTAL 0.7 0.8 1.4*   ALKPHOS 76 75 110   ALT 77* 74* 133*   AST 34 27 28       Recent Results (from the past 24 hour(s))   NM HepatOBiliary Scan    [facility]    NUCLEAR MEDICINE HEPATOBILIARY SCAN   10/28/2019 4:22 PM     TECHNIQUE: Hepatobiliary scan was performed following intravenous  injection of 5.5 mCi technetium-99M mebrofenin.    HISTORY: Cholelithiasis.    COMPARISON:   Nuclear Study: None.    Other Relevant Studies: Ultrasound from 10/27/2019.    FINDINGS: After one hour of imaging the gallbladder was not  visualized. Activity is seen extending from the common bile duct into  the small bowel.      Impression    IMPRESSION: Nonvisualization of the gallbladder suggests cholecystitis  or cystic duct obstruction.

## 2019-10-30 NOTE — PROGRESS NOTES
"[facility]  General Surgery Progress Note           Assessment and Plan:   Assessment:   C/S for Acute Acalculous Cholecystitis   Metastatic Lung Cancer, now with pancytopenia, counts slowly improving on neupogen  Tmax 99.1      Plan:   -lab values continue to be too low for surgery today, will make a low fat diet available and NPO after midnight for possible surgery vs shmuel tube placement tomorrow.   -IV antibiotics: cefipime for neutropenic fever, flagyl  -Pain control: oxycodone  -GI prophylaxis: protonix  -Oncology following, appreciate input, on neupogen  -Plan for lap shmuel vs cholecystostomy tube when counts recover to be safely done  -VTE prophylaxis: PCDs  -Diet: low fat as tolerated today, continue NPO after midnight tonight         Interval History:   Comfortable in bed resting. Daughter at bedside. Reports she is feeling somewhat better today, tolerated a small amount of low fat diet yesterday. Voiding independently.        Physical Exam:   Blood pressure 125/68, pulse 112, temperature 98.2  F (36.8  C), temperature source Oral, resp. rate 20, height 1.626 m (5' 4\"), weight 65.6 kg (144 lb 11.2 oz), SpO2 93 %, not currently breastfeeding.    I/O last 3 completed shifts:  In: 1278 [P.O.:120; I.V.:542]  Out: -     Abdomen:  Soft, non distended, ttp in RUQ although improved from admission, no masses palpated, +BS          Data:     Recent Labs   Lab 10/30/19  0749 10/29/19  0631 10/28/19  0751   WBC 1.1* 0.4* 0.3*   HGB 9.7* 7.7* 8.5*   HCT 29.0* 24.0* 25.5*   MCV 95 97 95   PLT 42* 21* 26*     Recent Labs   Lab 10/30/19  0749 10/29/19  0631 10/28/19  0751    136 136   POTASSIUM 3.0* 3.5 3.7   CHLORIDE 108 109 109   CO2 22 21 21   ANIONGAP 7 6 6   GLC 95 100* 120*   BUN 10 11 11   CR 0.76 0.61 0.46*   GFRESTIMATED 76 88 >90   GFRESTBLACK 89 >90 >90   BIBI 8.8 8.6 8.2*   PROTTOTAL 5.5* 5.0* 5.0*   ALBUMIN 1.9* 1.6* 1.7*   BILITOTAL 0.8 0.7 0.8   ALKPHOS 80 76 75   AST 30 34 27   ALT 70* " 77* 74*     Recent Labs   Lab 10/30/19  0749 10/29/19  0631 10/28/19  0751   AST 30 34 27   ALT 70* 77* 74*   ALKPHOS 80 76 75   BILITOTAL 0.8 0.7 0.8       Dena Torres PA-C      Labs improving, still not adequate for surgery or procedure.  Possibly ready by tomorrow.  Family is requesting surgery instead of shmuel tube.  That decision is best made by her care team based on her prognosis.  Okay for diet today, NPO after midnight for possible intervention tomorrow.  Barbie Kern MD

## 2019-10-30 NOTE — PLAN OF CARE
FULL Code  A&O x4.  Pt tachycardic, otherwise VSS; IV - SL.  Ambulatory Status: Pt up with Ax1 with walker and GB. Alarms ON. Ambulated in room.  Pain: Rated 8/10 in R lower abdomen, received PRN oxycodone which was effective.  Resp: LS clear, on RA.  GI: Denies nausea. BS normoactive. Passing gas. LBM 10/27. On low fat diet; NPO at midnight.  : Voiding spontaneously.  Skin: Intact.  Labs: K+ 3.0; replaced orally, recheck at 1500. Triggered sepsis protocol, lactate was 0.6.  Tx: Maxipime.  Consults/Following: Surgery & Hem/Onc.  Disposition: TBD.

## 2019-10-30 NOTE — PROGRESS NOTES
"Hematology-Oncology Follow-up Note  [facility]     Today's Date: 10/30/19  Date of Admission:  10/27/2019  Reason for Consult: Metastatic lung cancer      ASSESSMENT/ PLAN :  Claudia Simon is a 74 year old female    Metastatic lung cancer mets to brain and bone  Patient Dr. Roman at the   -10/16-started carboplatin, pemetrexed and pembrolizumab  -Hold treatment now until patient sees Dr. Roman  -schedule with Dr. Roman after discharge        Pancytopenia  -Continue with daily Neupogen shot keep ANC 1.5 or above  -Transfuse for hemoglobin 8 or less or symptomatic  Transfuse for platelet count 20 or below or in the event of bleeding      Acute cholecystitis  Per surgery recommendations          INTERIM HISTORY:  Patient is resting in bed comfortably.  Denies abdominal pain denies nausea denies fever chills sweats     MEDICATIONS:  Reviewed         PHYSICAL EXAM:  Vital signs:  Temp: 98.2  F (36.8  C) Temp src: Oral BP: 125/68 Pulse: 112 Heart Rate: 105 Resp: 20 SpO2: 93 % O2 Device: None (Room air)   Height: 162.6 cm (5' 4\") Weight: 65.6 kg (144 lb 11.2 oz)  Estimated body mass index is 24.84 kg/m  as calculated from the following:    Height as of this encounter: 1.626 m (5' 4\").    Weight as of this encounter: 65.6 kg (144 lb 11.2 oz).      GENERAL/CONSTITUTIONAL: No acute distress.  RESPIRATORY: Clear to auscultation bilaterally. No crackles or wheezing.   CARDIOVASCULAR: Regular rate and rhythm without murmurs, gallops, or rubs.  GASTROINTESTINAL: No hepatosplenomegaly, masses, or tenderness. The patient has normal bowel sounds. No guarding.  No distention.  MUSCULOSKELETAL: Warm and well-perfused, no cyanosis, clubbing, or edema.  LYMPH: No anterior cervical, posterior cervical, supraclavicular, axillary or inguinal adenopathy.       LABS:  Recent Labs   Lab Test 10/30/19  0749      POTASSIUM 3.0*   CHLORIDE 108   CO2 22   ANIONGAP 7   BUN 10   CR 0.76   GLC 95   BIBI 8.8 "     Recent Labs   Lab Test 10/30/19  0749 10/29/19  0631  10/25/19  1359   WBC 1.1* 0.4*   < > 0.5*   HGB 9.7* 7.7*   < > 12.6   PLT 42* 21*   < > 38*   MCV 95 97   < > 96   NEUTROPHIL 68.0  --   --  75.0    < > = values in this interval not displayed.     Recent Labs   Lab Test 10/30/19  0749 10/29/19  0631  10/16/19  1207  10/08/19  1341   BILITOTAL 0.8 0.7   < > 0.9   < > 1.1   ALKPHOS 80 76   < > 116   < > 108   ALT 70* 77*   < > 58*   < > 24   AST 30 34   < > 26   < > 17   ALBUMIN 1.9* 1.6*   < > 3.2*   < > 3.5   LDH  --   --   --  Unsatisfactory specimen - hemolyzed  --  433*    < > = values in this interval not displayed.             Wenceslao Engel, Nurse Practitioner   [facility]     Chart documentation with Dragon Voice recognition Software. Although reviewed after completion, some words and grammatical errors may remain.

## 2019-10-30 NOTE — PLAN OF CARE
Patient A & O x4. NPO @ 0000 pending AM counts, however tolerated low fat diet today. Voiding w/out difficulty. C/O severe throat pain when taking meds, taking meds one at a time. IV Vancomycin and Maxipime. Passing gas, BS+. Given PRN Oxycodone for back, abdomen, and throat pain.

## 2019-10-30 NOTE — PLAN OF CARE
Alert and oriented. Complains of pain in abdomin and back with movement. Complains of throat pain when swallowing. Taking oxycodone for pain. Up with assist of 1 and walker.

## 2019-10-30 NOTE — PROGRESS NOTES
[facility]    Hospitalist Progress Note  Name: Claudia Simon    MRN: [id number]  Provider:  Vipul Elias DO MPH  Date of Service: 10/30/2019    Summary of Stay: 74-year-old female with a history of metastatic lung cancer with known metastases to brain and bone, type 2 diabetes, irritable bowel syndrome, hypertension, and hyperlipidemia who presents here with diarrhea and epistaxis.  Patient is currently undergoing chemotherapy along with radiation.  Patient is followed by Dr. Roman who I believe this with [place] [facility].  In addition to diarrhea, patient does report some abdominal cramping and discomfort.  Stools are described as loose but nonbloody.  In terms of chemotherapy, patient has undergone 1 round approximately 3 weeks ago with carboplatin, pemetrexed, and pembrolizumab and has had 10 cycles of radiation therapy.  She does report some chronic baseline nausea with her chemo.  In addition, she does have epistaxis intermittently every 2 weeks.  She did report one episode of epistaxis.  She reports a temperature 100.8 morning of admission but denies any cough, shortness of breath, sore throat, rashes, or dysuria.       On presentation to the ED, patient was noted to have a low white blood count of 0.3.  Sodium was 129.  Urinalysis was negative but CT of the abdomen did suggest the possibility of acute cholecystitis.  She was given vancomycin and cefepime and admitted for further inpatient IV antibiotic for neutropenic fever and probable cholecystitis.      Problem list/Plan:  #Neutropenic fever: Despite diarrheal complaints which I feel may be related to chemotherapy, CT of the abdomen suggest the possibility of cholecystitis.  Ultrasound did seem to indicate some distention.  HIDA scan performed on 10/28 did show evidence of cholecystitis.  Otherwise denies any shortness of breath, cough, new chest discomfort, dysuria.  C. difficile and enteric panel have come back negative.  -Surgery  consulted, continue antibiotics for now and likely lap shmuel vs cholecystostomy tube tomorrow when counts improved.  -Continue cefepime but stop vancomycin.  Flagyl for anaerobic coverage.  Gram negative or anaerobic infection seems most likely based on presumed cholecystitis as etiology of symptoms/fever.  -Oncology has been consulted, appreciate recs.  Did receive dose of Neupogen on 10/27.   -Neutropenic precautions discontinued.     #Pancytopenia: Patient with pancytopenia on presentation secondary to chemotherapy.  Counts improving.  -We will need to monitor closely for any signs of bleeding.  -Neupogen administered per oncology.  -Received 1 unit PRBCs and 1 unit of platelets.   -No indication for transfusion today.     #Abdominal cramping and diarrhea: Enteric panel negative.  Patient started on probiotics.  Likely secondary to chemotherapy.     #Hypertension: Patient is chronically on lisinopril 40 mg p.o. daily, and Toprol-XL 25 mg p.o. every 24 hours.    -We will hold these medications given neutropenic fever.  Blood pressure is currently controlled.  Restart as able.     #Type 2 diabetes: Chronically on metformin  mg p.o. daily.  Will hold for now while patient has active infection.  Medium intensity sliding scale.  BG well controlled.     #Thrush: Continue nystatin swish and swallow 4 times daily.     #History of GERD: Continue Protonix 40 mg IV every 24 hours.     #History of metastatic lung cancer: Oncology consulted, appreciate recs.     #History of left eye blindness.    DVT Prophylaxis: Pneumatic Compression Devices  Code Status: Full Code  Disposition: Expected discharge in 3+ days to D. Goals prior to discharge include surgery.   Incidental Findings: None.  Family updated today: Yes      Interval History   Assumed care from previous hospitalist. The history was fully reviewed.  The patient reports doing well. No chest pain or shortness of breath. No nausea, vomiting, diarrhea,  constipation. No fevers. No other specific complaints identified.     -Data reviewed today: I personally reviewed all new labs and imaging results over the last 24 hours.     Physical Exam   Temp: 98.2  F (36.8  C) Temp src: Oral BP: 125/68 Pulse: 112 Heart Rate: 105 Resp: 20 SpO2: 93 % O2 Device: None (Room air)    Vitals:    10/28/19 1425   Weight: 65.6 kg (144 lb 11.2 oz)     Vital Signs with Ranges  Temp:  [96.6  F (35.9  C)-99.1  F (37.3  C)] 98.2  F (36.8  C)  Pulse:  [] 112  Heart Rate:  [] 105  Resp:  [20] 20  BP: (110-132)/(52-78) 125/68  SpO2:  [93 %-99 %] 93 %  I/O last 3 completed shifts:  In: 1278 [P.O.:120; I.V.:542]  Out: -     GENERAL: No apparent distress. Awake, alert, and fully oriented.  HEENT: Normocephalic, atraumatic. Extraocular movements intact.  CARDIOVASCULAR: Regular rate and rhythm without murmurs or rubs. No S3.  PULMONARY: Clear bilaterally.  GASTROINTESTINAL: Soft, non-tender, non-distended. Bowel sounds normoactive.   EXTREMITIES: No cyanosis or clubbing. No edema.  NEUROLOGICAL: CN 2-12 grossly intact, no focal neurological deficits.  DERMATOLOGICAL: No rash, ulcer, bruising, nor jaundice.     Medications       ceFEPIme (MAXIPIME) IV  1 g Intravenous Q12H     filgrastim (NEUPOGEN/GRANIX) subcutaneous  480 mcg Subcutaneous Daily at 8 pm     folic acid  1 mg Oral Daily     gabapentin  600 mg Oral At Bedtime     insulin aspart  1-6 Units Subcutaneous Q4H     lactobacillus rhamnosus (GG)  1 capsule Oral BID     lidocaine  2 patch Transdermal Q24h    And     lidocaine   Transdermal Q24H    And     lidocaine   Transdermal Q8H     metroNIDAZOLE  500 mg Oral TID     nystatin  500,000 Units Swish & Swallow 4x Daily     oxymetazoline  2 spray Nasal Once     pantoprazole (PROTONIX) IV  40 mg Intravenous Daily with breakfast     sodium chloride (PF)  3 mL Intracatheter Q8H     Data     Laboratory:  Recent Labs   Lab 10/30/19  0749 10/29/19  0631 10/28/19  0751   WBC 1.1* 0.4* 0.3*    HGB 9.7* 7.7* 8.5*   HCT 29.0* 24.0* 25.5*   MCV 95 97 95   PLT 42* 21* 26*     Recent Labs   Lab 10/30/19  0749 10/29/19  0631 10/28/19  0751    136 136   POTASSIUM 3.0* 3.5 3.7   CHLORIDE 108 109 109   CO2 22 21 21   ANIONGAP 7 6 6   GLC 95 100* 120*   BUN 10 11 11   CR 0.76 0.61 0.46*   GFRESTIMATED 76 88 >90   GFRESTBLACK 89 >90 >90   BIBI 8.8 8.6 8.2*     Recent Labs   Lab 10/27/19  1148 10/27/19  1109   CULT No growth after 3 days No growth after 3 days       Imaging:  No results found for this or any previous visit (from the past 24 hour(s)).      Vipul Elias DO MPH  [facility] Hospitalist  [address] [place] [place] [address]  Pager: [phone]  10/30/2019

## 2019-10-31 NOTE — PROGRESS NOTES
Med Onc       Patient improving from her pancytopenia   Counts still on low side but neutropenia resolved   Surgery planned when counts better     Mucositis better     NO pain   No nausea     O/E temp 99  Otherwise stable VS     Scooter mucositis improving   Heart normal   abd soft mild tender no rebound     Legs neg   Skin normal     wcc 1.8  Hb 8.9  Plts 30       IMP    Lung ca   S/p chemo   Recovering from pancytopenia   Cholecystitis   Continue abs   Should see labs continuing to improve       Bob Callaway

## 2019-10-31 NOTE — PROGRESS NOTES
[facility]    Hospitalist Progress Note  Name: Claudia Simon    MRN: [id number]  Provider:  Vipul Elias DO MPH  Date of Service: 10/31/2019    Summary of Stay: 74-year-old female with a history of metastatic lung cancer with known metastases to brain and bone, type 2 diabetes, irritable bowel syndrome, hypertension, and hyperlipidemia who presents here with diarrhea and epistaxis.  Patient is currently undergoing chemotherapy along with radiation.  Patient is followed by Dr. Roman who I believe this with [place] [facility].  In addition to diarrhea, patient does report some abdominal cramping and discomfort.  Stools are described as loose but nonbloody.  In terms of chemotherapy, patient has undergone 1 round approximately 3 weeks ago with carboplatin, pemetrexed, and pembrolizumab and has had 10 cycles of radiation therapy.  She does report some chronic baseline nausea with her chemo.  In addition, she does have epistaxis intermittently every 2 weeks.  She did report one episode of epistaxis.  She reports a temperature 100.8 morning of admission but denies any cough, shortness of breath, sore throat, rashes, or dysuria.       On presentation to the ED, patient was noted to have a low white blood count of 0.3.  Sodium was 129.  Urinalysis was negative but CT of the abdomen did suggest the possibility of acute cholecystitis.  She was given vancomycin and cefepime and admitted for further inpatient IV antibiotic for neutropenic fever and probable cholecystitis.      Problem list/Plan:  #Neutropenic fever: Despite diarrheal complaints which I feel may be related to chemotherapy, CT of the abdomen suggest the possibility of cholecystitis.  Ultrasound did seem to indicate some distention.  HIDA scan performed on 10/28 did show evidence of cholecystitis.  Otherwise denies any shortness of breath, cough, new chest discomfort, dysuria.  C. difficile and enteric panel have come back negative.  -Surgery  consulted, continue antibiotics for now and likely lap shmuel vs cholecystostomy tube when counts improved (thrombocytopenia still problematic).  -Continue cefepime but stopped vancomycin (10/30).  Flagyl for anaerobic coverage.  Gram negative or anaerobic infection seems most likely based on presumed cholecystitis as etiology of symptoms/fever.  -Oncology has been consulted, appreciate recs.  Did receive dose of Neupogen on 10/27, 10/30.   -Neutropenic precautions discontinued.     #Pancytopenia: Patient with pancytopenia on presentation secondary to chemotherapy.  Counts improving.  -We will need to monitor closely for any signs of bleeding.  -Neupogen administered per oncology.  -Received 1 unit PRBCs and 1 unit of platelets.   -No indication for transfusion today.     #Abdominal cramping and diarrhea: Enteric panel negative.  Patient started on probiotics.  Likely secondary to chemotherapy.     #Hypertension: Patient is chronically on lisinopril 40 mg p.o. daily, and Toprol-XL 25 mg p.o. every 24 hours.    -We will hold these medications given neutropenic fever.  Blood pressure is currently controlled.  Restart as able.     #Type 2 diabetes: Chronically on metformin  mg p.o. daily.  Will hold for now while patient has active infection.  Medium intensity sliding scale.  BG well controlled.     #Thrush: Continue nystatin swish and swallow 4 times daily.     #History of GERD: Continue Protonix 40 mg IV every 24 hours.     #History of metastatic lung cancer: Oncology consulted, appreciate recs.     #History of left eye blindness.    DVT Prophylaxis: Pneumatic Compression Devices  Code Status: Full Code  Disposition: Expected discharge in 3+ days to D. Goals prior to discharge include surgery.   Incidental Findings: None.  Family updated today: Yes daughter at bedside.     Interval History   The patient reports doing well. No chest pain or shortness of breath. No nausea, vomiting, diarrhea, constipation. No  fevers. No other specific complaints identified.     -Data reviewed today: I personally reviewed all new labs and imaging results over the last 24 hours.     Physical Exam   Temp: 99.4  F (37.4  C) Temp src: Axillary BP: 118/52   Heart Rate: 97 Resp: 20 SpO2: 91 % O2 Device: None (Room air)    Vitals:    10/28/19 1425   Weight: 65.6 kg (144 lb 11.2 oz)     Vital Signs with Ranges  Temp:  [99.1  F (37.3  C)-99.8  F (37.7  C)] 99.4  F (37.4  C)  Heart Rate:  [] 97  Resp:  [18-20] 20  BP: (106-121)/(52-71) 118/52  SpO2:  [90 %-94 %] 91 %  I/O last 3 completed shifts:  In: 240 [P.O.:240]  Out: -     GENERAL: No apparent distress. Awake, alert, and fully oriented.  HEENT: Normocephalic, atraumatic. Extraocular movements intact.  CARDIOVASCULAR: Regular rate and rhythm without murmurs or rubs. No S3.  PULMONARY: Clear bilaterally.  GASTROINTESTINAL: Soft, non-tender, non-distended. Bowel sounds normoactive.   EXTREMITIES: No cyanosis or clubbing. No edema.  NEUROLOGICAL: CN 2-12 grossly intact, no focal neurological deficits.  DERMATOLOGICAL: No rash, ulcer, bruising, nor jaundice.     Medications       ceFEPIme (MAXIPIME) IV  1 g Intravenous Q12H     filgrastim (NEUPOGEN/GRANIX) subcutaneous  480 mcg Subcutaneous Daily at 8 pm     folic acid  1 mg Oral Daily     gabapentin  600 mg Oral At Bedtime     insulin aspart  1-6 Units Subcutaneous Q4H     lactobacillus rhamnosus (GG)  1 capsule Oral BID     lidocaine  2 patch Transdermal Q24h    And     lidocaine   Transdermal Q24H    And     lidocaine   Transdermal Q8H     metroNIDAZOLE  500 mg Oral TID     nystatin  500,000 Units Swish & Swallow 4x Daily     oxymetazoline  2 spray Nasal Once     pantoprazole (PROTONIX) IV  40 mg Intravenous Daily with breakfast     sodium chloride (PF)  3 mL Intracatheter Q8H     Data     Laboratory:  Recent Labs   Lab 10/31/19  0636 10/30/19  0749 10/29/19  0631   WBC 1.8* 1.1* 0.4*   HGB 8.9* 9.7* 7.7*   HCT 27.8* 29.0* 24.0*   MCV 97  95 97   PLT 30* 42* 21*     Recent Labs   Lab 10/31/19  0636 10/30/19  1512 10/30/19  0749 10/29/19  0631     --  137 136   POTASSIUM 3.1* 3.5 3.0* 3.5   CHLORIDE 111*  --  108 109   CO2 23  --  22 21   ANIONGAP 6  --  7 6   GLC 94  --  95 100*   BUN 10  --  10 11   CR 0.73  --  0.76 0.61   GFRESTIMATED 81  --  76 88   GFRESTBLACK >90  --  89 >90   BIBI 8.8  --  8.8 8.6     Recent Labs   Lab 10/27/19  1148 10/27/19  1109   CULT No growth after 4 days No growth after 4 days       Imaging:  No results found for this or any previous visit (from the past 24 hour(s)).      Vipul Elias DO MPH  [facility] Hospitalist  [address] [place] [address]  Pager: [phone]  10/31/2019

## 2019-10-31 NOTE — PLAN OF CARE
Low grade temps, vss, platelets 30K (down from yesterday), white count 1.8 with 1.2 retic count. Potassium 3.1 today, replaced with 60meq will recheck level this afternoon. Lots of head and back pain, liquid oxycodone given twice with decrease in pain. Surgery saw and don't want to do surgery with plt ct low so pt allowed to eat. Up with assist of 1-2, voiding and had BM this afternoon. Daughter noted pt more SOB with activity today, coughing a lot since ate breakfast. Not sure if they will do surgery or just a shmuel tube, checking labs again tomorrow.

## 2019-10-31 NOTE — PROGRESS NOTES
"[facility]  General Surgery Progress Note           Assessment and Plan:   Assessment:   C/S for Acute Acalculous Cholecystitis   Metastatic Lung Cancer, now with pancytopenia, platelets down from yesterday  Tmax 99.8      Plan:   -lab values continue to be too low for surgery today, will make a low fat diet available and NPO after midnight for possible surgery vs shmuel tube placement tomorrow.   -IV antibiotics: cefipime for neutropenic fever, flagyl  -Pain control: oxycodone  -GI prophylaxis: protonix  -Oncology following, appreciate input, on neupogen  -Plan for lap shmuel vs cholecystostomy tube when counts recover to be safely done  -VTE prophylaxis: PCDs  -Diet: low fat as tolerated today, continue NPO after midnight tonight         Interval History:   Comfortable in bed. Patient reports minimal pain currently, although recently received pain meds. Tolerated low fat diet, daughter reports patient has some issues with food \"getting caught\" in her throat since her radiation therapy and she also reports a diminished appetite since admission. +flatus, +BM. Denies nausea/vomiting or worsening abdominal pain with PO intake.        Physical Exam:   Blood pressure 118/52, pulse 112, temperature 99.4  F (37.4  C), temperature source Axillary, resp. rate 20, height 1.626 m (5' 4\"), weight 65.6 kg (144 lb 11.2 oz), SpO2 91 %, not currently breastfeeding.    I/O last 3 completed shifts:  In: 240 [P.O.:240]  Out: -     Abdomen:  Soft, non distended, ttp in RUQ although improved from admission, no masses palpated, +BS          Data:     Recent Labs   Lab 10/31/19  0636 10/30/19  0749 10/29/19  0631   WBC 1.8* 1.1* 0.4*   HGB 8.9* 9.7* 7.7*   HCT 27.8* 29.0* 24.0*   MCV 97 95 97   PLT 30* 42* 21*     Recent Labs   Lab 10/31/19  0636 10/30/19  1512 10/30/19  0749 10/29/19  0631     --  137 136   POTASSIUM 3.1* 3.5 3.0* 3.5   CHLORIDE 111*  --  108 109   CO2 23  --  22 21   ANIONGAP 6  --  7 6   GLC 94  --  " 95 100*   BUN 10  --  10 11   CR 0.73  --  0.76 0.61   GFRESTIMATED 81  --  76 88   GFRESTBLACK >90  --  89 >90   BIBI 8.8  --  8.8 8.6   PROTTOTAL 5.0*  --  5.5* 5.0*   ALBUMIN 1.6*  --  1.9* 1.6*   BILITOTAL 0.5  --  0.8 0.7   ALKPHOS 74  --  80 76   AST 20  --  30 34   ALT 50  --  70* 77*     Recent Labs   Lab 10/31/19  0636 10/30/19  0749 10/29/19  0631   AST 20 30 34   ALT 50 70* 77*   ALKPHOS 74 80 76   BILITOTAL 0.5 0.8 0.7       Dena Torres PA-C      Patient is seen and examined by me today.  She seems quite short of breath, and has a fair amount of difficulty just getting back into bed.  Her platelet count is 30 today, down from 42 yesterday.  (That was after transfusion of 1 dose of platelets).  We discussed the option of medical treatment versus cholecystostomy tube versus cholecystectomy.  I explained to the patient and her daughter that my relatively strong preference at this point would be a cholecystostomy tube once her platelet count has risen sufficiently.  If her symptoms resolve completely, consideration could be given to conservative therapy.  The patient and her daughter seem to be accepting of this recommendation.  We will follow the patient with you.  I think would be reasonable to order a cholecystostomy tube placement once the patient's platelet count has risen sufficiently (probably over 50, though that determination will be up to the interventional radiologist).    Ousmane Bray MD  [facility]

## 2019-10-31 NOTE — PLAN OF CARE
Full code.  VSS on room air. Afebrile.  Up with assist of 1, gait belt, and walker. Cannot be left unattended on toilet.  Tolerating a low fat diet. Poor appetite. NPO at midnight.  Surgery following. Possible cholecystectomy v. drainage of gallbladder.   Oriented but confused.  Neupogen given subcutaneous.  K replaced to 3.5  WBC: 1.1  ANC: 0.7  PLT: 42  Oxycodone liquid PRN q4h.  Lidocaine patches to back.  PO Flagyl TID.  Maxipime q12h iv.  BG q4h.

## 2019-11-01 NOTE — PROGRESS NOTES
[facility]    Hematology/Oncology Follow-up Note      Today's Date: 11/01/19  Date of Admission:  10/27/2019  Reason for Consult: Metastatic lung cancer        ASSESSMENT/ PLAN :  Claudia Simon is a 74 year old female with:      Metastatic lung cancer mets to brain and bone  Patient Dr. Roman at the   -10/16-cycle 1 carboplatin, pemetrexed and pembrolizumab  -Hold treatment now until patient sees Dr. Roman  -schedule with Dr. Roman after discharge  -We will intermittently follow over the weekend, please call with any questions     Pancytopenia  -Secondary to chemotherapy  -She was given Neupogen and ANC is now normal at 3.6, can discontinue Neupogen.  -Transfuse for hemoglobin 8 or less or symptomatic  -Transfuse for platelet count 20 or below or in the event of bleeding     Mucositis   -Slowly improving and will likely continue to improve as counts are improving.    Acute cholecystitis  -Per surgery recommendations, may consider cholecystostomy tube when thrombocytopenia has improved       INTERIM HISTORY: Laurie was seen in her room, sleeping in bed.  I spoke to her daughter.  She continues to have some headache and some left eye pain, which is been persistent, but had a recent fall and therefore CT head was obtained this morning.  Daughter has no other new complaints.       MEDICATIONS:  Current Facility-Administered Medications   Medication     acetaminophen (TYLENOL) solution 650 mg     artificial saliva (BIOTENE MT) solution 2 spray     ceFEPIme (MAXIPIME) 1g vial to attach to  ml bag for ADULTS or NS 50 ml bag for PEDS     glucose gel 15-30 g    Or     dextrose 50 % injection 25-50 mL    Or     glucagon injection 1 mg     diclofenac (VOLTAREN) 1 % topical gel 4 g     folic acid (FOLVITE) tablet 1 mg     gabapentin (NEURONTIN) solution 300 mg     gabapentin (NEURONTIN) solution 600 mg     insulin aspart (NovoLOG) inj (RAPID ACTING)     lactobacillus rhamnosus (GG)  (CULTURELL) capsule 1 capsule     Lidocaine (LIDOCARE) 4 % Patch 2 patch    And     lidocaine patch REMOVAL    And     lidocaine patch in PLACE     lidocaine (LMX4) cream     lidocaine (LMX4) cream     lidocaine 1 % 0.1-1 mL     LORazepam (ATIVAN) tablet 0.5 mg     magic mouthwash suspension (diphenhydramine, lidocaine, aluminum-magnesium & simethicone)     melatonin tablet 5 mg     metronidazole (FIRST-METRONIDAZOLE) suspension 500 mg     naloxone (NARCAN) injection 0.1-0.4 mg     neomycin-bacitracin-polymyxin (NEOSPORIN) ointment     nystatin (MYCOSTATIN) suspension 500,000 Units     ondansetron (ZOFRAN-ODT) ODT tab 4 mg    Or     ondansetron (ZOFRAN) injection 4 mg     oxyCODONE (ROXICODONE) solution 10 mg    Or     oxyCODONE (ROXICODONE) tablet 10 mg     oxyCODONE (ROXICODONE) solution 5-10 mg     oxymetazoline (AFRIN) 0.05 % spray 2 spray     [START ON 11/2/2019] pantoprazole (PROTONIX) 2 mg/mL suspension 40 mg     potassium chloride (KLOR-CON) Packet 20-40 mEq     potassium chloride 10 mEq in 100 mL intermittent infusion with 10 mg lidocaine     potassium chloride 10 mEq in 100 mL sterile water intermittent infusion (premix)     potassium chloride 20 mEq in 50 mL intermittent infusion     potassium chloride ER (K-DUR/KLOR-CON M) CR tablet 20-40 mEq     prochlorperazine (COMPAZINE) injection 5 mg    Or     prochlorperazine (COMPAZINE) tablet 5 mg    Or     prochlorperazine (COMPAZINE) Suppository 12.5 mg     sodium chloride (PF) 0.9% PF flush 3 mL     sodium chloride (PF) 0.9% PF flush 3 mL     Facility-Administered Medications Ordered in Other Encounters   Medication     ioversol (OPTIRAY 320) iv solution 68% 100 mL           ALLERGIES:  Allergies   Allergen Reactions     Sulfa Drugs Anaphylaxis     Angioedema           PHYSICAL EXAM:  Vital signs:  Temp: 98  F (36.7  C) Temp src: Oral BP: (!) 145/89(after ambulating to bathroom, will recheck later)   Heart Rate: 105 Resp: 20 SpO2: 96 % O2 Device: Nasal  "cannula Oxygen Delivery: 1 LPM(pt feels short of breath after ambulating to & from bathroom) Height: 162.6 cm (5' 4\") Weight: 66.7 kg (147 lb)  Estimated body mass index is 25.23 kg/m  as calculated from the following:    Height as of this encounter: 1.626 m (5' 4\").    Weight as of this encounter: 66.7 kg (147 lb).    GENERAL:  Female, in no acute distress.   HEENT:  Normocephalic, left eye closed, ptosis.   SKIN: No rash on exposed skin  PSYCH: sleeping      LABS:  CBC RESULTS:   Recent Labs   Lab Test 11/01/19 0820   WBC 4.8   RBC 3.14*   HGB 9.9*   HCT 30.9*   MCV 98   MCH 31.5   MCHC 32.0   RDW 15.1*   PLT 28*       Recent Labs   Lab Test 11/01/19  0820 10/31/19  2001 10/31/19  0636     --  140   POTASSIUM 3.5 3.4 3.1*   CHLORIDE 114*  --  111*   CO2 21  --  23   ANIONGAP 8  --  6   *  --  94   BUN 11  --  10   CR 0.64  --  0.73   BIBI 9.1  --  8.8           María Stevens PA-C  Hematology/Oncology  [facility]      "

## 2019-11-01 NOTE — PROGRESS NOTES
[place]    Pt in pain will give iV dilaudid tonight. Will need to readdress in am as it was discontinued

## 2019-11-01 NOTE — PROGRESS NOTES
[facility]    Hospitalist Progress Note  Name: Claudia Simon    MRN: [id number]  Provider:  Vipul Elias DO MPH  Date of Service: 11/01/2019    Summary of Stay: 74-year-old female with a history of metastatic lung cancer with known metastases to brain and bone, type 2 diabetes, irritable bowel syndrome, hypertension, and hyperlipidemia who presents here with diarrhea and epistaxis.  Patient is currently undergoing chemotherapy along with radiation.  Patient is followed by Dr. Roman who I believe this with [facility].  In addition to diarrhea, patient does report some abdominal cramping and discomfort.  Stools are described as loose but nonbloody.  In terms of chemotherapy, patient has undergone 1 round approximately 3 weeks ago with carboplatin, pemetrexed, and pembrolizumab and has had 10 cycles of radiation therapy.  She does report some chronic baseline nausea with her chemo.  In addition, she does have epistaxis intermittently every 2 weeks.  She did report one episode of epistaxis.  She reports a temperature 100.8 morning of admission but denies any cough, shortness of breath, sore throat, rashes, or dysuria.       On presentation to the ED, patient was noted to have a low white blood count of 0.3.  Sodium was 129.  Urinalysis was negative but CT of the abdomen did suggest the possibility of acute cholecystitis.  She was given vancomycin and cefepime and admitted for further inpatient IV antibiotic for neutropenic fever and probable cholecystitis.      Problem list/Plan:  #Neutropenic fever: Despite diarrheal complaints which I feel may be related to chemotherapy, CT of the abdomen suggest the possibility of cholecystitis.  Ultrasound did seem to indicate some distention.  HIDA scan performed on 10/28 did show evidence of cholecystitis.  Otherwise denies any shortness of breath, cough, new chest discomfort, dysuria.  C. difficile and enteric panel have come back negative.  -Surgery  consulted, continue antibiotics for now and likely lap shmuel vs cholecystostomy tube when counts improved (thrombocytopenia still problematic).  -Continue cefepime but stopped vancomycin (10/30).  Flagyl for anaerobic coverage.  Gram negative or anaerobic infection seems most likely based on presumed cholecystitis as etiology of symptoms/fever.  -Oncology has been consulted, appreciate recs.  Did receive dose of Neupogen on 10/27, 10/30.   -Neutropenic precautions discontinued.     #Pancytopenia: Patient with pancytopenia on presentation secondary to chemotherapy.  Counts improving slowly except platelets.  -We will need to monitor closely for any signs of bleeding.  -Neupogen administered per oncology.  -Received 1 unit PRBCs and 1 unit of platelets.   -No indication for transfusion today.     #Abdominal cramping and diarrhea: Enteric panel negative.  Patient started on probiotics.  Likely secondary to chemotherapy.     #Hypertension: Patient is chronically on lisinopril 40 mg p.o. daily, and Toprol-XL 25 mg p.o. every 24 hours.    -We will hold these medications given neutropenic fever.  Blood pressure is currently controlled.  Restart as able.     #Type 2 diabetes: Chronically on metformin  mg p.o. daily.  Will hold for now while patient has active infection.  Medium intensity sliding scale.  BG well controlled.     #Thrush: Continue nystatin swish and swallow 4 times daily.     #History of GERD: Continue Protonix 40 mg IV every 24 hours.     #History of metastatic lung cancer: Oncology consulted, appreciate recs.     #History of left eye blindness.    #Headache: Intermittent and evidently hit her head on table a few days ago.  -STAT Head CT without evidence of bleed.  -Has widespread CNS metastatic disease, no need for MRI at this time.  -Continue pain management.    DVT Prophylaxis: Pneumatic Compression Devices  Code Status: Full Code  Disposition: Expected discharge in 3+ days to TBD. Goals prior to  discharge include surgery.   Incidental Findings: None.  Family updated today: Yes daughter at bedside.     Interval History   The patient reports doing well. No chest pain or shortness of breath. No nausea, vomiting, diarrhea, constipation. No fevers. Having headache. No other specific complaints identified.     -Data reviewed today: I personally reviewed all new labs and imaging results over the last 24 hours.     Physical Exam   Temp: 97.7  F (36.5  C) Temp src: Oral BP: 123/72   Heart Rate: 97 Resp: 20 SpO2: 95 % O2 Device: None (Room air)    Vitals:    10/28/19 1425 10/31/19 1309   Weight: 65.6 kg (144 lb 11.2 oz) 66.7 kg (147 lb)     Vital Signs with Ranges  Temp:  [97.1  F (36.2  C)-99.1  F (37.3  C)] 97.7  F (36.5  C)  Heart Rate:  [] 97  Resp:  [18-20] 20  BP: (123-142)/(72-88) 123/72  SpO2:  [91 %-95 %] 95 %  I/O last 3 completed shifts:  In: 240 [P.O.:240]  Out: -     GENERAL: No apparent distress. Awake, alert, and fully oriented.  HEENT: Normocephalic, atraumatic. Extraocular movements intact.  CARDIOVASCULAR: Regular rate and rhythm without murmurs or rubs. No S3.  PULMONARY: Clear bilaterally.  GASTROINTESTINAL: Soft, non-tender, non-distended. Bowel sounds normoactive.   EXTREMITIES: No cyanosis or clubbing. No edema.  NEUROLOGICAL: CN 2-12 grossly intact, no focal neurological deficits.  DERMATOLOGICAL: No rash, ulcer, bruising, nor jaundice.     Medications       ceFEPIme (MAXIPIME) IV  1 g Intravenous Q12H     filgrastim (NEUPOGEN/GRANIX) subcutaneous  480 mcg Subcutaneous Daily at 8 pm     folic acid  1 mg Oral Daily     gabapentin  600 mg Oral At Bedtime     insulin aspart  1-6 Units Subcutaneous Q4H     lactobacillus rhamnosus (GG)  1 capsule Oral BID     lidocaine  2 patch Transdermal Q24h    And     lidocaine   Transdermal Q24H    And     lidocaine   Transdermal Q8H     lidocaine visc 2% & maalox/mylanta w/simethicone & diphenhydramine  10 mL Swish & Swallow 4x Daily w/meals      metroNIDAZOLE  500 mg Oral TID     nystatin  500,000 Units Swish & Swallow 4x Daily     oxymetazoline  2 spray Nasal Once     pantoprazole (PROTONIX) IV  40 mg Intravenous Daily with breakfast     sodium chloride (PF)  3 mL Intracatheter Q8H     Data     Laboratory:  Recent Labs   Lab 11/01/19  0820 10/31/19  0636 10/30/19  0749   WBC 4.8 1.8* 1.1*   HGB 9.9* 8.9* 9.7*   HCT 30.9* 27.8* 29.0*   MCV 98 97 95   PLT 28* 30* 42*     Recent Labs   Lab 11/01/19  0820 10/31/19  2001 10/31/19  0636  10/30/19  0749     --  140  --  137   POTASSIUM 3.5 3.4 3.1*   < > 3.0*   CHLORIDE 114*  --  111*  --  108   CO2 21  --  23  --  22   ANIONGAP 8  --  6  --  7   *  --  94  --  95   BUN 11  --  10  --  10   CR 0.64  --  0.73  --  0.76   GFRESTIMATED 87  --  81  --  76   GFRESTBLACK >90  --  >90  --  89   BIBI 9.1  --  8.8  --  8.8    < > = values in this interval not displayed.     Recent Labs   Lab 10/27/19  1148 10/27/19  1109   CULT No growth after 5 days No growth after 5 days       Imaging:  Recent Results (from the past 24 hour(s))   CT Head w/o Contrast    Narrative    CT SCAN OF THE HEAD WITHOUT CONTRAST   11/1/2019 10:07 AM     HISTORY:  Headache, acute, severe, worst HA of life.    TECHNIQUE:  Axial images of the head and coronal reformations without  IV contrast material. Radiation dose for this scan was reduced using  automated exposure control, adjustment of the mA and/or kV according  to patient size, or iterative reconstruction technique.    COMPARISON: CTA head and neck 9/18/2019, MRI head 9/17/2019.    FINDINGS:  Mild volume loss is present. Patchy white matter  hypoattenuation likely represents chronic small vessel ischemic  change. Dural masses along the left frontal lobe are present. There is  new vasogenic edema within the left frontal lobe adjacent to the dural  masses. No evidence of acute ischemia or hemorrhage. Abnormal lucency  and demineralization within the left sphenotemporal buttress  with  associated mass is unchanged. Mild calvarial heterogeneity is  unchanged consistent with known osseous metastatic disease. Paranasal  sinuses are well aerated. Tympanic and mastoid cavities are well  aerated.      Impression    IMPRESSION:   1. New nonspecific hypoattenuation along the posterior margin of left  frontal lobe dural metastasis. This is favored to represent change in  dural tumor morphology or ex vacuo enlargement of CSF space. No other  appreciable change by CT. Consider repeat MRI.    2. Hypoattenuation involving the medial left postcentral gyrus  consistent with expected evolution of subacute infarct demonstrated on  prior MRI.    3. No appreciable change of extensive dural metastatic disease and  mass within the left sphenotemporal buttress extending into the orbit.  Consider repeat MRI.         Vipul Elias DO MPH  [facility] Hospitalist  [address] [place] [address]  Pager: [phone]  11/01/2019

## 2019-11-01 NOTE — PROVIDER NOTIFICATION
MD paged: Patient would like something for pain before going down to CT. CT wants her now but no pain meds available until 1020. Can she have something else for pain?

## 2019-11-01 NOTE — PLAN OF CARE
Full code.  VSS on room air. Afebrile.  Protective isolation.  Tolerating a low fat diet + boost breeze.  Up with assist of 2 and walker. Cannot be left alone on toilet.  K replaced to 3.4  Surgery following. Possible drain of gallbladder.  B, 126.  Hem/Onc following.  Flagyl PO TID.  Maxipime q12h iv.  PRN Oxy liquid.  WBC: 1.8, ANC: 1.2, PLT: 30

## 2019-11-01 NOTE — PROGRESS NOTES
"[facility]  General Surgery Progress Note           Assessment and Plan:   Assessment:   New severe HA  Cholecystitis  Thrombocytopenia (platelets LOWER today)      Plan:   -CT head ongoing  consider chlocystostomy tube when platelets acceptable, possible repeat HIDA before procedure (cholecystitis may have resolved by the time platelets improve)  Discussed with Dr Elias         Interval History:            Physical Exam:   Blood pressure 123/72, pulse 112, temperature 97.7  F (36.5  C), temperature source Oral, resp. rate 20, height 1.626 m (5' 4\"), weight 66.7 kg (147 lb), SpO2 95 %, not currently breastfeeding.    I/O last 3 completed shifts:  In: 240 [P.O.:240]  Out: -     Not available for exam - at CT          Data:     Recent Labs   Lab 11/01/19  0820 10/31/19  0636 10/30/19  0749   WBC 4.8 1.8* 1.1*   HGB 9.9* 8.9* 9.7*   HCT 30.9* 27.8* 29.0*   MCV 98 97 95   PLT 28* 30* 42*         Krystian Self MD     "

## 2019-11-01 NOTE — PLAN OF CARE
Pt remain hospitalized due to neutropenic fever. Pt alert and oriented x4. Pt complaining of generalized pain 10/10, had prn Oxycodone solution 2x overnight and did  helped the pain. Regular mouth care done. Pt having productive cough. Pt repositioned overnight. Incontinent of bladder 1 x. Plan  NPO at [place] for possible Lap shmuel if counts improve.

## 2019-11-01 NOTE — PLAN OF CARE
Ambulatory Status:  Pt up with assist of 1-2 and walker. Weak.  VS:  VSS, requiring 1L o2 to keep sats >90%  Pain:  Uncontrolled, consistently 10/10. Oxycodone and IV dilaudid x2.   Resp: LS coarse/wheezes. SOB+Very VELAZQUEZ  GI:  Denies nausea.  Poor appetite and on regular diet. Mouth sores make eating/swallowing difficult. Asked pharm to switch PO meds to liquid. BS active.  Passing flatus.  Last BM 10/31.  :  Continent this shift, voiding adequately  Skin:  scabs, bruises, pale, eric legs  Tx:  IV cefipime, flagyl  Labs:  plt 28, hgb 9.9. wbc/anc now WNL  Consults:  new pain team consult - paged team to see if they can see pt before weekend  Disposition:  TBD.

## 2019-11-01 NOTE — CONSULTS
[facility]  Pain Service Consultation   Text Page    Date of Admission:  10/27/2019    Assessment & Plan   Claudia Simon is a 74 year old female who was admitted on 10/27/2019. I was asked by Dr. Elias to see the patient for acute on chronic oncologic related pain.    1)  Acute on chronic metastatic lung cancer related  Pain of the thoracic spine.  Acute mucositis pain secondary to recent chemo/radiation.  Acute RUQ abdominal pain secondary to cholecystitis.    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  Baseline 60 mg Daily Morphine Equivalent as dispensed and as reported daily use.  (10 mg oxycodone every 4 hours.)  Patient has an expected opioid tolerance.     Patient's opioid use in past 24 hours: 60 mg oxycodone PO = 90 mg Daily Morphine Equivalent    3)  Risk factors for opioid related harms  -Concurrent benzodiazepine use  -High opioid dose (>50 MME/day)    4)  Opioid induced side-effects:  -Nausea/Vomit - chronic chemo associated nausea, may have additional side effect of nausea from opioids and decreased PO intake  -Sedation - lethargic at times, active leptomeningeal disease and left orbital/frontal metastatic lesion, high risk for multifactorial neurological status changes.    5)  Other/Related:    - Deconditioning - baseline functional decline secondary to chemo induced symptom burden and fragility.  - Concern for malnutrition - secondary to mucositis and esophagitis due to recent chemo/radiation.      PLAN:   1)  Pending hospital course, consider Palliative care consult on Monday for goals of care and advanced care planning.  2)  Biotene for mucositis/dry mouth.    3) neosporin/lidocaine cream to left hip/buttock area of radiation induced irritation.  4)Non-opioid multimodal medication therapy  -Topical: Voltaren 1% Topical Gel four times daily to thoracic sine and occipital regions,  Lidocaine Patch 4% thoracic spine.  -N-SAIDS: Avoid due to pancytopenia.  -Muscle Relaxants: None indicated  -Adjuvants: Acetaminophen 650 mg, Gabapentin 300 mg at noon, 600 mg at bedtime.   -Antidepresants/anxiolytics: Lorazepam 0.5 mg q 6 hours prn for nausea/anxiety  5)  Non-medication interventions  Positioning, Heating pad PRN, ICE, Relaxation, Distraction with family, visiting, Essential oils per nursing  6)  Opioids:  - oxycodone 10 mg q 3 hours prn  - dilaudid 0.5 mg q 2 hours prn breakthrough pain  Opioids Treatment Goal:   -Improvement in function  -Participate in PT  7)  Constipation Prophylaxis  Not indicated due to severe diarrhea - continue to monitor and add PRN regimen if diarrhea ceases.  8)  Pain Education  -Opioid safe use, storage and disposal information included in DC AVS  9)  DC Planning   Discussed goal of Opioid therapy as above with patient's nurse, and family.  No indication for opioid taper at this time.  May continue to require up titration of multimodal pain plan due to metastatic disease and active chemo/radiation symptom management.  Disposition: pending.  Support systems: family -  and daughter  Outpatient Referrals: Palliative care as outpatient (per previous oncology referral)    Time Spent on this Encounter   Total unit/floor time 60 minutes, time consisted of the following, examination of the patient, reviewing the record and completing documentation. >50% of time spent in counseling and coordination of care.  Time spend counseling with patient and family consisted of the following topics, symptom management.  Time spent in coordination of care with Bedside Nurse , and Hospitalist Dr. Vipul Elias.     Suly DOWD, CNP  Pain Management and Palliative Care  [facility]  Pgr: [phone]      Reason for Consult   Reason for consult: I was asked by Dr. Elias to evaluate this patient for acute pain management.    Primary Care Physician   Primary Care  Physician:Sekou Craig  Pain Specialist: n/a    Chief Complaint   Generalized pain, diarrhea, nausea, epistaxis and fever    History is obtained from the patient, electronic health record and patient's family    History of Present Illness   Claudia Simon is a 74 year old female with history of NSCLC with metastatic disease to brain and thoracic spine who presents with neutropenic fever, diarrhea, epistaxis and nausea.  Per family report, patient had been working to control post chemo/radiation symptoms including pain, nausea, mucositis, diarrhea and fatigue.  Patient's daughter reported that she got a nose bleed that would not stop and thus they presented to the ED for care.  On arrival patient was stabilized and labs were noted to find pancytopenia and hyponatremia of 129.  She had mild fever on day of presentation as well.  She was admitted for medical stabilization and fever work up.  Diarrhea prompted abdominal work up which revealed cholecystitis.  Surgery consulted but due to pancytopenia, patient is not a candidate for surgical or interventional radiology interventions.    Patient has chronic pain due to metastatic lesions of the thoracic spine and brain with bony invasion.  She is managed as an outpatient by oncology and takes 50-60 mg oxycodone daily without side effects.  She states her pain at times is generalized, but mostly is intense in the thoracic spine, worse with movement, better with rest, heat or ice, she has had relief with gabapentin and lidocaine patches as well prior to admission.  She states she currently has mucositis pain and is hopeful nystatin and magic mouthwash helps.      CURRENT PAIN:  Her pain is located in the thoracic spine.  It is described as Aching, Burning and Stabbing  She rates it as ranging between 5/10 and 10/10  The average is 7/10 on a scale of 0-10  Currently it is rated as 5/10  It improves by pain medications, rest  It worsens by movement, missing doses  of medications.  She has been compliant with the recommendations while in the hospital.    PAST PAIN TREATMENT:   Medications:oxycodone, gabapentin, lidocaine patch  Non-phamacologic modalities: relaxation  Previous interventions/surgeries:none    [place]: 10/16 oxycodone 5 mg tabs #100.  No concern for diversion or misuse of opiates.    Past Medical History   I have reviewed this patient's medical history and updated it with pertinent information if needed.   Past Medical History:   Diagnosis Date     Lung cancer (H) 09/23/2019       Past Surgical History   I have reviewed this patient's surgical history and updated it with pertinent information if needed.  Past Surgical History:   Procedure Laterality Date     C NONSPECIFIC PROCEDURE      back surgery x 3     C NONSPECIFIC PROCEDURE      ruptured discs x 2-1 yr apart     C NONSPECIFIC PROCEDURE      titanium cage     IR CHEST TUBE PLACEMENT NON-TUNNELLED LEFT  9/23/2019     IR FOLLOW UP VISIT INPATIENT  9/24/2019         Prior to Admission Medications   Prior to Admission Medications   Prescriptions Last Dose Informant Patient Reported? Taking?   LORazepam (ATIVAN) 0.5 MG tablet 10/26/2019  No Yes   Sig: Take 1 tablet (0.5 mg) by mouth every 6 hours as needed (Nausea/Vomiting)   blood glucose (CONTOUR NEXT TEST) test strip   No No   Sig: Use to test blood sugar 2 times daily or as directed.   cholecalciferol (VITAMIN D3) 5000 units (125 mcg) capsule 10/26/2019 at AM  Yes Yes   Sig: Take 5,000 Units by mouth daily   dexamethasone (DECADRON) 2 MG tablet 10/26/2019 at complete  No Yes   Sig: Take 4 mg with supper on 10/17, 4 mg twice daily with meals on 10/18, 4 mg with breakfast on 10/19, 2 mg with breakfast x 3 days, 1 mg with breakfast x 3 days, then off   dexamethasone (DECADRON) 4 MG tablet  at complete  No No   Sig: Take 1 tablet (4 mg) by mouth 2 times daily (with meals) Start one day prior to Pemetrexed (Alimta), continue on the day of  treatment, and the day following Pemetrexed.   folic acid (FOLVITE) 1 MG tablet 10/26/2019 at AM  No Yes   Sig: Take 1 tablet (1 mg) by mouth daily   folic acid (FOLVITE) 1 MG tablet  at ongoing  No No   Sig: Take 1 tablet (1,000 mcg) by mouth daily Begin 7 days before Pemetrexed (Alimta) starts and continue for 21 days after Pemetrexed is discontinued.   gabapentin (NEURONTIN) 300 MG capsule 10/26/2019 at 1200  No Yes   Sig: Take 1 capsule (300 mg) by mouth daily At Noon.   gabapentin (NEURONTIN) 300 MG capsule 10/26/2019 at PM  Yes Yes   Sig: Take 600 mg by mouth At Bedtime   lidocaine (LIDODERM) 5 % patch 10/27/2019 at removed at 0800  No Yes   Sig: Place 2 patches onto the skin every 24 hours To prevent lidocaine toxicity, patient should be patch free for 12 hrs daily.   lisinopril (PRINIVIL/ZESTRIL) 40 MG tablet 10/26/2019 at AM  No Yes   Sig: Take 1 tablet (40 mg) by mouth daily   melatonin 5 MG tablet 10/26/2019 at PM Spouse/Significant Other Yes Yes   Sig: Take 5 mg by mouth nightly as needed for sleep   metFORMIN (GLUCOPHAGE-XR) 500 MG 24 hr tablet Unknown Spouse/Significant Other Yes No   Sig: Take 500 mg by mouth daily (with dinner) Only take if blood sugar is above 140.   metoprolol succinate ER (TOPROL-XL) 25 MG 24 hr tablet 10/26/2019 at 2100  No Yes   Sig: TAKE ONE TABLET BY MOUTH ONE TIME DAILY   nystatin (MYCOSTATIN) 893364 UNIT/ML suspension 10/26/2019  No Yes   Sig: Swish and swallow 5 mL four times daily   omeprazole 20 MG tablet 10/26/2019 at PM  No Yes   Sig: Take 1 tablet (20 mg) by mouth daily   ondansetron (ZOFRAN) 8 MG tablet 10/27/2019 at 0200  No Yes   Sig: Take 1 tablet (8 mg) by mouth every 8 hours as needed for nausea (vomiting)   ondansetron (ZOFRAN-ODT) 8 MG ODT tab Unknown  No No   Sig: Take 1 tablet (8 mg) by mouth every 8 hours as needed for nausea   oxyCODONE (ROXICODONE) 5 MG tablet 10/26/2019  No Yes   Sig: Take 2 tablets (10 mg) by mouth every 4 hours as needed for moderate to  severe pain   polyethylene glycol (MIRALAX/GLYCOLAX) packet 10/26/2019 at AM  No Yes   Sig: Take 17 g by mouth daily   prochlorperazine (COMPAZINE) 10 MG tablet Unknown  No No   Sig: Take 0.5 tablets (5 mg) by mouth every 6 hours as needed (Nausea/Vomiting)   senna (SENOKOT) 8.6 MG tablet 10/26/2019  Yes Yes   Sig: Take 1-2 tablets by mouth 2 times daily   sucralfate (CARAFATE) 1 GM/10ML suspension 10/26/2019 at PM  No Yes   Sig: Take 10 mLs (1 g) by mouth 4 times daily      Facility-Administered Medications: None     Allergies   Allergies   Allergen Reactions     Sulfa Drugs Anaphylaxis     Angioedema         Social History   I have reviewed this patient's social history and updated it with pertinent information if needed. Claudia Simon  reports that she quit smoking about 10 years ago. She quit after 34.00 years of use. She has never used smokeless tobacco. She reports that she does not drink alcohol or use drugs.    Family History   I have reviewed this patient's family history and updated it with pertinent information if needed.   Family History   Problem Relation Age of Onset     Diabetes Mother      Alzheimer Disease Father      Cerebrovascular Disease Sister      Cerebrovascular Disease Sister      Family history of addiction no    Review of Systems   Mucositis/esophagitis pain.  Generalized muscle aches  Nausea  Diarrhea  Fatigue  Headache  RUQ pain    Physical Exam   Temp:  [97.1  F (36.2  C)-97.7  F (36.5  C)] 97.7  F (36.5  C)  Heart Rate:  [97] 97  Resp:  [18-30] 30  BP: (123-142)/(72-78) 123/72  SpO2:  [83 %-95 %] 93 %  147 lbs 0 oz  GEN:  Alert, oriented x 3, appears uncomfortable, facial grimace, No apparent distress.  HEENT:  Normocephalic/atraumatic, no scleral icterus, no nasal discharge, mouth moist.  CV:  RRR, S1, S2; no murmurs or other irregularities noted.  +3 DP/PT pulses bilaterally; no edema bilateral lower extremeties.  RESP:  Clear to auscultation bilaterally without  rales/rhonchi/wheezing/retractions.  Symmetric chest rise on inhalation noted.  Normal respiratory effort.  ABD:  Rounded, soft, non-tender/non-distended.  +BS  EXT:  Edema & pulses as noted above.  Color, moisture and sensation intact x 4.     M/S:   Tender to palpation over thoracic spine.    SKIN:  Dry to touch, no exanthems noted in the visualized areas.    NEURO: Symmetric strength +4/5.  Sensation to touch intact all extremities.   There is no area of allodynia or hyperesthesia.  PAIN BEHAVIOR: Cooperative  Psych:  Normal affect.  Calm, cooperative, conversant appropriately.       Data   Most Recent 3 CBC's:  Recent Labs   Lab Test 11/01/19  0820 10/31/19  0636 10/30/19  0749   WBC 4.8 1.8* 1.1*   HGB 9.9* 8.9* 9.7*   MCV 98 97 95   PLT 28* 30* 42*     Most Recent 3 BMP's:  Recent Labs   Lab Test 11/01/19  0820 10/31/19  2001 10/31/19  0636  10/30/19  0749     --  140  --  137   POTASSIUM 3.5 3.4 3.1*   < > 3.0*   CHLORIDE 114*  --  111*  --  108   CO2 21  --  23  --  22   BUN 11  --  10  --  10   CR 0.64  --  0.73  --  0.76   ANIONGAP 8  --  6  --  7   BIBI 9.1  --  8.8  --  8.8   *  --  94  --  95    < > = values in this interval not displayed.     Most Recent 2 LFT's:  Recent Labs   Lab Test 10/31/19  0636 10/30/19  0749   AST 20 30   ALT 50 70*   ALKPHOS 74 80   BILITOTAL 0.5 0.8     Most Recent 3 INR's:  Recent Labs   Lab Test 09/17/19  1256   INR 0.99

## 2019-11-01 NOTE — PROVIDER NOTIFICATION
md paged Patient/daughter still requesting pain medication more frequently than available. Next dose of q4h prn oxycodone not available until 1515. No orders for anything for breakthrough.

## 2019-11-02 NOTE — PROGRESS NOTES
"CLINICAL NUTRITION SERVICES - REASSESSMENT NOTE    Recommendations Ordered by Registered Dietitian (RD):   Diet per SLP  Ordered Magic Cup (chocolate and wild berry) between meals   Continue Boost Breeze PRN per pt's daughters request    Malnutrition:   % Weight Loss:  Up to 5% in 1 month (non-severe malnutrition)  % Intake:  </= 50% for >/= 5 days (severe malnutrition)   Subcutaneous Fat Loss:  Orbital region mild/moderate depletion - will not use given only one indicator   Muscle Loss:  Acromion bone region moderate depletion, Anterior thigh region moderate depletion and Posterior calf region moderate depletion, clavicle region mild/moderate depletion   Fluid Retention: Trace- Mild     Malnutrition Diagnosis: Severe malnutrition  In Context of:  Acute illness or injury  Chronic illness or disease     EVALUATION OF PROGRESS TOWARD GOALS   Diet: low fat diet, Boost Breeze with meals     Intake/Tolerance: Per the flowsheets, pt consuming 0-100% of meals ordered. Meals at 100% include mainly just popsicles. Per the meal ordering system, pt ordering very small meals infrequently. Daughter assisting with ordering food. Per SLP, family educated on ordering pureed foods.     ASSESSED NUTRITION NEEDS:  Dosing Weight 65.5 kg (based on an actual weight)  Estimated Energy Needs: 4464-4609+ kcals (25-30+ Kcal/Kg)  Justification: maintenance  Estimated Protein Needs: 66-79+ grams protein (1-1.2+ g pro/Kg)  Justification: maintenance  Estimated Fluid Needs: >1 mL/Kcal  Justification: maintenance    NEW FINDINGS:   - Surgery consulted and following   - SLP following per note on 11/2: \"Recommend continuing with low-fat Regular diet although family will choose pureed items for now. Spoke with dietician who will order Magic Cup as well. Swallow precautions include: upright for all po intake; only feed when fully alert; small bites/sips; straws ok.\"   - Weight: weight has remained stable throughout this admission.  Vitals:    " 10/28/19 1425 10/31/19 1309   Weight: 65.6 kg (144 lb 11.2 oz) 66.7 kg (147 lb)     Wt Readings from Last 10 Encounters:   10/31/19 66.7 kg (147 lb)   10/25/19 63.5 kg (140 lb)   10/22/19 63.9 kg (140 lb 14.4 oz)   10/16/19 64.9 kg (143 lb)   10/14/19 64.4 kg (142 lb)   10/08/19 65.3 kg (143 lb 14.4 oz)   10/07/19 66.3 kg (146 lb 1.6 oz)   09/30/19 65.5 kg (144 lb 4.8 oz)   09/17/19 68.5 kg (151 lb 1.6 oz)   07/27/18 73 kg (161 lb)     Labs:  Recent Labs   Lab Test 11/02/19  0748 11/01/19  0820 10/31/19  2001 10/31/19  0636 10/30/19  1512   POTASSIUM 3.6 3.5 3.4 3.1* 3.5     Recent Labs   Lab Test 09/24/19  0730 09/23/19  0636 09/22/19  0712 09/21/19  0755 09/20/19  0540   PHOS 4.4 3.7 3.4 3.4 3.9     Recent Labs   Lab Test 09/24/19  0730 09/23/19  0636 09/22/19  0712 09/21/19  0755 09/20/19  0540   MAG 2.3 2.2 2.2 2.4* 2.2     Recent Labs   Lab Test 11/02/19  0748 11/01/19  0820 10/31/19  0636 10/30/19  0749 10/29/19  0631    143 140 137 136     Recent Labs   Lab Test 11/02/19  0748 11/01/19  0820 10/31/19  0636 10/30/19  0749 10/29/19  0631   CR 0.69 0.64 0.73 0.76 0.61     Recent Labs   Lab 11/02/19  0748 11/01/19  0820 10/31/19  0636 10/30/19  0749 10/29/19  0631 10/28/19  0751 10/27/19  1109   * 114* 94 95 100* 120* 163*     Lab Results   Component Value Date    A1C 6.1 09/18/2019    A1C 6.4 07/27/2018    A1C 6.5 06/26/2017    A1C 7.4 06/23/2015     Triglycerides   Date Value Ref Range Status   07/27/2018 162 (H) <150 mg/dL Final     Comment:     Borderline high:  150-199 mg/dl  High:             200-499 mg/dl  Very high:       >499 mg/dl  Fasting specimen     06/26/2017 234 (H) <150 mg/dL Final     Comment:     Borderline high:  150-199 mg/dl   High:             200-499 mg/dl   Very high:       >499 mg/dl   Fasting specimen     08/03/2016 360 (H) <150 mg/dL Final     Comment:     Borderline high:  150-199 mg/dl   High:             200-499 mg/dl   Very high:       >499 mg/dl   Fasting specimen         Medications:    Folvite 1 mg, daily   Reviewed insulin regimen     Previous Goals:   Pt to consume % of nutritional needs + 2 high protein supplements per day.   Evaluation: Not met    Previous Nutrition Diagnosis:   Inadequate oral intake related to recent trouble swallowing due to burning sensation and dry mouth/throat area as evidenced by pt likely consuming <75% of nutritional needs prior to admission for >7 days and noted muscle/weight loss.   Evaluation: No change    MALNUTRITION  % Weight Loss:  Up to 5% in 1 month (non-severe malnutrition)  % Intake:  </= 50% for >/= 5 days (severe malnutrition)  Subcutaneous Fat Loss:  Orbital region mild/moderate depletion - will not use given only one indicator   Muscle Loss:  Acromion bone region moderate depletion, Anterior thigh region moderate depletion and Posterior calf region moderate depletion, clavicle region mild/moderate depletion   Fluid Retention: Trace- Mild     Malnutrition Diagnosis: Severe malnutrition  In Context of:  Acute illness or injury  Chronic illness or disease    CURRENT NUTRITION DIAGNOSIS  Inadequate oral intake related to recent trouble swallowing due to burning sensation, dry mouth/throat area, and mouth sores as evidenced by pt likely consuming <50% of nutritional needs for >/=5 days and noted muscle/weight loss.     INTERVENTIONS  Recommendations / Nutrition Prescription  Diet per SLP  Ordered Magic Cup (chocolate and wild berry) between meals   Continue Boost Breeze PRN per pt's daughters request     Implementation  Medical Food Supplement: as above   Collaboration and Referral of Nutrition care: discussed pt with SLP     Goals  Pt to consume >50% of meals ordered TID + 2-3 oral nutritional supplements per day     MONITORING AND EVALUATION:  Progress towards goals will be monitored and evaluated per protocol and Practice Guidelines    Monica Adames RD, LD   Clinical Dietitian

## 2019-11-02 NOTE — PROGRESS NOTES
General Surgery Chart Note  Spoke with hospitalist. Will possibly be going palliative route.   No surgery planned at this time because of low platelets.  Will follow peripherally. Please contact us if we can be of further assistance.    Harlan Raymond PA-C    Agree,    Ousmane Bray MD  [facility]

## 2019-11-02 NOTE — PROGRESS NOTES
Clinical Swallow Evaluation  [facility]  11/02/19 1526   General Information   Onset Date 10/27/19   Start of Care Date 11/02/19   Referring Physician Dr. Elias   Patient Profile Review/OT: Additional Occupational Profile Info See Profile for full history and prior level of function   Patient/Family Goals Statement Be able to eat more.   Swallowing Evaluation Bedside swallow evaluation   Behaviorial Observations WFL (within functional limits)   Mode of current nutrition Oral diet   Type of oral diet Regular;Thin liquid  (Low-Fat; family choose pureed items)   Respiratory Status O2 Supply   Type of O2 supply Nasal cannula  (mask when sleeping)   Comments Patient admitted with diarrhea and epistaxis in the setting of lung CA with mets to brain and bone. Doing chemo and radiation. Nursing reported a choking episode on liquid medications this morning. Family reports she has not been eating solids for the past week. Thrush and mouth sores from chemo are giving her pain when swallowing.  Past Medical History:   Diagnosis Date     Lung cancer (H) 09/23/2019      Clinical Swallow Evaluation   Oral Musculature generally intact   Structural Abnormalities none present   Dentition present and adequate   Mucosal Quality adequate   Mandibular Strength and Mobility intact   Oral Labial Strength and Mobility WFL   Lingual Strength and Mobility WFL   Buccal Strength and Mobility intact   Laryngeal Function Cough;Throat clear;Swallow;Voicing initiated   Clinical Swallow Eval: Thin Liquid Texture Trial   Mode of Presentation, Thin Liquids spoon;cup;fed by clinician;self-fed;straw   Volume of Liquid or Food Presented ~ 2 ounces of water   Oral Phase of Swallow WFL   Pharyngeal Phase of Swallow intact   Diagnostic Statement No overt s/sx of aspiration   Clinical Swallow Eval: Puree Solid Texture Trial   Mode of Presentation, Puree spoon;fed by clinician   Volume of Puree Presented ~3 ounces of pudding   Oral Phase, Puree Poor AP movement    Pharyngeal Phase, Puree intact   Diagnostic Statement Significant delay; no c/o sticking; no oral residue   Swallow Compensations   Swallow Compensations Alternate viscosity of consistencies;Pacing;Reduce amounts   Results Oral difficulties only   General Therapy Interventions   Planned Therapy Interventions Dysphagia Treatment   Dysphagia treatment Modified diet education;Instruction of safe swallow strategies   Intervention Comments diet tolerance; strategy education   Swallow Eval: Clinical Impressions   Skilled Criteria for Therapy Intervention Skilled criteria met.  Treatment indicated.   Functional Assessment Scale (FAS) 3   Dysphagia Outcome Severity Scale (BOZENA) Level 3 - BOZENA   Treatment Diagnosis dyspaghia   Diet texture recommendations Regular diet;Thin liquids  (self select pureed items for efficiency)   Recommended Feeding/Eating Techniques alternate between small bites and sips of food/liquid;maintain upright posture during/after eating for 30 mins;small sips/bites   Therapy Frequency 5x/week   Predicted Duration of Therapy Intervention (days/wks) One week   Anticipated Discharge Disposition home   Risks and Benefits of Treatment have been explained. Yes   Patient, family and/or staff in agreement with Plan of Care Yes   Clinical Impression Comments Patient presents with a moderate oral-pharyngeal dysphagia characterized by slow oral phase, pain with swallowing due to thrush and oral sores, and observed choking episode by RN. Oral motor exam was unremarkable, however, excessive chewing with pureed and family states she has not been able to eat solids for the past week. Patient has pain with eating and swallowing due to mouth sores. Good oral clearing. No overt s/sx of aspiration during evaluation. Kept patient on a Regular Low Fat diet with family to order more pureed items for swallowing efficiency. This gives family the ability to order other items if she is desiring advanced textures.   Total  Evaluation Time   Total Evaluation Time (Minutes) 32   Tesfaye Herring MS CCC-SLP

## 2019-11-02 NOTE — PLAN OF CARE
"Ambulatory Status:  Pt up A-1 with Gb and walker..  Pain:  Complains of generalized pain, as well as head, back and ankle pain.  Resp: LS coarse. Has Good productive cough.  Pt on oxymask 2L while resting. Dipped down to 88% RA when resting.  GI:  Denies nausea.  Poor appetite and on Low fat diet.  BS active.  Passing flatus.   :  WDL.  Tx:  Pain control.  Labs:  Platelets: 28 Hgb: 9.9 WBC: 4.8 ANC: 3.6  Consults:  Pain/Hem/Onc  Disposition:  TBD.  Vital signs:  Temp: 96.5  F (35.8  C) Temp src: Oral BP: (!) 141/77   Heart Rate: 100 Resp: 22 SpO2: 96 % O2 Device: Nasal cannula Oxygen Delivery: 1 LPM Height: 162.6 cm (5' 4\") Weight: 66.7 kg (147 lb)  Estimated body mass index is 25.23 kg/m  as calculated from the following:    Height as of this encounter: 1.626 m (5' 4\").    Weight as of this encounter: 66.7 kg (147 lb).         "

## 2019-11-02 NOTE — PLAN OF CARE
Discharge Planner SLP   Patient plan for discharge: did not discuss  Current status: Orders received for swallow evaluation after patient had a choking episode on her liquid medication this morning. Chart reviewed, spoke with RN. Family present during swallow evaluation and helped provide information. Patient c/o some pain with swallowing. Oral motor exam was unremarkable, however, very slow oral phase with pureed. Tended to chew for significant amount of time when not necessary with that texture. Good oral clearing.  No overt s/sx of aspiration on thin liquids or pureed textures. Thin given via teaspoon, cup, and straw. Using the straw was easiest for patient due to overall weakness and difficulty moving cup to her mouth to drink.     Recommend continuing with low-fat Regular diet although family will choose pureed items for now. Spoke with dietician who will order Magic Cup as well. Swallow precautions include: upright for all po intake; only feed when fully alert; small bites/sips; straws ok.  Barriers to return to prior living situation: no speech barriers  Recommendations for discharge: home with family  Rationale for recommendations: continued speech as patient is below baseline for swallowing. Follow for diet tolerance and strategy education.       Entered by: Tesfaye Michaels 11/02/2019 3:11 PM

## 2019-11-02 NOTE — PLAN OF CARE
Ambulatory Status:  Pt remained bedrest this shift, refusing repositioning or attempt to toilet.  VS:  Tachy to 120, BP stable, afebrile. Requiring 3L o2  Pain:  Intollerable when awake, but pt very drowsy. Pt had one dose oxycodone this AM. After PM dose had significant aspiration/choking and spit oxycodone up. Pt given dilaudid after this.  Resp: LS coarse. Labored breathing, SOB, tachypneic, and very dyspneic with even minimal exertion such as talking or taking medications  GI:  Denies nausea.  Minimal appetite and was on low fat diet but Mouth sores make it difficult to swallow. Now NPO pending speech eval. BS active.  Passing flatus.  Last BM yesterday.  :  Pt has not voided this shift. Bladder scan 354. Pure wick in place with 0 out.  Skin:  Pt was not able to reposition for RN to assess all of skin. Skin visible is pale, legs eric slightly mottled, but warm. Scab to L ear.  Tx:  IV cefipime, PO flagyl. Pain control.  Labs:  Plt 30, hgb 10.0, WBC 8.5  Consults:  Pain team,speech  Disposition:  TBD

## 2019-11-02 NOTE — PROGRESS NOTES
[facility]    Hospitalist Progress Note  Name: Claudia Simon    MRN: [id number]  Provider:  Vipul Elias DO MPH  Date of Service: 11/02/2019    Summary of Stay: 74-year-old female with a history of metastatic lung cancer with known metastases to brain and bone, type 2 diabetes, irritable bowel syndrome, hypertension, and hyperlipidemia who presents here with diarrhea and epistaxis.  Patient is currently undergoing chemotherapy along with radiation.  Patient is followed by Dr. Roman who I believe this with [place] [facility].  In addition to diarrhea, patient does report some abdominal cramping and discomfort.  Stools are described as loose but nonbloody.  In terms of chemotherapy, patient has undergone 1 round approximately 3 weeks ago with carboplatin, pemetrexed, and pembrolizumab and has had 10 cycles of radiation therapy.  She does report some chronic baseline nausea with her chemo.  In addition, she does have epistaxis intermittently every 2 weeks.  She did report one episode of epistaxis.  She reports a temperature 100.8 morning of admission but denies any cough, shortness of breath, sore throat, rashes, or dysuria.       On presentation to the ED, patient was noted to have a low white blood count of 0.3.  Sodium was 129.  Urinalysis was negative but CT of the abdomen did suggest the possibility of acute cholecystitis.  She was given vancomycin and cefepime and admitted for further inpatient IV antibiotic for neutropenic fever and probable cholecystitis.      Problem list/Plan:  #Neutropenic fever: Despite diarrheal complaints which I feel may be related to chemotherapy, CT of the abdomen suggest the possibility of cholecystitis.  Ultrasound did seem to indicate some distention.  HIDA scan performed on 10/28 did show evidence of cholecystitis.  Otherwise denies any shortness of breath, cough, new chest discomfort, dysuria.  C. difficile and enteric panel have come back negative.  -Surgery  consulted, continue antibiotics for now and likely lap shmuel vs cholecystostomy tube when counts improved (thrombocytopenia still problematic).  -Continue cefepime but stopped vancomycin (10/30).  Flagyl for anaerobic coverage.  Gram negative or anaerobic infection seems most likely based on presumed cholecystitis as etiology of symptoms/fever.  -Oncology has been consulted, appreciate recs.  Did receive dose of Neupogen on 10/27, 10/30.   -Neutropenic precautions discontinued.     #Pancytopenia: Patient with pancytopenia on presentation secondary to chemotherapy.  Counts improving slowly except platelets.  -We will need to monitor closely for any signs of bleeding.  -Neupogen administered per oncology.  -Received 1 unit PRBCs and 1 unit of platelets.   -No indication for transfusion today.     #Abdominal cramping and diarrhea: Enteric panel negative.  Patient started on probiotics.  Likely secondary to chemotherapy.     #Hypertension: Patient is chronically on lisinopril 40 mg p.o. daily, and Toprol-XL 25 mg p.o. every 24 hours.    -We will hold these medications given neutropenic fever.  Blood pressure is currently controlled.  Restart as able.     #Type 2 diabetes: Chronically on metformin  mg p.o. daily.  Will hold for now while patient has active infection.  Medium intensity sliding scale.  BG well controlled.     #Thrush and mucositis: Continue nystatin and MMW swish and swallow 4 times daily.     #History of GERD: Continue Protonix 40 mg IV every 24 hours.     #History of metastatic lung cancer: Oncology consulted, appreciate recs.     #History of left eye blindness.    DVT Prophylaxis: Pneumatic Compression Devices  Code Status: Full Code  Disposition: Expected discharge in 3+ days to D. Goals prior to discharge include cholecystitis mgmt and palliative care discussion.   Incidental Findings: None.  Family updated today: Awaiting family to arrive.     Interval History   Diffuse pain. No chest pain or  shortness of breath. No nausea, vomiting, diarrhea, constipation. No fevers. Having headache. No other specific complaints identified.     -Data reviewed today: I personally reviewed all new labs and imaging results over the last 24 hours.     Physical Exam   Temp: 97.2  F (36.2  C) Temp src: Axillary BP: 128/60 Pulse: 100 Heart Rate: 99 Resp: (!) 32 SpO2: 92 % O2 Device: Oxymask Oxygen Delivery: 2 LPM  Vitals:    10/28/19 1425 10/31/19 1309   Weight: 65.6 kg (144 lb 11.2 oz) 66.7 kg (147 lb)     Vital Signs with Ranges  Temp:  [96.3  F (35.7  C)-98  F (36.7  C)] 97.2  F (36.2  C)  Pulse:  [100] 100  Heart Rate:  [] 99  Resp:  [18-32] 32  BP: (128-145)/(60-89) 128/60  SpO2:  [83 %-96 %] 92 %  I/O last 3 completed shifts:  In: 480 [P.O.:480]  Out: -     GENERAL: No apparent distress. Awake, alert, and fully oriented.  HEENT: Normocephalic, atraumatic. Extraocular movements intact.  CARDIOVASCULAR: Regular rate and rhythm without murmurs or rubs. No S3.  PULMONARY: Clear bilaterally.  GASTROINTESTINAL: Soft, non-tender, non-distended. Bowel sounds normoactive.   EXTREMITIES: No cyanosis or clubbing. No edema.  NEUROLOGICAL: CN 2-12 grossly intact, no focal neurological deficits.  DERMATOLOGICAL: No rash, ulcer, bruising, nor jaundice.     Medications       ceFEPIme (MAXIPIME) IV  1 g Intravenous Q12H     diclofenac  4 g Transdermal 4x Daily     folic acid  1 mg Oral Daily     gabapentin  300 mg Oral Daily     gabapentin  600 mg Oral At Bedtime     insulin aspart  1-7 Units Subcutaneous TID AC     insulin aspart  1-5 Units Subcutaneous At Bedtime     lactobacillus rhamnosus (GG)  1 capsule Oral BID     lidocaine  2 patch Transdermal Q24h    And     lidocaine   Transdermal Q24H    And     lidocaine   Transdermal Q8H     lidocaine visc 2% & maalox/mylanta w/simethicone & diphenhydramine  10 mL Swish & Swallow 4x Daily w/meals     metroNIDAZOLE  500 mg Oral TID     neomycin-bacitracin-polymyxin   Topical BID      nystatin  500,000 Units Swish & Swallow 4x Daily     pantoprazole  40 mg Oral QAM AC     sodium chloride (PF)  3 mL Intracatheter Q8H     Data     Laboratory:  Recent Labs   Lab 11/02/19  0748 11/01/19  0820 10/31/19  0636   WBC 8.5 4.8 1.8*   HGB 10.0* 9.9* 8.9*   HCT 31.2* 30.9* 27.8*   MCV 98 98 97   PLT 30* 28* 30*     Recent Labs   Lab 11/02/19 0748 11/01/19  0820 10/31/19  2001 10/31/19  0636    143  --  140   POTASSIUM 3.6 3.5 3.4 3.1*   CHLORIDE 111* 114*  --  111*   CO2 25 21  --  23   ANIONGAP 5 8  --  6   * 114*  --  94   BUN 15 11  --  10   CR 0.69 0.64  --  0.73   GFRESTIMATED 85 87  --  81   GFRESTBLACK >90 >90  --  >90   BIBI 9.1 9.1  --  8.8     Recent Labs   Lab 10/27/19  1148 10/27/19  1109   CULT No growth No growth       Imaging:  No results found for this or any previous visit (from the past 24 hour(s)).      Vipul Elias DO MPH  [facility] Hospitalist  [address] [place] [address]  Pager: [phone]  11/02/2019

## 2019-11-02 NOTE — PLAN OF CARE
VSS. 2L supplemental O2 to maintain saturations. Pt continues to have pain to head, L eye, abdomen, and ankles. Hard time swallowing liquid oxycodone, is only taking small sips of water with a lot of encouragement. Lowfat diet, Poor oral intake. L eye reddened. Lungs coarse, exp wheezes. Did not get out of bed overnight. Lethargic, but alert and oriented once awoken. Refused repositioning overnight due to pain with any minimal movements.

## 2019-11-02 NOTE — PROVIDER NOTIFICATION
Pt LS coarse, some wet breathing heard.  Been tachy most of shift. Coughing with liquid meds.  Using 1-2L oxymask when resting.

## 2019-11-03 NOTE — PROGRESS NOTES
[facility]    Hospitalist Progress Note  Name: Claudia Simon    MRN: [id number]  Provider:  Vipul Elias DO MPH  Date of Service: 11/03/2019    Summary of Stay: 74-year-old female with a history of metastatic lung cancer with known metastases to brain and bone, type 2 diabetes, irritable bowel syndrome, hypertension, and hyperlipidemia who presents here with diarrhea and epistaxis.  Patient is currently undergoing chemotherapy along with radiation.  Patient is followed by Dr. Roman who I believe this with [place] [facility].  In addition to diarrhea, patient does report some abdominal cramping and discomfort.  Stools are described as loose but nonbloody.  In terms of chemotherapy, patient has undergone 1 round approximately 3 weeks ago with carboplatin, pemetrexed, and pembrolizumab and has had 10 cycles of radiation therapy.  She does report some chronic baseline nausea with her chemo.  In addition, she does have epistaxis intermittently every 2 weeks.  She did report one episode of epistaxis.  She reports a temperature 100.8 morning of admission but denies any cough, shortness of breath, sore throat, rashes, or dysuria.       On presentation to the ED, patient was noted to have a low white blood count of 0.3.  Sodium was 129.  Urinalysis was negative but CT of the abdomen did suggest the possibility of acute cholecystitis.  She was given vancomycin and cefepime and admitted for further inpatient IV antibiotic for neutropenic fever and probable cholecystitis.     Surgery consulted but currently not an operative candidate.  Would benefit from cholecystostomy tube but has problematic thrombocytopenia.  Has remained stable on broad-spectrum antibiotics.  Oncology following as well.  Patient is ongoing pain and severe debilitation and malnutrition.  Health care conference today with multiple family members updating them on her extensive malignancy and prognosis provided previously by her oncologist  and patient and her family seem interested in at least palliative care discussions and possible hospice consideration in the near future.  Pain and palliative care consulted.     Problem list/Plan:  #Neutropenic fever: Despite diarrheal complaints which I feel may be related to chemotherapy, CT of the abdomen suggest the possibility of cholecystitis.  Ultrasound did seem to indicate some distention.  HIDA scan performed on 10/28 did show evidence of cholecystitis.  Otherwise denies any shortness of breath, cough, new chest discomfort, dysuria.  C. difficile and enteric panel have come back negative.  -Surgery consulted, continue antibiotics for now and likely cholecystostomy tube when counts improved (thrombocytopenia still problematic).  -Could repeat I do scan tomorrow to see if there is still evidence of ongoing cholecystitis.  -Continue cefepime but stopped vancomycin (10/30).  Flagyl for anaerobic coverage.  Gram negative or anaerobic infection seems most likely based on presumed cholecystitis as etiology of symptoms/fever.  -Oncology has been consulted, appreciate recommendations.  Did receive dose of Neupogen on 10/27- 10/30.   -Neutropenic precautions discontinued.     #Pancytopenia: Patient with pancytopenia on presentation secondary to chemotherapy.  Counts improving slowly except platelets.  -We will need to monitor closely for any signs of bleeding.  -Neupogen administered per oncology.  -Received 1 unit PRBCs and 1 unit of platelets.   -No indication for transfusion today.     #Metastatic lung cancer with involvement of the brain, orbit, and bone: Reviewing imaging previously this seems quite extensive.  Has been on chemotherapy and radiation.  Family tells me that they were provided a life expectancy of 2 to 12 months.  Patient has ongoing debilitating pain and is severely deconditioned and malnourished.  Having some side effects related to chemotherapy as well per her report.  Care conference held  with family today and they seem interested in palliative care discussions and potentially hospice.  We also discussed CODE STATUS and that this should be addressed in the coming days as well.  -Pain and palliative care consulted.    #Abdominal cramping and diarrhea: Enteric panel negative.  Patient started on probiotics.  Likely secondary to chemotherapy.     #Hypertension: Patient is chronically on lisinopril 40 mg p.o. daily, and Toprol-XL 25 mg p.o. every 24 hours.    -We will hold these medications given neutropenic fever.  Blood pressure is currently controlled.  Restart as able.     #Type 2 diabetes: Chronically on metformin  mg p.o. daily.  Will hold for now while patient has active infection.  Medium intensity sliding scale.  BG well controlled.     #Thrush and mucositis: Continue nystatin and MMW swish and swallow 4 times daily.     #History of GERD: Continue Protonix 40 mg IV every 24 hours.     #History of metastatic lung cancer: Oncology consulted, appreciate recs.     #History of left eye blindness.    DVT Prophylaxis: Pneumatic Compression Devices  Code Status: Full Code  Disposition: Expected discharge in 3+ days to D. Goals prior to discharge include cholecystitis mgmt and palliative care discussion.   Incidental Findings: None.  Family updated today: Yes, care conference.    Addendum: Loose stools with abdominal pain, check C.diff PCR.       Interval History   Diffuse pain. No chest pain or shortness of breath. No nausea, vomiting, diarrhea, constipation. No fevers. Having headache. No other specific complaints identified.     Had a care conference with the patient, her , 2 daughters and son-in-law.    -Data reviewed today: I personally reviewed all new labs and imaging results over the last 24 hours.     Physical Exam   Temp: 96.1  F (35.6  C) Temp src: Axillary BP: 138/72 Pulse: 92 Heart Rate: 102 Resp: 22 SpO2: 97 % O2 Device: None (Room air) Oxygen Delivery: 3 LPM  Vitals:     10/28/19 1425 10/31/19 1309   Weight: 65.6 kg (144 lb 11.2 oz) 66.7 kg (147 lb)     Vital Signs with Ranges  Temp:  [96.1  F (35.6  C)-98.6  F (37  C)] 96.1  F (35.6  C)  Pulse:  [] 92  Heart Rate:  [] 102  Resp:  [22-32] 22  BP: (133-138)/(70-76) 138/72  SpO2:  [82 %-97 %] 97 %  I/O last 3 completed shifts:  In: -   Out: 250 [Urine:250]    GENERAL: No apparent distress. Awake, alert, and fully oriented.  HEENT: Normocephalic, atraumatic. Extraocular movements intact.  CARDIOVASCULAR: Regular rate and rhythm without murmurs or rubs. No S3.  PULMONARY: Clear bilaterally.  GASTROINTESTINAL: Soft, non-tender, non-distended. Bowel sounds normoactive.   EXTREMITIES: No cyanosis or clubbing. No edema.  NEUROLOGICAL: CN 2-12 grossly intact, no focal neurological deficits.  DERMATOLOGICAL: No rash, ulcer, bruising, nor jaundice.     Medications       ceFEPIme (MAXIPIME) IV  1 g Intravenous Q12H     diclofenac  4 g Transdermal 4x Daily     folic acid  1 mg Oral Daily     gabapentin  300 mg Oral Daily     gabapentin  600 mg Oral At Bedtime     insulin aspart  1-7 Units Subcutaneous TID AC     insulin aspart  1-5 Units Subcutaneous At Bedtime     lactobacillus rhamnosus (GG)  1 capsule Oral BID     lidocaine  2 patch Transdermal Q24h    And     lidocaine   Transdermal Q24H    And     lidocaine   Transdermal Q8H     lidocaine visc 2% & maalox/mylanta w/simethicone & diphenhydramine  10 mL Swish & Swallow 4x Daily w/meals     metroNIDAZOLE  500 mg Oral TID     neomycin-bacitracin-polymyxin   Topical BID     nystatin  500,000 Units Swish & Swallow 4x Daily     pantoprazole  40 mg Oral QAM AC     sodium chloride (PF)  3 mL Intracatheter Q8H     Data     Laboratory:  Recent Labs   Lab 11/03/19  0824 11/02/19  0748 11/01/19  0820   WBC 8.8 8.5 4.8   HGB 10.8* 10.0* 9.9*   HCT 32.6* 31.2* 30.9*   MCV 96 98 98   PLT 30* 30* 28*     Recent Labs   Lab 11/03/19  0824 11/02/19  0748 11/01/19  0820   * 141 143   POTASSIUM  3.7 3.6 3.5   CHLORIDE 115* 111* 114*   CO2 25 25 21   ANIONGAP 5 5 8   * 112* 114*   BUN 22 15 11   CR 0.76 0.69 0.64   GFRESTIMATED 76 85 87   GFRESTBLACK 88 >90 >90   BIBI 9.4 9.1 9.1     No results for input(s): CULT in the last 168 hours.    Imaging:  No results found for this or any previous visit (from the past 24 hour(s)).      Vipul Elias DO MPH  [facility] Hospitalist  [address] [place] [address]  Pager: [phone]  11/03/2019

## 2019-11-03 NOTE — PLAN OF CARE
Ambulatory Status:  Pt up with assist of 1-2 and walker.  VS:  VSS, afebrile, 1L o2  Pain:  Taking oxycodone q3h, tylenol PRN  Resp: LS dim  GI:  Denies nausea.  Poor appetite and on low fat diet.  BS active.  Passing flatus.  Last BM - diarrhea several times this shift.  :  incontinent occasionally, voiding adequately this shift  Skin:  bruised, pale, scab  Tx:  cefipime, flagyl  Labs:  plt 30, hgb 10.0  Consults:  palliative tomorrow  Disposition:  TBD

## 2019-11-03 NOTE — PLAN OF CARE
Afebrile. HR tachy, 102. Requiring 3 L oxygen in high 90s. LS diminished with expiratory wheezing. VS x4. Pt having generalized pain, received Tylenol x2 and Oxy x2, improvement noted. BLE eric. Low Fat diet. A2 with Isamar Steady. PIV - SL. Getting IV Maxipime. Slept well. Discharge TBD.

## 2019-11-03 NOTE — PLAN OF CARE
Pt is AOx4, Ax2, gait belt and walker to pivot transfer to [facility]. Crackles and expiratory wheezes auscultated bilateral lungs, abdomen is soft and non-tender to touch. Pt has a poor appetite, ate less than 25% at dinner. PRN liquid oxycodone and IV dilaudid helpful for pain control. Voided 250 ml this shift. Fluids encouraged. Will continue to monitor and encourage fluids. Plan TBD.

## 2019-11-03 NOTE — PLAN OF CARE
[id number]: Pt resting comfortably. VSS. A&O. Pain managed with oxycodone, pain patches. Transfers with Ax2. . Loose stools. Will continue to monitor.

## 2019-11-03 NOTE — PLAN OF CARE
Discharge Planner SLP   Patient plan for discharge: home with family  Current status: Patient seen for swallow treatment with family present. Patient upright in bed and more alert and reports feeling better than previous day. Patient and family report that ordering pureed items is working well for patient and her appetite has improved. Today mixed vanilla ice cream and Boost Breeze together to make a smoothie and patient drank entire drink via straw with no overt s/sx of aspiration. No double swallow needed. Liquid meds taken with no difficulty. Pain in mouth and throat with swallowing has improved.    Recommend continue with current plan Regular Low-Fat diet with patient ordering pureed items. Magic Cup between meals. Ensure that patient is upright for all po intake and remain upright for 30 minutes after meals; small bites; small sips via straw; alternate liquids and solids as needed.  Barriers to return to prior living situation: no speech barriers  Recommendations for discharge: home with family  Rationale for recommendations: Anticipate 1-2 more sessions for diet tolerance and strategy education and then no further SLP services indicated.       Entered by: Tesfaye Michaels 11/03/2019 10:57 AM

## 2019-11-03 NOTE — PROGRESS NOTES
[facility] Progress Note  FR Med Surg 5    Two attempts today to do a follow-up visit but pt sleeping/did not rouse to gently saying name.  SH remains available per patient/family/staff request.      Laura Franco  Chaplain Resident  [phone]

## 2019-11-04 NOTE — PLAN OF CARE
A and Ox4. Hoping to obtain stool sample for loose stools, iso cart ordered. Struggling to manage pt. Pain with oxy and dilaudid. 1L o2 NC. A1 with walker and gait. Will continue to monitor.

## 2019-11-04 NOTE — PLAN OF CARE
Pt sleeping on and off through overnight as able related to frequent interruptions, unable to obtain PIV access with page to on-call to address IV abx and infrequent requests for IV pain interventions with new orders for PICC line STAT-verified and approved per CXR and infusing well with good blood return, K+ to be replaced this AM, continues 1LPM oxygen for comfort, , triggered sepsis with lactic 2.0, IV maxipime Q12H.

## 2019-11-04 NOTE — PLAN OF CARE
Ambulatory Status:  Pt up with assist of 1 and walker/belt.  VS:  VSS, afebrile, 1L o2  Pain: Constant, scheduled oxycodone given as ordered. PRN tylenol x2  Resp: LS dim  GI:  Denies nausea.  Poor appetite and on low fat diet.  BS active.  Passing flatus.  Last BM this shift, loose.  :  Voiding small amounts frequently, orange urine  Skin:  Scab, pale, radiation burns. Open area on R buttock, barrier cream applied.  Tx:  IV maxipime, flagyl. Pain control  Labs:  Plt 27, hgb 9.1, ba 147, k+ 3.3, replaced, recheck 3.8  Consults:  pain/palliative, hem/onc  Disposition:  TBD palliative care conference at 1500

## 2019-11-04 NOTE — CONSULTS
[facility]    Palliative Care Consultation   Text Page    Date of Admission:  10/27/2019    Assessment & Plan   Claudia Simon is a 74 year old female who was admitted on 10/27/2019. I was asked to see the patient for goals of care and continued symptom management.    Recommendations:     Please see assessments below for rationale.  1.Decisional Capacity -  Intact. Patient does not have an advance directive. Per  informed consent policy next of kin should be involved if patient becomes unable.  2. Pain- Patient with multifocal metastatic disease and pain secondary to bony involvement.  Primary pain consists of back and abdominal pain.  Poorly controlled with PRN oxycodone medication due to varying administration times.  - oxycodone 10 mg q 3 hours scheduled  - dilaudid IV 0.3 - 0.5 mg q 2 hours prn  - voltaren gel four times daily  - lidocaine patch application every evening.  - heating pad/ice patches for comfort.  3. Spiritual Care- Oriented to Spiritual Health as part of Palliative Care team.  4. Care Planning- Appreciate Care of Raven [facility]IT[facility].    Goals of care:  DNR/DNI, selective treatment. Patient and family awaiting resolution of cholecystitis.  They plan to transition to comfort measures with hospice enrollment following that acute treatment.    Findings & plan of care discussed with: Bedside Nurse , and Hospitalist Dr. Coreas  Thank you for involving us in the patient's care.     Suly DOWD, CNP  Pain Management and Palliative Care  [facility]  Pgr: [phone]    Time Spent on this Encounter   Total unit/floor time 75 minutes, time consisted of the following, examination of the patient, reviewing the record and completing documentation. >50% of time spent in counseling and coordination of care.  Time spend counseling with patient and family consisted of the following topics, goals of care, advance care planning, education about prognosis, care planning  for discharge and symptom management.  Time spent in coordination of care with team members as listed above.       Palliative Care Assessment:  Claudia Simon is a 74 year old female with history of NSCLC with metastatic disease to brain and thoracic spine who presents with neutropenic fever, diarrhea, epistaxis and nausea.  Per family report, patient had been working to control post chemo/radiation symptoms including pain, nausea, mucositis, diarrhea and fatigue. Patient has been dependent in cares for a long time and had progressively worsened since the chemo treatment. Patient's daughter reported that she got a nose bleed that would not stop and thus they presented to the ED for care.  On arrival patient was stabilized and labs were noted to find pancytopenia and hyponatremia of 129.  She had mild fever on day of presentation as well.  She was admitted for medical stabilization and fever work up.    Diarrhea prompted abdominal work up which revealed cholecystitis.  Surgery consulted but due to pancytopenia, patient is not a candidate for surgical or interventional radiology interventions.    Patient has chronic pain due to metastatic lesions of the thoracic spine and brain with bony invasion.     She is managed as an outpatient by oncology and takes 50-60 mg oxycodone daily without side effects.  She states her pain at times is generalized, but mostly is intense in the thoracic spine, worse with movement, better with rest, heat or ice, she has had relief with gabapentin and lidocaine patches as well prior to admission.  She states she currently has mucositis pain and is hopeful nystatin and magic mouthwash continue to help.       This is in the setting of metastatic lung cancer with involvement in the brain, orbit and bone.  She and her family underwent a family meeting yesterday with oncology to review the prognostic outlook of her disease and symptom burden.  They have determined that comfort approach would  be most in line with patient's values and wish to have more information regarding hospice.  She has had recent increase of symptom burden with failure to thrive at home and pain that has not been well controlled.     Symptoms:   Pain - chronic with acute worsening following chemo 3 weeks ago.  Dyspnea - mild, requiring supplemental oxygen  Nausea without vomiting - vague feeling of abdominal discomfort and decreased appetite.  Diarrhea - acute on chronic, IBS.  Fatigue - worsened in the past 3 weeks.  xerostomia - dry mouth and mucositis secondary to recent radiation treatment.      Social:         Living situation: home with        Support system: two adult children and        Actual/Potential Caregiver:        Functional status: dependent with most ADLs    Mental Health:   Stable at this time, patient denies depression or anxiety.    Coping:   Continues to evolve with the prognosis of her cancer diagnosis.  States she has been ready for this transition.  Family reflect similar thoughts.    Spiritual/Rastafari:    Spiritual background: Caodaism  Spiritual needs: none identified  Sense/Meaning Making: none identified    Prognostic Information:   Patient with multiple symptoms and failure to thrive following chemo/radiation treatments.  In addition patient has acute cholecystitis that is unable to be intervened on due to pancytopenia.  Patient and family were told prognosis is grave and further chemo/radiation would not likely benefit patient, but cause further suffering. This has been discussed with patient, family and primary team.    Advance Care Planning:        Decision making capacity: intact       Disease understanding: understood       Goals of Care: dnr/dni, comfort focused       Preferred way of decision making: with family       Health care directive: none on file.       Health care agent: n/a       Code Status:  DNR / DNI       POLST Physician orders for life-sustaining treatment  (POLST) form is not completed  Educated on role of POLST during family meeting today.  Will complete during this hospitalization.      History of Present Illness   History is obtained from the patient    Claudia Simon is a 74 year old female with history of NSCLC with metastatic disease to brain and thoracic spine who presents with neutropenic fever, diarrhea, epistaxis and nausea.  Per family report, patient had been working to control post chemo/radiation symptoms including pain, nausea, mucositis, diarrhea and fatigue. Patient has been dependent in cares for a long time and had progressively worsened since the chemo treatment. Patient's daughter reported that she got a nose bleed that would not stop and thus they presented to the ED for care.  On arrival patient was stabilized and labs were noted to find pancytopenia and hyponatremia of 129.  She had mild fever on day of presentation as well.  She was admitted for medical stabilization and fever work up.    Diarrhea prompted abdominal work up which revealed cholecystitis.  Surgery consulted but due to pancytopenia, patient is not a candidate for surgical or interventional radiology interventions.    Patient has chronic pain due to metastatic lesions of the thoracic spine and brain with bony invasion.     She is managed as an outpatient by oncology and takes 50-60 mg oxycodone daily without side effects.  She states her pain at times is generalized, but mostly is intense in the thoracic spine, worse with movement, better with rest, heat or ice, she has had relief with gabapentin and lidocaine patches as well prior to admission.  She states she currently has mucositis pain and is hopeful nystatin and magic mouthwash continue to help.           Decision-Making & Goals of Care Discussion:  Discussed on November 4, 2019 with Suly DOWD, CNP:   Met with patient, two daughters and son-in-law at the bedside.  Reviewed prognostic conversation that they  had with oncology yesterday.  Daughter reviewed the prognosis and verbalized that they wished to take a comfort approach.  Inquired if they knew about hospice enrollment and if that was the direction that they were indicating that they want to go, patient verbalized that yes she wanted to enroll in hospice after her gallbladder was treated if possible.    I reviewed briefly about the hospice benefit and limitations on restorative care that are in place on hospice.  I emphasized symptom management and quality of life as the central emphasis for a patient on hospice.  Everyone verbalized understanding.    I discussed code status and patient stated that she wanted to be DNR/DNI at this time, she wanted to avoid any form of suffering and was comfortable with that decision.    Family requested informational meeting for hospice and to continue conversation as they knew more about the gallbladder treatment plan.     Past Medical History   I have reviewed this patient's medical history and updated it with pertinent information if needed.   Past Medical History:   Diagnosis Date     Lung cancer (H) 09/23/2019       Past Surgical History   I have reviewed this patient's surgical history and updated it with pertinent information if needed.  Past Surgical History:   Procedure Laterality Date     C NONSPECIFIC PROCEDURE      back surgery x 3     C NONSPECIFIC PROCEDURE      ruptured discs x 2-1 yr apart     C NONSPECIFIC PROCEDURE      titanium cage     IR CHEST TUBE PLACEMENT NON-TUNNELLED LEFT  9/23/2019     IR FOLLOW UP VISIT INPATIENT  9/24/2019       Prior to Admission Medications   Prior to Admission Medications   Prescriptions Last Dose Informant Patient Reported? Taking?   LORazepam (ATIVAN) 0.5 MG tablet 10/26/2019  No Yes   Sig: Take 1 tablet (0.5 mg) by mouth every 6 hours as needed (Nausea/Vomiting)   blood glucose (CONTOUR NEXT TEST) test strip   No No   Sig: Use to test blood sugar 2 times daily or as directed.    cholecalciferol (VITAMIN D3) 5000 units (125 mcg) capsule 10/26/2019 at AM  Yes Yes   Sig: Take 5,000 Units by mouth daily   dexamethasone (DECADRON) 2 MG tablet 10/26/2019 at complete  No Yes   Sig: Take 4 mg with supper on 10/17, 4 mg twice daily with meals on 10/18, 4 mg with breakfast on 10/19, 2 mg with breakfast x 3 days, 1 mg with breakfast x 3 days, then off   dexamethasone (DECADRON) 4 MG tablet  at complete  No No   Sig: Take 1 tablet (4 mg) by mouth 2 times daily (with meals) Start one day prior to Pemetrexed (Alimta), continue on the day of treatment, and the day following Pemetrexed.   folic acid (FOLVITE) 1 MG tablet 10/26/2019 at AM  No Yes   Sig: Take 1 tablet (1 mg) by mouth daily   folic acid (FOLVITE) 1 MG tablet  at ongoing  No No   Sig: Take 1 tablet (1,000 mcg) by mouth daily Begin 7 days before Pemetrexed (Alimta) starts and continue for 21 days after Pemetrexed is discontinued.   gabapentin (NEURONTIN) 300 MG capsule 10/26/2019 at 1200  No Yes   Sig: Take 1 capsule (300 mg) by mouth daily At Noon.   gabapentin (NEURONTIN) 300 MG capsule 10/26/2019 at PM  Yes Yes   Sig: Take 600 mg by mouth At Bedtime   lidocaine (LIDODERM) 5 % patch 10/27/2019 at removed at 0800  No Yes   Sig: Place 2 patches onto the skin every 24 hours To prevent lidocaine toxicity, patient should be patch free for 12 hrs daily.   lisinopril (PRINIVIL/ZESTRIL) 40 MG tablet 10/26/2019 at AM  No Yes   Sig: Take 1 tablet (40 mg) by mouth daily   melatonin 5 MG tablet 10/26/2019 at PM Spouse/Significant Other Yes Yes   Sig: Take 5 mg by mouth nightly as needed for sleep   metFORMIN (GLUCOPHAGE-XR) 500 MG 24 hr tablet Unknown Spouse/Significant Other Yes No   Sig: Take 500 mg by mouth daily (with dinner) Only take if blood sugar is above 140.   metoprolol succinate ER (TOPROL-XL) 25 MG 24 hr tablet 10/26/2019 at 2100  No Yes   Sig: TAKE ONE TABLET BY MOUTH ONE TIME DAILY   nystatin (MYCOSTATIN) 765630 UNIT/ML suspension  10/26/2019  No Yes   Sig: Swish and swallow 5 mL four times daily   omeprazole 20 MG tablet 10/26/2019 at PM  No Yes   Sig: Take 1 tablet (20 mg) by mouth daily   ondansetron (ZOFRAN) 8 MG tablet 10/27/2019 at 0200  No Yes   Sig: Take 1 tablet (8 mg) by mouth every 8 hours as needed for nausea (vomiting)   ondansetron (ZOFRAN-ODT) 8 MG ODT tab Unknown  No No   Sig: Take 1 tablet (8 mg) by mouth every 8 hours as needed for nausea   oxyCODONE (ROXICODONE) 5 MG tablet 10/26/2019  No Yes   Sig: Take 2 tablets (10 mg) by mouth every 4 hours as needed for moderate to severe pain   polyethylene glycol (MIRALAX/GLYCOLAX) packet 10/26/2019 at AM  No Yes   Sig: Take 17 g by mouth daily   prochlorperazine (COMPAZINE) 10 MG tablet Unknown  No No   Sig: Take 0.5 tablets (5 mg) by mouth every 6 hours as needed (Nausea/Vomiting)   senna (SENOKOT) 8.6 MG tablet 10/26/2019  Yes Yes   Sig: Take 1-2 tablets by mouth 2 times daily   sucralfate (CARAFATE) 1 GM/10ML suspension 10/26/2019 at PM  No Yes   Sig: Take 10 mLs (1 g) by mouth 4 times daily      Facility-Administered Medications: None     Allergies   Allergies   Allergen Reactions     Sulfa Drugs Anaphylaxis     Angioedema         Social History   I have updated and reviewed the following Social History Narrative:   Social History     Patient does not qualify to have social determinant information on file (likely too young).   Social History Narrative     Not on file       Family History   I have reviewed this patient's family history and updated it with pertinent information if needed.   Family History   Problem Relation Age of Onset     Diabetes Mother      Alzheimer Disease Father      Cerebrovascular Disease Sister      Cerebrovascular Disease Sister        Review of Systems   The 10 point Review of Systems is negative other than noted in the HPI or here.     Palliative Symptom Review (0=no symptom/no concern, 1=mild, 2=moderate, 3=severe):      Pain: 2-moderate      Fatigue:  1-mild      Nausea: 1-mild      Constipation: 0-none      Diarrhea: 1-mild      Depressive Symptoms: 0-none      Anxiety: 0-none      Drowsiness: 0-none      Poor Appetite: 2-moderate      Shortness of Breath: 1-mild      Insomnia: 0-none    Physical Exam   Temp:  [97.2  F (36.2  C)-98.6  F (37  C)] 97.9  F (36.6  C)  Pulse:  [] 90  Heart Rate:  [92] 92  Resp:  [20-24] 20  BP: (130-136)/(76-88) 136/76  SpO2:  [93 %-97 %] 97 %  147 lbs 0 oz  GEN:  Alert, oriented x 3, appears uncomfortable, facial grimace, No apparent distress.  HEENT:  Normocephalic/atraumatic, no scleral icterus, no nasal discharge, mouth dry.  CV:  RRR, S1, S2; no murmurs or other irregularities noted.  +3 DP/PT pulses bilaterally; no edema bilateral lower extremeties.  RESP:  Clear to auscultation bilaterally without rales/rhonchi/wheezing/retractions.  Symmetric chest rise on inhalation noted.  Normal respiratory effort.  ABD:  Rounded, soft, non-tender/non-distended.  +BS  EXT:  Edema & pulses as noted above.  Color, moisture and sensation intact x 4.     M/S:   Tender to palpation over thoracic spine.    SKIN:  Dry to touch, no exanthems noted in the visualized areas.    NEURO: Symmetric strength +4/5.  Sensation to touch intact all extremities.   There is no area of allodynia or hyperesthesia.  PAIN BEHAVIOR: Cooperative  Psych:  Normal affect.  Calm, cooperative, conversant appropriately.     Delirium Screen/CAM:  Delirium = (#1 and #2 = YES) + (#3 and/or #4)   1) Acute onset and fluctuating course:   No   (acute change in mental status from baseline over last 24 hours)  2) Inattention:   No   (difficulty focusing, distractible, can't follow conversation)  3) Disorganized thinking:   No   (score only if #1 and #2 are YES)  (rambling/irrelevant conversation, unclear/illogical thoughts, inconsistency)  4) Altered level of consciousness:   No   (score only if #1 and #2 are YES)  (other than alert, calm, cooperative)    Delirium/CAM score:  0/4  Interpretation:  1)  Delirium:  Absent  Data   Most Recent 3 CBC's:  Recent Labs   Lab Test 11/04/19  0518 11/03/19  0824 11/02/19  0748   WBC 6.8 8.8 8.5   HGB 9.1* 10.8* 10.0*    96 98   PLT 27* 30* 30*     Most Recent 3 BMP's:  Recent Labs   Lab Test 11/04/19  1200 11/04/19  0518 11/04/19  0119 11/03/19  0824 11/02/19  0748   NA  --  147*  --  145* 141   POTASSIUM 3.8 3.3*  --  3.7 3.6   CHLORIDE  --  115*  --  115* 111*   CO2  --  29  --  25 25   BUN  --  18  --  22 15   CR  --  0.60  --  0.76 0.69   ANIONGAP  --  3  --  5 5   BIBI  --  8.7  --  9.4 9.1   GLC  --  150* 178* 159* 112*     Most Recent 2 LFT's:  Recent Labs   Lab Test 10/31/19  0636 10/30/19  0749   AST 20 30   ALT 50 70*   ALKPHOS 74 80   BILITOTAL 0.5 0.8     Most Recent 3 INR's:  Recent Labs   Lab Test 09/17/19  1256   INR 0.99

## 2019-11-04 NOTE — PROGRESS NOTES
[facility]    Hematology/Oncology Follow-up Note      Today's Date: 11/04/19  Date of Admission:  10/27/2019  Reason for Consult: Metastatic lung cancer        ASSESSMENT/ PLAN :  Claudia Simon is a 74 year old female with:      Metastatic lung cancer mets to brain and bone  [place] [facility] [place]cycle 1 carboplatin, pemetrexed and pembrolizumab  -Hold treatment now until patient sees Dr. Roman  -schedule with Dr. Roman after discharge  -She will be meeting with palliative care today at 3pm with family. They will be discussing hospice and goals. We will see her again tomorrow to discuss.      Pancytopenia  -Secondary to chemotherapy  -She was given Neupogen and WBC normal  -Transfuse for hemoglobin 8 or less or symptomatic  -Transfuse for platelet count 20 or below or in the event of bleeding     Mucositis   -Slowly improving and will likely continue to improve as counts are improving.     Acute cholecystitis  -Per surgery recommendations, may consider cholecystostomy tube when thrombocytopenia has improved. They are also waiting for palliative care consult today.      INTERIM HISTORY: Claudia was seen in her room, resting in bed. Daughter at bedside. Pain control is still an issue for her. She is a little more comfortable today. She is interested in meeting with palliative care today to possibly discuss hospice. No new complaints.        MEDICATIONS:  Current Facility-Administered Medications   Medication     acetaminophen (TYLENOL) solution 650 mg     artificial saliva (BIOTENE MT) solution 2 spray     ceFEPIme (MAXIPIME) 1g vial to attach to  ml bag for ADULTS or NS 50 ml bag for PEDS     glucose gel 15-30 g    Or     dextrose 50 % injection 25-50 mL    Or     glucagon injection 1 mg     diclofenac (VOLTAREN) 1 % topical gel 4 g     folic acid (FOLVITE) tablet 1 mg     gabapentin (NEURONTIN) solution 300 mg     gabapentin (NEURONTIN) solution 600 mg      heparin lock flush 10 UNIT/ML injection 2-5 mL     HYDROmorphone (PF) (DILAUDID) injection 0.3-0.5 mg     insulin aspart (NovoLOG) inj (RAPID ACTING)     insulin aspart (NovoLOG) inj (RAPID ACTING)     lactobacillus rhamnosus (GG) (CULTURELL) capsule 1 capsule     Lidocaine (LIDOCARE) 4 % Patch 2 patch    And     lidocaine patch REMOVAL    And     lidocaine patch in PLACE     lidocaine (LMX4) cream     lidocaine (LMX4) cream     lidocaine (LMX4) cream     lidocaine 1 % 0.1-1 mL     lidocaine 1 % 0.1-1 mL     lidocaine 1 % 0.1-1 mL     LORazepam (ATIVAN) tablet 0.5 mg     magic mouthwash suspension (diphenhydramine, lidocaine, aluminum-magnesium & simethicone)     melatonin tablet 5 mg     metronidazole (FIRST-METRONIDAZOLE) suspension 500 mg     naloxone (NARCAN) injection 0.1-0.4 mg     neomycin-bacitracin-polymyxin (NEOSPORIN) ointment     nystatin (MYCOSTATIN) suspension 500,000 Units     ondansetron (ZOFRAN-ODT) ODT tab 4 mg    Or     ondansetron (ZOFRAN) injection 4 mg     oxyCODONE (ROXICODONE) tablet 10-15 mg    Or     oxyCODONE (ROXICODONE) solution 10-15 mg     pantoprazole (PROTONIX) 2 mg/mL suspension 40 mg     potassium chloride (KLOR-CON) Packet 20-40 mEq     potassium chloride 10 mEq in 100 mL intermittent infusion with 10 mg lidocaine     potassium chloride 10 mEq in 100 mL sterile water intermittent infusion (premix)     potassium chloride 20 mEq in 50 mL intermittent infusion     potassium chloride ER (K-DUR/KLOR-CON M) CR tablet 20-40 mEq     prochlorperazine (COMPAZINE) injection 5 mg    Or     prochlorperazine (COMPAZINE) tablet 5 mg    Or     prochlorperazine (COMPAZINE) Suppository 12.5 mg     sodium chloride (PF) 0.9% PF flush 10 mL     sodium chloride (PF) 0.9% PF flush 10-20 mL     Facility-Administered Medications Ordered in Other Encounters   Medication     ioversol (OPTIRAY 320) iv solution 68% 100 mL       ALLERGIES:  Allergies   Allergen Reactions     Sulfa Drugs Anaphylaxis      "Angioedema           PHYSICAL EXAM:  Vital signs:  Temp: 97.2  F (36.2  C) Temp src: Axillary BP: 130/78 Pulse: 90   Resp: 20 SpO2: 93 % O2 Device: Nasal cannula Oxygen Delivery: 1 LPM Height: 162.6 cm (5' 4\") Weight: 66.7 kg (147 lb)  Estimated body mass index is 25.23 kg/m  as calculated from the following:    Height as of this encounter: 1.626 m (5' 4\").    Weight as of this encounter: 66.7 kg (147 lb).       GENERAL:  Female, in no acute distress.   HEENT:  Normocephalic, left eye closed.   SKIN: No rash on exposed skin  PSYCH: Mood stable      LABS:  CBC RESULTS:   Recent Labs   Lab Test 11/04/19 0518   WBC 6.8   RBC 2.93*   HGB 9.1*   HCT 29.2*      MCH 31.1   MCHC 31.2*   RDW 15.7*   PLT 27*       Recent Labs   Lab Test 11/04/19  0518 11/04/19  0119 11/03/19  0824   *  --  145*   POTASSIUM 3.3*  --  3.7   CHLORIDE 115*  --  115*   CO2 29  --  25   ANIONGAP 3  --  5   * 178* 159*   BUN 18  --  22   CR 0.60  --  0.76   BIBI 8.7  --  9.4           María Stevens PA-C  Hematology/Oncology  [facility]      "

## 2019-11-04 NOTE — PROGRESS NOTES
[facility]    Hospitalist Progress Note  Name: Claudia Simon    MRN: [id number]  Date of Service: 11/04/2019    Summary of Stay: 74-year-old female with a history of metastatic lung cancer with known metastases to brain and bone, type 2 diabetes, irritable bowel syndrome, hypertension, and hyperlipidemia who presents here with diarrhea and epistaxis.  Patient is currently undergoing chemotherapy along with radiation.  Patient is followed by Dr. Roman who I believe this with [place] [facility].  In addition to diarrhea, patient does report some abdominal cramping and discomfort.  Stools are described as loose but nonbloody.  In terms of chemotherapy, patient has undergone 1 round approximately 3 weeks ago with carboplatin, pemetrexed, and pembrolizumab and has had 10 cycles of radiation therapy.  She does report some chronic baseline nausea with her chemo.  In addition, she does have epistaxis intermittently every 2 weeks.  She did report one episode of epistaxis.  She reports a temperature 100.8 morning of admission but denies any cough, shortness of breath, sore throat, rashes, or dysuria.       On presentation to the ED, patient was noted to have a low white blood count of 0.3.  Sodium was 129.  Urinalysis was negative but CT of the abdomen did suggest the possibility of acute cholecystitis.  She was given vancomycin and cefepime and admitted for further inpatient IV antibiotic for neutropenic fever and probable cholecystitis.     Surgery consulted but currently not an operative candidate.  Would benefit from cholecystostomy tube but has problematic thrombocytopenia.  Has remained stable on broad-spectrum antibiotics.  Oncology following as well.      Patient is ongoing pain and severe debilitation and malnutrition.  Palliative care team consult to assist with further goal of care planning.     Problem list/Plan:    #Neutropenic fever:   --Patient is afebrile.  Concern for cholecystitis on HIDA  scan and CT scan imaging.  --Continue antibiotic, monitor for fever and reach out to interventional radiology for cholecystostomy tube if restorative care is planned       #Pancytopenia:   --Due to chemotherapy  --Anemia is worsening at 9.1 today.  Platelet count is 27 down from 30 yesterday.  No evidence of active bleeding.  Patient received Neupogen as well as a unit of blood.  --Continue to monitor blood count for now.    #Metastatic lung cancer with involvement of the brain, orbit, and bone:   --Appears to be poor prognosis.  Palliative care team is consulted and expected to assist with further discussion regarding CODE STATUS and goal of care including consideration of hospice care.  Plan noted for this afternoon today.    #Abdominal cramping and diarrhea: Enteric panel negative.  Patient started on probiotics.  Likely secondary to chemotherapy.     #Hypertension: Patient is chronically on lisinopril 40 mg p.o. daily, and Toprol-XL 25 mg p.o. every 24 hours.    -We will continue to hold lisinopril and resume metoprolol with parameters.       #Type 2 diabetes: Chronically on metformin  mg p.o. daily.  Will hold for now while patient has active infection.  Medium intensity sliding scale.  BG well controlled.     #Thrush and mucositis: Continue nystatin and MMW swish and swallow 4 times daily.     #History of GERD: Continue Protonix 40 mg IV every 24 hours.     #History of metastatic lung cancer: Oncology consulted, appreciate recs.     #History of left eye blindness.    DVT Prophylaxis: Pneumatic Compression Devices  Code Status: Full Code  Disposition: Expected discharge in 3+ days to TBD. Goals prior to discharge include cholecystitis mgmt and palliative care discussion.   Incidental Findings: None.  Family updated today: Yes, care conference.         Interval History   Patient was seen and examined this morning.  Care assumed by me today.  She is complaining of pain all over.  Denies any fever chills.   Patient daughter in the room and questions and concerns addressed.  Due to low platelet count no intervention for cholecystitis at this time except for antibiotic.    Physical Exam   Temp: 97.2  F (36.2  C) Temp src: Axillary BP: 130/78 Pulse: 90   Resp: 20 SpO2: 93 % O2 Device: Nasal cannula Oxygen Delivery: 1 LPM  Vitals:    10/28/19 1425 10/31/19 1309   Weight: 65.6 kg (144 lb 11.2 oz) 66.7 kg (147 lb)     Vital Signs with Ranges  Temp:  [97.2  F (36.2  C)-98.6  F (37  C)] 97.2  F (36.2  C)  Pulse:  [] 90  Resp:  [20-24] 20  BP: (130-134)/(78-88) 130/78  SpO2:  [93 %-99 %] 93 %  No intake/output data recorded.    GENERAL: No apparent distress. Awake, alert, and fully oriented.  HEENT: Normocephalic, atraumatic. Extraocular movements intact.  CARDIOVASCULAR: Regular rate and rhythm without murmurs or rubs. No S3.  PULMONARY: Clear bilaterally.  GASTROINTESTINAL: Soft, non-tender, non-distended. Bowel sounds normoactive.   EXTREMITIES: No cyanosis or clubbing. No edema.  NEUROLOGICAL: CN 2-12 grossly intact, no focal neurological deficits.  DERMATOLOGICAL: No rash, ulcer, bruising, nor jaundice.     Medications       ceFEPIme (MAXIPIME) IV  1 g Intravenous Q12H     diclofenac  4 g Transdermal 4x Daily     folic acid  1 mg Oral Daily     gabapentin  300 mg Oral Daily     gabapentin  600 mg Oral At Bedtime     insulin aspart  1-7 Units Subcutaneous TID AC     insulin aspart  1-5 Units Subcutaneous At Bedtime     lactobacillus rhamnosus (GG)  1 capsule Oral BID     lidocaine  2 patch Transdermal Q24h    And     lidocaine   Transdermal Q24H    And     lidocaine   Transdermal Q8H     lidocaine visc 2% & maalox/mylanta w/simethicone & diphenhydramine  10 mL Swish & Swallow 4x Daily w/meals     metroNIDAZOLE  500 mg Oral TID     neomycin-bacitracin-polymyxin   Topical BID     nystatin  500,000 Units Swish & Swallow 4x Daily     oxyCODONE  10-15 mg Oral Q3H    Or     oxyCODONE  10-15 mg Oral Q3H     pantoprazole  40  mg Oral QAM AC     sodium chloride (PF)  10 mL Intracatheter Q7 Days     Data     Laboratory:  Recent Labs   Lab 11/04/19  0518 11/03/19  0824 11/02/19  0748   WBC 6.8 8.8 8.5   HGB 9.1* 10.8* 10.0*   HCT 29.2* 32.6* 31.2*    96 98   PLT 27* 30* 30*     Recent Labs   Lab 11/04/19  0518 11/04/19  0119 11/03/19  0824 11/02/19  0748   *  --  145* 141   POTASSIUM 3.3*  --  3.7 3.6   CHLORIDE 115*  --  115* 111*   CO2 29  --  25 25   ANIONGAP 3  --  5 5   * 178* 159* 112*   BUN 18  --  22 15   CR 0.60  --  0.76 0.69   GFRESTIMATED 89  --  76 85   GFRESTBLACK >90  --  88 >90   BIBI 8.7  --  9.4 9.1     No results for input(s): CULT in the last 168 hours.    Imaging:  Recent Results (from the past 24 hour(s))   XR Chest Port 1 View    Narrative    EXAM: XR CHEST PORT 1 VW, XR CHEST PORT 1 VW  LOCATION: [facility]  DATE/TIME: 11/4/2019 3:39 AM    INDICATION: PICC placement.  COMPARISON: None.    FINDINGS: 2 upright portable chest x-rays from 0347 hours and 0355 hours. Initial images show a right PICC with tip in the right atrium. The follow-up image shows the PICC repositioned with tip at the junction of SVC and right atrium in good position.   There is no pneumothorax. The heart size is normal. The lungs are clear.      Impression    IMPRESSION: Right PICC to distal SVC.              XR Chest Port 1 View    Narrative    EXAM: XR CHEST PORT 1 VW, XR CHEST PORT 1 VW  LOCATION: [facility]  DATE/TIME: 11/4/2019 3:39 AM    INDICATION: PICC placement.  COMPARISON: None.    FINDINGS: 2 upright portable chest x-rays from 0347 hours and 0355 hours. Initial images show a right PICC with tip in the right atrium. The follow-up image shows the PICC repositioned with tip at the junction of SVC and right atrium in good position.   There is no pneumothorax. The heart size is normal. The lungs are clear.      Impression    IMPRESSION: Right PICC to distal SVC.

## 2019-11-04 NOTE — PROGRESS NOTES
Pt tolerated picc placement well. Placed 5Fr dual lumen vavled power picc to the right basilic vein on 1st attempt. Good blood return. Flushes easily. Placement verified by x-ray. Initial x-ray showed picc tip was deep, pulled back 3cm using sterile technique and dressing change. Follow-up x-ray completed and verified by radiologist, OK to use.

## 2019-11-04 NOTE — PLAN OF CARE
Attempted to see patient for swallowing treatment, however, patient just getting into restroom and family requesting to try again after 15-20 minutes. Will follow, as appropriate.  Provided written education materials re: mucositis.    [Follow-Up: _____] : a [unfilled] follow-up visit

## 2019-11-04 NOTE — PROGRESS NOTES
Discharge Planner   Discharge Plans in progress: Per request of Palliative care team SW set up Info only Hospice meeting with  Hospice  Barriers to discharge plan: None anticipated   Follow up plan: will follow up with family following information meeting  Called  Hospice to request meeting        Entered by: Corinne C. White 11/04/2019 4:21 PM

## 2019-11-04 NOTE — PROGRESS NOTES
[facility]   Procedure Note           Peripherally Inserted Central Line Catheter (PICC):       Claudia Simon  MRN# [id number]   November 4, 2019, 3:56 AM Indication: Medication administration           Pause for the cause: Consent for catheter placement procedure signed  Time out completed  Patient ID's verified using two distinct indicators  All necessary equipment is present  Site marked if extremity to be used has been predetermined   Type of line to be used: PICC   Full barrier precautions used: Yes   Skin preparation: Chloraprep   Date of insertion: November 4, 2019, 3:00 AM   Device type: Double lumen, valved, 5.0   Catheter brand: Taxizu   Lot number: [id number]   Insertion location: Right basilic vein   Method of placement: MST  Ultrasound   Number of attempts: With ultrasound: 1   Without ultrasound: 0   Difficulty threading: No   PICC IV device: Dressing dry and intact  Chlorhexidine patch  Catheter securement device   Arm circumference: Adults 10 cm   PICC extremity circumference: 31 cm   Internal length: 35 cm   PICC visible catheter length: 3 cm   Total catheter length: 38 cm   Tip termination: SVC/RA junction   Method of verification: Chest x-ray   PICC patency post placement: Positive blood return  Flushes without difficulty   Line flush: Line flush documented on the eMAR yes   Placement verified by: Physician   Catheter placed by: Krupa Avitia STAT RN   Discontinuation form initiated: Yes   Patient tolerance: Tolerated well  Intradermal injection      Summary:  This procedure was performed without difficulty and she tolerated the procedure well with no immediate complications.       PICC pulled back 3cm after initial placement using sterile technique and a dressing change.. Follow-up x-ray completed.

## 2019-11-05 NOTE — PLAN OF CARE
"Ambulatory Status:  Pt up to the bathroom, assist +2.   Pain:  Patient rated pain 10. Gave Oxycodone x 2, IV dilaudid x1.  Resp: LS diminished, shortness of breath, labored.   GI:  Pt. denied nausea.  Poor appetite and on low fat diet.  BS WDL.  Passing flatus.  Last BM 11/4/19.   : WDL  Skin:  Scabs on both knees from a fall prior to admission. Blanchable coccyx redness, barrier cream applied. Radiation sore on buttocks.   Tx:  Oxycodone x2, dilaudid x1 for pain, lidocaine patches upper back.   Labs:  , Hgb 9.1, Platelets 27.   Consults:  Social work, speech language pathology, hem/oncology  Disposition:  Unknown  VS:  Vital signs:  Temp: 97.9  F (36.6  C) Temp src: Oral BP: 136/76 Pulse: 90 Heart Rate: 92 Resp: 20 SpO2: 97 % O2 Device: Nasal cannula Oxygen Delivery: 1 LPM Height: 162.6 cm (5' 4\") Weight: 66.7 kg (147 lb)  Estimated body mass index is 25.23 kg/m  as calculated from the following:    Height as of this encounter: 1.626 m (5' 4\").    Weight as of this encounter: 66.7 kg (147 lb).        "

## 2019-11-05 NOTE — PROGRESS NOTES
[facility]  Palliative Care Progress Note  Text Page     Assessment & Plan      Palliative Care Assessment:  Claudia Simon is a 74 year old female with history of NSCLC with metastatic disease to brain and thoracic spine who presented with neutropenic fever, diarrhea, epistaxis and nausea.  Per family report, patient had been working to control post chemo/radiation symptoms including pain, nausea, mucositis, diarrhea and fatigue. Patient has been dependent in cares for a long time and had progressively worsened since the chemo treatment. Patient's daughter reported that she got a nose bleed that would not stop and thus they presented to the ED for care.  On arrival patient was stabilized and labs were noted to find pancytopenia and hyponatremia of 129.  She had mild fever on day of presentation as well.  She was admitted for medical stabilization and fever work up.     Diarrhea prompted abdominal work up which revealed cholecystitis.  Surgery consulted but due to pancytopenia, patient is not a candidate for surgical or interventional radiology interventions.    Patient has chronic pain due to metastatic lesions of the thoracic spine and brain with bony invasion.      She is managed as an outpatient by oncology and takes 50-60 mg oxycodone daily without side effects.  She states her pain at times is generalized, but mostly is intense in the thoracic spine, worse with movement, better with rest, heat or ice, she has had relief with gabapentin and lidocaine patches as well prior to admission.  She states she currently has mucositis pain and is hopeful nystatin and magic mouthwash continue to help.       This is in the setting of metastatic lung cancer with involvement in the brain, orbit and bone.  She and her family underwent a family meeting yesterday with oncology to review the prognostic outlook of her disease and symptom burden.  They have determined that comfort approach would be most in line  with patient's values and wish to have more information regarding hospice.  She has had recent increase of symptom burden with failure to thrive at home and pain that has not been well controlled.      Symptoms:   Pain - chronic with acute worsening following chemo 3 weeks ago.  Dyspnea - mild, requiring supplemental oxygen  Nausea without vomiting - vague feeling of abdominal discomfort and decreased appetite.  Diarrhea - acute on chronic, IBS.  Fatigue - worsened in the past 3 weeks.  xerostomia - dry mouth and mucositis secondary to recent radiation treatment.     Recommendations:      Please see assessments below for rationale.  1.Decisional Capacity -  Intact. Patient does not have an advance directive. Per  informed consent policy next of kin should be involved if patient becomes unable.  2. Pain- Patient with multifocal metastatic disease and pain secondary to bony involvement.  Primary pain consists of back and abdominal pain.  Pain more tolerable with scheduled oxycodone.  24 hour opioid use:  112.5 mg oxycodone, 0.5 mg dilaudid IV = 122.5 OME  - discontinue oxycodone - opioid rotation.  - Dilaudid 4 mg SL q 4 hours scheduled  - dilaudid IV 0.3 - 0.5 mg q 2 hours prn breakthrough pain.  - voltaren gel four times daily  - lidocaine patch application every evening.  - heating pad/ice patches for comfort.  3. Spiritual Care- Oriented to Spiritual Health as part of Palliative Care team.  4. Care Planning- Appreciate Care of Raven WHITFIELD.     Goals of care:  DNR/DNI, selective treatment. Patient and family awaiting resolution of cholecystitis for plan of care.  They plan to transition to comfort measures with hospice enrollment following that acute treatment.     Findings & plan of care discussed with: Bedside Nurse Maggy, and Hospitalist Dr. Coreas  Thank you for involving us in the patient's care.     Suly Arthur APRN, CNP  Pain Management and Palliative Care  [facility]  Pgr:  [phone]    Time Spent on this Encounter   Total unit/floor time 60 minutes, time consisted of the following, examination of the patient, reviewing the record and completing documentation. >50% of time spent in counseling and coordination of care.  Time spend counseling with patient and family consisted of the following topics, goals of care, care planning for discharge and symptom management.  Time spent in coordination of care with those listed above.     Interval History    Continued pain overnight, more tolerable with scheduled oxycodone.  Patient and family anticipating hospice informational meeting today.      Course of Hospitalization Discussions Data      Decision-Making & Goals of Care Discussion:  Discussed on November 5, 2019:     Met with patient and family at bedside to discuss ongoing symptom management plan of care.  Transitioned to SL dilaudid standard concentrate and educated the risks and benefits of opioid rotation for Claudia's symptom management.  They verbalized understanding.    Discussed overall prognosis in relation to further treatment of acute gallbladder diagnosis.  Reflected that the gallbladder, though it may cause some discomfort, would not likely lead to overall mortality and could be treated conservatively if the family would like to avoid further invasive procedures or prolonged hospitalization.  Family said that they would think about this as they wait to hear from hospitalist and surgical teams.      Discussed on November 4, 2019 with Suly DOWD, CNP:   Met with patient, two daughters and son-in-law at the bedside.  Reviewed prognostic conversation that they had with oncology yesterday.  Daughter reviewed the prognosis and verbalized that they wished to take a comfort approach.  Inquired if they knew about hospice enrollment and if that was the direction that they were indicating that they want to go, patient verbalized that yes she wanted to enroll in hospice  after her gallbladder was treated if possible.    I reviewed briefly about the hospice benefit and limitations on restorative care that are in place on hospice.  I emphasized symptom management and quality of life as the central emphasis for a patient on hospice.  Everyone verbalized understanding.    I discussed code status and patient stated that she wanted to be DNR/DNI at this time, she wanted to avoid any form of suffering and was comfortable with that decision.    Family requested informational meeting for hospice and to continue conversation as they knew more about the gallbladder treatment plan.           Review of Systems    CONSTITUTIONAL: NEGATIVE for fever, chills, change in weight  ENT/MOUTH: NEGATIVE for ear, mouth and throat problems  RESP: NEGATIVE for significant cough or SOB  CV: NEGATIVE for chest pain, palpitations or peripheral edema    Palliative Symptom Review (0=no symptom/no concern, 1=mild, 2=moderate, 3=severe):      Pain: 2-moderate      Fatigue: 1-mild      Nausea: 1-mild      Constipation: 0-none      Diarrhea: 1-mild      Depressive Symptoms: 0-none      Anxiety: 0-none      Drowsiness: 0-none      Poor Appetite: 2-moderate      Shortness of Breath: 0-none      Insomnia: 0-none    Physical Exam   Temp:  [96.9  F (36.1  C)-97.9  F (36.6  C)] 96.9  F (36.1  C)  Heart Rate:  [] 106  Resp:  [12-20] 12  BP: (136-143)/(76-84) 136/84  SpO2:  [94 %-99 %] 94 %  151 lbs 3.2 oz  GEN:  Alert, oriented x 3, appears uncomfortable, facial grimace, No apparent distress.  HEENT:  Normocephalic/atraumatic, no scleral icterus, no nasal discharge, mouth dry.  CV:  RRR, S1, S2; no murmurs or other irregularities noted.  +3 DP/PT pulses bilaterally; no edema bilateral lower extremeties.  RESP:  Clear to auscultation bilaterally without rales/rhonchi/wheezing/retractions.  Symmetric chest rise on inhalation noted.  Normal respiratory effort.  ABD:  Rounded, soft, non-tender/non-distended.  +BS  EXT:   Edema & pulses as noted above.  Color, moisture and sensation intact x 4.     M/S:   Tender to palpation over thoracic spine.    SKIN:  Dry to touch, no exanthems noted in the visualized areas.    NEURO: Symmetric strength +4/5.  Sensation to touch intact all extremities.   There is no area of allodynia or hyperesthesia.  PAIN BEHAVIOR: Cooperative  Psych:  Normal affect.  Calm, cooperative, conversant appropriately.      Medications       ceFEPIme (MAXIPIME) IV  1 g Intravenous Q12H     diclofenac  4 g Transdermal 4x Daily     folic acid  1 mg Oral Daily     gabapentin  300 mg Oral Daily     gabapentin  600 mg Oral At Bedtime     insulin aspart  1-7 Units Subcutaneous TID AC     insulin aspart  1-5 Units Subcutaneous At Bedtime     lactobacillus rhamnosus (GG)  1 capsule Oral BID     lidocaine  2 patch Transdermal Q24h    And     lidocaine   Transdermal Q24H    And     lidocaine   Transdermal Q8H     lidocaine visc 2% & maalox/mylanta w/simethicone & diphenhydramine  10 mL Swish & Swallow 4x Daily w/meals     metroNIDAZOLE  500 mg Oral TID     neomycin-bacitracin-polymyxin   Topical BID     nystatin  500,000 Units Swish & Swallow 4x Daily     oxyCODONE  10-15 mg Oral Q3H    Or     oxyCODONE  10-15 mg Oral Q3H     pantoprazole  40 mg Oral QAM AC     sodium chloride (PF)  10 mL Intracatheter Q7 Days       Data   Recent Labs   Lab 11/05/19  0605 11/04/19  1200 11/04/19  0518 11/04/19  0119 11/03/19  0824  10/31/19  0636  10/30/19  0749   WBC 6.5  --  6.8  --  8.8   < > 1.8*  --  1.1*   HGB 9.3*  --  9.1*  --  10.8*   < > 8.9*  --  9.7*     --  100  --  96   < > 97  --  95   PLT 34*  --  27*  --  30*   < > 30*  --  42*   *  --  147*  --  145*   < > 140  --  137   POTASSIUM 3.8 3.8 3.3*  --  3.7   < > 3.1*   < > 3.0*   CHLORIDE 114*  --  115*  --  115*   < > 111*  --  108   CO2 29  --  29  --  25   < > 23  --  22   BUN 16  --  18  --  22   < > 10  --  10   CR 0.63  --  0.60  --  0.76   < > 0.73  --  0.76    ANIONGAP 3  --  3  --  5   < > 6  --  7   BIBI 8.3*  --  8.7  --  9.4   < > 8.8  --  8.8   *  --  150* 178* 159*   < > 94  --  95   ALBUMIN  --   --   --   --   --   --  1.6*  --  1.9*   PROTTOTAL  --   --   --   --   --   --  5.0*  --  5.5*   BILITOTAL  --   --   --   --   --   --  0.5  --  0.8   ALKPHOS  --   --   --   --   --   --  74  --  80   ALT  --   --   --   --   --   --  50  --  70*   AST  --   --   --   --   --   --  20  --  30    < > = values in this interval not displayed.

## 2019-11-05 NOTE — PROGRESS NOTES
[facility]    Hospitalist Progress Note  Name: Claudia Simon    MRN: [id number]  Date of Service: 11/05/2019    Reason for hospital stay:      Acute cholecystitis    Neutropenic fever    Metastatic cancer    Failure to thrive    Summary of Stay: 74-year-old female with a history of metastatic lung cancer with known metastases to brain and bone, type 2 diabetes, irritable bowel syndrome, hypertension, and hyperlipidemia who presents here with diarrhea and epistaxis.  Patient is currently undergoing chemotherapy along with radiation.  Patient is followed by Dr. Roman who I believe this with [place] [facility].  In addition to diarrhea, patient does report some abdominal cramping and discomfort.  Stools are described as loose but nonbloody.  In terms of chemotherapy, patient has undergone 1 round approximately 3 weeks ago with carboplatin, pemetrexed, and pembrolizumab and has had 10 cycles of radiation therapy.  She does report some chronic baseline nausea with her chemo.  In addition, she does have epistaxis intermittently every 2 weeks.  She did report one episode of epistaxis.  She reports a temperature 100.8 morning of admission but denies any cough, shortness of breath, sore throat, rashes, or dysuria.       On presentation to the ED, patient was noted to have a low white blood count of 0.3.  Sodium was 129.  Urinalysis was negative but CT of the abdomen did suggest the possibility of acute cholecystitis.  She was given vancomycin and cefepime and admitted for further inpatient IV antibiotic for neutropenic fever and probable cholecystitis.     Surgery consulted but currently not an operative candidate.  Recommendation was made to consider interventional radiology to place cholecystostomy tube.        Problem list/Plan:    #Neutropenic fever:   --Patient is afebrile.  Concern for cholecystitis on HIDA scan and CT scan imaging.  --Patient has been on IV antibiotic, slowly improving.  Care plan  is discussed with patient and family at bedside and recommendation for cholecystostomy tube was discussed.  Family wanted treatment for acute cholecystitis including placement of tube by interventional radiology.  I discussed risks and benefits of placing the tube and family understood and wanted to proceed with the cholecystostomy tube.  I discussed with interventional radiology Dr. Morrow who stated that patient needs platelet transfusion tomorrow before IR placement of cholecystostomy tube.  I also discussed with surgical team and obtaining follow-up HIDA scan to determine if there is any change and and definitive cholecystotomy as needed.  --HIDA scan ordered, follow results and determine if cholecystostomy tube is needed.  If there is no evidence of acute cholecystitis on HIDA scan,planned cholecystostomy tube placement can be canceled     #Pancytopenia:   --Due to chemotherapy  --Anemia is worsening at 9.3 today.  Platelet count is 34 slightly up from yesterday at 27.  No evidence of active bleeding.  Patient received Neupogen as well as a unit of blood.  --Continue to monitor blood count for now.  --Hematology following    #Metastatic lung cancer with involvement of the brain, orbit, and bone:   --Appears to be poor prognosis.  Palliative care team is consulted and input is associated.  Family considering hospice care and likely discharge on hospice    #Abdominal cramping and diarrhea: Enteric panel negative.  Patient started on probiotics.  Likely secondary to chemotherapy.     #Hypertension: Patient is chronically on lisinopril 40 mg p.o. daily, and Toprol-XL 25 mg p.o. every 24 hours.    -Currently patient is on metoprolol.  Lisinopril is on hold.  May need to resume lisinopril if blood pressure remains elevated      #Type 2 diabetes: Chronically on metformin  mg p.o. daily.  Will hold for now while patient has active infection.  Medium intensity sliding scale.  BG well controlled.     #Thrush and  mucositis: Continue nystatin and MMW swish and swallow 4 times daily.     #History of GERD: Continue Protonix 40 mg IV every 24 hours.     #History of metastatic lung cancer: Oncology consulted, appreciate recs.     #History of left eye blindness.    #Goal of care: Patient is DNR/DNI.  Goal of care is being discussed with palliative care team and patient and family considering hospice care.  Patient can get cholecystostomy tube as a palliative approach to her cholecystitis and can still be discharged on hospice in the next 2 days.    DVT Prophylaxis: Pneumatic Compression Devices  Code Status: DNR/DNI  Disposition: Anticipate discharge on Thursday on hospice  Incidental Findings: None.  Family updated today: Yes, care conference.         Interval History   Patient was seen and examined by me twice today.  We had extensive discussion about treatment care plan.  She appears to be comfortable with pain controlled with narcotic medications.  She has mild abdominal pain and headache.  I discussed the risks and benefits of further treatment of her cholecystitis with cholecystostomy tube.  Family wanted to consider cholecystostomy tube and care was discussed with interventional radiology for the possibility of putting cholecystostomy tube.    Physical Exam   Temp: 96.9  F (36.1  C) Temp src: Axillary BP: 136/84   Heart Rate: 106 Resp: 12 SpO2: 94 % O2 Device: Nasal cannula Oxygen Delivery: 1 LPM  Vitals:    10/28/19 1425 10/31/19 1309 11/05/19 0513   Weight: 65.6 kg (144 lb 11.2 oz) 66.7 kg (147 lb) 68.6 kg (151 lb 3.2 oz)     Vital Signs with Ranges  Temp:  [96.9  F (36.1  C)-97.8  F (36.6  C)] 96.9  F (36.1  C)  Heart Rate:  [] 106  Resp:  [12-18] 12  BP: (136-143)/(83-84) 136/84  SpO2:  [94 %-99 %] 94 %  I/O last 3 completed shifts:  In: 200 [P.O.:200]  Out: -     GENERAL: No apparent distress. Awake, alert, and fully oriented.  HEENT: Normocephalic, atraumatic. Extraocular movements intact.  CARDIOVASCULAR:  Regular rate and rhythm without murmurs or rubs. No S3.  PULMONARY: Clear bilaterally.  GASTROINTESTINAL: Soft, non-tender, non-distended. Bowel sounds normoactive.   EXTREMITIES: No cyanosis or clubbing. No edema.  NEUROLOGICAL: CN 2-12 grossly intact, no focal neurological deficits.  DERMATOLOGICAL: No rash, ulcer, bruising, nor jaundice.     Medications       ceFEPIme (MAXIPIME) IV  1 g Intravenous Q12H     diclofenac  4 g Transdermal 4x Daily     folic acid  1 mg Oral Daily     gabapentin  300 mg Oral Daily     gabapentin  600 mg Oral At Bedtime     HYDROmorphone (STANDARD CONC)  4 mg Oral Q4H     insulin aspart  1-7 Units Subcutaneous TID AC     insulin aspart  1-5 Units Subcutaneous At Bedtime     lactobacillus rhamnosus (GG)  1 capsule Oral BID     lidocaine  2 patch Transdermal Q24h    And     lidocaine   Transdermal Q24H    And     lidocaine   Transdermal Q8H     lidocaine visc 2% & maalox/mylanta w/simethicone & diphenhydramine  10 mL Swish & Swallow 4x Daily w/meals     metroNIDAZOLE  500 mg Oral TID     neomycin-bacitracin-polymyxin   Topical BID     nystatin  500,000 Units Swish & Swallow 4x Daily     pantoprazole  40 mg Oral QAM AC     sodium chloride (PF)  10 mL Intracatheter Q7 Days     Data     Laboratory:  Recent Labs   Lab 11/05/19  0605 11/04/19  0518 11/03/19  0824   WBC 6.5 6.8 8.8   HGB 9.3* 9.1* 10.8*   HCT 29.7* 29.2* 32.6*    100 96   PLT 34* 27* 30*     Recent Labs   Lab 11/05/19  0605 11/04/19  1200 11/04/19  0518 11/04/19  0119 11/03/19  0824   *  --  147*  --  145*   POTASSIUM 3.8 3.8 3.3*  --  3.7   CHLORIDE 114*  --  115*  --  115*   CO2 29  --  29  --  25   ANIONGAP 3  --  3  --  5   *  --  150* 178* 159*   BUN 16  --  18  --  22   CR 0.63  --  0.60  --  0.76   GFRESTIMATED 87  --  89  --  76   GFRESTBLACK >90  --  >90  --  88   BIBI 8.3*  --  8.7  --  9.4     No results for input(s): CULT in the last 168 hours.    Imaging:  No results found for this or any  previous visit (from the past 24 hour(s)).

## 2019-11-05 NOTE — PLAN OF CARE
Afebrile. Continues to require 1 L oxygen in high 90s. LS diminished. BS hypoactive. Pt having generalized pain, received scheduled meds. Low Fat diet. A2 with walker and belt to ambulate, very slow to move. PICC - SL. Getting IV Maxipime. Denies nausea. Slept well. Alarm on bed for safety. Discharge TBD.

## 2019-11-05 NOTE — PROGRESS NOTES
[facility] Nurse Inpatient Wound Assessment   Reason for consultation: Evaluate and treat Right buttock wounds     Assessment  Right buttock wounds due to radiation per hospitalist- Inside gluteal cleft near coccyx is a dry, scabbed intact area currently not draining. Barrier cream applied PRN with loose incontinent stools. Right lateral fleshy buttocks has other skin changes from radiation- currently is an intact, maroon/purple, superficial raised area approx the size of a quarter. Neither area is over a bony prominence and per hospitalist these are skin changes from radiation. Will have staff use Enma spray to both and barrier cram with stool incontinence episodes to gluteal cleft.      Status: initial assessment    Treatment Plan  Right buttock wound: Every shift and PRN with incontinence episodes  1. Cleanse with Enma spray cleanser and bud white disposable washcloths ([id number])  2. Apply light layer of Critic-aid paste to reddened areas near anus   3. If paste becomes soiled, wipe clean but no need to totally remove paste and re-apply as needed  4. Pt must be positioned side to side when in bed with heals elevated, at all times. At this time no dressings necessary.  Would prefer to see pt positioned side to side only when in bed and up to chair no longer than 1-2 hours and sitting on a chair cushion.      Orders Written  [facility] Nurse follow-up plan:signing off     Nursing to notify the Provider(s) and re-consult the [facility] Nurse if wound(s) deteriorates or new skin concern.    Patient History  According to provider note(s):  74-year-old female with a history of metastatic lung cancer with known metastases to brain and bone, type 2 diabetes, irritable bowel syndrome, hypertension, and hyperlipidemia who presents here with diarrhea and epistaxis.  Patient is currently undergoing chemotherapy along with radiation.  Patient is followed by Dr. Roman who I believe this with [place] [facility].  In addition to diarrhea,  patient does report some abdominal cramping and discomfort.  Stools are described as loose but nonbloody.  In terms of chemotherapy, patient has undergone 1 round approximately 3 weeks ago with carboplatin, pemetrexed, and pembrolizumab and has had 10 cycles of radiation therapy.     Objective Data  Containment of urine/stool: Continent of bladder, Diaper and Incontinence Protocol    Active Diet Order  Orders Placed This Encounter      Combination Diet Regular Diet Adult; Low Fat Diet      Output:   I/O last 3 completed shifts:  In: 436 [P.O.:436]  Out: 100 [Urine:100]    Risk Assessment:   Sensory Perception: 3-->slightly limited  Moisture: 3-->occasionally moist  Activity: 3-->walks occasionally  Mobility: 2-->very limited  Nutrition: 3-->adequate  Friction and Shear: 2-->potential problem  Roe Score: 16                          Labs:   Recent Labs   Lab 11/05/19  0605  10/31/19  0636   ALBUMIN  --   --  1.6*   HGB 9.3*   < > 8.9*   WBC 6.5   < > 1.8*    < > = values in this interval not displayed.       Physical Exam  Skin inspection: focused buttocks    Wound Location:  Right buttock  Date of last photo none taken today  Wound History: 2 purple/maroon areas have shown up on skin, not over a bony prominence and per hospitalist these are skin changes from radiation. Both are approx the size of a quarter.    Palpation of the wound bed: normal   Periwound skin: intact and dry/scaly  Color: purple  Temperature: normal   Drainage:, none  Description of drainage: none  Odor: none  Pain: denies , none    Interventions  Current support surface: Standard  Atmos Air mattress  Current off-loading measures: Pillows under calves  Visual inspection of wound(s) completed  Wound Care: done per plan of care  Supplies: placed at the bedside, discussed with RN, discussed with family and discussed with patient  Education provided: importance of repositioning and plan of care  Discussed plan of care with Patient, Family, Nurse and  Physician    Clemencia Brito RN CWON

## 2019-11-05 NOTE — TELEPHONE ENCOUNTER
I placed call to [facility] at below number and advised of message from Dr. Weinberg below. She says that is just fine and she will let the family know. Fidelia Zuniga R.N.

## 2019-11-05 NOTE — PROGRESS NOTES
Patient was seen and examined by me this morning.  Care plan was discussed with family members as well as nursing team and hospice team.  Patient is complaining of headache and some abdominal discomfort.  No fever or chills.  She is not eating a lot but able to take some food.  She is able to ambulate.  I did discuss plan of care with interventional radiology who recommended trial of placement of gallbladder tube for drainage after platelet transfusion.  This was discussed with patient and family and they are agreeable with plan to place cholecystostomy tube.  This will be arranged tomorrow after transfusion of platelets.  Please review my detailed progress note at later time.  Hospice team to meet patient family this afternoon.

## 2019-11-05 NOTE — TELEPHONE ENCOUNTER
[facility] Henrique calling [phone].    Is wondering if Dr. Weinberg will continue to follow as attending as  Claudia will go home on hospice upon discharge.    Dr. Weinberg, please advise. Thank you, Fidelia Zuniga R.N.--triage

## 2019-11-05 NOTE — CONSULTS
Care Transition Initial Assessment - SW     Met with: Family  Daughters. PT sleeping at time of visit   Active Problems:    Neutropenic fever (H)       DATA  Lives With: spouse -  Lives in apartment. Daughters also assist with care for pt    Description of Support System: Supportive, Involved  Who is your support system?: , Children  Support Assessment: Adequate family and caregiver support, Adequate social supports. Family are able to provide 24 hour care pt at this time.   Identified issues/concerns regarding health management: Pt has H. O Lung cancer with mets to the bone and brain.     @   Resources List: Hospice   Info Hospice meeting set up for 11/5 @ 1300  Family do plan to enroll into Hospice when pt is medically able to d.c home      Transportation Anticipated: family or friend will provide    ASSESSMENT  Cognitive Status:  oriented  Concerns to be addressed: spoke with Inez. Confirmed appointment for today.  Family expressed frustration that the side effects of Chemo were not better discussed prior to treatment. Pt did have Radiation as planned then suddenly chemo.. Pt now having Gall bladder issues.  Family/pt would like this issue resolved before she returns home  Pt has her home walker here  Will need Hospital bed, bed side table,. Commode and new home o2 set up for support.   Family are hopefull to keep pt at home. SW did discuss that should placement be needed about out of pocket cost.  Family have not applied for MA yet but have application at home      PLAN  Following for New Hospice support with [facility] at discharge     [facility]  Inpatient Care Coordination   [phone]  [facility]

## 2019-11-05 NOTE — PROGRESS NOTES
[facility]    Hematology/Oncology Follow-up Note      Today's Date: 11/05/19  Date of Admission:  10/27/2019  Reason for Consult: Metastatic lung cancer        ASSESSMENT/ PLAN :  Claudia Simon is a 74 year old female with:      Metastatic lung cancer mets to brain and bone  Patient Dr. Roman at the [facility]  -10/16-cycle 1 carboplatin, pemetrexed and pembrolizumab  -Hold treatment while hospitalized  -She met with palliative care yesterday and meeting with hospice this afternoon.  I spoke to Dr. Roman yesterday and she would recommend next option of afatinib. Discussed this with the family and patient today and they are not sure they want to pursue any more treatment.  They plan to meet with hospice today and discuss options.  I will swing by again tomorrow to see her.      Pancytopenia  -Secondary to chemotherapy  -She was given Neupogen and WBC normal  -Transfuse for hemoglobin 8 or less or symptomatic  -Regarding thrombocytopenia, although rare, could be immune related thrombocytopenia and steroids may help.  Discussed this with patient and family and they are not interested in pursuing any steroid treatment at this time. They prefer a platelet transfusion and then cholecystostomy tube tomorrow with IR if possible.      Mucositis   -Slowly improving and will likely continue to improve as counts are improving.     Acute cholecystitis  -Per surgery recommendations.  Hospitalist has been in touch with IR and plan for platelet transfusion tomorrow morning and then cholecystostomy tube.        INTERIM HISTORY: Patient seen in her room, resting comfortably in bed.  Several family members at bedside.  She is feeling a little bit better today, although weak.  She is anxious to meet with the hospice team today.  She has no new complaints today.       MEDICATIONS:  Current Facility-Administered Medications   Medication     acetaminophen (TYLENOL) solution 650 mg     artificial saliva (BIOTENE  MT) solution 2 spray     ceFEPIme (MAXIPIME) 1g vial to attach to  ml bag for ADULTS or NS 50 ml bag for PEDS     glucose gel 15-30 g    Or     dextrose 50 % injection 25-50 mL    Or     glucagon injection 1 mg     diclofenac (VOLTAREN) 1 % topical gel 4 g     folic acid (FOLVITE) tablet 1 mg     gabapentin (NEURONTIN) solution 300 mg     gabapentin (NEURONTIN) solution 600 mg     heparin lock flush 10 UNIT/ML injection 2-5 mL     HYDROmorphone (PF) (DILAUDID) injection 0.3-0.5 mg     HYDROmorphone (STANDARD CONC) (DILAUDID) oral solution 4 mg     insulin aspart (NovoLOG) inj (RAPID ACTING)     insulin aspart (NovoLOG) inj (RAPID ACTING)     lactobacillus rhamnosus (GG) (CULTURELL) capsule 1 capsule     Lidocaine (LIDOCARE) 4 % Patch 2 patch    And     lidocaine patch REMOVAL    And     lidocaine patch in PLACE     lidocaine (LMX4) cream     lidocaine (LMX4) cream     lidocaine (LMX4) cream     lidocaine 1 % 0.1-1 mL     lidocaine 1 % 0.1-1 mL     lidocaine 1 % 0.1-1 mL     loperamide (IMODIUM) liquid 2 mg     LORazepam (ATIVAN) tablet 0.5 mg     magic mouthwash suspension (diphenhydramine, lidocaine, aluminum-magnesium & simethicone)     melatonin tablet 5 mg     metronidazole (FIRST-METRONIDAZOLE) suspension 500 mg     naloxone (NARCAN) injection 0.1-0.4 mg     neomycin-bacitracin-polymyxin (NEOSPORIN) ointment     nystatin (MYCOSTATIN) suspension 500,000 Units     ondansetron (ZOFRAN-ODT) ODT tab 4 mg    Or     ondansetron (ZOFRAN) injection 4 mg     pantoprazole (PROTONIX) 2 mg/mL suspension 40 mg     potassium chloride (KLOR-CON) Packet 20-40 mEq     potassium chloride 10 mEq in 100 mL intermittent infusion with 10 mg lidocaine     potassium chloride 10 mEq in 100 mL sterile water intermittent infusion (premix)     potassium chloride 20 mEq in 50 mL intermittent infusion     potassium chloride ER (K-DUR/KLOR-CON M) CR tablet 20-40 mEq     prochlorperazine (COMPAZINE) injection 5 mg    Or      "prochlorperazine (COMPAZINE) tablet 5 mg    Or     prochlorperazine (COMPAZINE) Suppository 12.5 mg     sodium chloride (PF) 0.9% PF flush 10 mL     sodium chloride (PF) 0.9% PF flush 10-20 mL     Facility-Administered Medications Ordered in Other Encounters   Medication     ioversol (OPTIRAY 320) iv solution 68% 100 mL           ALLERGIES:  Allergies   Allergen Reactions     Sulfa Drugs Anaphylaxis     Angioedema           PHYSICAL EXAM:  Vital signs:  Temp: 96.9  F (36.1  C) Temp src: Axillary BP: 136/84   Heart Rate: 106 Resp: 12 SpO2: 94 % O2 Device: Nasal cannula Oxygen Delivery: 1 LPM Height: 162.6 cm (5' 4\") Weight: 68.6 kg (151 lb 3.2 oz)  Estimated body mass index is 25.95 kg/m  as calculated from the following:    Height as of this encounter: 1.626 m (5' 4\").    Weight as of this encounter: 68.6 kg (151 lb 3.2 oz).      GENERAL:  Female, in no acute distress.   HEENT:  Normocephalic, left eye closed.   SKIN: No rash on exposed skin  PSYCH: Mood stable      LABS:  CBC RESULTS:   Recent Labs   Lab Test 11/05/19  0605   WBC 6.5   RBC 2.96*   HGB 9.3*   HCT 29.7*      MCH 31.4   MCHC 31.3*   RDW 16.2*   PLT 34*       Recent Labs   Lab Test 11/05/19  0605 11/04/19  1200 11/04/19  0518   *  --  147*   POTASSIUM 3.8 3.8 3.3*   CHLORIDE 114*  --  115*   CO2 29  --  29   ANIONGAP 3  --  3   *  --  150*   BUN 16  --  18   CR 0.63  --  0.60   BIBI 8.3*  --  8.7         María Stevens PA-C  Hematology/Oncology  [facility]      "

## 2019-11-05 NOTE — CONSULTS
Writer met with pt, spouse and daughters to review hospice benefit and philosophy.  Pt expressed desire to return home with hospice, family will provide 24 hr care.  Discharge time not yet determined, as pt would like to have cholecystostomy tube placed, and current plan per Dr. Coreas is to have this done tomorrow after platelet transfusion.  Would also need to have pt's pain better managed, on oral meds only, before discharge.      Pt will need a hospital bed, 1/2 rails, obt, SUSANNE overlay, O2 concentrator, high back wheelchair with cushion, commode, and 4ww (family request as her brakes do not work). Also, could consider oral suction with yankeur if this would be helpful with oral secretions/phlegm.   If family able to transport pt home, will also need to delivery portable tank to the hospital for transport.      Once a discharge plan is known, please contact  Hospice to coordinate enrollment, meds, and equipment.      Thank you for this consult,    Henrique Sullivan RN Referral specialist  [phone]

## 2019-11-05 NOTE — PLAN OF CARE
Afebrile. Requiring 1L oxygen. Lung sounds diminished. Low fat diet, poor appetite. Ambulating assist x2 GB and walker. Having difficulty managing pain, pain team consulted. Starting scheduled Dilaudid. LBM 11/4, bowel sounds hypoactive. PICC saline locked.

## 2019-11-06 NOTE — PLAN OF CARE
"Ambulatory Status:  Pt up A-1 with GB walker.    Pain:  On scheduled PO Dilaudid q4h.  Resp: LS diminished  GI:  Denies nausea.  Poor appetite.  BS active.    Tx:  Had HIDA scan completed this shift.  Labs:  Platelets: 34 Hgb: 9.3   Disposition:  TBD.   Vital signs:  Temp: 96.1  F (35.6  C) Temp src: Oral BP: (!) 164/70   Heart Rate: 95 Resp: 16 SpO2: 94 % O2 Device: Nasal cannula Oxygen Delivery: 1 LPM Height: 162.6 cm (5' 4\") Weight: 68.6 kg (151 lb 3.2 oz)  Estimated body mass index is 25.95 kg/m  as calculated from the following:    Height as of this encounter: 1.626 m (5' 4\").    Weight as of this encounter: 68.6 kg (151 lb 3.2 oz).          "

## 2019-11-06 NOTE — PLAN OF CARE
Pt remains hospitalized for neutropenic fever   Tachy at times, currently on 1L NC,   PICC in place saline locked, continues IV Maxipime   Up with assist x 2 with gait belt, to BSC  Scheduled oral Dilaudid given x 2   +2, +3 LE/UE edema

## 2019-11-06 NOTE — PROGRESS NOTES
[facility]    Hematology/Oncology Follow-up Note    Patient seen, but not examined. Spoke to daughter while pt was in the bathroom. They are planning on enrolling in hospice once shmuel tube is placed and she is discharged. Dr Roman was notified. We will sign off. Please call with any questions.      María Stevens PA-C  Hematology/Oncology  [facility]

## 2019-11-06 NOTE — PLAN OF CARE
SLP: Session attempted however pt/family report just finishing up breakfast ~ 1 PM (took her between 10-1) and currently very fatigued. Pleasantly declining session at this time, requesting to rest, but agreeable for writer to come back tomorrow for her breakfast tray - planned for 9 AM. Will re-schedule for that time.

## 2019-11-06 NOTE — PROGRESS NOTES
[facility]  Hospitalist Progress Note    Summary:  74-year-old female with a history of metastatic lung cancer with known metastases to brain and bone, type 2 diabetes, irritable bowel syndrome, hypertension, and hyperlipidemia who presents here with diarrhea and epistaxis.  Patient is currently undergoing chemotherapy along with radiation.  Patient is followed by Dr. Roman, [facility]. In addition to diarrhea, patient does report some abdominal cramping and discomfort.  Stools are described as loose but nonbloody.  In terms of chemotherapy, patient has undergone 1 round approximately 3 weeks ago with carboplatin, pemetrexed, and pembrolizumab and has had 10 cycles of radiation therapy.  She does report some chronic baseline nausea with her chemo.  In addition, she does have epistaxis intermittently every 2 weeks.  She did report one episode of epistaxis. She reports a temperature 100.8 morning of admission but denies any cough, shortness of breath, sore throat, rashes, or dysuria. IV abx started for possible cholecystitis. However, following HIDA scan and improvements in clinical status abx were stopped on 11/6/19. Patient elective in-home hospice cares and will discharge once pain controlled.     Assessment & Plan     Neutropenic fever; history of metastatic lung cancer involving the brain, orbit and bone; Non-severe malnutrition   -Oncology consulted-appreciate assistance  -Palliative care/hospice consulted- patient plans to go with in-home hospice once adequate pain control obtained  -HIDA scan completed on 11/5 and negative for acute cholecystitis.  No indication for IR placement of cholecystostomy tube.  Reviewed culture data and negative to date  -Discontinue Flagyl and Cefepime (10/29 - 11/6)  -Pain control with oral Dilaudid.  Will assess narcotic needs in a.m. to see if adequate pain control was obtained    Pancytopenia  -Due to chemotherapy  - Improved with Neupogen   -Hold monitor  CBC/BMP with goals of care focused on comfort     Abdominal cramping and diarrhea  -Follow culture data but no growth to date.  Likely secondary to chemotherapy    Type 2 diabetes  -Hold metformin 500 mg daily  -Sliding scale insulin: Point-of-care glucose testing: Hypoglycemia protocol PRN    Thrush and mucositis  -Continue nystatin and Magic mouthwash swish and swallow 4 times daily    History of GERD  -Protonix 40 mg IV every 24 hours    History of left eye blindness  -Complicates cares    DVT Prophylaxis: Pneumatic Compression Devices  Code Status: DNR/DNI  Expected discharge: Tomorrow, recommended to prior living arrangement once adequate pain management/ tolerating PO medications.    Lindsey Sousa, DO  Text Page (7am - 6pm, M-F)    Interval History   Patient provides minimal verbal information.  Daughter is present at bedside and answers majority of questions for her mother.  Reports that her pain was a 10 out of 10 this morning and was given IV Dilaudid which decreased her pain to a 6 out of 10.  However patient is sitting comfortably in bed.  No chest pain or palpitations.  Reports being sore all over.  Fatigue.  Poor appetite.  Increased oral secretions.  No nausea or vomiting.    -Data reviewed today: I reviewed all new labs and imaging results over the last 24 hours.     Physical Exam   Temp: 97.3  F (36.3  C) Temp src: Oral BP: 134/78   Heart Rate: 101 Resp: 16 SpO2: 97 % O2 Device: Nasal cannula Oxygen Delivery: 1 LPM  Vitals:    10/28/19 1425 10/31/19 1309 11/05/19 0513   Weight: 65.6 kg (144 lb 11.2 oz) 66.7 kg (147 lb) 68.6 kg (151 lb 3.2 oz)     Vital Signs with Ranges  Temp:  [96.1  F (35.6  C)-97.3  F (36.3  C)] 97.3  F (36.3  C)  Heart Rate:  [] 101  Resp:  [12-16] 16  BP: (110-164)/(70-84) 134/78  SpO2:  [94 %-98 %] 97 %  I/O last 3 completed shifts:  In: 375 [P.O.:375]  Out: -     Constitutional: Awake, alert, no apparent distress. Non-toxic. Older than stated age. Chronically ill.    HEENT: Atraumatic. Normocephalic. Conjunctiva non-injected. Sclera anicteric. MMM.   Respiratory: Moves air bilaterally. Clear to auscultation bilaterally, no crackles or wheezing  Cardiovascular: Regular rate and rhythm, normal S1 and S2, and no murmur noted  GI: Normal bowel sounds, soft, non-distended, non-tender  Skin/Integumen: No rashes, no cyanosis. Abrasion on right buttock without erythema or drainage. +1 edema.       Medications       ceFEPIme (MAXIPIME) IV  1 g Intravenous Q12H     diclofenac  4 g Transdermal 4x Daily     folic acid  1 mg Oral Daily     gabapentin  300 mg Oral Daily     gabapentin  600 mg Oral At Bedtime     HYDROmorphone (STANDARD CONC)  4 mg Oral Q4H     insulin aspart  1-7 Units Subcutaneous TID AC     insulin aspart  1-5 Units Subcutaneous At Bedtime     lactobacillus rhamnosus (GG)  1 capsule Oral BID     lidocaine  2 patch Transdermal Q24h    And     lidocaine   Transdermal Q24H    And     lidocaine   Transdermal Q8H     lidocaine visc 2% & maalox/mylanta w/simethicone & diphenhydramine  10 mL Swish & Swallow 4x Daily w/meals     metroNIDAZOLE  500 mg Oral TID     neomycin-bacitracin-polymyxin   Topical BID     nystatin  500,000 Units Swish & Swallow 4x Daily     pantoprazole  40 mg Oral QAM AC     sodium chloride (PF)  10 mL Intracatheter Q7 Days       Data   Recent Labs   Lab 11/06/19  0554 11/05/19  0605 11/04/19  1200 11/04/19  0518  10/31/19  0636   WBC 6.7 6.5  --  6.8   < > 1.8*   HGB 9.4* 9.3*  --  9.1*   < > 8.9*   * 100  --  100   < > 97   PLT 44* 34*  --  27*   < > 30*    146*  --  147*   < > 140   POTASSIUM 3.9 3.8 3.8 3.3*   < > 3.1*   CHLORIDE 111* 114*  --  115*   < > 111*   CO2 31 29  --  29   < > 23   BUN 16 16  --  18   < > 10   CR 0.62 0.63  --  0.60   < > 0.73   ANIONGAP 2* 3  --  3   < > 6   BIBI 8.5 8.3*  --  8.7   < > 8.8   * 125*  --  150*   < > 94   ALBUMIN 1.8*  --   --   --   --  1.6*   PROTTOTAL 5.3*  --   --   --   --  5.0*    BILITOTAL 0.5  --   --   --   --  0.5   ALKPHOS 94  --   --   --   --  74   ALT 26  --   --   --   --  50   AST 30  --   --   --   --  20    < > = values in this interval not displayed.       Recent Results (from the past 24 hour(s))   NM HepatOBiliary Scan    Narrative    HEPATOBILIARY SCAN.  11/05/2019.    INDICATION: Cholecystitis/cholangitis follow-up.    Comparison studies: Hepatobiliary scan 10/28/2019 and abdominal ultrasound 10/27/2019 and CT chest, abdomen and pelvis 10/27/2019    Technique: 6.5 mCi of technetium 99m mebrofenin injected IV. Imaging then performed for 58 minutes.    Findings: Homogeneous tracer activity throughout the liver. No focal defects. Trace activity identified within the intrahepatic bile ducts and common bile duct at approximately 20 minutes, within the gallbladder at 30 minutes, and within the small bowel   at 42 minutes. Cholecystokinin not administered.      Impression    Impression: Normal hepatobiliary scan. Activity is present within the gallbladder.

## 2019-11-06 NOTE — PLAN OF CARE
VSS. Assist x2 GB and walker. Low fat diet, poor appetite. 1L oxygen, stating around 93%. VELAZQUEZ, lung sounds diminished. Scheduled oral solution dilaudid solution and PRN IV dilaudid given for pain. Pt continues to state they are in pain. LBM 11/6. R buttocks with scab, neosporin applied. PICC saline locked.

## 2019-11-06 NOTE — PROGRESS NOTES
CLINICAL NUTRITION SERVICES - REASSESSMENT NOTE      Malnutrition: (11/06/2019)  % Weight Loss:  Up to 5% in 1 month (non-severe malnutrition) --> PTA  % Intake:  </= 75% for >/= 7 days (non-severe malnutrition) --> continues during admit, slight improvement with use of Magic Cup supplements  Subcutaneous Fat Loss:  Orbital region mild/moderate depletion - will not use given only one indicator   Muscle Loss:  Acromion bone region moderate depletion, Anterior thigh region moderate depletion and Posterior calf region moderate depletion, clavicle region mild/moderate depletion   Fluid Retention: At least mild     Malnutrition Diagnosis: Non-severe malnutrition (previously meeting severe)  In Context of:  Acute illness or injury  Chronic illness or disease       EVALUATION OF PROGRESS TOWARD GOALS   Diet: Low fat/regular textures + Magic Cup between meals BID    Intake/Tolerance:  Based on last RD reassessment: variable PO intakes since admit (0-100% of meals, sometimes single food items including popsicles, infrequent meal ordering).  Per review of flowsheets, variable ordering and meal intakes continue, 0-100%.  Barriers to PO intakes including: mentation, pain, WOB, nausea, xerostomia, overall fatigue.  Suspect continues to meet <50-75% needs orally.  SLP following and ok for regular textures, self-selection of pureed as needed?    Daughter feeding patient while in room.  Continue to self-select pureed foods d/t mouth/throat pain per daughter's report.  Patient herself notes she would like to continue with Magic Cups while admitted.  Likes the fruit-flavored options.  Eating at least 2 daily.        ASSESSED NUTRITION NEEDS:  Dosing Weight 65.5 kg (based on an actual weight)  Estimated Energy Needs: 7022-3089+ kcals (25-30+ Kcal/Kg)  Justification: maintenance  Estimated Protein Needs: 66-79+ grams protein (1-1.2+ g pro/Kg)  Justification: maintenance  Estimated Fluid Needs: >1  mL/Kcal  Justification: maintenance      NEW FINDINGS:   - Palliative following: DNR/DNI, selective treatment.  Plans for transition to comfort measures and hospice following acute treatment of cholecystitis.  - WOCN following: documentation of R buttocks wound d/t radiation, skin intact.  No documentation of PI.    - Medications reviewed including magic mouthwash.    - Labs reviewed.  - Wt trending reviewed.  At risk for true wt loss being masked by fluid shifts/retention:  Vitals:    10/28/19 1425 10/31/19 1309 11/05/19 0513 11/06/19 0811   Weight: 65.6 kg (144 lb 11.2 oz) 66.7 kg (147 lb) 68.6 kg (151 lb 3.2 oz) 68.9 kg (151 lb 14.4 oz)   - Stooling patterns reviewed, having diarrhea.    Previous Goals:   Pt to consume >50% of meals ordered TID + 2-3 oral nutritional supplements per day   Evaluation: Not met    Previous Nutrition Diagnosis:   Inadequate oral intake related to recent trouble swallowing due to burning sensation, dry mouth/throat area, and mouth sores as evidenced by pt likely consuming <50% of nutritional needs for >/=5 days and noted muscle/weight loss.   Evaluation: No change, updated to more pertinent below       CURRENT NUTRITION DIAGNOSIS  Malnutrition related to overall decrease in appetite with mouth/throat pain, nausea, WOB, fatigue, mentation, all in the setting of CA leading to wt and LBM losses as evidenced by meeting <75% needs since admission, wt loss of 5% in 1 month PTA, muscle loss, risk of edema masking true wt trending.    INTERVENTIONS  Recommendations / Nutrition Prescription  Diet per MD/SLP.    Patient prefers to continue Magic Cups during admit.  Scheduled BID between meals.    Implementation  Collaboration and Referral of Nutrition care: Discussed POC with team during rounds.    Goals  Patient to consume at least 50% of meals BID+ 2 supplements daily while inpatient.      MONITORING AND EVALUATION:  Progress towards goals will be monitored and evaluated per protocol and  Practice Guidelines      Nancy Serna RD, LD  Clinical Dietitian  3rd floor/ICU: [phone]  All other floors: [phone]  Weekend/holiday: [phone]

## 2019-11-07 NOTE — DISCHARGE SUMMARY
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 Sherwood is a 74-year-old female with a history of metastatic lung cancer with known metastases to orbit, brain and bone, type 2 diabetes, irritable bowel syndrome, hypertension, and hyperlipidemia who presents here with diarrhea and epistaxis.      Neutropenic fever: Patient on admission had a fever of 100.8 and CBC was consistent with pancytopenia.  Of note she had had one round of chemotherapy 3 weeks prior to admission with carboplatin, pemetrexed, and pembrolizumab. Dr. Roman, [facility] evaluated patient. She was treated with Neupogen and white count improved. Imaging was initially concerning for cholecystitis.  IV antibiotics was started and surgery was consulted.  Patient was not a surgical candidate and I was consulted to consider a percutaneous cholecystectomy tube.  HIDA scan completed on 11/6/2019 showed a normal gallbladder.  Supportive cares for abdominal pain was initiated.  Following discussion with palliative care patient is elected to focus on comfort and go home with hospice.    Goals of care: Regular hospitalization patient met with palliative care.  Following discussion with patient and family goals were redirected towards comfort.  Patient's pain and symptoms were adequately controlled prior to discharge.  Will directly with palliative care and hospice to arrange discharge to home.  Patient was discharged with  needed medications per hospice recommendations.    Lindsey Sousa,     Significant Results and Procedures     11/5/19 HIDA SCAN IMPRESSION:  Normal hepatobiliary scan. Activity is present within the gallbladder.    Pending Results   These results will be followed up by PCP -  No indication for intervention as patient is hospice   Unresulted Labs Ordered in the Past 30 Days of this Admission     Date and Time Order Name Status Description    10/29/2019 0833 Platelets prepare order unit In process     10/27/2019 1145 Platelets prepare order unit In process           Code Status   DNR / DNI       Primary Care Physician   Sekou Craig    Physical Exam   Temp: 97.1  F (36.2  C) Temp src: Oral BP: 102/62   Heart Rate: 69 Resp: 16 SpO2: 98 % O2 Device: Nasal cannula Oxygen Delivery: 1 LPM  Vitals:    10/31/19 1309 11/05/19 0513 11/06/19 0811   Weight: 66.7 kg (147 lb) 68.6 kg (151 lb 3.2 oz) 68.9 kg (151 lb 14.4 oz)     Vital Signs with Ranges  Temp:  [97.1  F (36.2  C)-98  F (36.7  C)] 97.1  F (36.2  C)  Heart Rate:  [69-99] 69  Resp:  [16] 16  BP: (102-132)/(62-70) 102/62  SpO2:  [94 %-98 %] 98 %  I/O last 3 completed shifts:  In: 240 [P.O.:240]  Out: -     Constitutional: Awake, alert, mild distress with movement. Older than stated age. Chronically ill.   HEENT: Atraumatic. Normocephalic. Conjunctiva non-injected. Sclera anicteric. MMM.   Respiratory: Moves air bilaterally. Clear to auscultation bilaterally, no crackles or wheezing  Cardiovascular: Regular rate and rhythm, normal S1 and S2, and no murmur noted  GI: Normal bowel sounds, soft, non-distended, non-tender  Skin/Integumen: No rashes, no cyanosis. Abrasion on right buttock without erythema or drainage. +1 edema    Discharge Disposition   Discharged to home with hospice  Condition at discharge: Terminal    Consultations This Hospital Stay   PHARMACY TO DOSE VANCO  PHARMACY TO DOSE VANCO  HEMATOLOGY & ONCOLOGY IP CONSULT  NUTRITION SERVICES ADULT IP  CONSULT  SPIRITUAL HEALTH SERVICES IP CONSULT  CARE COORDINATOR IP CONSULT  SURGERY GENERAL IP CONSULT  PAIN MANAGEMENT ADULT IP CONSULT  SPEECH LANGUAGE PATH ADULT IP CONSULT  PALLIATIVE CARE ADULT IP CONSULT  VASCULAR ACCESS ADULT IP CONSULT  WOUND OSTOMY CONTINENCE NURSE  IP CONSULT  SOCIAL WORK IP CONSULT    Time Spent on this Encounter   Lindsey THAKUR DO, personally saw the patient today and spent greater than 30 minutes discharging this patient.    Discharge Orders   No discharge procedures on file.  Discharge Medications   Current Discharge Medication List      CONTINUE these medications which have NOT CHANGED    Details   cholecalciferol (VITAMIN D3) 5000 units (125 mcg) capsule Take 5,000 Units by mouth daily      !! dexamethasone (DECADRON) 2 MG tablet Take 4 mg with supper on 10/17, 4 mg twice daily with meals on 10/18, 4 mg with breakfast on 10/19, 2 mg with breakfast x 3 days, 1 mg with breakfast x 3 days, then off  Qty: 15 tablet, Refills: 0    Associated Diagnoses: Primary lung adenocarcinoma, left (H)      !! folic acid (FOLVITE) 1 MG tablet Take 1 tablet (1 mg) by mouth daily  Qty: 30 tablet, Refills: 0    Associated Diagnoses: Primary lung adenocarcinoma, left (H)      !! gabapentin (NEURONTIN) 300 MG capsule Take 600 mg by mouth At Bedtime      !! gabapentin (NEURONTIN) 300 MG capsule Take 1 capsule (300 mg) by mouth daily At Noon.  Qty: 90 capsule, Refills: 3    Associated Diagnoses: Type 2 diabetes mellitus with diabetic neuropathy, without long-term current use of insulin (H); Brain mass      lidocaine (LIDODERM) 5 % patch Place 2 patches onto the skin every 24 hours To prevent lidocaine toxicity, patient should be patch free for 12 hrs daily.  Qty: 60 patch, Refills: 1    Associated Diagnoses: Disorder of bone and cartilage      lisinopril (PRINIVIL/ZESTRIL) 40 MG tablet Take 1 tablet (40 mg) by mouth daily  Qty: 90 tablet, Refills: 2    Associated Diagnoses: Benign essential hypertension       LORazepam (ATIVAN) 0.5 MG tablet Take 1 tablet (0.5 mg) by mouth every 6 hours as needed (Nausea/Vomiting)  Qty: 30 tablet, Refills: 3    Associated Diagnoses: Primary lung adenocarcinoma, left (H)      melatonin 5 MG tablet Take 5 mg by mouth nightly as needed for sleep      metoprolol succinate ER (TOPROL-XL) 25 MG 24 hr tablet TAKE ONE TABLET BY MOUTH ONE TIME DAILY  Qty: 90 tablet, Refills: 0    Associated Diagnoses: Essential hypertension, benign      nystatin (MYCOSTATIN) 948250 UNIT/ML suspension Swish and swallow 5 mL four times daily  Qty: 400 mL, Refills: 1    Associated Diagnoses: Thrush      omeprazole 20 MG tablet Take 1 tablet (20 mg) by mouth daily  Qty: 30 tablet, Refills: 0    Associated Diagnoses: Gastroesophageal reflux disease without esophagitis      ondansetron (ZOFRAN) 8 MG tablet Take 1 tablet (8 mg) by mouth every 8 hours as needed for nausea (vomiting)  Qty: 30 tablet, Refills: 11    Associated Diagnoses: Primary lung adenocarcinoma, left (H)      oxyCODONE (ROXICODONE) 5 MG tablet Take 2 tablets (10 mg) by mouth every 4 hours as needed for moderate to severe pain  Qty: 100 tablet, Refills: 0    Associated Diagnoses: Cancer related pain      polyethylene glycol (MIRALAX/GLYCOLAX) packet Take 17 g by mouth daily  Qty: 30 packet, Refills: 11    Associated Diagnoses: Therapeutic opioid induced constipation      senna (SENOKOT) 8.6 MG tablet Take 1-2 tablets by mouth 2 times daily      sucralfate (CARAFATE) 1 GM/10ML suspension Take 10 mLs (1 g) by mouth 4 times daily  Qty: 420 mL, Refills: 1    Associated Diagnoses: Gastroesophageal reflux disease with esophagitis      blood glucose (CONTOUR NEXT TEST) test strip Use to test blood sugar 2 times daily or as directed.  Qty: 60 strip, Refills: 11    Associated Diagnoses: Steroid-induced diabetes mellitus (H)      !! dexamethasone (DECADRON) 4 MG tablet Take 1 tablet (4 mg) by mouth 2 times daily (with meals) Start one day prior to Pemetrexed  (Alimta), continue on the day of treatment, and the day following Pemetrexed.  Qty: 6 tablet, Refills: 11    Associated Diagnoses: Primary lung adenocarcinoma, left (H)      !! folic acid (FOLVITE) 1 MG tablet Take 1 tablet (1,000 mcg) by mouth daily Begin 7 days before Pemetrexed (Alimta) starts and continue for 21 days after Pemetrexed is discontinued.  Qty: 30 tablet, Refills: 11    Associated Diagnoses: Primary lung adenocarcinoma, left (H)      metFORMIN (GLUCOPHAGE-XR) 500 MG 24 hr tablet Take 500 mg by mouth daily (with dinner) Only take if blood sugar is above 140.      ondansetron (ZOFRAN-ODT) 8 MG ODT tab Take 1 tablet (8 mg) by mouth every 8 hours as needed for nausea  Qty: 30 tablet, Refills: 11    Associated Diagnoses: Nausea      prochlorperazine (COMPAZINE) 10 MG tablet Take 0.5 tablets (5 mg) by mouth every 6 hours as needed (Nausea/Vomiting)  Qty: 30 tablet, Refills: 11    Associated Diagnoses: Primary lung adenocarcinoma, left (H)       !! - Potential duplicate medications found. Please discuss with provider.        Allergies   Allergies   Allergen Reactions     Sulfa Drugs Anaphylaxis     Angioedema       Data   Most Recent 3 CBC's:  Recent Labs   Lab Test 11/06/19  0554 11/05/19  0605 11/04/19  0518   WBC 6.7 6.5 6.8   HGB 9.4* 9.3* 9.1*   * 100 100   PLT 44* 34* 27*      Most Recent 3 BMP's:  Recent Labs   Lab Test 11/06/19  0554 11/05/19  0605 11/04/19  1200 11/04/19  0518    146*  --  147*   POTASSIUM 3.9 3.8 3.8 3.3*   CHLORIDE 111* 114*  --  115*   CO2 31 29  --  29   BUN 16 16  --  18   CR 0.62 0.63  --  0.60   ANIONGAP 2* 3  --  3   BIBI 8.5 8.3*  --  8.7   * 125*  --  150*     Most Recent 2 LFT's:  Recent Labs   Lab Test 11/06/19  0554 10/31/19  0636   AST 30 20   ALT 26 50   ALKPHOS 94 74   BILITOTAL 0.5 0.5     Most Recent INR's and Anticoagulation Dosing History:  Anticoagulation Dose History     Recent Dosing and Labs Latest Ref Rng & Units 4/25/2006 10/13/2009  9/17/2019    INR 0.86 - 1.14 0.98 1.02 0.99        Most Recent 3 Troponin's:No lab results found.  Most Recent Cholesterol Panel:  Recent Labs   Lab Test 07/27/18  1120   CHOL 203*   *   HDL 40*   TRIG 162*     Most Recent 6 Bacteria Isolates From Any Culture (See EPIC Reports for Culture Details):  Recent Labs   Lab Test 10/27/19  1148 10/27/19  1109   CULT No growth No growth     Most Recent TSH, T4 and A1c Labs:  Recent Labs   Lab Test 10/25/19  1359 09/18/19  0342   TSH 3.30  --    T4 0.84  --    A1C  --  6.1*

## 2019-11-07 NOTE — PLAN OF CARE
Pt states pain is better, only needing scheduled dilaudid, mucositis treated with scheduled S&S and magic mouthwash. Noted pt continues to have some difficulty swallowing. Coccyx red, barrier cream applied. Pt up to BR with 1 assist and walker. Plan for discharge home with hospice today.

## 2019-11-07 NOTE — PROGRESS NOTES
Patient alert and oriented.  Up with 2 gait belt walker.  Pain managed with scheduled and prn x1 dilaudid solution.  Lung sounds dim/clear.  02 98% on 1L via NC (sent home with home 02).  PICC to right arm removed per order/protocol.      Patient discharge medications and summary reviewed with patient/daughter/spouse.  Questions encouraged and answered.   Patient discharged with home hospice set up.  Discharged with belongings and new medications.  Patient wheeled to exit via staff.  Patient  and daughter here for transport.

## 2019-11-07 NOTE — PLAN OF CARE
Discharge Planner SLP   Patient plan for discharge: home with hospice today  Current status: Swallow tx completed with breakfast meal consisting of DD1 solids, DD2 solids, thin liquids. Pt upright, assist with feeding from dtr. Received medication for throat comfort/numbing prior to intake, which they report is helping. Slow mastication/oral clearance with DD2 solids, limited endurance for same but able to tolerate without s/s noted. Puree solids/thin liquids more timely, no s/s noted. Pt and dtr aware of how to appropriately modify diet to both DD1/DD2 for comfort, independent with swallow strategies.     Recommend continue with current plan Regular Low-Fat diet with patient/family pursing ordering pureed or dysphagia diet level 2 items.  Ensure that patient is upright for all po intake and remain upright for 30 minutes after meals; small bites; small sips via straw; alternate liquids and solids as needed.    Family asking re: magic cup and boost breeze supplements for home, provided some information re: ordering magic cup online however paged registered dietician for more information.     Barriers to return to prior living situation: no SLP barriers  Recommendations for discharge: home with family  Rationale for recommendations: Swallow goals met, pt to discharge home with hospice today, no further SLP indicated.        Entered by: Sabrina Trinidad 11/07/2019 9:13 AM     Speech Language Therapy Discharge Summary    Reason for therapy discharge:    Discharged to home with hospice  All goals and outcomes met, no further needs identified.    Progress towards therapy goal(s). See goals on Care Plan in [facility] electronic health record for goal details.  Goals met    Therapy recommendation(s):    No further therapy is recommended.

## 2019-11-07 NOTE — PROGRESS NOTES
[facility]  Palliative Care Progress Note  Text Page     Assessment & Plan      Palliative Care Assessment:  Claudia Simon is a 74 year old female with history of NSCLC with metastatic disease to brain and thoracic spine who presented with neutropenic fever, diarrhea, epistaxis and nausea.  Per family report, patient had been working to control post chemo/radiation symptoms including pain, nausea, mucositis, diarrhea and fatigue. Patient has been dependent in cares for a long time and had progressively worsened since the chemo treatment. Patient's daughter reported that she got a nose bleed that would not stop and thus they presented to the ED for care.  On arrival patient was stabilized and labs were noted to find pancytopenia and hyponatremia of 129.  She had mild fever on day of presentation as well.  She was admitted for medical stabilization and fever work up.     Diarrhea prompted abdominal work up which revealed cholecystitis.  Surgery consulted but due to pancytopenia, patient is not a candidate for surgical or interventional radiology interventions.    Patient has chronic pain due to metastatic lesions of the thoracic spine and brain with bony invasion.      She is managed as an outpatient by oncology and takes 50-60 mg oxycodone daily without side effects.  She states her pain at times is generalized, but mostly is intense in the thoracic spine, worse with movement, better with rest, heat or ice, she has had relief with gabapentin and lidocaine patches as well prior to admission.  She states she currently has mucositis pain and is hopeful nystatin and magic mouthwash continue to help.       This is in the setting of metastatic lung cancer with involvement in the brain, orbit and bone.  She and her family underwent a family meeting yesterday with oncology to review the prognostic outlook of her disease and symptom burden.  They have determined that comfort approach would be most in line  with patient's values and wish to have more information regarding hospice.  She has had recent increase of symptom burden with failure to thrive at home and pain that has not been well controlled.      Symptoms:   Pain - chronic with acute worsening following chemo 3 weeks ago.  Dyspnea - mild, requiring supplemental oxygen  Nausea without vomiting - vague feeling of abdominal discomfort and decreased appetite.  Diarrhea - acute on chronic, IBS.  Fatigue - worsened in the past 3 weeks.  xerostomia - dry mouth and mucositis secondary to recent radiation treatment.     Recommendations:      Please see assessments below for rationale.  1.Decisional Capacity -  Intact. Patient does not have an advance directive. Per  informed consent policy next of kin should be involved if patient becomes unable.  2. Pain- Patient with multifocal metastatic disease and pain secondary to bony involvement.  Primary pain consists of back and abdominal pain.  Pain more tolerable with scheduled oxycodone.  24 hour opioid use:  112.5 mg oxycodone, 0.5 mg dilaudid IV = 122.5 OME  - discontinue oxycodone - opioid rotation.  - Dilaudid 4 mg SL q 4 hours scheduled  - dilaudid IV 0.3 - 0.5 mg q 2 hours prn breakthrough pain.  - voltaren gel four times daily  - lidocaine patch application every evening.  - heating pad/ice patches for comfort.  3. Spiritual Care- Oriented to Spiritual Health as part of Palliative Care team.  4. Care Planning- Appreciate Care of Ravensonny WHITFIELD.  Appreciate care of Henrique with  hospice services.    - hospice medications written for today to be filled at discharge pharmacy.  Patient to go to her home and will travel by private vehicle.     Goals of care:  DNR/DNI, home on hospice today.     Findings & plan of care discussed with: Bedside Nurse Maggy, and Hospitalist Dr. Coreas  Thank you for involving us in the patient's care.     Suly Arthur APRN, CNP  Pain Management and Palliative Care  [place]  [facility]  Pgr: [phone]    Time Spent on this Encounter   Total unit/floor time 45 minutes, time consisted of the following, examination of the patient, reviewing the record and completing documentation. >50% of time spent in counseling and coordination of care.  Time spend counseling with patient and family consisted of the following topics, care planning for discharge.  Time spent in coordination of care with those listed above.     Interval History   Stable today, more alert on exam.  Pain well controlled, patient is anticipating discharge around 12 noon.     Course of Hospitalization Discussions Data      Decision-Making & Goals of Care Discussion:  Discussed on November 5, 2019:     Met with patient and family at bedside to discuss ongoing symptom management plan of care.  Transitioned to SL dilaudid standard concentrate and educated the risks and benefits of opioid rotation for Caludia's symptom management.  They verbalized understanding.    Discussed overall prognosis in relation to further treatment of acute gallbladder diagnosis.  Reflected that the gallbladder, though it may cause some discomfort, would not likely lead to overall mortality and could be treated conservatively if the family would like to avoid further invasive procedures or prolonged hospitalization.  Family said that they would think about this as they wait to hear from hospitalist and surgical teams.      Discussed on November 4, 2019 with Suly DOWD, CNP:   Met with patient, two daughters and son-in-law at the bedside.  Reviewed prognostic conversation that they had with oncology yesterday.  Daughter reviewed the prognosis and verbalized that they wished to take a comfort approach.  Inquired if they knew about hospice enrollment and if that was the direction that they were indicating that they want to go, patient verbalized that yes she wanted to enroll in hospice after her gallbladder was treated if possible.    I  reviewed briefly about the hospice benefit and limitations on restorative care that are in place on hospice.  I emphasized symptom management and quality of life as the central emphasis for a patient on hospice.  Everyone verbalized understanding.    I discussed code status and patient stated that she wanted to be DNR/DNI at this time, she wanted to avoid any form of suffering and was comfortable with that decision.    Family requested informational meeting for hospice and to continue conversation as they knew more about the gallbladder treatment plan.           Review of Systems    CONSTITUTIONAL: NEGATIVE for fever, chills, change in weight  ENT/MOUTH: NEGATIVE for ear, mouth and throat problems  RESP: NEGATIVE for significant cough or SOB  CV: NEGATIVE for chest pain, palpitations or peripheral edema    Palliative Symptom Review (0=no symptom/no concern, 1=mild, 2=moderate, 3=severe):      Pain: 2-moderate      Fatigue: 1-mild      Nausea: 1-mild      Constipation: 0-none      Diarrhea: 1-mild      Depressive Symptoms: 0-none      Anxiety: 0-none      Drowsiness: 0-none      Poor Appetite: 2-moderate      Shortness of Breath: 0-none      Insomnia: 0-none    Physical Exam   Temp:  [97.1  F (36.2  C)-98  F (36.7  C)] 97.1  F (36.2  C)  Heart Rate:  [69-99] 69  Resp:  [16] 16  BP: (102-132)/(62-70) 102/62  SpO2:  [94 %-98 %] 98 %  151 lbs 14.4 oz  GEN:  Alert, oriented x 3, appears comfortable, No apparent distress.  HEENT:  Normocephalic/atraumatic, no scleral icterus, no nasal discharge, mouth dry.  CV:  RRR, S1, S2; no murmurs or other irregularities noted.  +3 DP/PT pulses bilaterally; no edema bilateral lower extremeties.  RESP:  Clear to auscultation bilaterally without rales/rhonchi/wheezing/retractions.  Symmetric chest rise on inhalation noted.  Normal respiratory effort.  ABD:  Rounded, soft, non-tender/non-distended.  +BS  EXT:  Edema & pulses as noted above.  Color, moisture and sensation intact x 4.      M/S:   Tender to palpation over thoracic spine.    SKIN:  Dry to touch, no exanthems noted in the visualized areas.    NEURO: Symmetric strength +4/5.  Sensation to touch intact all extremities.   There is no area of allodynia or hyperesthesia.  PAIN BEHAVIOR: Cooperative  Psych:  Normal affect.  Calm, cooperative, conversant appropriately.      Medications       diclofenac  4 g Transdermal 4x Daily     folic acid  1 mg Oral Daily     gabapentin  300 mg Oral Daily     gabapentin  600 mg Oral At Bedtime     HYDROmorphone (STANDARD CONC)  4 mg Oral Q4H     insulin aspart  1-7 Units Subcutaneous TID AC     insulin aspart  1-5 Units Subcutaneous At Bedtime     lactobacillus rhamnosus (GG)  1 capsule Oral BID     lidocaine  2 patch Transdermal Q24h    And     lidocaine   Transdermal Q24H    And     lidocaine   Transdermal Q8H     lidocaine visc 2% & maalox/mylanta w/simethicone & diphenhydramine  10 mL Swish & Swallow 4x Daily w/meals     neomycin-bacitracin-polymyxin   Topical BID     nystatin  500,000 Units Swish & Swallow 4x Daily     pantoprazole  40 mg Oral QAM AC     sodium chloride (PF)  10 mL Intracatheter Q7 Days       Data   Recent Labs   Lab 11/06/19  0554 11/05/19  0605 11/04/19  1200 11/04/19  0518   WBC 6.7 6.5  --  6.8   HGB 9.4* 9.3*  --  9.1*   * 100  --  100   PLT 44* 34*  --  27*    146*  --  147*   POTASSIUM 3.9 3.8 3.8 3.3*   CHLORIDE 111* 114*  --  115*   CO2 31 29  --  29   BUN 16 16  --  18   CR 0.62 0.63  --  0.60   ANIONGAP 2* 3  --  3   BIBI 8.5 8.3*  --  8.7   * 125*  --  150*   ALBUMIN 1.8*  --   --   --    PROTTOTAL 5.3*  --   --   --    BILITOTAL 0.5  --   --   --    ALKPHOS 94  --   --   --    ALT 26  --   --   --    AST 30  --   --   --

## 2019-11-07 NOTE — PLAN OF CARE
Cared for patient from [id number]. Remains hospitalized for pain control related to metastatic lung ca. Pain controlled this evening with scheduled Dilaudid - prn PO Dilaudid ordered today in preparation for discharge. Remains on 1L O2. Hopeful for discharge tomorrow with hospice.

## 2019-11-08 NOTE — PROGRESS NOTES
Transition Communication Hand-off for Care Transitions to Next Level of Care Provider    Name: Clauida Simon  : 1945  MRN #: [id number]  Primary Care Provider: Sekou Craig  Primary Care MD Name: Kiara  Primary Clinic: [address] [facility] [address]  Primary Care Clinic Name: [facility]   Reason for Hospitalization:  Epistaxis [R04.0]  Thrombocytopenia (H) [D69.6]  Neutropenic fever (H) [D70.9, R50.81]  Admit Date/Time: 10/27/2019 10:48 AM  Discharge Date:   Payor Source: Payor: MEDICARE / Plan: MEDICARE / Product Type: Medicare /     Readmission Assessment Measure (HOLDEN) Risk Score/category: Elevated         Reason for Communication Hand-off Referral: Fragility    Discharge Plan:    Concern for non-adherence with plan of care:   No  Discharge Needs Assessment:  Needs      Most Recent Value   Equipment Currently Used at Home  walker, rolling   Current Discharge Risk  terminally ill   # of Referrals Placed by [facility]  Internal Clinic Care Coordination   Hospice  [facility] [phone], Fax: [phone]          Already enrolled in Tele-monitoring program and name of program:  NA  Follow-up specialty is recommended: No    Follow-up plan:    Future Appointments   Date Time Provider Department Center   2019  9:00 AM [address]   2019 10:30 AM Diomedes Valladares MD [facility]   2019 10:00 AM [facility]1 [facility]   2019 10:40 AM [facility]1 [facility]   2019 12:45 PM Glenys Roman MD [facility]   2019 12:00 PM RH INFUSION CHAIR 8 [facility] RID       Any outstanding tests or procedures:    Procedures     Future Labs/Procedures    Oxygen Adult     Comments:    [place] Oxygen Order 1-2 liter(s) by nasal cannula continuously with use of portable tank. Expected treatment length is indefinite (99 months).. Test on conserving device as applicable.    Patients who qualify for home O2 coverage under the CMS guidelines require  ABG tests or O2 sat readings obtained closest to, but no earlier than 2 days prior to the discharge, as evidence of the need for home oxygen therapy. Testing must be performed while patient is in the chronic stable state. See notes for O2 sats.    I certify that this patient, Claudia Simon has been under my care and that I, or a nurse practitioner or physician's assistant working with me, had a face-to-face encounter that meets the face-to-face encounter requirements with this patient on 11/7/2019. The patient, Claudia Simon was evaluated or treated in whole, or in part, for the following medical condition, which necessitates the use of the ordered oxygen. Treatment Diagnosis: metastatic lung cancer - hospice    Attending Provider: Lindsey Sousa DO  Physician signature: See electronic signature associated with these discharge orders  Date of Order: November 7, 2019                 Key Recommendations:  Pt is admitted with Neutropenic Fever.  She has Lung Ca with Bone and Brain mets . She follows with Dr Livingston with [facility].  Pt was discharged to home with spouse, dtr and [facility].     Hyun Stinson RN/sent by HALIE Tao RN     AVS/Discharge Summary is the source of truth; this is a helpful guide for improved communication of patient story

## 2019-11-11 NOTE — PROGRESS NOTES
Clinic Care Coordination Contact    Situation: Patient chart reviewed by care coordinator. Received and reviewed CTS letter.     Background: RNCC following patient on maintenance status after goal completed of completing a health care directive.     Assessment: Patient was admitted to [facility] from 10/27 to 11/07 with a diagnosis of metastatic lung cancer involving brain and bone, neutropenic fever.     Plan/Recommendations: Patient was discharged with hospice. No further outreaches scheduled.     Lawanda Hudson RN Care Coordinator  [facility]  Email: [email or web address]  Phone: [phone]

## 2019-11-14 ENCOUNTER — TELEPHONE (OUTPATIENT)
Dept: ONCOLOGY | Facility: CLINIC | Age: 74
End: 2019-11-14

## 2019-11-14 NOTE — TELEPHONE ENCOUNTER
Received notice of patient death.  Date of Death  11.13.19  All appointments, orders and treatment plans cancelled.  Care TEAM and HIM notified

## 2023-01-01 NOTE — TELEPHONE ENCOUNTER
Patient calling and states has not started Lisinopril yet.  Wants to finish Captopril she has-#25 yet.  Changed med due to cost.  Advised that is fine.  Advised to come see Dr. Craig 1 month after starting Lisinopril.  Patient agrees with plan.  Angeli Juarez RN     negative

## 2025-06-08 NOTE — TELEPHONE ENCOUNTER
Panel Management Review      Patient has the following on her problem list:     Diabetes    ASA: Passed    Last A1C  Lab Results   Component Value Date    A1C 6.4 07/27/2018    A1C 6.5 06/26/2017    A1C 7.4 06/23/2015     A1C tested: Passed    Last LDL:    Lab Results   Component Value Date    CHOL 203 07/27/2018     Lab Results   Component Value Date    HDL 40 07/27/2018     Lab Results   Component Value Date     07/27/2018     Lab Results   Component Value Date    TRIG 162 07/27/2018     Lab Results   Component Value Date    CHOLHDLRATIO 6.7 06/12/2015     Lab Results   Component Value Date    NHDL 163 07/27/2018       Is the patient on a Statin? YES             Is the patient on Aspirin? YES    Medications     HMG CoA Reductase Inhibitors    atorvastatin (LIPITOR) 10 MG tablet    Salicylates    ASPIRIN 81 MG OR TABS          Last three blood pressure readings:  BP Readings from Last 3 Encounters:   07/27/18 100/60   06/26/17 129/78   08/03/16 132/74       Date of last diabetes office visit: 7/27/2018     Tobacco History:     History   Smoking Status     Former Smoker     Years: 34.00     Quit date: 2/12/2009   Smokeless Tobacco     Never Used         Hypertension   Last three blood pressure readings:  BP Readings from Last 3 Encounters:   07/27/18 100/60   06/26/17 129/78   08/03/16 132/74     Blood pressure: Passed    HTN Guidelines:  Age 18-59 BP range:  Less than 140/90  Age 60-85 with Diabetes:  Less than 140/90  Age 60-85 without Diabetes:  less than 150/90      Composite cancer screening  Chart review shows that this patient is due/due soon for the following Colonoscopy  Summary:    Patient is due/failing the following:   COLONOSCOPY    Action needed:   Schedule colonoscopy or complete FIT test    Type of outreach:    routed to panel pool for outreach    Questions for provider review:    None                                                                                                                                     Radha Rich       Chart routed to Care Team .             Skin normal color for race, warm, dry and intact. No evidence of rash.

## (undated) RX ORDER — LIDOCAINE HYDROCHLORIDE 10 MG/ML
INJECTION, SOLUTION EPIDURAL; INFILTRATION; INTRACAUDAL; PERINEURAL
Status: DISPENSED
Start: 2019-01-01

## (undated) RX ORDER — FENTANYL CITRATE 50 UG/ML
INJECTION, SOLUTION INTRAMUSCULAR; INTRAVENOUS
Status: DISPENSED
Start: 2019-01-01